# Patient Record
Sex: FEMALE | Race: WHITE | NOT HISPANIC OR LATINO | URBAN - METROPOLITAN AREA
[De-identification: names, ages, dates, MRNs, and addresses within clinical notes are randomized per-mention and may not be internally consistent; named-entity substitution may affect disease eponyms.]

---

## 2017-03-30 ENCOUNTER — OUTPATIENT (OUTPATIENT)
Dept: OUTPATIENT SERVICES | Facility: HOSPITAL | Age: 82
LOS: 1 days | Discharge: HOME | End: 2017-03-30

## 2017-06-27 DIAGNOSIS — R91.1 SOLITARY PULMONARY NODULE: ICD-10-CM

## 2017-07-10 ENCOUNTER — OUTPATIENT (OUTPATIENT)
Dept: OUTPATIENT SERVICES | Facility: HOSPITAL | Age: 82
LOS: 1 days | Discharge: HOME | End: 2017-07-10

## 2017-07-10 DIAGNOSIS — R91.8 OTHER NONSPECIFIC ABNORMAL FINDING OF LUNG FIELD: ICD-10-CM

## 2017-10-06 PROBLEM — Z00.00 ENCOUNTER FOR PREVENTIVE HEALTH EXAMINATION: Status: ACTIVE | Noted: 2017-10-06

## 2017-11-28 ENCOUNTER — OUTPATIENT (OUTPATIENT)
Dept: OUTPATIENT SERVICES | Facility: HOSPITAL | Age: 82
LOS: 1 days | Discharge: HOME | End: 2017-11-28

## 2017-11-28 DIAGNOSIS — R91.8 OTHER NONSPECIFIC ABNORMAL FINDING OF LUNG FIELD: ICD-10-CM

## 2017-11-29 ENCOUNTER — APPOINTMENT (OUTPATIENT)
Dept: CARDIOLOGY | Facility: CLINIC | Age: 82
End: 2017-11-29

## 2017-11-29 VITALS — WEIGHT: 116 LBS | HEIGHT: 59 IN | BODY MASS INDEX: 23.39 KG/M2

## 2017-11-29 VITALS — SYSTOLIC BLOOD PRESSURE: 150 MMHG | HEART RATE: 78 BPM | DIASTOLIC BLOOD PRESSURE: 80 MMHG

## 2017-11-29 RX ORDER — CHOLECALCIFEROL (VITAMIN D3) 50 MCG
2000 CAPSULE ORAL
Refills: 0 | Status: ACTIVE | COMMUNITY

## 2017-11-29 RX ORDER — MULTIVIT-MIN/FA/LYCOPEN/LUTEIN .4-300-25
TABLET ORAL
Refills: 0 | Status: ACTIVE | COMMUNITY

## 2017-11-29 RX ORDER — ZOLPIDEM TARTRATE 10 MG/1
10 TABLET, FILM COATED ORAL
Refills: 0 | Status: ACTIVE | COMMUNITY

## 2017-12-14 ENCOUNTER — APPOINTMENT (OUTPATIENT)
Dept: CARDIOLOGY | Facility: CLINIC | Age: 82
End: 2017-12-14

## 2018-02-15 ENCOUNTER — OUTPATIENT (OUTPATIENT)
Dept: OUTPATIENT SERVICES | Facility: HOSPITAL | Age: 83
LOS: 1 days | Discharge: HOME | End: 2018-02-15

## 2018-02-15 DIAGNOSIS — R06.02 SHORTNESS OF BREATH: ICD-10-CM

## 2018-02-15 DIAGNOSIS — J44.9 CHRONIC OBSTRUCTIVE PULMONARY DISEASE, UNSPECIFIED: ICD-10-CM

## 2018-02-15 RX ORDER — REGADENOSON 0.08 MG/ML
0.4 INJECTION, SOLUTION INTRAVENOUS ONCE
Qty: 0 | Refills: 0 | Status: DISCONTINUED | OUTPATIENT
Start: 2018-02-15 | End: 2018-03-02

## 2018-04-05 ENCOUNTER — APPOINTMENT (OUTPATIENT)
Dept: CARDIOLOGY | Facility: CLINIC | Age: 83
End: 2018-04-05

## 2018-04-05 VITALS
BODY MASS INDEX: 22.78 KG/M2 | WEIGHT: 113 LBS | SYSTOLIC BLOOD PRESSURE: 130 MMHG | HEART RATE: 71 BPM | DIASTOLIC BLOOD PRESSURE: 74 MMHG | HEIGHT: 59 IN

## 2018-04-05 RX ORDER — PANTOPRAZOLE 20 MG/1
20 TABLET, DELAYED RELEASE ORAL DAILY
Refills: 0 | Status: DISCONTINUED | COMMUNITY
End: 2018-04-05

## 2018-04-18 ENCOUNTER — OUTPATIENT (OUTPATIENT)
Dept: OUTPATIENT SERVICES | Facility: HOSPITAL | Age: 83
LOS: 1 days | Discharge: HOME | End: 2018-04-18

## 2018-04-18 DIAGNOSIS — Z12.31 ENCOUNTER FOR SCREENING MAMMOGRAM FOR MALIGNANT NEOPLASM OF BREAST: ICD-10-CM

## 2018-04-19 ENCOUNTER — OUTPATIENT (OUTPATIENT)
Dept: OUTPATIENT SERVICES | Facility: HOSPITAL | Age: 83
LOS: 1 days | Discharge: HOME | End: 2018-04-19

## 2018-04-19 DIAGNOSIS — R92.8 OTHER ABNORMAL AND INCONCLUSIVE FINDINGS ON DIAGNOSTIC IMAGING OF BREAST: ICD-10-CM

## 2018-05-01 ENCOUNTER — OUTPATIENT (OUTPATIENT)
Dept: OUTPATIENT SERVICES | Facility: HOSPITAL | Age: 83
LOS: 1 days | Discharge: HOME | End: 2018-05-01

## 2018-05-01 DIAGNOSIS — R91.8 OTHER NONSPECIFIC ABNORMAL FINDING OF LUNG FIELD: ICD-10-CM

## 2018-06-25 ENCOUNTER — OUTPATIENT (OUTPATIENT)
Dept: OUTPATIENT SERVICES | Facility: HOSPITAL | Age: 83
LOS: 1 days | Discharge: HOME | End: 2018-06-25

## 2018-06-25 ENCOUNTER — RESULT REVIEW (OUTPATIENT)
Age: 83
End: 2018-06-25

## 2018-06-25 VITALS
HEART RATE: 86 BPM | SYSTOLIC BLOOD PRESSURE: 135 MMHG | WEIGHT: 110.89 LBS | TEMPERATURE: 98 F | DIASTOLIC BLOOD PRESSURE: 76 MMHG | RESPIRATION RATE: 18 BRPM | HEIGHT: 58 IN

## 2018-06-25 VITALS
SYSTOLIC BLOOD PRESSURE: 152 MMHG | DIASTOLIC BLOOD PRESSURE: 63 MMHG | HEART RATE: 80 BPM | OXYGEN SATURATION: 98 % | RESPIRATION RATE: 18 BRPM

## 2018-06-25 DIAGNOSIS — Z98.890 OTHER SPECIFIED POSTPROCEDURAL STATES: Chronic | ICD-10-CM

## 2018-06-25 DIAGNOSIS — K63.5 POLYP OF COLON: ICD-10-CM

## 2018-06-25 DIAGNOSIS — Z90.89 ACQUIRED ABSENCE OF OTHER ORGANS: Chronic | ICD-10-CM

## 2018-06-25 NOTE — ASU PATIENT PROFILE, ADULT - PMH
Anxiety    COPD (chronic obstructive pulmonary disease)    GERD (gastroesophageal reflux disease)    Glaucoma

## 2018-06-25 NOTE — ASU DISCHARGE PLAN (ADULT/PEDIATRIC). - NOTIFY
Excessive Diarrhea/Inability to Tolerate Liquids or Foods/Persistent Nausea and Vomiting/Fever greater than 101/Pain not relieved by Medications/Bleeding that does not stop

## 2018-06-26 LAB — SURGICAL PATHOLOGY STUDY: SIGNIFICANT CHANGE UP

## 2018-06-27 DIAGNOSIS — K63.5 POLYP OF COLON: ICD-10-CM

## 2018-06-27 DIAGNOSIS — H40.9 UNSPECIFIED GLAUCOMA: ICD-10-CM

## 2018-06-27 DIAGNOSIS — D12.3 BENIGN NEOPLASM OF TRANSVERSE COLON: ICD-10-CM

## 2018-06-27 DIAGNOSIS — J44.9 CHRONIC OBSTRUCTIVE PULMONARY DISEASE, UNSPECIFIED: ICD-10-CM

## 2018-06-27 DIAGNOSIS — F41.9 ANXIETY DISORDER, UNSPECIFIED: ICD-10-CM

## 2018-06-27 DIAGNOSIS — K21.9 GASTRO-ESOPHAGEAL REFLUX DISEASE WITHOUT ESOPHAGITIS: ICD-10-CM

## 2018-06-27 DIAGNOSIS — Z09 ENCOUNTER FOR FOLLOW-UP EXAMINATION AFTER COMPLETED TREATMENT FOR CONDITIONS OTHER THAN MALIGNANT NEOPLASM: ICD-10-CM

## 2018-10-11 PROBLEM — J44.9 CHRONIC OBSTRUCTIVE PULMONARY DISEASE, UNSPECIFIED: Chronic | Status: ACTIVE | Noted: 2018-06-25

## 2018-10-11 PROBLEM — K21.9 GASTRO-ESOPHAGEAL REFLUX DISEASE WITHOUT ESOPHAGITIS: Chronic | Status: ACTIVE | Noted: 2018-06-25

## 2018-10-11 PROBLEM — F41.9 ANXIETY DISORDER, UNSPECIFIED: Chronic | Status: ACTIVE | Noted: 2018-06-25

## 2018-10-11 PROBLEM — H40.9 UNSPECIFIED GLAUCOMA: Chronic | Status: ACTIVE | Noted: 2018-06-25

## 2018-10-24 ENCOUNTER — OUTPATIENT (OUTPATIENT)
Dept: OUTPATIENT SERVICES | Facility: HOSPITAL | Age: 83
LOS: 1 days | Discharge: HOME | End: 2018-10-24

## 2018-10-24 DIAGNOSIS — Z98.890 OTHER SPECIFIED POSTPROCEDURAL STATES: Chronic | ICD-10-CM

## 2018-10-24 DIAGNOSIS — R92.8 OTHER ABNORMAL AND INCONCLUSIVE FINDINGS ON DIAGNOSTIC IMAGING OF BREAST: ICD-10-CM

## 2018-10-24 DIAGNOSIS — Z90.89 ACQUIRED ABSENCE OF OTHER ORGANS: Chronic | ICD-10-CM

## 2018-12-11 ENCOUNTER — APPOINTMENT (OUTPATIENT)
Dept: CARDIOLOGY | Facility: CLINIC | Age: 83
End: 2018-12-11

## 2018-12-12 ENCOUNTER — APPOINTMENT (OUTPATIENT)
Dept: CARDIOLOGY | Facility: CLINIC | Age: 83
End: 2018-12-12

## 2019-01-03 ENCOUNTER — APPOINTMENT (OUTPATIENT)
Dept: CARDIOLOGY | Facility: CLINIC | Age: 84
End: 2019-01-03

## 2019-01-03 VITALS
SYSTOLIC BLOOD PRESSURE: 150 MMHG | HEIGHT: 59 IN | BODY MASS INDEX: 22.98 KG/M2 | HEART RATE: 71 BPM | WEIGHT: 114 LBS | DIASTOLIC BLOOD PRESSURE: 80 MMHG

## 2019-01-03 RX ORDER — ESCITALOPRAM OXALATE 10 MG/1
10 TABLET ORAL
Qty: 90 | Refills: 0 | Status: DISCONTINUED | COMMUNITY
Start: 2017-12-09 | End: 2019-01-03

## 2019-01-03 NOTE — REVIEW OF SYSTEMS
[Shortness Of Breath] : shortness of breath [Joint Pain] : joint pain [Negative] : Psychiatric [Chest Pain] : no chest pain [Palpitations] : no palpitations

## 2019-01-03 NOTE — PHYSICAL EXAM
[General Appearance - Well Developed] : well developed [Normal Appearance] : normal appearance [Well Groomed] : well groomed [General Appearance - Well Nourished] : well nourished [No Deformities] : no deformities [General Appearance - In No Acute Distress] : no acute distress [Normal Conjunctiva] : the conjunctiva exhibited no abnormalities [Eyelids - No Xanthelasma] : the eyelids demonstrated no xanthelasmas [Normal Oral Mucosa] : normal oral mucosa [No Oral Pallor] : no oral pallor [No Oral Cyanosis] : no oral cyanosis [Respiration, Rhythm And Depth] : normal respiratory rhythm and effort [Exaggerated Use Of Accessory Muscles For Inspiration] : no accessory muscle use [Auscultation Breath Sounds / Voice Sounds] : lungs were clear to auscultation bilaterally [Abdomen Soft] : soft [Abdomen Tenderness] : non-tender [Abdomen Mass (___ Cm)] : no abdominal mass palpated [Abnormal Walk] : normal gait [Nail Clubbing] : no clubbing of the fingernails [Cyanosis, Localized] : no localized cyanosis [Petechial Hemorrhages (___cm)] : no petechial hemorrhages [Skin Color & Pigmentation] : normal skin color and pigmentation [] : no rash [No Venous Stasis] : no venous stasis [Skin Lesions] : no skin lesions [No Skin Ulcers] : no skin ulcer [No Xanthoma] : no  xanthoma was observed [Normal Rate] : normal [Rhythm Regular] : regular [No Murmur] : no murmurs heard [2+] : left 2+ [No Pitting Edema] : no pitting edema present [Rt] : varicose veins of the right leg noted [Lt] : varicose veins of the left leg noted [FreeTextEntry1] : No JVD

## 2019-01-03 NOTE — HISTORY OF PRESENT ILLNESS
[FreeTextEntry1] : The patient has COPD since 2000. She had stopped smoking more than 30 years ago. Overall she has no chest pain . She does have SOB with the slightest exertion.

## 2019-01-03 NOTE — ASSESSMENT
[FreeTextEntry1] : The patient has COPD . It is thought much of her SOB is from that .She has normal LV systolic function . She has mild MR . No chest pain and Lexiscan stress test was negative for ischemia less than 2 years ago.

## 2019-01-23 ENCOUNTER — OUTPATIENT (OUTPATIENT)
Dept: OUTPATIENT SERVICES | Facility: HOSPITAL | Age: 84
LOS: 1 days | Discharge: HOME | End: 2019-01-23

## 2019-01-23 DIAGNOSIS — Z90.89 ACQUIRED ABSENCE OF OTHER ORGANS: Chronic | ICD-10-CM

## 2019-01-23 DIAGNOSIS — Z98.890 OTHER SPECIFIED POSTPROCEDURAL STATES: Chronic | ICD-10-CM

## 2019-01-23 DIAGNOSIS — M25.511 PAIN IN RIGHT SHOULDER: ICD-10-CM

## 2019-05-01 ENCOUNTER — OUTPATIENT (OUTPATIENT)
Dept: OUTPATIENT SERVICES | Facility: HOSPITAL | Age: 84
LOS: 1 days | Discharge: HOME | End: 2019-05-01
Payer: MEDICARE

## 2019-05-01 DIAGNOSIS — Z98.890 OTHER SPECIFIED POSTPROCEDURAL STATES: Chronic | ICD-10-CM

## 2019-05-01 DIAGNOSIS — R92.8 OTHER ABNORMAL AND INCONCLUSIVE FINDINGS ON DIAGNOSTIC IMAGING OF BREAST: ICD-10-CM

## 2019-05-01 DIAGNOSIS — Z90.89 ACQUIRED ABSENCE OF OTHER ORGANS: Chronic | ICD-10-CM

## 2019-05-01 PROCEDURE — 77066 DX MAMMO INCL CAD BI: CPT | Mod: 26

## 2019-05-01 PROCEDURE — G0279: CPT | Mod: 26

## 2019-05-08 ENCOUNTER — OUTPATIENT (OUTPATIENT)
Dept: OUTPATIENT SERVICES | Facility: HOSPITAL | Age: 84
LOS: 1 days | Discharge: HOME | End: 2019-05-08
Payer: MEDICARE

## 2019-05-08 DIAGNOSIS — Z98.890 OTHER SPECIFIED POSTPROCEDURAL STATES: Chronic | ICD-10-CM

## 2019-05-08 DIAGNOSIS — R91.8 OTHER NONSPECIFIC ABNORMAL FINDING OF LUNG FIELD: ICD-10-CM

## 2019-05-08 DIAGNOSIS — J44.9 CHRONIC OBSTRUCTIVE PULMONARY DISEASE, UNSPECIFIED: ICD-10-CM

## 2019-05-08 DIAGNOSIS — Z90.89 ACQUIRED ABSENCE OF OTHER ORGANS: Chronic | ICD-10-CM

## 2019-05-08 PROCEDURE — 71250 CT THORAX DX C-: CPT | Mod: 26

## 2019-07-09 ENCOUNTER — APPOINTMENT (OUTPATIENT)
Dept: CARDIOLOGY | Facility: CLINIC | Age: 84
End: 2019-07-09
Payer: MEDICARE

## 2019-07-09 VITALS
SYSTOLIC BLOOD PRESSURE: 130 MMHG | BODY MASS INDEX: 22.18 KG/M2 | HEIGHT: 59 IN | WEIGHT: 110 LBS | DIASTOLIC BLOOD PRESSURE: 70 MMHG | HEART RATE: 76 BPM

## 2019-07-09 PROCEDURE — 93000 ELECTROCARDIOGRAM COMPLETE: CPT

## 2019-07-09 PROCEDURE — 99214 OFFICE O/P EST MOD 30 MIN: CPT

## 2019-07-09 NOTE — ASSESSMENT
[FreeTextEntry1] : The patient has COPD . It is thought much of her SOB is from that .She has normal LV systolic function . She has mild MR . No chest pain and Lexiscan stress test was negative for ischemia l 2 years ago.   The patient has severe COPD

## 2019-07-09 NOTE — PHYSICAL EXAM
[General Appearance - Well Developed] : well developed [Well Groomed] : well groomed [Normal Appearance] : normal appearance [No Deformities] : no deformities [General Appearance - Well Nourished] : well nourished [General Appearance - In No Acute Distress] : no acute distress [Normal Conjunctiva] : the conjunctiva exhibited no abnormalities [Normal Oral Mucosa] : normal oral mucosa [Eyelids - No Xanthelasma] : the eyelids demonstrated no xanthelasmas [No Oral Pallor] : no oral pallor [No Oral Cyanosis] : no oral cyanosis [Respiration, Rhythm And Depth] : normal respiratory rhythm and effort [Exaggerated Use Of Accessory Muscles For Inspiration] : no accessory muscle use [Auscultation Breath Sounds / Voice Sounds] : lungs were clear to auscultation bilaterally [Abdomen Soft] : soft [Abdomen Tenderness] : non-tender [Abdomen Mass (___ Cm)] : no abdominal mass palpated [Abnormal Walk] : normal gait [Nail Clubbing] : no clubbing of the fingernails [Petechial Hemorrhages (___cm)] : no petechial hemorrhages [Cyanosis, Localized] : no localized cyanosis [Skin Color & Pigmentation] : normal skin color and pigmentation [] : no rash [No Venous Stasis] : no venous stasis [No Skin Ulcers] : no skin ulcer [Skin Lesions] : no skin lesions [No Xanthoma] : no  xanthoma was observed [Normal Rate] : normal [Rhythm Regular] : regular [No Murmur] : no murmurs heard [2+] : left 2+ [Rt] : varicose veins of the right leg noted [No Pitting Edema] : no pitting edema present [Lt] : varicose veins of the left leg noted [FreeTextEntry1] : No JVD

## 2019-09-04 ENCOUNTER — OUTPATIENT (OUTPATIENT)
Dept: OUTPATIENT SERVICES | Facility: HOSPITAL | Age: 84
LOS: 1 days | Discharge: HOME | End: 2019-09-04
Payer: MEDICARE

## 2019-09-04 DIAGNOSIS — Z90.89 ACQUIRED ABSENCE OF OTHER ORGANS: Chronic | ICD-10-CM

## 2019-09-04 DIAGNOSIS — R91.8 OTHER NONSPECIFIC ABNORMAL FINDING OF LUNG FIELD: ICD-10-CM

## 2019-09-04 DIAGNOSIS — Z98.890 OTHER SPECIFIED POSTPROCEDURAL STATES: Chronic | ICD-10-CM

## 2019-09-04 PROCEDURE — 71250 CT THORAX DX C-: CPT | Mod: 26

## 2019-10-10 ENCOUNTER — OUTPATIENT (OUTPATIENT)
Dept: OUTPATIENT SERVICES | Facility: HOSPITAL | Age: 84
LOS: 1 days | Discharge: HOME | End: 2019-10-10

## 2019-10-10 DIAGNOSIS — R79.9 ABNORMAL FINDING OF BLOOD CHEMISTRY, UNSPECIFIED: ICD-10-CM

## 2019-10-10 DIAGNOSIS — Z90.89 ACQUIRED ABSENCE OF OTHER ORGANS: Chronic | ICD-10-CM

## 2019-10-10 DIAGNOSIS — Z98.890 OTHER SPECIFIED POSTPROCEDURAL STATES: Chronic | ICD-10-CM

## 2019-10-12 ENCOUNTER — OUTPATIENT (OUTPATIENT)
Dept: OUTPATIENT SERVICES | Facility: HOSPITAL | Age: 84
LOS: 1 days | Discharge: HOME | End: 2019-10-12

## 2019-10-12 DIAGNOSIS — R79.9 ABNORMAL FINDING OF BLOOD CHEMISTRY, UNSPECIFIED: ICD-10-CM

## 2019-10-12 DIAGNOSIS — Z90.89 ACQUIRED ABSENCE OF OTHER ORGANS: Chronic | ICD-10-CM

## 2019-10-12 DIAGNOSIS — Z98.890 OTHER SPECIFIED POSTPROCEDURAL STATES: Chronic | ICD-10-CM

## 2019-10-17 ENCOUNTER — OUTPATIENT (OUTPATIENT)
Dept: OUTPATIENT SERVICES | Facility: HOSPITAL | Age: 84
LOS: 1 days | Discharge: HOME | End: 2019-10-17

## 2019-10-17 DIAGNOSIS — Z98.890 OTHER SPECIFIED POSTPROCEDURAL STATES: Chronic | ICD-10-CM

## 2019-10-17 DIAGNOSIS — R79.9 ABNORMAL FINDING OF BLOOD CHEMISTRY, UNSPECIFIED: ICD-10-CM

## 2019-10-17 DIAGNOSIS — Z90.89 ACQUIRED ABSENCE OF OTHER ORGANS: Chronic | ICD-10-CM

## 2019-12-04 ENCOUNTER — OUTPATIENT (OUTPATIENT)
Dept: OUTPATIENT SERVICES | Facility: HOSPITAL | Age: 84
LOS: 1 days | Discharge: HOME | End: 2019-12-04
Payer: MEDICARE

## 2019-12-04 DIAGNOSIS — Z98.890 OTHER SPECIFIED POSTPROCEDURAL STATES: Chronic | ICD-10-CM

## 2019-12-04 DIAGNOSIS — Z90.89 ACQUIRED ABSENCE OF OTHER ORGANS: Chronic | ICD-10-CM

## 2019-12-04 DIAGNOSIS — R92.8 OTHER ABNORMAL AND INCONCLUSIVE FINDINGS ON DIAGNOSTIC IMAGING OF BREAST: ICD-10-CM

## 2019-12-04 PROCEDURE — G0279: CPT | Mod: 26,LT

## 2019-12-04 PROCEDURE — 77065 DX MAMMO INCL CAD UNI: CPT | Mod: 26,LT

## 2019-12-05 DIAGNOSIS — Z13.820 ENCOUNTER FOR SCREENING FOR OSTEOPOROSIS: ICD-10-CM

## 2019-12-05 DIAGNOSIS — Z78.0 ASYMPTOMATIC MENOPAUSAL STATE: ICD-10-CM

## 2019-12-05 DIAGNOSIS — M81.0 AGE-RELATED OSTEOPOROSIS WITHOUT CURRENT PATHOLOGICAL FRACTURE: ICD-10-CM

## 2019-12-13 ENCOUNTER — OUTPATIENT (OUTPATIENT)
Dept: OUTPATIENT SERVICES | Facility: HOSPITAL | Age: 84
LOS: 1 days | Discharge: HOME | End: 2019-12-13
Payer: MEDICARE

## 2019-12-13 DIAGNOSIS — Z90.89 ACQUIRED ABSENCE OF OTHER ORGANS: Chronic | ICD-10-CM

## 2019-12-13 DIAGNOSIS — R91.1 SOLITARY PULMONARY NODULE: ICD-10-CM

## 2019-12-13 DIAGNOSIS — Z98.890 OTHER SPECIFIED POSTPROCEDURAL STATES: Chronic | ICD-10-CM

## 2019-12-13 PROCEDURE — 71250 CT THORAX DX C-: CPT | Mod: 26

## 2020-01-09 ENCOUNTER — APPOINTMENT (OUTPATIENT)
Dept: CARDIOLOGY | Facility: CLINIC | Age: 85
End: 2020-01-09
Payer: MEDICARE

## 2020-01-09 VITALS
BODY MASS INDEX: 20.96 KG/M2 | HEART RATE: 88 BPM | WEIGHT: 104 LBS | HEIGHT: 59 IN | DIASTOLIC BLOOD PRESSURE: 80 MMHG | SYSTOLIC BLOOD PRESSURE: 118 MMHG

## 2020-01-09 PROCEDURE — 99214 OFFICE O/P EST MOD 30 MIN: CPT

## 2020-01-09 PROCEDURE — 93000 ELECTROCARDIOGRAM COMPLETE: CPT

## 2020-01-09 RX ORDER — ASCORBIC ACID 500 MG
TABLET ORAL
Refills: 0 | Status: ACTIVE | COMMUNITY

## 2020-01-09 NOTE — REVIEW OF SYSTEMS
[Shortness Of Breath] : shortness of breath [Chest Pain] : no chest pain [Palpitations] : no palpitations [Joint Pain] : joint pain [Negative] : Psychiatric

## 2020-01-09 NOTE — PHYSICAL EXAM
[Normal Appearance] : normal appearance [General Appearance - Well Developed] : well developed [General Appearance - Well Nourished] : well nourished [No Deformities] : no deformities [Well Groomed] : well groomed [Normal Conjunctiva] : the conjunctiva exhibited no abnormalities [Eyelids - No Xanthelasma] : the eyelids demonstrated no xanthelasmas [General Appearance - In No Acute Distress] : no acute distress [No Oral Pallor] : no oral pallor [Normal Oral Mucosa] : normal oral mucosa [No Oral Cyanosis] : no oral cyanosis [FreeTextEntry1] : No JVD  [Exaggerated Use Of Accessory Muscles For Inspiration] : no accessory muscle use [Respiration, Rhythm And Depth] : normal respiratory rhythm and effort [Abdomen Tenderness] : non-tender [Abdomen Soft] : soft [Auscultation Breath Sounds / Voice Sounds] : lungs were clear to auscultation bilaterally [Abnormal Walk] : normal gait [Abdomen Mass (___ Cm)] : no abdominal mass palpated [Petechial Hemorrhages (___cm)] : no petechial hemorrhages [Cyanosis, Localized] : no localized cyanosis [Nail Clubbing] : no clubbing of the fingernails [] : no rash [Skin Color & Pigmentation] : normal skin color and pigmentation [No Venous Stasis] : no venous stasis [Skin Lesions] : no skin lesions [No Skin Ulcers] : no skin ulcer [Normal Rate] : normal [No Xanthoma] : no  xanthoma was observed [No Murmur] : no murmurs heard [Rhythm Regular] : regular [2+] : left 2+ [Rt] : varicose veins of the right leg noted [No Pitting Edema] : no pitting edema present [Lt] : varicose veins of the left leg noted

## 2020-01-09 NOTE — HISTORY OF PRESENT ILLNESS
[FreeTextEntry1] : The patient was admitted to [facility] with PNA . Hosptial course was complicated by a rapid heart rhythm . She is now improved. Being followed by Dr. de la torre .

## 2020-01-09 NOTE — ASSESSMENT
[FreeTextEntry1] : The patient has been admitted to [facility] with PNA . Said to have had a "rapid rhythm . The patient has had no chest pain . Ucertain what work up she has had from [facility] . SHe has changes on her CT scan of her chest , possible postinflammatory . She is following with Dr. de la torre . Nulcear stress test was negative for ischemia less than 2 years ago .

## 2020-01-10 ENCOUNTER — OTHER (OUTPATIENT)
Age: 85
End: 2020-01-10

## 2020-01-29 ENCOUNTER — APPOINTMENT (OUTPATIENT)
Dept: CARDIOLOGY | Facility: CLINIC | Age: 85
End: 2020-01-29
Payer: MEDICARE

## 2020-01-29 PROCEDURE — 93306 TTE W/DOPPLER COMPLETE: CPT

## 2020-05-14 ENCOUNTER — APPOINTMENT (OUTPATIENT)
Dept: CARDIOLOGY | Facility: CLINIC | Age: 85
End: 2020-05-14
Payer: MEDICARE

## 2020-05-14 PROCEDURE — 99442: CPT | Mod: CR

## 2020-05-14 RX ORDER — ESCITALOPRAM OXALATE 10 MG/1
10 TABLET ORAL DAILY
Refills: 0 | Status: ACTIVE | COMMUNITY
Start: 2020-05-14

## 2020-05-14 NOTE — HISTORY OF PRESENT ILLNESS
[Verbal consent obtained from patient] : the patient, [unfilled] [FreeTextEntry1] : The patient has her baseline KONG . She has been active before the Covid crisis . She has not had another  admission and she had recovered from her PNA . No chest pain .  The patient had an echocardiogram in 1-2020 which showed Normal LV systolic function mod to severe TR RVSP was 51 mmhg . There is no edema according to the patient .  I have reviewed her medications .  Chol 170 HDL 60 LDL 96 [Time Spent: ___ minutes] : I have spent [unfilled] minutes with the patient on the telephone

## 2020-05-18 ENCOUNTER — OUTPATIENT (OUTPATIENT)
Dept: OUTPATIENT SERVICES | Facility: HOSPITAL | Age: 85
LOS: 1 days | Discharge: HOME | End: 2020-05-18
Payer: MEDICARE

## 2020-05-18 ENCOUNTER — RESULT REVIEW (OUTPATIENT)
Age: 85
End: 2020-05-18

## 2020-05-18 DIAGNOSIS — R91.8 OTHER NONSPECIFIC ABNORMAL FINDING OF LUNG FIELD: ICD-10-CM

## 2020-05-18 DIAGNOSIS — Z98.890 OTHER SPECIFIED POSTPROCEDURAL STATES: Chronic | ICD-10-CM

## 2020-05-18 DIAGNOSIS — Z90.89 ACQUIRED ABSENCE OF OTHER ORGANS: Chronic | ICD-10-CM

## 2020-05-18 PROCEDURE — 71250 CT THORAX DX C-: CPT | Mod: 26

## 2020-06-08 ENCOUNTER — OUTPATIENT (OUTPATIENT)
Dept: OUTPATIENT SERVICES | Facility: HOSPITAL | Age: 85
LOS: 1 days | Discharge: HOME | End: 2020-06-08
Payer: MEDICARE

## 2020-06-08 DIAGNOSIS — R92.8 OTHER ABNORMAL AND INCONCLUSIVE FINDINGS ON DIAGNOSTIC IMAGING OF BREAST: ICD-10-CM

## 2020-06-08 DIAGNOSIS — Z98.890 OTHER SPECIFIED POSTPROCEDURAL STATES: Chronic | ICD-10-CM

## 2020-06-08 DIAGNOSIS — Z90.89 ACQUIRED ABSENCE OF OTHER ORGANS: Chronic | ICD-10-CM

## 2020-06-08 PROCEDURE — G0279: CPT | Mod: 26

## 2020-06-08 PROCEDURE — 77066 DX MAMMO INCL CAD BI: CPT | Mod: 26

## 2020-09-03 NOTE — PACU DISCHARGE NOTE - MENTAL STATUS: PATIENT PARTICIPATION
"Anesthesia Transfer of Care Note    Patient: Fifi Mcnair    Procedure(s) Performed: Procedure(s) (LRB):  PHACOEMULSIFICATION, CATARACT (Left)    Patient location: PACU    Anesthesia Type: MAC    Transport from OR: Transported from OR on room air with adequate spontaneous ventilation    Post pain: adequate analgesia    Post assessment: no apparent anesthetic complications    Post vital signs: stable    Level of consciousness: awake    Nausea/Vomiting: no nausea/vomiting    Complications: none    Transfer of care protocol was followed      Last vitals:   Visit Vitals  BP (!) 123/59 (BP Location: Right arm, Patient Position: Lying)   Pulse 70   Temp 36.5 °C (97.7 °F) (Skin)   Resp 18   Ht 5' 2" (1.575 m)   Wt 117.9 kg (260 lb)   LMP  (LMP Unknown)   SpO2 99%   Breastfeeding No   BMI 47.55 kg/m²     " Awake

## 2020-09-16 ENCOUNTER — APPOINTMENT (OUTPATIENT)
Dept: CARDIOLOGY | Facility: CLINIC | Age: 85
End: 2020-09-16
Payer: MEDICARE

## 2020-09-16 VITALS
WEIGHT: 110 LBS | DIASTOLIC BLOOD PRESSURE: 80 MMHG | HEART RATE: 78 BPM | SYSTOLIC BLOOD PRESSURE: 130 MMHG | BODY MASS INDEX: 22.18 KG/M2 | TEMPERATURE: 96.7 F | HEIGHT: 59 IN

## 2020-09-16 PROCEDURE — 99214 OFFICE O/P EST MOD 30 MIN: CPT

## 2020-09-16 PROCEDURE — 93000 ELECTROCARDIOGRAM COMPLETE: CPT

## 2020-09-16 NOTE — PHYSICAL EXAM
[General Appearance - Well Developed] : well developed [Normal Appearance] : normal appearance [General Appearance - Well Nourished] : well nourished [Well Groomed] : well groomed [General Appearance - In No Acute Distress] : no acute distress [No Deformities] : no deformities [Normal Conjunctiva] : the conjunctiva exhibited no abnormalities [Eyelids - No Xanthelasma] : the eyelids demonstrated no xanthelasmas [Normal Oral Mucosa] : normal oral mucosa [No Oral Cyanosis] : no oral cyanosis [No Oral Pallor] : no oral pallor [Exaggerated Use Of Accessory Muscles For Inspiration] : no accessory muscle use [Respiration, Rhythm And Depth] : normal respiratory rhythm and effort [Auscultation Breath Sounds / Voice Sounds] : lungs were clear to auscultation bilaterally [Abdomen Soft] : soft [Abdomen Tenderness] : non-tender [Nail Clubbing] : no clubbing of the fingernails [Abnormal Walk] : normal gait [Abdomen Mass (___ Cm)] : no abdominal mass palpated [Cyanosis, Localized] : no localized cyanosis [Petechial Hemorrhages (___cm)] : no petechial hemorrhages [] : no rash [Skin Lesions] : no skin lesions [No Venous Stasis] : no venous stasis [Skin Color & Pigmentation] : normal skin color and pigmentation [Normal Rate] : normal [No Xanthoma] : no  xanthoma was observed [No Skin Ulcers] : no skin ulcer [Rhythm Regular] : regular [No Murmur] : no murmurs heard [Lt] : varicose veins of the left leg noted [No Pitting Edema] : no pitting edema present [Rt] : varicose veins of the right leg noted [2+] : left 2+ [FreeTextEntry1] : No JVD

## 2020-09-16 NOTE — PHYSICAL EXAM
[General Appearance - Well Developed] : well developed [Normal Appearance] : normal appearance [Well Groomed] : well groomed [General Appearance - Well Nourished] : well nourished [General Appearance - In No Acute Distress] : no acute distress [No Deformities] : no deformities [Normal Conjunctiva] : the conjunctiva exhibited no abnormalities [Normal Oral Mucosa] : normal oral mucosa [Eyelids - No Xanthelasma] : the eyelids demonstrated no xanthelasmas [No Oral Cyanosis] : no oral cyanosis [No Oral Pallor] : no oral pallor [Exaggerated Use Of Accessory Muscles For Inspiration] : no accessory muscle use [Respiration, Rhythm And Depth] : normal respiratory rhythm and effort [Auscultation Breath Sounds / Voice Sounds] : lungs were clear to auscultation bilaterally [Abdomen Soft] : soft [Abdomen Tenderness] : non-tender [Abnormal Walk] : normal gait [Abdomen Mass (___ Cm)] : no abdominal mass palpated [Nail Clubbing] : no clubbing of the fingernails [Cyanosis, Localized] : no localized cyanosis [Petechial Hemorrhages (___cm)] : no petechial hemorrhages [Skin Lesions] : no skin lesions [Skin Color & Pigmentation] : normal skin color and pigmentation [] : no rash [No Venous Stasis] : no venous stasis [Normal Rate] : normal [No Skin Ulcers] : no skin ulcer [No Xanthoma] : no  xanthoma was observed [No Murmur] : no murmurs heard [Rhythm Regular] : regular [Lt] : varicose veins of the left leg noted [Rt] : varicose veins of the right leg noted [No Pitting Edema] : no pitting edema present [2+] : left 2+ [FreeTextEntry1] : No JVD

## 2020-09-16 NOTE — ASSESSMENT
[FreeTextEntry1] : The patient has had her baseline KONG . She has not been sick during the pandemic . She does have some degree of pulmonary HTN . This may be from her lung issues . She does have moderate to severe TR . No clinical signs however of right sided HF . .

## 2020-09-16 NOTE — HISTORY OF PRESENT ILLNESS
[FreeTextEntry1] : The patient has been feeling well. Some KONG but is not changed . The patient overall feels well. . she has not been sick during pandemic

## 2020-11-17 ENCOUNTER — OUTPATIENT (OUTPATIENT)
Dept: OUTPATIENT SERVICES | Facility: HOSPITAL | Age: 85
LOS: 1 days | Discharge: HOME | End: 2020-11-17
Payer: MEDICARE

## 2020-11-17 ENCOUNTER — RESULT REVIEW (OUTPATIENT)
Age: 85
End: 2020-11-17

## 2020-11-17 DIAGNOSIS — Z98.890 OTHER SPECIFIED POSTPROCEDURAL STATES: Chronic | ICD-10-CM

## 2020-11-17 DIAGNOSIS — Z90.89 ACQUIRED ABSENCE OF OTHER ORGANS: Chronic | ICD-10-CM

## 2020-11-17 DIAGNOSIS — J44.9 CHRONIC OBSTRUCTIVE PULMONARY DISEASE, UNSPECIFIED: ICD-10-CM

## 2020-11-17 PROCEDURE — 71250 CT THORAX DX C-: CPT | Mod: 26

## 2020-11-28 ENCOUNTER — EMERGENCY (EMERGENCY)
Facility: HOSPITAL | Age: 85
LOS: 0 days | Discharge: HOME | End: 2020-11-28
Attending: STUDENT IN AN ORGANIZED HEALTH CARE EDUCATION/TRAINING PROGRAM | Admitting: STUDENT IN AN ORGANIZED HEALTH CARE EDUCATION/TRAINING PROGRAM
Payer: MEDICARE

## 2020-11-28 VITALS
OXYGEN SATURATION: 95 % | DIASTOLIC BLOOD PRESSURE: 85 MMHG | HEIGHT: 58 IN | TEMPERATURE: 98 F | HEART RATE: 100 BPM | SYSTOLIC BLOOD PRESSURE: 182 MMHG | RESPIRATION RATE: 19 BRPM

## 2020-11-28 VITALS — HEART RATE: 88 BPM

## 2020-11-28 DIAGNOSIS — Z90.89 ACQUIRED ABSENCE OF OTHER ORGANS: Chronic | ICD-10-CM

## 2020-11-28 DIAGNOSIS — R10.32 LEFT LOWER QUADRANT PAIN: ICD-10-CM

## 2020-11-28 DIAGNOSIS — Z98.890 OTHER SPECIFIED POSTPROCEDURAL STATES: Chronic | ICD-10-CM
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
## 2020-11-28 DIAGNOSIS — Z87.891 PERSONAL HISTORY OF NICOTINE DEPENDENCE: ICD-10-CM

## 2020-11-28 DIAGNOSIS — Z79.899 OTHER LONG TERM (CURRENT) DRUG THERAPY: ICD-10-CM
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
## 2020-11-28 DIAGNOSIS — J44.9 CHRONIC OBSTRUCTIVE PULMONARY DISEASE, UNSPECIFIED: ICD-10-CM
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
## 2020-11-28 DIAGNOSIS — M25.562 PAIN IN LEFT KNEE: ICD-10-CM

## 2020-11-28 DIAGNOSIS — R10.13 EPIGASTRIC PAIN: ICD-10-CM

## 2020-11-28 DIAGNOSIS — M25.561 PAIN IN RIGHT KNEE: ICD-10-CM

## 2020-11-28 LAB
ALBUMIN SERPL ELPH-MCNC: 4 G/DL — SIGNIFICANT CHANGE UP (ref 3.5–5.2)
ALP SERPL-CCNC: 61 U/L — SIGNIFICANT CHANGE UP (ref 30–115)
ALT FLD-CCNC: 14 U/L — SIGNIFICANT CHANGE UP (ref 0–41)
ANION GAP SERPL CALC-SCNC: 11 MMOL/L — SIGNIFICANT CHANGE UP (ref 7–14)
APPEARANCE UR: CLEAR — SIGNIFICANT CHANGE UP
AST SERPL-CCNC: 20 U/L — SIGNIFICANT CHANGE UP (ref 0–41)
BASOPHILS # BLD AUTO: 0.04 K/UL — SIGNIFICANT CHANGE UP (ref 0–0.2)
BASOPHILS NFR BLD AUTO: 0.2 % — SIGNIFICANT CHANGE UP (ref 0–1)
BILIRUB DIRECT SERPL-MCNC: <0.2 MG/DL — SIGNIFICANT CHANGE UP (ref 0–0.2)
BILIRUB INDIRECT FLD-MCNC: >0.4 MG/DL — SIGNIFICANT CHANGE UP (ref 0.2–1.2)
BILIRUB SERPL-MCNC: 0.6 MG/DL — SIGNIFICANT CHANGE UP (ref 0.2–1.2)
BILIRUB UR-MCNC: NEGATIVE — SIGNIFICANT CHANGE UP
BLD GP AB SCN SERPL QL: SIGNIFICANT CHANGE UP
BUN SERPL-MCNC: 22 MG/DL — HIGH (ref 10–20)
CALCIUM SERPL-MCNC: 9.6 MG/DL — SIGNIFICANT CHANGE UP (ref 8.5–10.1)
CHLORIDE SERPL-SCNC: 104 MMOL/L — SIGNIFICANT CHANGE UP (ref 98–110)
CO2 SERPL-SCNC: 23 MMOL/L — SIGNIFICANT CHANGE UP (ref 17–32)
COLOR SPEC: YELLOW — SIGNIFICANT CHANGE UP
CREAT SERPL-MCNC: 0.9 MG/DL — SIGNIFICANT CHANGE UP (ref 0.7–1.5)
DIFF PNL FLD: SIGNIFICANT CHANGE UP
EOSINOPHIL # BLD AUTO: 0.11 K/UL — SIGNIFICANT CHANGE UP (ref 0–0.7)
EOSINOPHIL NFR BLD AUTO: 0.7 % — SIGNIFICANT CHANGE UP (ref 0–8)
GLUCOSE SERPL-MCNC: 115 MG/DL — HIGH (ref 70–99)
GLUCOSE UR QL: NEGATIVE — SIGNIFICANT CHANGE UP
HCT VFR BLD CALC: 41.9 % — SIGNIFICANT CHANGE UP (ref 37–47)
HGB BLD-MCNC: 13.8 G/DL — SIGNIFICANT CHANGE UP (ref 12–16)
IMM GRANULOCYTES NFR BLD AUTO: 0.2 % — SIGNIFICANT CHANGE UP (ref 0.1–0.3)
KETONES UR-MCNC: SIGNIFICANT CHANGE UP
LACTATE SERPL-SCNC: 1 MMOL/L — SIGNIFICANT CHANGE UP (ref 0.7–2)
LEUKOCYTE ESTERASE UR-ACNC: NEGATIVE — SIGNIFICANT CHANGE UP
LIDOCAIN IGE QN: 23 U/L — SIGNIFICANT CHANGE UP (ref 7–60)
LYMPHOCYTES # BLD AUTO: 1.05 K/UL — LOW (ref 1.2–3.4)
LYMPHOCYTES # BLD AUTO: 6.5 % — LOW (ref 20.5–51.1)
MCHC RBC-ENTMCNC: 32.3 PG — HIGH (ref 27–31)
MCHC RBC-ENTMCNC: 32.9 G/DL — SIGNIFICANT CHANGE UP (ref 32–37)
MCV RBC AUTO: 98.1 FL — SIGNIFICANT CHANGE UP (ref 81–99)
MONOCYTES # BLD AUTO: 1.17 K/UL — HIGH (ref 0.1–0.6)
MONOCYTES NFR BLD AUTO: 7.2 % — SIGNIFICANT CHANGE UP (ref 1.7–9.3)
NEUTROPHILS # BLD AUTO: 13.74 K/UL — HIGH (ref 1.4–6.5)
NEUTROPHILS NFR BLD AUTO: 85.2 % — HIGH (ref 42.2–75.2)
NITRITE UR-MCNC: NEGATIVE — SIGNIFICANT CHANGE UP
NRBC # BLD: 0 /100 WBCS — SIGNIFICANT CHANGE UP (ref 0–0)
PH UR: 5.5 — SIGNIFICANT CHANGE UP (ref 5–8)
PLATELET # BLD AUTO: 295 K/UL — SIGNIFICANT CHANGE UP (ref 130–400)
POTASSIUM SERPL-MCNC: 4.1 MMOL/L — SIGNIFICANT CHANGE UP (ref 3.5–5)
POTASSIUM SERPL-SCNC: 4.1 MMOL/L — SIGNIFICANT CHANGE UP (ref 3.5–5)
PROT SERPL-MCNC: 6.6 G/DL — SIGNIFICANT CHANGE UP (ref 6–8)
PROT UR-MCNC: SIGNIFICANT CHANGE UP
RBC # BLD: 4.27 M/UL — SIGNIFICANT CHANGE UP (ref 4.2–5.4)
RBC # FLD: 13.1 % — SIGNIFICANT CHANGE UP (ref 11.5–14.5)
SODIUM SERPL-SCNC: 138 MMOL/L — SIGNIFICANT CHANGE UP (ref 135–146)
SP GR SPEC: 1.02 — SIGNIFICANT CHANGE UP (ref 1.01–1.03)
TROPONIN T SERPL-MCNC: <0.01 NG/ML — SIGNIFICANT CHANGE UP
UROBILINOGEN FLD QL: SIGNIFICANT CHANGE UP
WBC # BLD: 16.15 K/UL — HIGH (ref 4.8–10.8)
WBC # FLD AUTO: 16.15 K/UL — HIGH (ref 4.8–10.8)

## 2020-11-28 PROCEDURE — 99285 EMERGENCY DEPT VISIT HI MDM: CPT | Mod: GC

## 2020-11-28 PROCEDURE — 71260 CT THORAX DX C+: CPT | Mod: 26

## 2020-11-28 PROCEDURE — 71045 X-RAY EXAM CHEST 1 VIEW: CPT | Mod: 26

## 2020-11-28 PROCEDURE — 74177 CT ABD & PELVIS W/CONTRAST: CPT | Mod: 26

## 2020-11-28 PROCEDURE — 73562 X-RAY EXAM OF KNEE 3: CPT | Mod: 26,50

## 2020-11-28 PROCEDURE — 93010 ELECTROCARDIOGRAM REPORT: CPT

## 2020-11-28 RX ORDER — ACETAMINOPHEN 500 MG
650 TABLET ORAL ONCE
Refills: 0 | Status: COMPLETED | OUTPATIENT
Start: 2020-11-28 | End: 2020-11-28

## 2020-11-28 RX ORDER — FAMOTIDINE 10 MG/ML
1 INJECTION INTRAVENOUS
Qty: 14 | Refills: 0
Start: 2020-11-28 | End: 2020-12-11

## 2020-11-28 RX ORDER — SODIUM CHLORIDE 9 MG/ML
1000 INJECTION, SOLUTION INTRAVENOUS ONCE
Refills: 0 | Status: COMPLETED | OUTPATIENT
Start: 2020-11-28 | End: 2020-11-28

## 2020-11-28 RX ADMIN — Medication 650 MILLIGRAM(S): at 16:55

## 2020-11-28 RX ADMIN — Medication 650 MILLIGRAM(S): at 13:11

## 2020-11-28 RX ADMIN — SODIUM CHLORIDE 1000 MILLILITER(S): 9 INJECTION, SOLUTION INTRAVENOUS at 13:11

## 2020-11-28 NOTE — ED PROVIDER NOTE - NSFOLLOWUPINSTRUCTIONS_ED_ALL_ED_FT
Please take pepcid 20 mg once daily, and follow-up with the gastroenterologist (GI) doctor to get further evaluation for your abdominal pain.     Abdominal Pain    Many things can cause abdominal pain. Usually, abdominal pain is not caused by a disease and will improve without treatment. Your health care provider will do a physical exam and possibly order blood tests and imaging to help determine the seriousness of your pain. However, in many cases, no cause may be found and you may need further testing as an outpatient. Monitor your abdominal pain for any changes.     SEEK IMMEDIATE MEDICAL CARE IF YOU HAVE THE FOLLOWING SYMPTOMS: worsening abdominal pain, vomiting, diarrhea, inability to have bowel movements or pass gas, black or bloody stool, fever accompanying chest pain or back pain, or dizziness/lightheadedness.

## 2020-11-28 NOTE — ED PROVIDER NOTE - PATIENT PORTAL LINK FT
You can access the FollowMyHealth Patient Portal offered by [facility] by registering at the following website: [email or web address]. By joining [employer]’s FollowMyHealth portal, you will also be able to view your health information using other applications (apps) compatible with our system.

## 2020-11-28 NOTE — ED PROVIDER NOTE - PROGRESS NOTE DETAILS
Discussed lab/CT results with Dr. Ravi, noted that she was comfortable with discharge and GI followup. Patient expressed understanding of results and agreement with outpatient plan.

## 2020-11-28 NOTE — ED ADULT NURSE NOTE - PMH
Anxiety    COPD (chronic obstructive pulmonary disease)    GERD (gastroesophageal reflux disease)    Glaucoma    Pancreatitis

## 2020-11-28 NOTE — ED PROVIDER NOTE - CLINICAL SUMMARY MEDICAL DECISION MAKING FREE TEXT BOX
86F with PMH COPD, pancreatitis who presents to [facility] from Dr. Van's office for concern regarding abd pain. Pain is epigastric and wraps horizontally around her belly, difficult to characterize, constant. This morning she also had a mechanical fall just outside the office, in which she landed directly onto b/l knees, and was ambulatory afterwards. Denies head trauma of loc. Pt denies n/v/d/f/c. Received a call from Dr. Morales requesting CT scan to eval for possible rib fractures vs intra-abd pathology. EKG, CXR, CT chest/abd/pelvis and labs reviewed. Isolated WBC of 16 however no evidence of infection via workup in the ED. Dr. Van was updated and agrees pt may f/u with GI. Will give pepcid on d/c. Pt well appearing, on re-eval abd soft, nt. I have fully discussed the medical management and delivery of care with the patient. I have discussed any available labs, imaging and treatment options with the patient. All Questions answered at the bedside. Patient confirms understanding and has been given detailed return precautions. Patient instructed to return to the ED should symptoms persist or worsen. Patient has demonstrated capacity and has verbalized understanding. Patient is well appearing upon discharge, ambulatory with a steady gait.

## 2020-11-28 NOTE — ED PROVIDER NOTE - CARE PROVIDER_API CALL
Rodney Simmons)  Gastroenterology; Internal Medicine  [address] [place] [address]  Phone: [phone]  Fax: [phone]  Follow Up Time: 4-6 Days

## 2020-11-28 NOTE — ED ADULT NURSE NOTE - OBJECTIVE STATEMENT
pt presents to ED c/o s/p fall while going to doctors office. Pt states she tripped outside on curb, denies LOC, denies blood thinners. Pt also c/o epigastric abdominal pain wrapping around abdomen with 1 day of loose stools.

## 2020-11-28 NOTE — ED PROVIDER NOTE - OBJECTIVE STATEMENT
86F hx COPD/pancreatitis/glaucoma presents with abdominal pain/fall. Patient woke up today and noticed epigastric pain wrapping around her stomach area, endorses loose stools yesterday, denies fever/cough/vomiting/dysuria/back pain. Patient was on her way to the doctor's office when she fell twice d/t mechanical trip/fall from the car, landed on her hands and knees, now has mild knee pain bilaterally, denies head injury/loc.

## 2020-11-28 NOTE — ED PROVIDER NOTE - CONDITION AT DISCHARGE:
Please notify     Provider agrees with these recommendations. Please thank pharmacist for calling. Recommend 8 mg weekly and once patient tolerates can go to 16 mg every other week.    Thank you   Satisfactory

## 2020-11-28 NOTE — ED ADULT TRIAGE NOTE - CHIEF COMPLAINT QUOTE
pt with c/o abdominal pain, fell going to drs office, fell outside of the drs office. no loc. not blood thinners. c/o lower back and knee pain.

## 2020-11-29 LAB
CULTURE RESULTS: SIGNIFICANT CHANGE UP
SPECIMEN SOURCE: SIGNIFICANT CHANGE UP

## 2021-01-01 ENCOUNTER — OUTPATIENT (OUTPATIENT)
Dept: OUTPATIENT SERVICES | Facility: HOSPITAL | Age: 86
LOS: 1 days | Discharge: HOME | End: 2021-01-01
Payer: MEDICARE

## 2021-01-01 ENCOUNTER — APPOINTMENT (OUTPATIENT)
Dept: CARDIOLOGY | Facility: CLINIC | Age: 86
End: 2021-01-01
Payer: MEDICARE

## 2021-01-01 ENCOUNTER — RX RENEWAL (OUTPATIENT)
Age: 86
End: 2021-01-01

## 2021-01-01 ENCOUNTER — APPOINTMENT (OUTPATIENT)
Dept: CARE COORDINATION | Facility: HOME HEALTH | Age: 86
End: 2021-01-01
Payer: MEDICARE

## 2021-01-01 ENCOUNTER — INPATIENT (INPATIENT)
Facility: HOSPITAL | Age: 86
LOS: 8 days | Discharge: HOME | End: 2021-12-27
Attending: INTERNAL MEDICINE | Admitting: INTERNAL MEDICINE
Payer: MEDICARE

## 2021-01-01 ENCOUNTER — APPOINTMENT (OUTPATIENT)
Dept: CARDIOLOGY | Facility: CLINIC | Age: 86
End: 2021-01-01

## 2021-01-01 ENCOUNTER — TRANSCRIPTION ENCOUNTER (OUTPATIENT)
Age: 86
End: 2021-01-01

## 2021-01-01 ENCOUNTER — RESULT REVIEW (OUTPATIENT)
Age: 86
End: 2021-01-01

## 2021-01-01 ENCOUNTER — APPOINTMENT (OUTPATIENT)
Age: 86
End: 2021-01-01
Payer: MEDICARE

## 2021-01-01 VITALS
DIASTOLIC BLOOD PRESSURE: 70 MMHG | BODY MASS INDEX: 21.37 KG/M2 | OXYGEN SATURATION: 92 % | WEIGHT: 106 LBS | SYSTOLIC BLOOD PRESSURE: 122 MMHG | HEIGHT: 59 IN | HEART RATE: 74 BPM | RESPIRATION RATE: 14 BRPM

## 2021-01-01 VITALS
SYSTOLIC BLOOD PRESSURE: 149 MMHG | TEMPERATURE: 97 F | RESPIRATION RATE: 18 BRPM | HEART RATE: 106 BPM | DIASTOLIC BLOOD PRESSURE: 67 MMHG

## 2021-01-01 VITALS
SYSTOLIC BLOOD PRESSURE: 202 MMHG | RESPIRATION RATE: 18 BRPM | OXYGEN SATURATION: 99 % | HEIGHT: 58 IN | DIASTOLIC BLOOD PRESSURE: 98 MMHG | TEMPERATURE: 98 F | HEART RATE: 98 BPM

## 2021-01-01 VITALS — DIASTOLIC BLOOD PRESSURE: 78 MMHG | HEART RATE: 95 BPM | TEMPERATURE: 97 F | SYSTOLIC BLOOD PRESSURE: 132 MMHG

## 2021-01-01 DIAGNOSIS — Z90.89 ACQUIRED ABSENCE OF OTHER ORGANS: Chronic | ICD-10-CM

## 2021-01-01 DIAGNOSIS — Z98.890 OTHER SPECIFIED POSTPROCEDURAL STATES: Chronic | ICD-10-CM

## 2021-01-01 DIAGNOSIS — Z86.79 PERSONAL HISTORY OF OTHER DISEASES OF THE CIRCULATORY SYSTEM: ICD-10-CM

## 2021-01-01 DIAGNOSIS — Z12.31 ENCOUNTER FOR SCREENING MAMMOGRAM FOR MALIGNANT NEOPLASM OF BREAST: ICD-10-CM

## 2021-01-01 DIAGNOSIS — Z78.9 OTHER SPECIFIED HEALTH STATUS: ICD-10-CM

## 2021-01-01 DIAGNOSIS — R07.9 CHEST PAIN, UNSPECIFIED: ICD-10-CM

## 2021-01-01 DIAGNOSIS — K22.70 BARRETT'S ESOPHAGUS W/OUT DYSPLASIA: ICD-10-CM

## 2021-01-01 DIAGNOSIS — Z87.19 PERSONAL HISTORY OF OTHER DISEASES OF THE DIGESTIVE SYSTEM: ICD-10-CM

## 2021-01-01 DIAGNOSIS — Z87.891 PERSONAL HISTORY OF NICOTINE DEPENDENCE: ICD-10-CM

## 2021-01-01 DIAGNOSIS — Z87.09 PERSONAL HISTORY OF OTHER DISEASES OF THE RESPIRATORY SYSTEM: ICD-10-CM

## 2021-01-01 LAB
ALBUMIN SERPL ELPH-MCNC: 2.7 G/DL — LOW (ref 3.5–5.2)
ALBUMIN SERPL ELPH-MCNC: 2.8 G/DL — LOW (ref 3.5–5.2)
ALBUMIN SERPL ELPH-MCNC: 2.9 G/DL — LOW (ref 3.5–5.2)
ALBUMIN SERPL ELPH-MCNC: 3.3 G/DL — LOW (ref 3.5–5.2)
ALBUMIN SERPL ELPH-MCNC: 3.3 G/DL — LOW (ref 3.5–5.2)
ALBUMIN SERPL ELPH-MCNC: 3.6 G/DL — SIGNIFICANT CHANGE UP (ref 3.5–5.2)
ALBUMIN SERPL ELPH-MCNC: 3.8 G/DL — SIGNIFICANT CHANGE UP (ref 3.5–5.2)
ALBUMIN SERPL ELPH-MCNC: 3.8 G/DL — SIGNIFICANT CHANGE UP (ref 3.5–5.2)
ALBUMIN SERPL ELPH-MCNC: 3.9 G/DL — SIGNIFICANT CHANGE UP (ref 3.5–5.2)
ALP SERPL-CCNC: 42 U/L — SIGNIFICANT CHANGE UP (ref 30–115)
ALP SERPL-CCNC: 43 U/L — SIGNIFICANT CHANGE UP (ref 30–115)
ALP SERPL-CCNC: 44 U/L — SIGNIFICANT CHANGE UP (ref 30–115)
ALP SERPL-CCNC: 49 U/L — SIGNIFICANT CHANGE UP (ref 30–115)
ALP SERPL-CCNC: 51 U/L — SIGNIFICANT CHANGE UP (ref 30–115)
ALP SERPL-CCNC: 52 U/L — SIGNIFICANT CHANGE UP (ref 30–115)
ALP SERPL-CCNC: 54 U/L — SIGNIFICANT CHANGE UP (ref 30–115)
ALP SERPL-CCNC: 54 U/L — SIGNIFICANT CHANGE UP (ref 30–115)
ALP SERPL-CCNC: 55 U/L — SIGNIFICANT CHANGE UP (ref 30–115)
ALT FLD-CCNC: 16 U/L — SIGNIFICANT CHANGE UP (ref 0–41)
ALT FLD-CCNC: 17 U/L — SIGNIFICANT CHANGE UP (ref 0–41)
ALT FLD-CCNC: 18 U/L — SIGNIFICANT CHANGE UP (ref 0–41)
ALT FLD-CCNC: 18 U/L — SIGNIFICANT CHANGE UP (ref 0–41)
ALT FLD-CCNC: 19 U/L — SIGNIFICANT CHANGE UP (ref 0–41)
ALT FLD-CCNC: 19 U/L — SIGNIFICANT CHANGE UP (ref 0–41)
ALT FLD-CCNC: 20 U/L — SIGNIFICANT CHANGE UP (ref 0–41)
ALT FLD-CCNC: 22 U/L — SIGNIFICANT CHANGE UP (ref 0–41)
ALT FLD-CCNC: 23 U/L — SIGNIFICANT CHANGE UP (ref 0–41)
ANION GAP SERPL CALC-SCNC: 11 MMOL/L — SIGNIFICANT CHANGE UP (ref 7–14)
ANION GAP SERPL CALC-SCNC: 12 MMOL/L — SIGNIFICANT CHANGE UP (ref 7–14)
ANION GAP SERPL CALC-SCNC: 12 MMOL/L — SIGNIFICANT CHANGE UP (ref 7–14)
ANION GAP SERPL CALC-SCNC: 13 MMOL/L — SIGNIFICANT CHANGE UP (ref 7–14)
ANION GAP SERPL CALC-SCNC: 14 MMOL/L — SIGNIFICANT CHANGE UP (ref 7–14)
ANION GAP SERPL CALC-SCNC: 14 MMOL/L — SIGNIFICANT CHANGE UP (ref 7–14)
ANION GAP SERPL CALC-SCNC: 15 MMOL/L — HIGH (ref 7–14)
ANION GAP SERPL CALC-SCNC: 15 MMOL/L — HIGH (ref 7–14)
ANION GAP SERPL CALC-SCNC: 16 MMOL/L — HIGH (ref 7–14)
ANION GAP SERPL CALC-SCNC: 16 MMOL/L — HIGH (ref 7–14)
AST SERPL-CCNC: 16 U/L — SIGNIFICANT CHANGE UP (ref 0–41)
AST SERPL-CCNC: 18 U/L — SIGNIFICANT CHANGE UP (ref 0–41)
AST SERPL-CCNC: 18 U/L — SIGNIFICANT CHANGE UP (ref 0–41)
AST SERPL-CCNC: 20 U/L — SIGNIFICANT CHANGE UP (ref 0–41)
AST SERPL-CCNC: 21 U/L — SIGNIFICANT CHANGE UP (ref 0–41)
AST SERPL-CCNC: 24 U/L — SIGNIFICANT CHANGE UP (ref 0–41)
BASOPHILS # BLD AUTO: 0.01 K/UL — SIGNIFICANT CHANGE UP (ref 0–0.2)
BASOPHILS # BLD AUTO: 0.01 K/UL — SIGNIFICANT CHANGE UP (ref 0–0.2)
BASOPHILS # BLD AUTO: 0.02 K/UL — SIGNIFICANT CHANGE UP (ref 0–0.2)
BASOPHILS # BLD AUTO: 0.03 K/UL — SIGNIFICANT CHANGE UP (ref 0–0.2)
BASOPHILS NFR BLD AUTO: 0.1 % — SIGNIFICANT CHANGE UP (ref 0–1)
BASOPHILS NFR BLD AUTO: 0.2 % — SIGNIFICANT CHANGE UP (ref 0–1)
BILIRUB SERPL-MCNC: 0.2 MG/DL — SIGNIFICANT CHANGE UP (ref 0.2–1.2)
BILIRUB SERPL-MCNC: 0.2 MG/DL — SIGNIFICANT CHANGE UP (ref 0.2–1.2)
BILIRUB SERPL-MCNC: 0.3 MG/DL — SIGNIFICANT CHANGE UP (ref 0.2–1.2)
BILIRUB SERPL-MCNC: 0.4 MG/DL — SIGNIFICANT CHANGE UP (ref 0.2–1.2)
BILIRUB SERPL-MCNC: 0.4 MG/DL — SIGNIFICANT CHANGE UP (ref 0.2–1.2)
BILIRUB SERPL-MCNC: 0.5 MG/DL — SIGNIFICANT CHANGE UP (ref 0.2–1.2)
BUN SERPL-MCNC: 10 MG/DL — SIGNIFICANT CHANGE UP (ref 10–20)
BUN SERPL-MCNC: 14 MG/DL — SIGNIFICANT CHANGE UP (ref 10–20)
BUN SERPL-MCNC: 14 MG/DL — SIGNIFICANT CHANGE UP (ref 10–20)
BUN SERPL-MCNC: 15 MG/DL — SIGNIFICANT CHANGE UP (ref 10–20)
BUN SERPL-MCNC: 17 MG/DL — SIGNIFICANT CHANGE UP (ref 10–20)
BUN SERPL-MCNC: 18 MG/DL — SIGNIFICANT CHANGE UP (ref 10–20)
BUN SERPL-MCNC: 20 MG/DL — SIGNIFICANT CHANGE UP (ref 10–20)
BUN SERPL-MCNC: 21 MG/DL — HIGH (ref 10–20)
BUN SERPL-MCNC: 22 MG/DL — HIGH (ref 10–20)
BUN SERPL-MCNC: 29 MG/DL — HIGH (ref 10–20)
C DIFF BY PCR RESULT: NEGATIVE — SIGNIFICANT CHANGE UP
C DIFF BY PCR RESULT: NEGATIVE — SIGNIFICANT CHANGE UP
C DIFF TOX GENS STL QL NAA+PROBE: SIGNIFICANT CHANGE UP
C DIFF TOX GENS STL QL NAA+PROBE: SIGNIFICANT CHANGE UP
CALCIUM SERPL-MCNC: 6.5 MG/DL — LOW (ref 8.5–10.1)
CALCIUM SERPL-MCNC: 6.7 MG/DL — LOW (ref 8.5–10.1)
CALCIUM SERPL-MCNC: 7.1 MG/DL — LOW (ref 8.5–10.1)
CALCIUM SERPL-MCNC: 7.2 MG/DL — LOW (ref 8.5–10.1)
CALCIUM SERPL-MCNC: 7.4 MG/DL — LOW (ref 8.5–10.1)
CALCIUM SERPL-MCNC: 8.1 MG/DL — LOW (ref 8.5–10.1)
CALCIUM SERPL-MCNC: 8.7 MG/DL — SIGNIFICANT CHANGE UP (ref 8.5–10.1)
CALCIUM SERPL-MCNC: 9 MG/DL — SIGNIFICANT CHANGE UP (ref 8.5–10.1)
CALCIUM SERPL-MCNC: 9.2 MG/DL — SIGNIFICANT CHANGE UP (ref 8.5–10.1)
CALCIUM SERPL-MCNC: 9.2 MG/DL — SIGNIFICANT CHANGE UP (ref 8.5–10.1)
CHLORIDE SERPL-SCNC: 101 MMOL/L — SIGNIFICANT CHANGE UP (ref 98–110)
CHLORIDE SERPL-SCNC: 102 MMOL/L — SIGNIFICANT CHANGE UP (ref 98–110)
CHLORIDE SERPL-SCNC: 102 MMOL/L — SIGNIFICANT CHANGE UP (ref 98–110)
CHLORIDE SERPL-SCNC: 103 MMOL/L — SIGNIFICANT CHANGE UP (ref 98–110)
CHLORIDE SERPL-SCNC: 104 MMOL/L — SIGNIFICANT CHANGE UP (ref 98–110)
CHLORIDE SERPL-SCNC: 105 MMOL/L — SIGNIFICANT CHANGE UP (ref 98–110)
CHLORIDE SERPL-SCNC: 106 MMOL/L — SIGNIFICANT CHANGE UP (ref 98–110)
CK SERPL-CCNC: 86 U/L — SIGNIFICANT CHANGE UP (ref 0–225)
CO2 SERPL-SCNC: 16 MMOL/L — LOW (ref 17–32)
CO2 SERPL-SCNC: 18 MMOL/L — SIGNIFICANT CHANGE UP (ref 17–32)
CO2 SERPL-SCNC: 18 MMOL/L — SIGNIFICANT CHANGE UP (ref 17–32)
CO2 SERPL-SCNC: 19 MMOL/L — SIGNIFICANT CHANGE UP (ref 17–32)
CO2 SERPL-SCNC: 20 MMOL/L — SIGNIFICANT CHANGE UP (ref 17–32)
CO2 SERPL-SCNC: 22 MMOL/L — SIGNIFICANT CHANGE UP (ref 17–32)
CO2 SERPL-SCNC: 22 MMOL/L — SIGNIFICANT CHANGE UP (ref 17–32)
CO2 SERPL-SCNC: 23 MMOL/L — SIGNIFICANT CHANGE UP (ref 17–32)
CO2 SERPL-SCNC: 26 MMOL/L — SIGNIFICANT CHANGE UP (ref 17–32)
CO2 SERPL-SCNC: 27 MMOL/L — SIGNIFICANT CHANGE UP (ref 17–32)
CREAT SERPL-MCNC: 0.7 MG/DL — SIGNIFICANT CHANGE UP (ref 0.7–1.5)
CREAT SERPL-MCNC: 0.7 MG/DL — SIGNIFICANT CHANGE UP (ref 0.7–1.5)
CREAT SERPL-MCNC: 0.8 MG/DL — SIGNIFICANT CHANGE UP (ref 0.7–1.5)
CREAT SERPL-MCNC: 0.9 MG/DL — SIGNIFICANT CHANGE UP (ref 0.7–1.5)
CREAT SERPL-MCNC: 0.9 MG/DL — SIGNIFICANT CHANGE UP (ref 0.7–1.5)
CREAT SERPL-MCNC: 1 MG/DL — SIGNIFICANT CHANGE UP (ref 0.7–1.5)
CRP SERPL-MCNC: <3 MG/L — SIGNIFICANT CHANGE UP
CULTURE RESULTS: SIGNIFICANT CHANGE UP
CULTURE RESULTS: SIGNIFICANT CHANGE UP
D DIMER BLD IA.RAPID-MCNC: 116 NG/ML DDU — SIGNIFICANT CHANGE UP (ref 0–230)
EOSINOPHIL # BLD AUTO: 0 K/UL — SIGNIFICANT CHANGE UP (ref 0–0.7)
EOSINOPHIL # BLD AUTO: 0.01 K/UL — SIGNIFICANT CHANGE UP (ref 0–0.7)
EOSINOPHIL # BLD AUTO: 0.03 K/UL — SIGNIFICANT CHANGE UP (ref 0–0.7)
EOSINOPHIL # BLD AUTO: 0.08 K/UL — SIGNIFICANT CHANGE UP (ref 0–0.7)
EOSINOPHIL # BLD AUTO: 0.12 K/UL — SIGNIFICANT CHANGE UP (ref 0–0.7)
EOSINOPHIL # BLD AUTO: 0.14 K/UL — SIGNIFICANT CHANGE UP (ref 0–0.7)
EOSINOPHIL # BLD AUTO: 0.22 K/UL — SIGNIFICANT CHANGE UP (ref 0–0.7)
EOSINOPHIL NFR BLD AUTO: 0 % — SIGNIFICANT CHANGE UP (ref 0–8)
EOSINOPHIL NFR BLD AUTO: 0.1 % — SIGNIFICANT CHANGE UP (ref 0–8)
EOSINOPHIL NFR BLD AUTO: 0.2 % — SIGNIFICANT CHANGE UP (ref 0–8)
EOSINOPHIL NFR BLD AUTO: 0.8 % — SIGNIFICANT CHANGE UP (ref 0–8)
EOSINOPHIL NFR BLD AUTO: 0.9 % — SIGNIFICANT CHANGE UP (ref 0–8)
EOSINOPHIL NFR BLD AUTO: 1 % — SIGNIFICANT CHANGE UP (ref 0–8)
EOSINOPHIL NFR BLD AUTO: 2.4 % — SIGNIFICANT CHANGE UP (ref 0–8)
FERRITIN SERPL-MCNC: 129 NG/ML — SIGNIFICANT CHANGE UP (ref 15–150)
GLUCOSE BLDC GLUCOMTR-MCNC: 151 MG/DL — HIGH (ref 70–99)
GLUCOSE SERPL-MCNC: 105 MG/DL — HIGH (ref 70–99)
GLUCOSE SERPL-MCNC: 105 MG/DL — HIGH (ref 70–99)
GLUCOSE SERPL-MCNC: 108 MG/DL — HIGH (ref 70–99)
GLUCOSE SERPL-MCNC: 112 MG/DL — HIGH (ref 70–99)
GLUCOSE SERPL-MCNC: 114 MG/DL — HIGH (ref 70–99)
GLUCOSE SERPL-MCNC: 131 MG/DL — HIGH (ref 70–99)
GLUCOSE SERPL-MCNC: 140 MG/DL — HIGH (ref 70–99)
GLUCOSE SERPL-MCNC: 208 MG/DL — HIGH (ref 70–99)
GLUCOSE SERPL-MCNC: 84 MG/DL — SIGNIFICANT CHANGE UP (ref 70–99)
GLUCOSE SERPL-MCNC: 91 MG/DL — SIGNIFICANT CHANGE UP (ref 70–99)
HCT VFR BLD CALC: 37.4 % — SIGNIFICANT CHANGE UP (ref 37–47)
HCT VFR BLD CALC: 38.4 % — SIGNIFICANT CHANGE UP (ref 37–47)
HCT VFR BLD CALC: 40.5 % — SIGNIFICANT CHANGE UP (ref 37–47)
HCT VFR BLD CALC: 41.2 % — SIGNIFICANT CHANGE UP (ref 37–47)
HCT VFR BLD CALC: 42.1 % — SIGNIFICANT CHANGE UP (ref 37–47)
HCT VFR BLD CALC: 42.8 % — SIGNIFICANT CHANGE UP (ref 37–47)
HCT VFR BLD CALC: 43.2 % — SIGNIFICANT CHANGE UP (ref 37–47)
HCT VFR BLD CALC: 43.9 % — SIGNIFICANT CHANGE UP (ref 37–47)
HCT VFR BLD CALC: 46.2 % — SIGNIFICANT CHANGE UP (ref 37–47)
HCT VFR BLD CALC: 46.9 % — SIGNIFICANT CHANGE UP (ref 37–47)
HGB BLD-MCNC: 12.5 G/DL — SIGNIFICANT CHANGE UP (ref 12–16)
HGB BLD-MCNC: 12.8 G/DL — SIGNIFICANT CHANGE UP (ref 12–16)
HGB BLD-MCNC: 13.3 G/DL — SIGNIFICANT CHANGE UP (ref 12–16)
HGB BLD-MCNC: 13.4 G/DL — SIGNIFICANT CHANGE UP (ref 12–16)
HGB BLD-MCNC: 13.6 G/DL — SIGNIFICANT CHANGE UP (ref 12–16)
HGB BLD-MCNC: 14.2 G/DL — SIGNIFICANT CHANGE UP (ref 12–16)
HGB BLD-MCNC: 14.2 G/DL — SIGNIFICANT CHANGE UP (ref 12–16)
HGB BLD-MCNC: 14.5 G/DL — SIGNIFICANT CHANGE UP (ref 12–16)
HGB BLD-MCNC: 15.1 G/DL — SIGNIFICANT CHANGE UP (ref 12–16)
HGB BLD-MCNC: 15.3 G/DL — SIGNIFICANT CHANGE UP (ref 12–16)
IMM GRANULOCYTES NFR BLD AUTO: 0.3 % — SIGNIFICANT CHANGE UP (ref 0.1–0.3)
IMM GRANULOCYTES NFR BLD AUTO: 0.4 % — HIGH (ref 0.1–0.3)
IMM GRANULOCYTES NFR BLD AUTO: 0.7 % — HIGH (ref 0.1–0.3)
IMM GRANULOCYTES NFR BLD AUTO: 0.9 % — HIGH (ref 0.1–0.3)
IMM GRANULOCYTES NFR BLD AUTO: 1.1 % — HIGH (ref 0.1–0.3)
LYMPHOCYTES # BLD AUTO: 0.32 K/UL — LOW (ref 1.2–3.4)
LYMPHOCYTES # BLD AUTO: 0.44 K/UL — LOW (ref 1.2–3.4)
LYMPHOCYTES # BLD AUTO: 0.48 K/UL — LOW (ref 1.2–3.4)
LYMPHOCYTES # BLD AUTO: 0.62 K/UL — LOW (ref 1.2–3.4)
LYMPHOCYTES # BLD AUTO: 0.64 K/UL — LOW (ref 1.2–3.4)
LYMPHOCYTES # BLD AUTO: 0.73 K/UL — LOW (ref 1.2–3.4)
LYMPHOCYTES # BLD AUTO: 1.13 K/UL — LOW (ref 1.2–3.4)
LYMPHOCYTES # BLD AUTO: 1.77 K/UL — SIGNIFICANT CHANGE UP (ref 1.2–3.4)
LYMPHOCYTES # BLD AUTO: 11.2 % — LOW (ref 20.5–51.1)
LYMPHOCYTES # BLD AUTO: 12.4 % — LOW (ref 20.5–51.1)
LYMPHOCYTES # BLD AUTO: 16.7 % — LOW (ref 20.5–51.1)
LYMPHOCYTES # BLD AUTO: 2.05 K/UL — SIGNIFICANT CHANGE UP (ref 1.2–3.4)
LYMPHOCYTES # BLD AUTO: 2.3 % — LOW (ref 20.5–51.1)
LYMPHOCYTES # BLD AUTO: 3.4 % — LOW (ref 20.5–51.1)
LYMPHOCYTES # BLD AUTO: 3.5 % — LOW (ref 20.5–51.1)
LYMPHOCYTES # BLD AUTO: 5 % — LOW (ref 20.5–51.1)
LYMPHOCYTES # BLD AUTO: 5.5 % — LOW (ref 20.5–51.1)
LYMPHOCYTES # BLD AUTO: 7.5 % — LOW (ref 20.5–51.1)
MAGNESIUM SERPL-MCNC: 1.7 MG/DL — LOW (ref 1.8–2.4)
MAGNESIUM SERPL-MCNC: 1.8 MG/DL — SIGNIFICANT CHANGE UP (ref 1.8–2.4)
MAGNESIUM SERPL-MCNC: 1.8 MG/DL — SIGNIFICANT CHANGE UP (ref 1.8–2.4)
MAGNESIUM SERPL-MCNC: 1.9 MG/DL — SIGNIFICANT CHANGE UP (ref 1.8–2.4)
MAGNESIUM SERPL-MCNC: 1.9 MG/DL — SIGNIFICANT CHANGE UP (ref 1.8–2.4)
MAGNESIUM SERPL-MCNC: 2 MG/DL — SIGNIFICANT CHANGE UP (ref 1.8–2.4)
MAGNESIUM SERPL-MCNC: 2.2 MG/DL — SIGNIFICANT CHANGE UP (ref 1.8–2.4)
MCHC RBC-ENTMCNC: 31.6 PG — HIGH (ref 27–31)
MCHC RBC-ENTMCNC: 31.9 PG — HIGH (ref 27–31)
MCHC RBC-ENTMCNC: 32 PG — HIGH (ref 27–31)
MCHC RBC-ENTMCNC: 32.1 PG — HIGH (ref 27–31)
MCHC RBC-ENTMCNC: 32.3 G/DL — SIGNIFICANT CHANGE UP (ref 32–37)
MCHC RBC-ENTMCNC: 32.3 PG — HIGH (ref 27–31)
MCHC RBC-ENTMCNC: 32.5 G/DL — SIGNIFICANT CHANGE UP (ref 32–37)
MCHC RBC-ENTMCNC: 32.5 PG — HIGH (ref 27–31)
MCHC RBC-ENTMCNC: 32.6 G/DL — SIGNIFICANT CHANGE UP (ref 32–37)
MCHC RBC-ENTMCNC: 32.7 G/DL — SIGNIFICANT CHANGE UP (ref 32–37)
MCHC RBC-ENTMCNC: 32.7 PG — HIGH (ref 27–31)
MCHC RBC-ENTMCNC: 32.7 PG — HIGH (ref 27–31)
MCHC RBC-ENTMCNC: 32.8 G/DL — SIGNIFICANT CHANGE UP (ref 32–37)
MCHC RBC-ENTMCNC: 32.9 G/DL — SIGNIFICANT CHANGE UP (ref 32–37)
MCHC RBC-ENTMCNC: 33 G/DL — SIGNIFICANT CHANGE UP (ref 32–37)
MCHC RBC-ENTMCNC: 33.2 G/DL — SIGNIFICANT CHANGE UP (ref 32–37)
MCHC RBC-ENTMCNC: 33.3 G/DL — SIGNIFICANT CHANGE UP (ref 32–37)
MCHC RBC-ENTMCNC: 33.4 G/DL — SIGNIFICANT CHANGE UP (ref 32–37)
MCV RBC AUTO: 100 FL — HIGH (ref 81–99)
MCV RBC AUTO: 95.8 FL — SIGNIFICANT CHANGE UP (ref 81–99)
MCV RBC AUTO: 96.1 FL — SIGNIFICANT CHANGE UP (ref 81–99)
MCV RBC AUTO: 97.6 FL — SIGNIFICANT CHANGE UP (ref 81–99)
MCV RBC AUTO: 97.9 FL — SIGNIFICANT CHANGE UP (ref 81–99)
MCV RBC AUTO: 98.4 FL — SIGNIFICANT CHANGE UP (ref 81–99)
MCV RBC AUTO: 98.6 FL — SIGNIFICANT CHANGE UP (ref 81–99)
MCV RBC AUTO: 98.7 FL — SIGNIFICANT CHANGE UP (ref 81–99)
MCV RBC AUTO: 98.9 FL — SIGNIFICANT CHANGE UP (ref 81–99)
MCV RBC AUTO: 98.9 FL — SIGNIFICANT CHANGE UP (ref 81–99)
MONOCYTES # BLD AUTO: 0.1 K/UL — SIGNIFICANT CHANGE UP (ref 0.1–0.6)
MONOCYTES # BLD AUTO: 0.91 K/UL — HIGH (ref 0.1–0.6)
MONOCYTES # BLD AUTO: 1.01 K/UL — HIGH (ref 0.1–0.6)
MONOCYTES # BLD AUTO: 1.04 K/UL — HIGH (ref 0.1–0.6)
MONOCYTES # BLD AUTO: 1.08 K/UL — HIGH (ref 0.1–0.6)
MONOCYTES # BLD AUTO: 1.1 K/UL — HIGH (ref 0.1–0.6)
MONOCYTES # BLD AUTO: 1.28 K/UL — HIGH (ref 0.1–0.6)
MONOCYTES # BLD AUTO: 1.39 K/UL — HIGH (ref 0.1–0.6)
MONOCYTES # BLD AUTO: 1.43 K/UL — HIGH (ref 0.1–0.6)
MONOCYTES NFR BLD AUTO: 1.1 % — LOW (ref 1.7–9.3)
MONOCYTES NFR BLD AUTO: 11.4 % — HIGH (ref 1.7–9.3)
MONOCYTES NFR BLD AUTO: 13.2 % — HIGH (ref 1.7–9.3)
MONOCYTES NFR BLD AUTO: 15.7 % — HIGH (ref 1.7–9.3)
MONOCYTES NFR BLD AUTO: 6 % — SIGNIFICANT CHANGE UP (ref 1.7–9.3)
MONOCYTES NFR BLD AUTO: 6.4 % — SIGNIFICANT CHANGE UP (ref 1.7–9.3)
MONOCYTES NFR BLD AUTO: 6.6 % — SIGNIFICANT CHANGE UP (ref 1.7–9.3)
MONOCYTES NFR BLD AUTO: 7.4 % — SIGNIFICANT CHANGE UP (ref 1.7–9.3)
MONOCYTES NFR BLD AUTO: 9.7 % — HIGH (ref 1.7–9.3)
NEUTROPHILS # BLD AUTO: 12.37 K/UL — HIGH (ref 1.4–6.5)
NEUTROPHILS # BLD AUTO: 12.51 K/UL — HIGH (ref 1.4–6.5)
NEUTROPHILS # BLD AUTO: 12.78 K/UL — HIGH (ref 1.4–6.5)
NEUTROPHILS # BLD AUTO: 16.16 K/UL — HIGH (ref 1.4–6.5)
NEUTROPHILS # BLD AUTO: 6.18 K/UL — SIGNIFICANT CHANGE UP (ref 1.4–6.5)
NEUTROPHILS # BLD AUTO: 7.52 K/UL — HIGH (ref 1.4–6.5)
NEUTROPHILS # BLD AUTO: 8.25 K/UL — HIGH (ref 1.4–6.5)
NEUTROPHILS # BLD AUTO: 8.6 K/UL — HIGH (ref 1.4–6.5)
NEUTROPHILS # BLD AUTO: 9.5 K/UL — HIGH (ref 1.4–6.5)
NEUTROPHILS NFR BLD AUTO: 68.2 % — SIGNIFICANT CHANGE UP (ref 42.2–75.2)
NEUTROPHILS NFR BLD AUTO: 70.3 % — SIGNIFICANT CHANGE UP (ref 42.2–75.2)
NEUTROPHILS NFR BLD AUTO: 77.5 % — HIGH (ref 42.2–75.2)
NEUTROPHILS NFR BLD AUTO: 81 % — HIGH (ref 42.2–75.2)
NEUTROPHILS NFR BLD AUTO: 84.2 % — HIGH (ref 42.2–75.2)
NEUTROPHILS NFR BLD AUTO: 88.7 % — HIGH (ref 42.2–75.2)
NEUTROPHILS NFR BLD AUTO: 89.4 % — HIGH (ref 42.2–75.2)
NEUTROPHILS NFR BLD AUTO: 90.5 % — HIGH (ref 42.2–75.2)
NEUTROPHILS NFR BLD AUTO: 93.4 % — HIGH (ref 42.2–75.2)
NRBC # BLD: 0 /100 WBCS — SIGNIFICANT CHANGE UP (ref 0–0)
PLATELET # BLD AUTO: 239 K/UL — SIGNIFICANT CHANGE UP (ref 130–400)
PLATELET # BLD AUTO: 245 K/UL — SIGNIFICANT CHANGE UP (ref 130–400)
PLATELET # BLD AUTO: 261 K/UL — SIGNIFICANT CHANGE UP (ref 130–400)
PLATELET # BLD AUTO: 262 K/UL — SIGNIFICANT CHANGE UP (ref 130–400)
PLATELET # BLD AUTO: 264 K/UL — SIGNIFICANT CHANGE UP (ref 130–400)
PLATELET # BLD AUTO: 274 K/UL — SIGNIFICANT CHANGE UP (ref 130–400)
PLATELET # BLD AUTO: 307 K/UL — SIGNIFICANT CHANGE UP (ref 130–400)
PLATELET # BLD AUTO: 370 K/UL — SIGNIFICANT CHANGE UP (ref 130–400)
PLATELET # BLD AUTO: 389 K/UL — SIGNIFICANT CHANGE UP (ref 130–400)
PLATELET # BLD AUTO: 425 K/UL — HIGH (ref 130–400)
POTASSIUM SERPL-MCNC: 3.6 MMOL/L — SIGNIFICANT CHANGE UP (ref 3.5–5)
POTASSIUM SERPL-MCNC: 3.7 MMOL/L — SIGNIFICANT CHANGE UP (ref 3.5–5)
POTASSIUM SERPL-MCNC: 3.9 MMOL/L — SIGNIFICANT CHANGE UP (ref 3.5–5)
POTASSIUM SERPL-MCNC: 4.2 MMOL/L — SIGNIFICANT CHANGE UP (ref 3.5–5)
POTASSIUM SERPL-MCNC: 4.3 MMOL/L — SIGNIFICANT CHANGE UP (ref 3.5–5)
POTASSIUM SERPL-MCNC: 4.4 MMOL/L — SIGNIFICANT CHANGE UP (ref 3.5–5)
POTASSIUM SERPL-MCNC: 4.5 MMOL/L — SIGNIFICANT CHANGE UP (ref 3.5–5)
POTASSIUM SERPL-MCNC: 4.6 MMOL/L — SIGNIFICANT CHANGE UP (ref 3.5–5)
POTASSIUM SERPL-MCNC: 5 MMOL/L — SIGNIFICANT CHANGE UP (ref 3.5–5)
POTASSIUM SERPL-MCNC: 5.1 MMOL/L — HIGH (ref 3.5–5)
POTASSIUM SERPL-SCNC: 3.6 MMOL/L — SIGNIFICANT CHANGE UP (ref 3.5–5)
POTASSIUM SERPL-SCNC: 3.7 MMOL/L — SIGNIFICANT CHANGE UP (ref 3.5–5)
POTASSIUM SERPL-SCNC: 3.9 MMOL/L — SIGNIFICANT CHANGE UP (ref 3.5–5)
POTASSIUM SERPL-SCNC: 4.2 MMOL/L — SIGNIFICANT CHANGE UP (ref 3.5–5)
POTASSIUM SERPL-SCNC: 4.3 MMOL/L — SIGNIFICANT CHANGE UP (ref 3.5–5)
POTASSIUM SERPL-SCNC: 4.4 MMOL/L — SIGNIFICANT CHANGE UP (ref 3.5–5)
POTASSIUM SERPL-SCNC: 4.5 MMOL/L — SIGNIFICANT CHANGE UP (ref 3.5–5)
POTASSIUM SERPL-SCNC: 4.6 MMOL/L — SIGNIFICANT CHANGE UP (ref 3.5–5)
POTASSIUM SERPL-SCNC: 5 MMOL/L — SIGNIFICANT CHANGE UP (ref 3.5–5)
POTASSIUM SERPL-SCNC: 5.1 MMOL/L — HIGH (ref 3.5–5)
PROCALCITONIN SERPL-MCNC: 0.04 NG/ML — SIGNIFICANT CHANGE UP (ref 0.02–0.1)
PROT SERPL-MCNC: 4.6 G/DL — LOW (ref 6–8)
PROT SERPL-MCNC: 4.8 G/DL — LOW (ref 6–8)
PROT SERPL-MCNC: 4.8 G/DL — LOW (ref 6–8)
PROT SERPL-MCNC: 5.6 G/DL — LOW (ref 6–8)
PROT SERPL-MCNC: 5.7 G/DL — LOW (ref 6–8)
PROT SERPL-MCNC: 5.9 G/DL — LOW (ref 6–8)
PROT SERPL-MCNC: 6 G/DL — SIGNIFICANT CHANGE UP (ref 6–8)
PROT SERPL-MCNC: 6.1 G/DL — SIGNIFICANT CHANGE UP (ref 6–8)
PROT SERPL-MCNC: 6.8 G/DL — SIGNIFICANT CHANGE UP (ref 6–8)
RAPID RVP RESULT: SIGNIFICANT CHANGE UP
RBC # BLD: 3.89 M/UL — LOW (ref 4.2–5.4)
RBC # BLD: 4.01 M/UL — LOW (ref 4.2–5.4)
RBC # BLD: 4.12 M/UL — LOW (ref 4.2–5.4)
RBC # BLD: 4.15 M/UL — LOW (ref 4.2–5.4)
RBC # BLD: 4.3 M/UL — SIGNIFICANT CHANGE UP (ref 4.2–5.4)
RBC # BLD: 4.34 M/UL — SIGNIFICANT CHANGE UP (ref 4.2–5.4)
RBC # BLD: 4.39 M/UL — SIGNIFICANT CHANGE UP (ref 4.2–5.4)
RBC # BLD: 4.44 M/UL — SIGNIFICANT CHANGE UP (ref 4.2–5.4)
RBC # BLD: 4.68 M/UL — SIGNIFICANT CHANGE UP (ref 4.2–5.4)
RBC # BLD: 4.74 M/UL — SIGNIFICANT CHANGE UP (ref 4.2–5.4)
RBC # FLD: 13.2 % — SIGNIFICANT CHANGE UP (ref 11.5–14.5)
RBC # FLD: 13.2 % — SIGNIFICANT CHANGE UP (ref 11.5–14.5)
RBC # FLD: 13.3 % — SIGNIFICANT CHANGE UP (ref 11.5–14.5)
RBC # FLD: 13.4 % — SIGNIFICANT CHANGE UP (ref 11.5–14.5)
RBC # FLD: 13.4 % — SIGNIFICANT CHANGE UP (ref 11.5–14.5)
RBC # FLD: 13.7 % — SIGNIFICANT CHANGE UP (ref 11.5–14.5)
RBC # FLD: 13.8 % — SIGNIFICANT CHANGE UP (ref 11.5–14.5)
RBC # FLD: 13.8 % — SIGNIFICANT CHANGE UP (ref 11.5–14.5)
SARS-COV-2 RNA SPEC QL NAA+PROBE: SIGNIFICANT CHANGE UP
SARS-COV-2 RNA SPEC QL NAA+PROBE: SIGNIFICANT CHANGE UP
SODIUM SERPL-SCNC: 134 MMOL/L — LOW (ref 135–146)
SODIUM SERPL-SCNC: 135 MMOL/L — SIGNIFICANT CHANGE UP (ref 135–146)
SODIUM SERPL-SCNC: 135 MMOL/L — SIGNIFICANT CHANGE UP (ref 135–146)
SODIUM SERPL-SCNC: 136 MMOL/L — SIGNIFICANT CHANGE UP (ref 135–146)
SODIUM SERPL-SCNC: 137 MMOL/L — SIGNIFICANT CHANGE UP (ref 135–146)
SODIUM SERPL-SCNC: 137 MMOL/L — SIGNIFICANT CHANGE UP (ref 135–146)
SODIUM SERPL-SCNC: 139 MMOL/L — SIGNIFICANT CHANGE UP (ref 135–146)
SODIUM SERPL-SCNC: 140 MMOL/L — SIGNIFICANT CHANGE UP (ref 135–146)
SODIUM SERPL-SCNC: 140 MMOL/L — SIGNIFICANT CHANGE UP (ref 135–146)
SODIUM SERPL-SCNC: 142 MMOL/L — SIGNIFICANT CHANGE UP (ref 135–146)
SPECIMEN SOURCE: SIGNIFICANT CHANGE UP
SPECIMEN SOURCE: SIGNIFICANT CHANGE UP
TROPONIN T SERPL-MCNC: <0.01 NG/ML — SIGNIFICANT CHANGE UP
WBC # BLD: 11.29 K/UL — HIGH (ref 4.8–10.8)
WBC # BLD: 12.24 K/UL — HIGH (ref 4.8–10.8)
WBC # BLD: 13.8 K/UL — HIGH (ref 4.8–10.8)
WBC # BLD: 13.83 K/UL — HIGH (ref 4.8–10.8)
WBC # BLD: 13.96 K/UL — HIGH (ref 4.8–10.8)
WBC # BLD: 15.78 K/UL — HIGH (ref 4.8–10.8)
WBC # BLD: 18.06 K/UL — HIGH (ref 4.8–10.8)
WBC # BLD: 8.84 K/UL — SIGNIFICANT CHANGE UP (ref 4.8–10.8)
WBC # BLD: 9.08 K/UL — SIGNIFICANT CHANGE UP (ref 4.8–10.8)
WBC # BLD: 9.71 K/UL — SIGNIFICANT CHANGE UP (ref 4.8–10.8)
WBC # FLD AUTO: 11.29 K/UL — HIGH (ref 4.8–10.8)
WBC # FLD AUTO: 12.24 K/UL — HIGH (ref 4.8–10.8)
WBC # FLD AUTO: 13.8 K/UL — HIGH (ref 4.8–10.8)
WBC # FLD AUTO: 13.83 K/UL — HIGH (ref 4.8–10.8)
WBC # FLD AUTO: 13.96 K/UL — HIGH (ref 4.8–10.8)
WBC # FLD AUTO: 15.78 K/UL — HIGH (ref 4.8–10.8)
WBC # FLD AUTO: 18.06 K/UL — HIGH (ref 4.8–10.8)
WBC # FLD AUTO: 8.84 K/UL — SIGNIFICANT CHANGE UP (ref 4.8–10.8)
WBC # FLD AUTO: 9.08 K/UL — SIGNIFICANT CHANGE UP (ref 4.8–10.8)
WBC # FLD AUTO: 9.71 K/UL — SIGNIFICANT CHANGE UP (ref 4.8–10.8)

## 2021-01-01 PROCEDURE — 71045 X-RAY EXAM CHEST 1 VIEW: CPT | Mod: 26

## 2021-01-01 PROCEDURE — 99285 EMERGENCY DEPT VISIT HI MDM: CPT | Mod: CS

## 2021-01-01 PROCEDURE — 99214 OFFICE O/P EST MOD 30 MIN: CPT

## 2021-01-01 PROCEDURE — 99348 HOME/RES VST EST LOW MDM 30: CPT | Mod: 95

## 2021-01-01 PROCEDURE — 77067 SCR MAMMO BI INCL CAD: CPT | Mod: 26

## 2021-01-01 PROCEDURE — 93010 ELECTROCARDIOGRAM REPORT: CPT

## 2021-01-01 PROCEDURE — 71046 X-RAY EXAM CHEST 2 VIEWS: CPT | Mod: 26

## 2021-01-01 PROCEDURE — 99233 SBSQ HOSP IP/OBS HIGH 50: CPT

## 2021-01-01 PROCEDURE — 93000 ELECTROCARDIOGRAM COMPLETE: CPT

## 2021-01-01 PROCEDURE — 99223 1ST HOSP IP/OBS HIGH 75: CPT

## 2021-01-01 PROCEDURE — 99441: CPT | Mod: 95

## 2021-01-01 PROCEDURE — 99213 OFFICE O/P EST LOW 20 MIN: CPT

## 2021-01-01 PROCEDURE — 77063 BREAST TOMOSYNTHESIS BI: CPT | Mod: 26

## 2021-01-01 RX ORDER — FLUTICASONE PROPIONATE AND SALMETEROL 50; 250 UG/1; UG/1
1 POWDER ORAL; RESPIRATORY (INHALATION)
Qty: 0 | Refills: 0 | DISCHARGE

## 2021-01-01 RX ORDER — ESCITALOPRAM OXALATE 10 MG/1
10 TABLET, FILM COATED ORAL DAILY
Refills: 0 | Status: DISCONTINUED | OUTPATIENT
Start: 2021-01-01 | End: 2021-01-01

## 2021-01-01 RX ORDER — NYSTATIN 500MM UNIT
1 POWDER (EA) MISCELLANEOUS
Qty: 56 | Refills: 0
Start: 2021-01-01 | End: 2022-01-01

## 2021-01-01 RX ORDER — FLUTICASONE PROPIONATE AND SALMETEROL 250; 50 UG/1; UG/1
250-50 POWDER RESPIRATORY (INHALATION)
Qty: 1 | Refills: 3 | Status: DISCONTINUED | COMMUNITY
Start: 2021-06-04 | End: 2021-01-01

## 2021-01-01 RX ORDER — ALBUTEROL 90 UG/1
2 AEROSOL, METERED ORAL EVERY 6 HOURS
Refills: 0 | Status: DISCONTINUED | OUTPATIENT
Start: 2021-01-01 | End: 2021-01-01

## 2021-01-01 RX ORDER — LOPERAMIDE HCL 2 MG
2 TABLET ORAL THREE TIMES A DAY
Refills: 0 | Status: DISCONTINUED | OUTPATIENT
Start: 2021-01-01 | End: 2021-01-01

## 2021-01-01 RX ORDER — ACETAMINOPHEN 500 MG
650 TABLET ORAL ONCE
Refills: 0 | Status: COMPLETED | OUTPATIENT
Start: 2021-01-01 | End: 2021-01-01

## 2021-01-01 RX ORDER — FAMOTIDINE 10 MG/ML
20 INJECTION INTRAVENOUS DAILY
Refills: 0 | Status: DISCONTINUED | OUTPATIENT
Start: 2021-01-01 | End: 2021-01-01

## 2021-01-01 RX ORDER — ONDANSETRON 8 MG/1
4 TABLET, FILM COATED ORAL ONCE
Refills: 0 | Status: COMPLETED | OUTPATIENT
Start: 2021-01-01 | End: 2021-01-01

## 2021-01-01 RX ORDER — ZOLPIDEM TARTRATE 10 MG/1
1 TABLET ORAL
Qty: 0 | Refills: 0 | DISCHARGE

## 2021-01-01 RX ORDER — CALCIUM CARBONATE 500(1250)
1 TABLET ORAL
Refills: 0 | Status: DISCONTINUED | OUTPATIENT
Start: 2021-01-01 | End: 2021-01-01

## 2021-01-01 RX ORDER — CLOTRIMAZOLE 10 MG
1 TROCHE MUCOUS MEMBRANE
Qty: 30 | Refills: 0
Start: 2021-01-01 | End: 2022-01-01

## 2021-01-01 RX ORDER — ESCITALOPRAM OXALATE 10 MG/1
1 TABLET, FILM COATED ORAL
Qty: 0 | Refills: 0 | DISCHARGE
Start: 2021-01-01

## 2021-01-01 RX ORDER — SODIUM CHLORIDE 9 MG/ML
1000 INJECTION, SOLUTION INTRAVENOUS ONCE
Refills: 0 | Status: COMPLETED | OUTPATIENT
Start: 2021-01-01 | End: 2021-01-01

## 2021-01-01 RX ORDER — FLUTICASONE PROPIONATE 220 MCG
0 AEROSOL WITH ADAPTER (GRAM) INHALATION
Qty: 0 | Refills: 0 | DISCHARGE

## 2021-01-01 RX ORDER — SACCHAROMYCES BOULARDII 250 MG
250 POWDER IN PACKET (EA) ORAL
Refills: 0 | Status: DISCONTINUED | OUTPATIENT
Start: 2021-01-01 | End: 2021-01-01

## 2021-01-01 RX ORDER — BUDESONIDE AND FORMOTEROL FUMARATE DIHYDRATE 160; 4.5 UG/1; UG/1
2 AEROSOL RESPIRATORY (INHALATION)
Qty: 120 | Refills: 0
Start: 2021-01-01 | End: 2022-01-01

## 2021-01-01 RX ORDER — ZOLPIDEM TARTRATE 10 MG/1
5 TABLET ORAL AT BEDTIME
Refills: 0 | Status: DISCONTINUED | OUTPATIENT
Start: 2021-01-01 | End: 2021-01-01

## 2021-01-01 RX ORDER — FAMOTIDINE 10 MG/ML
1 INJECTION INTRAVENOUS
Qty: 0 | Refills: 0 | DISCHARGE
Start: 2021-01-01

## 2021-01-01 RX ORDER — IPRATROPIUM/ALBUTEROL SULFATE 18-103MCG
3 AEROSOL WITH ADAPTER (GRAM) INHALATION EVERY 6 HOURS
Refills: 0 | Status: DISCONTINUED | OUTPATIENT
Start: 2021-01-01 | End: 2021-01-01

## 2021-01-01 RX ORDER — LEVALBUTEROL 1.25 MG/.5ML
3 SOLUTION, CONCENTRATE RESPIRATORY (INHALATION)
Qty: 0 | Refills: 0 | DISCHARGE

## 2021-01-01 RX ORDER — ALBUTEROL 90 UG/1
2 AEROSOL, METERED ORAL
Qty: 0 | Refills: 0 | DISCHARGE
Start: 2021-01-01

## 2021-01-01 RX ORDER — FLUTICASONE PROPIONATE AND SALMETEROL 50; 250 UG/1; UG/1
250-50 POWDER RESPIRATORY (INHALATION) TWICE DAILY
Refills: 0 | Status: DISCONTINUED | COMMUNITY
End: 2021-01-01

## 2021-01-01 RX ORDER — LEVALBUTEROL 1.25 MG/.5ML
2 SOLUTION, CONCENTRATE RESPIRATORY (INHALATION)
Qty: 0 | Refills: 0 | DISCHARGE

## 2021-01-01 RX ORDER — BUDESONIDE AND FORMOTEROL FUMARATE DIHYDRATE 160; 4.5 UG/1; UG/1
2 AEROSOL RESPIRATORY (INHALATION)
Refills: 0 | Status: DISCONTINUED | OUTPATIENT
Start: 2021-01-01 | End: 2021-01-01

## 2021-01-01 RX ORDER — RANITIDINE HYDROCHLORIDE 150 MG/1
1 TABLET, FILM COATED ORAL
Qty: 0 | Refills: 0 | DISCHARGE

## 2021-01-01 RX ORDER — LOPERAMIDE HCL 2 MG
2 TABLET ORAL THREE TIMES A DAY
Refills: 0 | Status: COMPLETED | OUTPATIENT
Start: 2021-01-01 | End: 2021-01-01

## 2021-01-01 RX ORDER — AMLODIPINE BESYLATE 2.5 MG/1
1 TABLET ORAL
Qty: 90 | Refills: 0
Start: 2021-01-01 | End: 2022-01-01

## 2021-01-01 RX ORDER — ESCITALOPRAM OXALATE 10 MG/1
1 TABLET, FILM COATED ORAL
Qty: 0 | Refills: 0 | DISCHARGE

## 2021-01-01 RX ORDER — LOPERAMIDE HCL 2 MG
2 TABLET ORAL
Refills: 0 | Status: DISCONTINUED | OUTPATIENT
Start: 2021-01-01 | End: 2021-01-01

## 2021-01-01 RX ORDER — ENOXAPARIN SODIUM 100 MG/ML
40 INJECTION SUBCUTANEOUS DAILY
Refills: 0 | Status: DISCONTINUED | OUTPATIENT
Start: 2021-01-01 | End: 2021-01-01

## 2021-01-01 RX ORDER — SODIUM CHLORIDE 9 MG/ML
1000 INJECTION INTRAMUSCULAR; INTRAVENOUS; SUBCUTANEOUS
Refills: 0 | Status: DISCONTINUED | OUTPATIENT
Start: 2021-01-01 | End: 2021-01-01

## 2021-01-01 RX ORDER — CALCIUM CARBONATE 500(1250)
1 TABLET ORAL
Qty: 60 | Refills: 0
Start: 2021-01-01 | End: 2022-01-01

## 2021-01-01 RX ORDER — BIMATOPROST 0.1 MG/ML
0.01 SOLUTION/ DROPS OPHTHALMIC
Refills: 0 | Status: DISCONTINUED | COMMUNITY
End: 2021-01-01

## 2021-01-01 RX ORDER — SACCHAROMYCES BOULARDII 250 MG
1 POWDER IN PACKET (EA) ORAL
Qty: 30 | Refills: 0
Start: 2021-01-01 | End: 2022-01-01

## 2021-01-01 RX ORDER — TOCOPHERSOLAN (VITAMIN E TPGS) 400/15ML
LIQUID (ML) ORAL
Refills: 0 | Status: DISCONTINUED | COMMUNITY
End: 2021-01-01

## 2021-01-01 RX ORDER — PANTOPRAZOLE SODIUM 20 MG/1
40 TABLET, DELAYED RELEASE ORAL
Refills: 0 | Status: DISCONTINUED | OUTPATIENT
Start: 2021-01-01 | End: 2021-01-01

## 2021-01-01 RX ORDER — FLUTICASONE PROPIONATE AND SALMETEROL 250; 50 UG/1; UG/1
250-50 POWDER RESPIRATORY (INHALATION)
Qty: 1 | Refills: 3 | Status: ACTIVE | COMMUNITY
Start: 2021-01-01 | End: 1900-01-01

## 2021-01-01 RX ORDER — LEVALBUTEROL TARTRATE 45 UG/1
45 AEROSOL, METERED ORAL EVERY 4 HOURS
Qty: 3 | Refills: 3 | Status: ACTIVE | COMMUNITY
Start: 2021-01-01 | End: 1900-01-01

## 2021-01-01 RX ORDER — ACETAMINOPHEN 500 MG
650 TABLET ORAL EVERY 6 HOURS
Refills: 0 | Status: DISCONTINUED | OUTPATIENT
Start: 2021-01-01 | End: 2021-01-01

## 2021-01-01 RX ORDER — PANTOPRAZOLE SODIUM 20 MG/1
1 TABLET, DELAYED RELEASE ORAL
Qty: 0 | Refills: 0 | DISCHARGE

## 2021-01-01 RX ORDER — LATANOPROST 0.05 MG/ML
1 SOLUTION/ DROPS OPHTHALMIC; TOPICAL
Qty: 0 | Refills: 0 | DISCHARGE

## 2021-01-01 RX ORDER — FAMOTIDINE 10 MG/ML
1 INJECTION INTRAVENOUS
Qty: 0 | Refills: 0 | DISCHARGE

## 2021-01-01 RX ORDER — ALBUTEROL 90 UG/1
3 AEROSOL, METERED ORAL
Qty: 0 | Refills: 0 | DISCHARGE

## 2021-01-01 RX ORDER — LEVALBUTEROL TARTRATE 45 UG/1
45 AEROSOL, METERED ORAL EVERY 4 HOURS
Qty: 3 | Refills: 3 | Status: DISCONTINUED | COMMUNITY
Start: 2021-06-25 | End: 2021-01-01

## 2021-01-01 RX ADMIN — Medication 600 MILLIGRAM(S): at 17:50

## 2021-01-01 RX ADMIN — ONDANSETRON 4 MILLIGRAM(S): 8 TABLET, FILM COATED ORAL at 22:46

## 2021-01-01 RX ADMIN — Medication 100 MILLIGRAM(S): at 05:41

## 2021-01-01 RX ADMIN — ESCITALOPRAM OXALATE 10 MILLIGRAM(S): 10 TABLET, FILM COATED ORAL at 11:10

## 2021-01-01 RX ADMIN — Medication 600 MILLIGRAM(S): at 18:58

## 2021-01-01 RX ADMIN — SODIUM CHLORIDE 50 MILLILITER(S): 9 INJECTION INTRAMUSCULAR; INTRAVENOUS; SUBCUTANEOUS at 14:05

## 2021-01-01 RX ADMIN — Medication 250 MILLIGRAM(S): at 06:00

## 2021-01-01 RX ADMIN — PANTOPRAZOLE SODIUM 40 MILLIGRAM(S): 20 TABLET, DELAYED RELEASE ORAL at 05:49

## 2021-01-01 RX ADMIN — Medication 600 MILLIGRAM(S): at 17:45

## 2021-01-01 RX ADMIN — BUDESONIDE AND FORMOTEROL FUMARATE DIHYDRATE 2 PUFF(S): 160; 4.5 AEROSOL RESPIRATORY (INHALATION) at 20:44

## 2021-01-01 RX ADMIN — Medication 3 MILLILITER(S): at 02:27

## 2021-01-01 RX ADMIN — Medication 100 MILLIGRAM(S): at 23:45

## 2021-01-01 RX ADMIN — Medication 600 MILLIGRAM(S): at 05:07

## 2021-01-01 RX ADMIN — Medication 3 MILLILITER(S): at 13:46

## 2021-01-01 RX ADMIN — Medication 100 MILLIGRAM(S): at 15:01

## 2021-01-01 RX ADMIN — Medication 100 MILLIGRAM(S): at 06:48

## 2021-01-01 RX ADMIN — Medication 100 MILLIGRAM(S): at 23:00

## 2021-01-01 RX ADMIN — ENOXAPARIN SODIUM 40 MILLIGRAM(S): 100 INJECTION SUBCUTANEOUS at 11:10

## 2021-01-01 RX ADMIN — ESCITALOPRAM OXALATE 10 MILLIGRAM(S): 10 TABLET, FILM COATED ORAL at 12:01

## 2021-01-01 RX ADMIN — BUDESONIDE AND FORMOTEROL FUMARATE DIHYDRATE 2 PUFF(S): 160; 4.5 AEROSOL RESPIRATORY (INHALATION) at 21:00

## 2021-01-01 RX ADMIN — Medication 2 MILLIGRAM(S): at 15:34

## 2021-01-01 RX ADMIN — Medication 40 MILLIGRAM(S): at 05:43

## 2021-01-01 RX ADMIN — FAMOTIDINE 20 MILLIGRAM(S): 10 INJECTION INTRAVENOUS at 11:56

## 2021-01-01 RX ADMIN — FAMOTIDINE 20 MILLIGRAM(S): 10 INJECTION INTRAVENOUS at 12:59

## 2021-01-01 RX ADMIN — Medication 600 MILLIGRAM(S): at 06:22

## 2021-01-01 RX ADMIN — BUDESONIDE AND FORMOTEROL FUMARATE DIHYDRATE 2 PUFF(S): 160; 4.5 AEROSOL RESPIRATORY (INHALATION) at 08:12

## 2021-01-01 RX ADMIN — ZOLPIDEM TARTRATE 5 MILLIGRAM(S): 10 TABLET ORAL at 22:39

## 2021-01-01 RX ADMIN — ENOXAPARIN SODIUM 40 MILLIGRAM(S): 100 INJECTION SUBCUTANEOUS at 12:00

## 2021-01-01 RX ADMIN — Medication 250 MILLIGRAM(S): at 19:30

## 2021-01-01 RX ADMIN — ESCITALOPRAM OXALATE 10 MILLIGRAM(S): 10 TABLET, FILM COATED ORAL at 12:09

## 2021-01-01 RX ADMIN — Medication 3 MILLILITER(S): at 08:38

## 2021-01-01 RX ADMIN — FAMOTIDINE 20 MILLIGRAM(S): 10 INJECTION INTRAVENOUS at 12:01

## 2021-01-01 RX ADMIN — Medication 3 MILLILITER(S): at 08:20

## 2021-01-01 RX ADMIN — BUDESONIDE AND FORMOTEROL FUMARATE DIHYDRATE 2 PUFF(S): 160; 4.5 AEROSOL RESPIRATORY (INHALATION) at 20:23

## 2021-01-01 RX ADMIN — Medication 650 MILLIGRAM(S): at 22:12

## 2021-01-01 RX ADMIN — Medication 100 MILLIGRAM(S): at 18:00

## 2021-01-01 RX ADMIN — Medication 250 MILLIGRAM(S): at 17:45

## 2021-01-01 RX ADMIN — Medication 100 MILLIGRAM(S): at 17:34

## 2021-01-01 RX ADMIN — Medication 1 TABLET(S): at 06:47

## 2021-01-01 RX ADMIN — ZOLPIDEM TARTRATE 5 MILLIGRAM(S): 10 TABLET ORAL at 02:33

## 2021-01-01 RX ADMIN — Medication 1 TABLET(S): at 22:59

## 2021-01-01 RX ADMIN — Medication 30 MILLILITER(S): at 02:33

## 2021-01-01 RX ADMIN — Medication 100 MILLIGRAM(S): at 11:56

## 2021-01-01 RX ADMIN — Medication 40 MILLIGRAM(S): at 06:27

## 2021-01-01 RX ADMIN — BUDESONIDE AND FORMOTEROL FUMARATE DIHYDRATE 2 PUFF(S): 160; 4.5 AEROSOL RESPIRATORY (INHALATION) at 08:07

## 2021-01-01 RX ADMIN — FAMOTIDINE 20 MILLIGRAM(S): 10 INJECTION INTRAVENOUS at 14:57

## 2021-01-01 RX ADMIN — Medication 2 MILLIGRAM(S): at 21:42

## 2021-01-01 RX ADMIN — Medication 100 MILLIGRAM(S): at 06:00

## 2021-01-01 RX ADMIN — FAMOTIDINE 20 MILLIGRAM(S): 10 INJECTION INTRAVENOUS at 12:09

## 2021-01-01 RX ADMIN — Medication 600 MILLIGRAM(S): at 05:44

## 2021-01-01 RX ADMIN — Medication 40 MILLIGRAM(S): at 06:07

## 2021-01-01 RX ADMIN — Medication 600 MILLIGRAM(S): at 17:34

## 2021-01-01 RX ADMIN — Medication 100 MILLIGRAM(S): at 21:54

## 2021-01-01 RX ADMIN — SODIUM CHLORIDE 50 MILLILITER(S): 9 INJECTION INTRAMUSCULAR; INTRAVENOUS; SUBCUTANEOUS at 02:33

## 2021-01-01 RX ADMIN — Medication 3 MILLILITER(S): at 08:26

## 2021-01-01 RX ADMIN — SODIUM CHLORIDE 1000 MILLILITER(S): 9 INJECTION, SOLUTION INTRAVENOUS at 21:42

## 2021-01-01 RX ADMIN — Medication 2 MILLIGRAM(S): at 05:43

## 2021-01-01 RX ADMIN — Medication 40 MILLIGRAM(S): at 02:25

## 2021-01-01 RX ADMIN — ZOLPIDEM TARTRATE 5 MILLIGRAM(S): 10 TABLET ORAL at 23:30

## 2021-01-01 RX ADMIN — Medication 600 MILLIGRAM(S): at 05:41

## 2021-01-01 RX ADMIN — Medication 100 MILLIGRAM(S): at 06:07

## 2021-01-01 RX ADMIN — BUDESONIDE AND FORMOTEROL FUMARATE DIHYDRATE 2 PUFF(S): 160; 4.5 AEROSOL RESPIRATORY (INHALATION) at 12:12

## 2021-01-01 RX ADMIN — ZOLPIDEM TARTRATE 5 MILLIGRAM(S): 10 TABLET ORAL at 02:26

## 2021-01-01 RX ADMIN — Medication 3 MILLILITER(S): at 20:08

## 2021-01-01 RX ADMIN — Medication 30 MILLILITER(S): at 15:15

## 2021-01-01 RX ADMIN — SODIUM CHLORIDE 50 MILLILITER(S): 9 INJECTION INTRAMUSCULAR; INTRAVENOUS; SUBCUTANEOUS at 14:57

## 2021-01-01 RX ADMIN — Medication 3 MILLILITER(S): at 08:11

## 2021-01-01 RX ADMIN — ENOXAPARIN SODIUM 40 MILLIGRAM(S): 100 INJECTION SUBCUTANEOUS at 12:13

## 2021-01-01 RX ADMIN — Medication 100 MILLIGRAM(S): at 05:49

## 2021-01-01 RX ADMIN — Medication 100 MILLIGRAM(S): at 14:41

## 2021-01-01 RX ADMIN — Medication 250 MILLIGRAM(S): at 05:07

## 2021-01-01 RX ADMIN — BUDESONIDE AND FORMOTEROL FUMARATE DIHYDRATE 2 PUFF(S): 160; 4.5 AEROSOL RESPIRATORY (INHALATION) at 07:59

## 2021-01-01 RX ADMIN — FAMOTIDINE 20 MILLIGRAM(S): 10 INJECTION INTRAVENOUS at 11:43

## 2021-01-01 RX ADMIN — Medication 100 MILLIGRAM(S): at 14:57

## 2021-01-01 RX ADMIN — ZOLPIDEM TARTRATE 5 MILLIGRAM(S): 10 TABLET ORAL at 01:40

## 2021-01-01 RX ADMIN — BUDESONIDE AND FORMOTEROL FUMARATE DIHYDRATE 2 PUFF(S): 160; 4.5 AEROSOL RESPIRATORY (INHALATION) at 20:48

## 2021-01-01 RX ADMIN — PANTOPRAZOLE SODIUM 40 MILLIGRAM(S): 20 TABLET, DELAYED RELEASE ORAL at 06:00

## 2021-01-01 RX ADMIN — FAMOTIDINE 20 MILLIGRAM(S): 10 INJECTION INTRAVENOUS at 11:10

## 2021-01-01 RX ADMIN — PANTOPRAZOLE SODIUM 40 MILLIGRAM(S): 20 TABLET, DELAYED RELEASE ORAL at 05:44

## 2021-01-01 RX ADMIN — Medication 40 MILLIGRAM(S): at 05:49

## 2021-01-01 RX ADMIN — BUDESONIDE AND FORMOTEROL FUMARATE DIHYDRATE 2 PUFF(S): 160; 4.5 AEROSOL RESPIRATORY (INHALATION) at 10:17

## 2021-01-01 RX ADMIN — BUDESONIDE AND FORMOTEROL FUMARATE DIHYDRATE 2 PUFF(S): 160; 4.5 AEROSOL RESPIRATORY (INHALATION) at 08:00

## 2021-01-01 RX ADMIN — Medication 100 MILLIGRAM(S): at 05:44

## 2021-01-01 RX ADMIN — ONDANSETRON 4 MILLIGRAM(S): 8 TABLET, FILM COATED ORAL at 17:56

## 2021-01-01 RX ADMIN — BUDESONIDE AND FORMOTEROL FUMARATE DIHYDRATE 2 PUFF(S): 160; 4.5 AEROSOL RESPIRATORY (INHALATION) at 19:57

## 2021-01-01 RX ADMIN — ESCITALOPRAM OXALATE 10 MILLIGRAM(S): 10 TABLET, FILM COATED ORAL at 11:42

## 2021-01-01 RX ADMIN — Medication 3 MILLILITER(S): at 08:28

## 2021-01-01 RX ADMIN — Medication 250 MILLIGRAM(S): at 05:41

## 2021-01-01 RX ADMIN — FAMOTIDINE 20 MILLIGRAM(S): 10 INJECTION INTRAVENOUS at 12:53

## 2021-01-01 RX ADMIN — ENOXAPARIN SODIUM 40 MILLIGRAM(S): 100 INJECTION SUBCUTANEOUS at 12:09

## 2021-01-01 RX ADMIN — Medication 600 MILLIGRAM(S): at 06:27

## 2021-01-01 RX ADMIN — BUDESONIDE AND FORMOTEROL FUMARATE DIHYDRATE 2 PUFF(S): 160; 4.5 AEROSOL RESPIRATORY (INHALATION) at 21:36

## 2021-01-01 RX ADMIN — Medication 3 MILLILITER(S): at 13:15

## 2021-01-01 RX ADMIN — Medication 2 MILLIGRAM(S): at 06:22

## 2021-01-01 RX ADMIN — ENOXAPARIN SODIUM 40 MILLIGRAM(S): 100 INJECTION SUBCUTANEOUS at 11:43

## 2021-01-01 RX ADMIN — Medication 100 MILLIGRAM(S): at 21:41

## 2021-01-01 RX ADMIN — ESCITALOPRAM OXALATE 10 MILLIGRAM(S): 10 TABLET, FILM COATED ORAL at 11:56

## 2021-01-01 RX ADMIN — Medication 40 MILLIGRAM(S): at 05:50

## 2021-01-01 RX ADMIN — Medication 100 MILLIGRAM(S): at 06:56

## 2021-01-01 RX ADMIN — Medication 250 MILLIGRAM(S): at 17:50

## 2021-01-01 RX ADMIN — Medication 650 MILLIGRAM(S): at 21:42

## 2021-01-01 RX ADMIN — Medication 3 MILLILITER(S): at 09:31

## 2021-01-01 RX ADMIN — Medication 100 MILLIGRAM(S): at 06:27

## 2021-01-01 RX ADMIN — Medication 100 MILLIGRAM(S): at 15:34

## 2021-01-01 RX ADMIN — ESCITALOPRAM OXALATE 10 MILLIGRAM(S): 10 TABLET, FILM COATED ORAL at 12:53

## 2021-01-01 RX ADMIN — ZOLPIDEM TARTRATE 5 MILLIGRAM(S): 10 TABLET ORAL at 21:56

## 2021-01-01 RX ADMIN — Medication 250 MILLIGRAM(S): at 05:44

## 2021-01-01 RX ADMIN — Medication 3 MILLILITER(S): at 21:17

## 2021-01-01 RX ADMIN — Medication 40 MILLIGRAM(S): at 15:06

## 2021-01-01 RX ADMIN — ESCITALOPRAM OXALATE 10 MILLIGRAM(S): 10 TABLET, FILM COATED ORAL at 12:12

## 2021-01-01 RX ADMIN — PANTOPRAZOLE SODIUM 40 MILLIGRAM(S): 20 TABLET, DELAYED RELEASE ORAL at 06:07

## 2021-01-01 RX ADMIN — FAMOTIDINE 20 MILLIGRAM(S): 10 INJECTION INTRAVENOUS at 12:12

## 2021-01-01 RX ADMIN — ZOLPIDEM TARTRATE 5 MILLIGRAM(S): 10 TABLET ORAL at 00:08

## 2021-01-01 RX ADMIN — ENOXAPARIN SODIUM 40 MILLIGRAM(S): 100 INJECTION SUBCUTANEOUS at 12:54

## 2021-01-01 RX ADMIN — Medication 600 MILLIGRAM(S): at 19:30

## 2021-01-01 RX ADMIN — Medication 600 MILLIGRAM(S): at 06:06

## 2021-01-01 RX ADMIN — BUDESONIDE AND FORMOTEROL FUMARATE DIHYDRATE 2 PUFF(S): 160; 4.5 AEROSOL RESPIRATORY (INHALATION) at 08:33

## 2021-01-01 RX ADMIN — ESCITALOPRAM OXALATE 10 MILLIGRAM(S): 10 TABLET, FILM COATED ORAL at 14:57

## 2021-01-01 RX ADMIN — Medication 100 MILLIGRAM(S): at 15:18

## 2021-01-01 RX ADMIN — ESCITALOPRAM OXALATE 10 MILLIGRAM(S): 10 TABLET, FILM COATED ORAL at 13:00

## 2021-01-01 RX ADMIN — Medication 3 MILLILITER(S): at 13:11

## 2021-01-01 RX ADMIN — Medication 1 TABLET(S): at 06:14

## 2021-01-01 RX ADMIN — ZOLPIDEM TARTRATE 5 MILLIGRAM(S): 10 TABLET ORAL at 23:57

## 2021-01-01 RX ADMIN — Medication 100 MILLIGRAM(S): at 22:21

## 2021-01-01 RX ADMIN — ENOXAPARIN SODIUM 40 MILLIGRAM(S): 100 INJECTION SUBCUTANEOUS at 13:00

## 2021-01-01 RX ADMIN — Medication 2 MILLIGRAM(S): at 23:01

## 2021-01-01 RX ADMIN — ENOXAPARIN SODIUM 40 MILLIGRAM(S): 100 INJECTION SUBCUTANEOUS at 11:56

## 2021-01-01 RX ADMIN — Medication 3 MILLILITER(S): at 08:39

## 2021-01-01 RX ADMIN — PANTOPRAZOLE SODIUM 40 MILLIGRAM(S): 20 TABLET, DELAYED RELEASE ORAL at 06:27

## 2021-01-01 RX ADMIN — Medication 20 MILLIGRAM(S): at 12:06

## 2021-01-01 RX ADMIN — Medication 600 MILLIGRAM(S): at 17:59

## 2021-01-01 RX ADMIN — Medication 3 MILLILITER(S): at 14:35

## 2021-01-01 RX ADMIN — Medication 600 MILLIGRAM(S): at 05:49

## 2021-01-21 ENCOUNTER — APPOINTMENT (OUTPATIENT)
Dept: CARDIOLOGY | Facility: CLINIC | Age: 86
End: 2021-01-21
Payer: MEDICARE

## 2021-01-21 VITALS
SYSTOLIC BLOOD PRESSURE: 130 MMHG | WEIGHT: 108 LBS | BODY MASS INDEX: 21.77 KG/M2 | DIASTOLIC BLOOD PRESSURE: 78 MMHG | TEMPERATURE: 97.2 F | HEART RATE: 93 BPM | HEIGHT: 59 IN

## 2021-01-21 PROBLEM — K85.90 ACUTE PANCREATITIS WITHOUT NECROSIS OR INFECTION, UNSPECIFIED: Chronic | Status: ACTIVE | Noted: 2020-11-28

## 2021-01-21 PROCEDURE — 93000 ELECTROCARDIOGRAM COMPLETE: CPT

## 2021-01-21 PROCEDURE — 99214 OFFICE O/P EST MOD 30 MIN: CPT

## 2021-01-21 RX ORDER — FAMOTIDINE 20 MG/1
20 TABLET, FILM COATED ORAL
Refills: 0 | Status: ACTIVE | COMMUNITY

## 2021-01-21 RX ORDER — LATANOPROST/PF 0.005 %
0.01 DROPS OPHTHALMIC (EYE) DAILY
Refills: 0 | Status: DISCONTINUED | COMMUNITY
End: 2021-01-21

## 2021-01-21 NOTE — HISTORY OF PRESENT ILLNESS
[FreeTextEntry1] : The patient has been feeling well. Some KONG but is not changed . The patient overall feels well. . she has not been sick during pandemic  The had abdominal pain and was seen in the ER . W/U was negative . The patient has had increased Amylase and Lipase . . CT scan of the abdomin and pelvis showed normal pancrease .

## 2021-03-15 ENCOUNTER — NON-APPOINTMENT (OUTPATIENT)
Age: 86
End: 2021-03-15

## 2021-03-15 DIAGNOSIS — M81.0 AGE-RELATED OSTEOPOROSIS W/OUT CURRENT PATHOLOGICAL FRACTURE: ICD-10-CM

## 2021-03-15 DIAGNOSIS — Z87.19 PERSONAL HISTORY OF OTHER DISEASES OF THE DIGESTIVE SYSTEM: ICD-10-CM

## 2021-03-15 DIAGNOSIS — G47.00 INSOMNIA, UNSPECIFIED: ICD-10-CM

## 2021-03-15 RX ORDER — PANTOPRAZOLE 40 MG/1
40 TABLET, DELAYED RELEASE ORAL
Refills: 0 | Status: ACTIVE | COMMUNITY

## 2021-04-05 ENCOUNTER — APPOINTMENT (OUTPATIENT)
Dept: PULMONOLOGY | Facility: CLINIC | Age: 86
End: 2021-04-05
Payer: MEDICARE

## 2021-04-05 VITALS
HEIGHT: 59 IN | WEIGHT: 106 LBS | OXYGEN SATURATION: 93 % | HEART RATE: 102 BPM | BODY MASS INDEX: 21.37 KG/M2 | RESPIRATION RATE: 14 BRPM | SYSTOLIC BLOOD PRESSURE: 110 MMHG | DIASTOLIC BLOOD PRESSURE: 70 MMHG

## 2021-04-05 PROCEDURE — 99213 OFFICE O/P EST LOW 20 MIN: CPT

## 2021-04-20 ENCOUNTER — APPOINTMENT (OUTPATIENT)
Dept: CARDIOLOGY | Facility: CLINIC | Age: 86
End: 2021-04-20
Payer: MEDICARE

## 2021-04-20 PROCEDURE — 93306 TTE W/DOPPLER COMPLETE: CPT

## 2021-05-27 NOTE — ED PROVIDER NOTE - ATTENDING CONTRIBUTION TO CARE
Ultrasound-guided mammotome biopsy x2 Wednesday morning. 86F with PMH COPD, pancreatitis who presents to [facility] from Dr. Van's office for concern regarding abd pain. Pain is epigastric and wraps horizontally around her belly, difficult to characterize, constant. This morning she also had a mechanical fall just outside the office, in which she landed directly onto b/l knees, and was ambulatory afterwards. Denies head trauma of loc. Pt denies n/v/d/f/c. Received a call from Dr. Morales requesting CT scan to eval for possible rib fractures vs intra-abd pathology.     CONSTITUTIONAL: Well-developed; well-nourished; in no acute distress. Sitting up and providing appropriate history and physical examination  SKIN: skin exam is warm and dry, no acute rash.  HEAD: Normocephalic; atraumatic.  EYES: PERRL, 3 mm bilateral, no nystagmus, EOM intact; conjunctiva and sclera clear.  ENT: No nasal discharge; airway clear.  NECK: Supple; non tender. + full passive ROM in all directions. No JVD  CARD: S1, S2 normal; no murmurs, gallops, or rubs. Regular rate and rhythm. + Symmetric Strong Pulses  RESP: + ttp lateral ribs 5-7 b/l. No wheezes, rales or rhonchi. Good air movement bilaterally  ABD: soft; non-distended; non-tender. No Rebound, No Guarding, No signs of peritonitis, No CVA tenderness. No pulsatile abdominal mass. + Strong and Symmetric Pulses  EXT: Normal ROM. No clubbing, cyanosis or edema. Dp and Pt Pulses intact. Cap refill less than 3 seconds  NEURO: CN 2-12 intact, normal finger to nose, normal romberg, stable gait, no sensory or motor deficits, Alert, oriented, grossly unremarkable. No Focal deficits. GCS 15. NIH 0  PSYCH: Cooperative, appropriate.

## 2021-06-04 ENCOUNTER — APPOINTMENT (OUTPATIENT)
Dept: CARDIOLOGY | Facility: CLINIC | Age: 86
End: 2021-06-04
Payer: MEDICARE

## 2021-06-04 VITALS
WEIGHT: 108 LBS | HEIGHT: 59 IN | TEMPERATURE: 97.4 F | DIASTOLIC BLOOD PRESSURE: 80 MMHG | BODY MASS INDEX: 21.77 KG/M2 | SYSTOLIC BLOOD PRESSURE: 118 MMHG

## 2021-06-04 PROCEDURE — 93000 ELECTROCARDIOGRAM COMPLETE: CPT

## 2021-06-04 PROCEDURE — 99214 OFFICE O/P EST MOD 30 MIN: CPT

## 2021-06-04 RX ORDER — SERTRALINE 25 MG/1
25 TABLET, FILM COATED ORAL
Refills: 0 | Status: DISCONTINUED | COMMUNITY
End: 2021-06-04

## 2021-06-04 RX ORDER — CALCIUM CARB/VIT D3/MINERALS 600 MG-400
600-400 TABLET ORAL
Refills: 0 | Status: DISCONTINUED | COMMUNITY
End: 2021-06-04

## 2021-06-04 NOTE — ASSESSMENT
[FreeTextEntry1] : The patient has remained unchanged . She has COPD and pulmonary HTN . Echo showed normal LV systolic function . She has had some increased SOB which she has attributed to her wearing a mask . It is not as prevalent when she is not  She does see Dr. Deng . previous ischemia work up was negative .

## 2021-06-04 NOTE — REVIEW OF SYSTEMS
[SOB] : shortness of breath [Dyspnea on exertion] : dyspnea during exertion [Joint Pain] : joint pain [Joint Swelling] : joint swelling [Negative] : Psychiatric [Chest Discomfort] : no chest discomfort

## 2021-06-04 NOTE — CARDIOLOGY SUMMARY
[___] : [unfilled] [de-identified] : 6-4-2021 NSR RSR' in V1 and V2 .  [de-identified] : 4- Normal LV systolic function mild MR Mod TR RVSP was 52 mmhg

## 2021-06-04 NOTE — HISTORY OF PRESENT ILLNESS
[FreeTextEntry1] : The patient has had no further abdominal pain . Has had some dyspnea which she has attributed to her mask

## 2021-06-04 NOTE — REASON FOR VISIT
[Symptom and Test Evaluation] : symptom and test evaluation [Structural Heart and Valve Disease] : structural heart and valve disease [Consultation] : a consultation regarding [FreeTextEntry3] : Dr. Van  [FreeTextEntry1] : COPD needs cardiac evaluation .

## 2021-11-09 NOTE — HISTORY OF PRESENT ILLNESS
[FreeTextEntry1] : The patient has had increased SOB    The patient has severe COPD and is seeing pulmonary . No chest pain

## 2021-11-09 NOTE — ASSESSMENT
[FreeTextEntry1] : The patient has remained unchanged . She has COPD and pulmonary HTN . Echo showed normal LV systolic function . She has had some increased SOB which she has attributed to her wearing a mask . It is not as prevalent when she is not  She does see Dr. Deng . previous ischemia work up was negative .The patient has remained unchanged . Her LDL is at goal. She has no edema .

## 2021-11-09 NOTE — REASON FOR VISIT
[Symptom and Test Evaluation] : symptom and test evaluation [Structural Heart and Valve Disease] : structural heart and valve disease [FreeTextEntry3] : Dr. Van  [Consultation] : a consultation regarding [FreeTextEntry1] : COPD needs cardiac evaluation .

## 2021-11-09 NOTE — PHYSICAL EXAM
[General Appearance - Well Developed] : well developed [Normal Appearance] : normal appearance [Well Groomed] : well groomed [General Appearance - Well Nourished] : well nourished [No Deformities] : no deformities [General Appearance - In No Acute Distress] : no acute distress [Normal Conjunctiva] : the conjunctiva exhibited no abnormalities [Eyelids - No Xanthelasma] : the eyelids demonstrated no xanthelasmas [Normal Oral Mucosa] : normal oral mucosa [No Oral Pallor] : no oral pallor [No Oral Cyanosis] : no oral cyanosis [FreeTextEntry1] : No JVD  [Respiration, Rhythm And Depth] : normal respiratory rhythm and effort [Exaggerated Use Of Accessory Muscles For Inspiration] : no accessory muscle use [Auscultation Breath Sounds / Voice Sounds] : lungs were clear to auscultation bilaterally [Abdomen Soft] : soft [Abdomen Tenderness] : non-tender [Abdomen Mass (___ Cm)] : no abdominal mass palpated [Abnormal Walk] : normal gait [Nail Clubbing] : no clubbing of the fingernails [Cyanosis, Localized] : no localized cyanosis [Petechial Hemorrhages (___cm)] : no petechial hemorrhages [Skin Color & Pigmentation] : normal skin color and pigmentation [] : no rash [No Venous Stasis] : no venous stasis [Skin Lesions] : no skin lesions [No Skin Ulcers] : no skin ulcer [No Xanthoma] : no  xanthoma was observed [Normal Rate] : normal [Rhythm Regular] : regular [No Murmur] : no murmurs heard [2+] : left 2+ [No Pitting Edema] : no pitting edema present [Rt] : varicose veins of the right leg noted [Lt] : varicose veins of the left leg noted

## 2021-11-09 NOTE — REVIEW OF SYSTEMS
[SOB] : shortness of breath [Dyspnea on exertion] : dyspnea during exertion [Chest Discomfort] : no chest discomfort [Joint Pain] : joint pain [Joint Swelling] : joint swelling [Negative] : Psychiatric

## 2021-12-18 NOTE — ED ADULT NURSE NOTE - ED STAT RN HANDOFF DETAILS
87 YEARS OLD FEMALE RECEIVED IN AREA ALERT,AWAKE, ORIENTED X4, 2 LITERS 02 VIA NC, CAME WITH C/O SOB/COUGH. 02 WAS APPLIED NEB TX GIVEN ALONG WITH FLUIDS.PT IS CURRENTLY CALM, NO S/S OF RESP DISTRESS.PT REPORTED FEELING BETTER. REPORT GIVEN

## 2021-12-18 NOTE — ED PROVIDER NOTE - CLINICAL SUMMARY MEDICAL DECISION MAKING FREE TEXT BOX
86 yo F presented to ED for SOB and weakness. COVID negative. CXR did not demonstrate pneumonia. When walked pt her oxygen was normal but she became weak. Pt lives alone and due to weakness is an unsafe discharge. PT admitted for further evaluation and management.
I have fibroids

## 2021-12-18 NOTE — ED PROVIDER NOTE - NSICDXPASTMEDICALHX_GEN_ALL_CORE_FT
PAST MEDICAL HISTORY:  Anxiety     COPD (chronic obstructive pulmonary disease)     GERD (gastroesophageal reflux disease)     Glaucoma     Pancreatitis

## 2021-12-18 NOTE — ED ADULT NURSE NOTE - NSIMPLEMENTINTERV_GEN_ALL_ED
Implemented All Fall with Harm Risk Interventions:  [place] to call system. Call bell, personal items and telephone within reach. Instruct patient to call for assistance. Room bathroom lighting operational. Non-slip footwear when patient is off stretcher. Physically safe environment: no spills, clutter or unnecessary equipment. Stretcher in lowest position, wheels locked, appropriate side rails in place. Provide visual cue, wrist band, yellow gown, etc. Monitor gait and stability. Monitor for mental status changes and reorient to person, place, and time. Review medications for side effects contributing to fall risk. Reinforce activity limits and safety measures with patient and family. Provide visual clues: red socks.

## 2021-12-18 NOTE — ED ADULT NURSE REASSESSMENT NOTE - NS ED NURSE REASSESS COMMENT FT1
bedside report given to Mike VALLES
vasyl admitted to medicine and moved to 15A.  Report given to REENA Keys. Patient remains on 2Lnc oxygen, stable conditon and nad.
Patient admitted with SOB. Patient's daughter is at bedside. Patient's GCS 15. Patient able to verbalized understanding on topics covered during teaching. Patient VSS. Will give report to receiving nurse.

## 2021-12-18 NOTE — ED PROVIDER NOTE - PHYSICAL EXAMINATION
Physical Exam    Vital Signs: I have reviewed the initial vital signs.  Constitutional: well-nourished, appears stated age, no acute distress  Eyes: Conjunctiva pink, Sclera clear  Cardiovascular: S1 and S2, regular rate, regular rhythm, well-perfused extremities, radial pulses equal and 2+, calves nonttp, equal in size  Respiratory: unlabored respiratory effort, speaking in full sentences, handling oral secretions, 98% on RA. 95 % on RA swith ambulation,  clear to auscultation bilaterally no wheezing, rales and rhonchi  Gastrointestinal: soft, non-tender abdomen, no pulsatile mass, normal bowl sounds  Musculoskeletal: supple neck, no lower extremity edema,   Neurologic: awake, alert x 3

## 2021-12-18 NOTE — ED PROVIDER NOTE - NS ED ROS FT
Constitutional: (-) fever (-) chills (-) (-) lightheadedness   Eyes/ENT: (-) blurry vision, (-) epistaxis (-) rhinorrhea (-) nasal congestion  Cardiovascular: (-) chest pain, (-) syncope (-) palpitations   Respiratory: (+) cough, (+) shortness of breath (-) pleurisy   Gastrointestinal: (-) vomiting, (-) diarrhea (-) abdominal pain (-) nausea (-) anorexia  Musculoskeletal: (-) neck pain, (-) back pain, (-) joint pain (-) joint swelling (-) painful ROM  Integumentary: (-) rash, (-) edema (-) lacerations (-) pruritis   Neurological: (-) headache, (-) altered mental status (-) LOC (-) dizziness (-) paresthesias (-) gait abnormalities

## 2021-12-18 NOTE — ED PROVIDER NOTE - CADM POA PRESS ULCER
PROCEDURE NOTE    DATE OF SERVICE: 1/15/2020    SURGEON: HERMAN Chavez MD     PRE-OP DIAGNOSIS:    History of ulcerative colitis  Rectal bleeding    POST-OP DIAGNOSIS:    Rectal bleeding  Active colitis    PROCEDURE:   Colonoscopy with random biopsies    ASSISTANT:  Circulator: Li Kasper RN  Relief Circulator: Emily Fernandez RN  Scrub Person: Pat Bernal  2nd Scrub: Carmencita Tyler  Pre-Op Nurse: Jacki Russell RN  Post-Op Nurse: Caitie Mercado RN    ANESTHESIA:  MAC                            Monitor Anesthesia CareCRNA Independent: Alex Holman APRN CRNA    INDICATION FOR THE PROCEDURE: The patient is a 47 year old male with active ulcerative colitis and rectal bleeding. The patient has no other complaints.  After explaining the risks to include bleeding, perforation, potential inability to reach the cecum the patient wishes to proceed.    PROCEDURE:After adequate sedation, the patient was in the left lateral decubitus position.  Rectal exam was performed.  There was normal tone and no palpable masses.  The colonoscope was introduced into the rectum and advanced to the cecum with Mild difficulty.  The patient's prep was good.  The terminal cecum was reached.  The cecum, ascending, transverse, descending and sigmoid colon were significant for continuous inflammation from splenic flexure to anorectal junction with areas of bleeding and diffusely friable tissue. random biopsies were taken from the entire colon.  The scope was retroflexed in the rectum.  The rectum was also remarkable for inflammation extending to the anorectal junction.  The scope was straightened and removed.  The patient tolerated the procedure well.     ESTIMATED BLOOD LOSS: none    COMPLICATIONS:  None    TISSUE REMOVED:  Yes    RECOMMEND:    Follow-up pending pathology      HERMAN Chavez MD            No

## 2021-12-18 NOTE — ED PROVIDER NOTE - ATTENDING CONTRIBUTION TO CARE
86 yo F with PMH of COPD (not on home 02) presents to ED for cough and SOB x2 weeks. Pt was given a zpack by her doctor and finished it earlier this week, as well as steroids. She is vaccinated against COVID x3. She came to the ED tonight because she felt SOB and weak with ambulation. She lives alone and felt she was unable to get around. No CP, nausea, vomiting, fevers or chills.     Const: Well nourished, well developed, appears stated age  Eyes: PERRL, no conjunctival injection  HENT:  Neck supple without meningismus   CV: RRR, Warm, well-perfused extremities  RESP: CTA B/L, no tachypnea   GI: soft, non-tender, non-distended  MSK: No gross deformities appreciated  Skin: Warm, dry. No rashes  Neuro: Alert, CNs II-XII grossly intact. Sensation and motor function of extremities grossly intact.  Psych: Appropriate mood and affect.    will ambulate pt to see O2 sat. Do labs and CXR to ru pneumonia

## 2021-12-18 NOTE — ED PROVIDER NOTE - OBJECTIVE STATEMENT
87 y.o. f, pmh of COPD not on home O2, covid vaccinated here fo 2 weeks of progressive cough but now with sob and generalized body aches. Is s/p completion of Z-pack started on Tuesday and almost completed a 5 day course of prednisone. Lives alone, feels weak with ADLS (dressing self) Called EMS, placed on 2L nasal cannula and breathing improved.

## 2021-12-18 NOTE — ED ADULT NURSE NOTE - OBJECTIVE STATEMENT
patient complaints of cough, SOB, generalized weakness, headache x 5 days.  Patient reports being vaccinated and denies exposure.

## 2021-12-18 NOTE — ED PROVIDER NOTE - CARE PLAN
1 Principal Discharge DX:	SOB (shortness of breath)  Secondary Diagnosis:	Weakness  Secondary Diagnosis:	Ambulatory dysfunction

## 2021-12-19 NOTE — H&P ADULT - NSHPPHYSICALEXAM_GEN_ALL_CORE
LOS: 1d    VITALS:   T(C): 36.6 (12-18-21 @ 23:34), Max: 36.9 (12-18-21 @ 20:05)  HR: 81 (12-18-21 @ 23:34) (81 - 98)  BP: 157/72 (12-18-21 @ 23:34) (157/72 - 202/98)  RR: 18 (12-18-21 @ 23:34) (18 - 18)  SpO2: 98% (12-18-21 @ 23:34) (98% - 99%)    GENERAL: NAD, lying in bed comfortably  HEAD:  Atraumatic, Normocephalic  EYES: EOMI, PERRLA, conjunctiva and sclera clear  ENT: Moist mucous membranes  NECK: Supple, No JVD  CHEST/LUNG: Clear to auscultation bilaterally; No rales, rhonchi, wheezing, or rubs. Unlabored respirations  HEART: Regular rate and rhythm; No murmurs, rubs, or gallops  ABDOMEN: BSx4; Soft, nontender, nondistended  EXTREMITIES:  2+ Peripheral Pulses, brisk capillary refill. No clubbing, cyanosis, or edema  NERVOUS SYSTEM:  A&Ox3, no focal deficits   SKIN: No rashes or lesions VITALS:   T(C): 36.6 (12-18-21 @ 23:34), Max: 36.9 (12-18-21 @ 20:05)  HR: 81 (12-18-21 @ 23:34) (81 - 98)  BP: 157/72 (12-18-21 @ 23:34) (157/72 - 202/98)  RR: 18 (12-18-21 @ 23:34) (18 - 18)  SpO2: 98% (12-18-21 @ 23:34) (98% - 99%)    GENERAL: NAD, lying in bed comfortably, frail elderly woman  HEAD:  Atraumatic, Normocephalic  EYES: EOMI, PERRLA, conjunctiva and sclera clear  ENT: Moist mucous membranes  NECK: Supple, No JVD  CHEST/LUNG: Decreased breath sounds, no wheezing   HEART: Regular rate and rhythm; No murmurs, rubs, or gallops  ABDOMEN: BSx4; Soft, nontender, nondistended  EXTREMITIES:  2+ Peripheral Pulses, brisk capillary refill. No clubbing, cyanosis, or edema  NERVOUS SYSTEM:  A&Ox3, no focal deficits

## 2021-12-19 NOTE — H&P ADULT - NSHPLABSRESULTS_GEN_ALL_CORE
14.2   12.24 )-----------( 425      ( 18 Dec 2021 21:17 )             42.8     12-18    140  |  102  |  15  ----------------------------<  105<H>  4.4   |  23  |  0.8    Ca    9.2      18 Dec 2021 21:17  Mg     1.9     12-18    TPro  6.8  /  Alb  3.9  /  TBili  0.4  /  DBili  x   /  AST  20  /  ALT  18  /  AlkPhos  52  12-18        CARDIAC MARKERS ( 18 Dec 2021 21:17 )  x     / <0.01 ng/mL / x     / x     / x            LIVER FUNCTIONS - ( 18 Dec 2021 21:17 )  Alb: 3.9 g/dL / Pro: 6.8 g/dL / ALK PHOS: 52 U/L / ALT: 18 U/L / AST: 20 U/L / GGT: x             D-Dimer Assay, Quantitative: 116 ng/mL DDU (12-18-21 @ 21:17)          RADIOLOGY & ADDITIONAL STUDIES: 14.2   12.24 )-----------( 425      ( 18 Dec 2021 21:17 )             42.8     12-18    140  |  102  |  15  ----------------------------<  105<H>  4.4   |  23  |  0.8    Ca    9.2      18 Dec 2021 21:17  Mg     1.9     12-18    TPro  6.8  /  Alb  3.9  /  TBili  0.4  /  DBili  x   /  AST  20  /  ALT  18  /  AlkPhos  52  12-18        CARDIAC MARKERS ( 18 Dec 2021 21:17 )  x     / <0.01 ng/mL / x     / x     / x            LIVER FUNCTIONS - ( 18 Dec 2021 21:17 )  Alb: 3.9 g/dL / Pro: 6.8 g/dL / ALK PHOS: 52 U/L / ALT: 18 U/L / AST: 20 U/L / GGT: x             D-Dimer Assay, Quantitative: 116 ng/mL DDU (12-18-21 @ 21:17)

## 2021-12-19 NOTE — H&P ADULT - ATTENDING COMMENTS
86 YO F with a PMH of COPD (not on home O2), HTN, GERD, Lucio's esophagus, and COVID vaccinated who was BIBEMS for eval of SOB for the past x 3-5 days. Associated with productive cough. + increase in sputum production. - change in sputum color (White). Denies any CP, fevers/chills, sore throat, runny nose, allergies, palpitations, headache, ABD pain, dysuria, or LE swelling.     Of note, pt received Azithro and PO prednisone from her PCP with little relief.     In the ED, Chest X-ray showed no changes from previous (pending official read).     FMHx: Reviewed, not relevant    Physical exam shows pt in NAD. VSS, not hypoxic on 1L NC. Speaking in full sentences. Neuro exam is WNLs. CTA B/L with no W/C/R, good air entry B/L. RRR, no M/G/R. ABD is soft and non-tender to palpation, normoactive BSs. No LE edema. Labs and radiology as above.     Dyspnea from acute COPD exacerbation. Send RSV. Send procal and CRP. IV steroids and transition to PO. Duonebs PRN/standing. Symbicort. Azithro. Supplemental O2 PRN.    Hx of HTN, GERD, Lucio's esophagus, and COVID vaccinated. Restart home meds, except as stated above. DVT PPX. Inform PCP of pt's admission to hospital. My note supersedes the residents note.     Date seen by Attendin21 86 YO F with a PMH of COPD (not on home O2), HTN, GERD, Lucio's esophagus, and COVID vaccinated who was BIBEMS for eval of SOB for the past x 3-5 days. Associated with productive cough. + increase in sputum production. - change in sputum color (White). Denies any CP, fevers/chills, sore throat, runny nose, allergies, palpitations, headache, ABD pain, dysuria, or LE swelling.     Of note, pt received Azithro and PO prednisone from her PCP with little relief.     In the ED, Chest X-ray showed no changes from previous (pending official read).     FMHx: Reviewed, not relevant    Physical exam shows pt in NAD. VSS, not hypoxic on 1L NC. Speaking in full sentences. Neuro exam is WNLs. CTA B/L with no W/C/R, good air entry B/L. RRR, no M/G/R. ABD is soft and non-tender to palpation, normoactive BSs. No LE edema. Labs and radiology as above.     Dyspnea from acute COPD exacerbation. Send RSV. Send procal and CRP. IV steroids and transition to PO. Duonebs PRN/standing. Symbicort. Azithro. Supplemental O2 PRN.    Hx of HTN, GERD, Lucio's esophagus, and COVID vaccinated. Restart home meds, except as stated above. DVT PPX. Inform PCP of pt's admission to hospital. My note supersedes the residents note.

## 2021-12-19 NOTE — H&P ADULT - ASSESSMENT
88 y/o female with PMHx of COPD not on home O2, pHTN, GERD, Lucio's esophagus, COVID vaccinated presented to the ED for 2 weeks of progressive productive cough, worsening SOB and generalized body aches. She reports that on Tuesday she had reached out to her PCP who prescribed her Zpack and 5 day course of Prednisone (completes tomorrow).    #Hypoxia likely secondary to COPD exacerbation   #Hx of COPD not on home O2  #Hx of pHTN  - 2 week hx of productive cough, SOB, generalized body aches  - s/p Z pack and Prednisone outpt  - no wheezing on exam, afebrile, leukocytosis (on prednisone)  - COVID and RVP negative  - CXR appear unremarkable f/u official read  - D-dimer 115, less likely to be PE   - currently on 2L NC saturating 98%, titrate O2 91-92%   - continue with Prednisone x1, to complete 5 days   - Duonebs, albuterol PRN  - PT consult     #Hypertensive Urgency - resolved  - /98 on admission   - consider starting amlodipine 5mg if continues to have elevated BP    #Hx of GERD  #Hx of Barett's esophagus   - continue with pepcid and PPI    #DVT PPx: Lovenox  #GI PPx: PPI  #Regular diet

## 2021-12-19 NOTE — H&P ADULT - HISTORY OF PRESENT ILLNESS
87 y.o. f, pmh of COPD not on home O2, covid vaccinated here fo 2 weeks of progressive cough but now with sob and generalized body aches. Is s/p completion of Z-pack started on Tuesday and almost completed a 5 day course of prednisone. Lives alone, feels weak with ADLS (dressing self) Called EMS, placed on 2L nasal cannula and breathing improved 88 y/o female with PMHx of COPD not on home O2, pHTN, GERD, Lucio's esophagus, COVID vaccinated presented to the ED for 2 weeks of progressive productive cough, worsening SOB and generalized body aches. She reports that on Tuesday she had reached out to her PCP who prescribed her Zpack and 5 day course of Prednisone (completes tomorrow). She woke up this morning and was completely unable to perform her ADLs, decided to call EMS. She denies any fevers, chills, chest pain, abdominal pain, nausea, vomiting, diarrhea, constipation.     In the ED, VS noted for T 98, HR 98, /98, SpO2 98% on 2L NC. Labs noted for WBC 12, D-dimer 115, Trop 0.01. RVP and COVID negative. CXR appears unremarkable

## 2021-12-19 NOTE — PATIENT PROFILE ADULT - FALL HARM RISK - HARM RISK INTERVENTIONS
Assistance with ambulation/Assistance OOB with selected safe patient handling equipment/Communicate Risk of Fall with Harm to all staff/Discuss with provider need for PT consult/Monitor gait and stability/Reinforce activity limits and safety measures with patient and family/Tailored Fall Risk Interventions/Visual Cue: Yellow wristband and red socks/Bed in lowest position, wheels locked, appropriate side rails in place/Call bell, personal items and telephone in reach/Instruct patient to call for assistance before getting out of bed or chair/Non-slip footwear when patient is out of bed/[place] to call system/Physically safe environment - no spills, clutter or unnecessary equipment/Purposeful Proactive Rounding/Room/bathroom lighting operational, light cord in reach

## 2021-12-20 NOTE — DISCHARGE NOTE NURSING/CASE MANAGEMENT/SOCIAL WORK - PATIENT PORTAL LINK FT
You can access the FollowMyHealth Patient Portal offered by [facility] by registering at the following website: [email or web address]. By joining "[facility]’s FollowMyHealth portal, you will also be able to view your health information using other applications (apps) compatible with our system.

## 2021-12-20 NOTE — PHYSICAL THERAPY INITIAL EVALUATION ADULT - LEVEL OF INDEPENDENCE: GAIT, REHAB EVAL
Amb on RA then on NC 2 L/m with 5 min seated rest break in between. Desat to 82% on RA, 93% on NC 2 L/m s/p amb./supervision

## 2021-12-20 NOTE — PHYSICAL THERAPY INITIAL EVALUATION ADULT - GENERAL OBSERVATIONS, REHAB EVAL
0971 - [id number] Pt rec in b/s chair with +chair alarm, in NAD with NC 2 L/m. Assessed SPO2 with and without NC, s/p amb. RA: 95% and 82% at rest and s/p amb respectively. NC 2 L/m: 97% and 93% at rest and s/p amb respectively. REENA Cherry notified and aware.

## 2021-12-20 NOTE — DISCHARGE NOTE NURSING/CASE MANAGEMENT/SOCIAL WORK - NSDCPEFALRISK_GEN_ALL_CORE
For information on Fall & Injury Prevention, visit: [email or web address] OR  [email or web address] OR  https://www.cdc.gov/steadi/patient.html

## 2021-12-20 NOTE — DISCHARGE NOTE PROVIDER - HOSPITAL COURSE
86 y/o female with PMHx of COPD not on home O2, pHTN, GERD, Lucio's esophagus, COVID vaccinated presented to the ED for 2 weeks of progressive productive cough, worsening SOB and generalized body aches. She reports that on Tuesday she had reached out to her PCP who prescribed her Zpack and 5 day course of Prednisone (completes tomorrow).    1) Hypoxia likely secondary to COPD exacerbation - Not on home O2  - 2 week hx of productive cough, SOB, generalized body aches  - s/p Z pack and Prednisone outpt with little improvement;   - no wheezing on exam, afebrile, leukocytosis (on prednisone)  - COVID and RVP negative  - CXR appear unremarkable f/u official read  - D-dimer 115, less likely to be PE   *** Desats to 86% on RA on Ambulation; Patient aware and agreeable to plan;   *** Plan: DC home on Home O2 & Prednisone (total 5 days; )     2) Hypertensive Urgency - Resolved       # Hypoxia likely secondary to COPD exacerbation - Not on home O2  # Hx of Pulmonary HTN  - 2 week hx of productive cough, SOB, generalized body aches  - s/p Z pack and Prednisone outpt  - COVID and RVP negative  - Completed 5 days of prednisone 40mg po for 5 days, will need to continue predisone 20mg po once daily for 5 more days ( start date: 12/27)  - F/u sputum gram stain/cx;  - pulm recs appreciated 12/22:  Neb q 6 h  prednisone 40 for 5 days 20 for 5 days  change doxy to levaquin  sputum gram stain/ cx ( ro stenotrophomonas)  repeat CXR  will add OP Daliresp ( after discussing risk and benefit)    #Watery Diarrhea Secondary to Rotavirus  - Rotavirus detected by GI PCR  - BUN/Creatinine normal.   - Encourage PO hydration upon discharge    # Hypertensive Urgency - Resolved  - /98 on admission   - consider starting amlodipine 5mg if continues to have elevated BP  - SBP >140mmHg on several times during the day  - Start Amlodipine 5mg po once daily    # Hx of GERD - Complicated w/ Lucio's Esophagus  - c/w Pepcid and increase to q12hrs, DC protonix.

## 2021-12-20 NOTE — DISCHARGE NOTE PROVIDER - NSDCCPCAREPLAN_GEN_ALL_CORE_FT
PRINCIPAL DISCHARGE DIAGNOSIS  Diagnosis: COPD exacerbation  Assessment and Plan of Treatment: Chronic obstructive pulmonary disease (COPD) is a disease that limits the flow of air in and out of your lungs. This makes it harder to breathe. With COPD, you are also more likely to get lung infections. COPD includes chronic bronchitis and emphysema. COPD is most often caused by heavy, long-term cigarette smoking. If you smoke, quit. It is the best thing you can do for your COPD and your overall health. To avoid infections, wash your hands often. Do your best to keep your hands away from your face. Most germs are spread from your hands to your mouth. Get a flu shot every year. Also ask your provider about pneumonia vaccines. To stay healthy, get enough sleep, exercise regularly, and eat a balanced diet.  Take your medicines exactly as directed. Don't skip doses.  Call your provider immediately if you have any of the following:  Shortness of breath, wheezing, or coughing  Increased mucus  Yellow, green, bloody, or smelly mucus  Fever or chills  Tightness in your chest that does not go away with rest or medicine  An irregular heartbeat or a feeling that your heart is beating very fast  Swollen ankles.       PRINCIPAL DISCHARGE DIAGNOSIS  Diagnosis: COPD exacerbation  Assessment and Plan of Treatment: Chronic obstructive pulmonary disease (COPD) is a disease that limits the flow of air in and out of your lungs. This makes it harder to breathe. With COPD, you are also more likely to get lung infections. COPD includes chronic bronchitis and emphysema. COPD is most often caused by heavy, long-term cigarette smoking. If you smoke, quit. It is the best thing you can do for your COPD and your overall health. To avoid infections, wash your hands often. Do your best to keep your hands away from your face. Most germs are spread from your hands to your mouth. Get a flu shot every year. Also ask your provider about pneumonia vaccines. To stay healthy, get enough sleep, exercise regularly, and eat a balanced diet.  Take your medicines exactly as directed. Don't skip doses.  Call your provider immediately if you have any of the following:  Shortness of breath, wheezing, or coughing  Increased mucus  Yellow, green, bloody, or smelly mucus  Fever or chills  Tightness in your chest that does not go away with rest or medicine  An irregular heartbeat or a feeling that your heart is beating very fast  Swollen ankles.      SECONDARY DISCHARGE DIAGNOSES  Diagnosis: Hypocalcemia  Assessment and Plan of Treatment: You have hypocalcemia which is low calcium levels in the blood. Part of the low number is because your albumin in the blood is low, but if you correct for that, your calcium will remain to be lower than normal. You will need to continue getting your calcium supplements for now. Follow up with your primary care physician as soon as possible.

## 2021-12-20 NOTE — DISCHARGE NOTE PROVIDER - NSDCFUADDINST_GEN_ALL_CORE_FT
A) For an optimal medical care, please take your medication as prescribed     B) For an optimal medical care, please make your suggested appointments     C) You will need to follow up with a Lung doctor, Pulmonologist Dr. Singh. You will also need to complete a course of prednisone for 5 more days    D) Continue to take your calcium supplements until you see your Primary care physician and recheck your calcium levels that are low.

## 2021-12-20 NOTE — DISCHARGE NOTE PROVIDER - PROVIDER TOKENS
PROVIDER:[TOKEN:[[id number]]] PROVIDER:[TOKEN:[[id number]]],PROVIDER:[TOKEN:[[id number]],FOLLOWUP:[1 week]]

## 2021-12-20 NOTE — PHYSICAL THERAPY INITIAL EVALUATION ADULT - PERTINENT HX OF CURRENT PROBLEM, REHAB EVAL
88 y/o female with PMHx of COPD not on home O2, pHTN, GERD, Lucio's esophagus, COVID vaccinated presented to the ED for 2 weeks of progressive productive cough, worsening SOB and generalized body aches. She reports that on Tuesday she had reached out to her PCP who prescribed her Zpack and 5 day course of Prednisone

## 2021-12-20 NOTE — DISCHARGE NOTE PROVIDER - CARE PROVIDER_API CALL
Srinivas Galdamez)  Critical Care Medicine; Internal Medicine; Pulmonary Disease; Sleep Medicine  [address] [place] [address]  Phone: [phone]  Fax: [phone]  Follow Up Time:    Srinivas Galdamez)  Critical Care Medicine; Internal Medicine; Pulmonary Disease; Sleep Medicine  [address] [place] [address]  Phone: [phone]  Fax: [phone]  Follow Up Time:     [place] [address]  Phone: [phone]  Fax: [phone]  Follow Up Time: 1 week

## 2021-12-20 NOTE — DISCHARGE NOTE PROVIDER - NSDCHHCONTACT_GEN_ALL_CORE_FT
As certified below, I, or a nurse practitioner or physician assistant working with me, had a face-to-face encounter that meets the physician face-to-face encounter requirements.
No

## 2021-12-20 NOTE — PHYSICAL THERAPY INITIAL EVALUATION ADULT - THERAPY FREQUENCY, PT EVAL
Pt can cont amb with NSG as needed, PT on stand by to monitor pt SPO2 with activity as needed as pt reports feeling more debilitated than baseline due to cough. Pt however encouraged to cont amb with NSG staff as tolerated and to continue independent seated ther ex./1-2x/week

## 2021-12-20 NOTE — PHYSICAL THERAPY INITIAL EVALUATION ADULT - GAIT DEVIATIONS NOTED, PT EVAL
Decreased speed, minimal arm swing, pt amb cautiously/slowly but no unsteadiness or loss of balance observed during this amb trial./decreased amilcar/decreased step length/decreased stride length

## 2021-12-20 NOTE — PHYSICAL THERAPY INITIAL EVALUATION ADULT - LEVEL OF INDEPENDENCE: STAND/SIT, REHAB EVAL
supervision Price (Do Not Change): 0.00 Detail Level: Simple Instructions: This plan will send the code FBSE to the PM system.  DO NOT or CHANGE the price.

## 2021-12-22 NOTE — CONSULT NOTE ADULT - SUBJECTIVE AND OBJECTIVE BOX
Patient is a 87y old  Female who presents with a chief complaint of weakness (21 Dec 2021 12:16)      88 y/o female with PMHx of COPD not on home O2, pHTN, GERD, Lucio's esophagus, COVID vaccinated presented to the ED for 2 weeks of progressive productive cough, worsening SOB and generalized body aches. She reports that on Tuesday she had reached out to her PCP who prescribed her Zpack and 5 day course of Prednisone (completes tomorrow). She woke up this morning and was completely unable to perform her ADLs, decided to call EMS. She denies any fevers, chills, chest pain, abdominal pain, nausea, vomiting, diarrhea, constipation.     In the ED, VS noted for T 98, HR 98, /98, SpO2 98% on 2L NC. Labs noted for WBC 12, D-dimer 115, Trop 0.01. RVP and COVID negative. Admitted to floor, co cough, productive of yellowish phlegm      PAST MEDICAL & SURGICAL HISTORY:  COPD (chronic obstructive pulmonary disease)    GERD (gastroesophageal reflux disease)    Glaucoma    Anxiety    Pancreatitis    History of tonsillectomy    H/O colonoscopy  5 years ago        SOCIAL HX:   Smoking   ex    FAMILY HISTORY:  No pertinent family history in first degree relatives        REVIEW OF SYSTEMS see hpi    Allergies    No Known Allergies    Intolerances        ALBUTerol    90 MICROgram(s) HFA Inhaler 2 Puff(s) Inhalation every 6 hours PRN  albuterol/ipratropium for Nebulization 3 milliLiter(s) Nebulizer every 6 hours  benzonatate 100 milliGRAM(s) Oral three times a day  budesonide  80 MICROgram(s)/formoterol 4.5 MICROgram(s) Inhaler 2 Puff(s) Inhalation two times a day  doxycycline hyclate Capsule 100 milliGRAM(s) Oral every 12 hours  enoxaparin Injectable 40 milliGRAM(s) SubCutaneous daily  escitalopram 10 milliGRAM(s) Oral daily  famotidine    Tablet 20 milliGRAM(s) Oral daily  guaiFENesin  milliGRAM(s) Oral every 12 hours  pantoprazole    Tablet 40 milliGRAM(s) Oral before breakfast  predniSONE   Tablet 40 milliGRAM(s) Oral daily  zolpidem 5 milliGRAM(s) Oral at bedtime PRN  : Home Meds:      PHYSICAL EXAM    ICU Vital Signs Last 24 Hrs  T(C): 36.3 (22 Dec 2021 05:53), Max: 36.3 (22 Dec 2021 05:53)  T(F): 97.3 (22 Dec 2021 05:53), Max: 97.3 (22 Dec 2021 05:53)  HR: 82 (22 Dec 2021 05:53) (82 - 97)  BP: 160/85 (22 Dec 2021 05:53) (158/70 - 160/85)  RR: 20 (22 Dec 2021 05:53) (17 - 20)  SpO2: 94%      General: ill looking  HEENT:  ZAIDA              Lymph Nodes: No cervical LN   Lungs: dec bs both bases  Cardiovascular: JULIO CESAR 2/6  Abdomen: Soft, Positive BS  Extremities: No clubbing  Skin: Warm  Neurological: Non focal       12-21-21 @ 07:01  -  12-22-21 @ 07:00  --------------------------------------------------------  IN:    Oral Fluid: 840 mL  Total IN: 840 mL    OUT:  Total OUT: 0 mL    Total NET: 840 mL          LABS:                          14.2   15.78 )-----------( 307      ( 21 Dec 2021 08:36 )             43.2                                               12-21    140  |  101  |  22<H>  ----------------------------<  114<H>  5.0   |  27  |  0.9    Ca    8.7      21 Dec 2021 08:36  Mg     2.2     12-21    TPro  5.9<L>  /  Alb  3.8  /  TBili  0.4  /  DBili  x   /  AST  16  /  ALT  16  /  AlkPhos  49  12-21                                                                                           LIVER FUNCTIONS - ( 21 Dec 2021 08:36 )  Alb: 3.8 g/dL / Pro: 5.9 g/dL / ALK PHOS: 49 U/L / ALT: 16 U/L / AST: 16 U/L / GGT: x                                                                                                                                       X-Rays                                                                                     ECHO    MEDICATIONS  (STANDING):  albuterol/ipratropium for Nebulization 3 milliLiter(s) Nebulizer every 6 hours  benzonatate 100 milliGRAM(s) Oral three times a day  budesonide  80 MICROgram(s)/formoterol 4.5 MICROgram(s) Inhaler 2 Puff(s) Inhalation two times a day  doxycycline hyclate Capsule 100 milliGRAM(s) Oral every 12 hours  enoxaparin Injectable 40 milliGRAM(s) SubCutaneous daily  escitalopram 10 milliGRAM(s) Oral daily  famotidine    Tablet 20 milliGRAM(s) Oral daily  guaiFENesin  milliGRAM(s) Oral every 12 hours  pantoprazole    Tablet 40 milliGRAM(s) Oral before breakfast  predniSONE   Tablet 40 milliGRAM(s) Oral daily    MEDICATIONS  (PRN):  ALBUTerol    90 MICROgram(s) HFA Inhaler 2 Puff(s) Inhalation every 6 hours PRN Shortness of Breath and/or Wheezing  zolpidem 5 milliGRAM(s) Oral at bedtime PRN Insomnia

## 2021-12-22 NOTE — CONSULT NOTE ADULT - ASSESSMENT
IMPRESSION:    COPD exacerbation/ RVP neg ( Felt better than worse)  severe COPD        PLAN:    - Neb q 6 h  - prednisone 40 for 5 days 20 for 5 days  - change doxy to levaquin  - sputum gram stain/ cx ( ro stenotrophomonas)  - repeat CXR  - will add OP Daliresp ( after discussing risk and benefit)  - DVT prophylaxis  - POX RA

## 2021-12-23 NOTE — CHART NOTE - NSCHARTNOTEFT_GEN_A_CORE
Patient's O2 sat is 92% on room air at rest. Patient's O2 sat is 86% on room air with ambulation.   Patient's O2 sat is 97% on 2 LPM via nasal cannula upon ambulation. Patient has been diagnosed with COPD and therefore needs home O2.   Patient is aware that she will be going home on oxygen. Patient was tested in a chronic stable state.
Per RN and Attending note, Patient has 4-5 episodes of watery diarrhea. MD requesting for patient to have rice, bananas, yogurt. Florstor also started per note. Levaquin planned to be held as improvement may be seen without antibiotics. If diarrhea continues, consider banatrol two times a day to aid in solidifying stool. RD to f/u.

## 2021-12-24 NOTE — CONSULT NOTE ADULT - ASSESSMENT
ASSESSMENT   Hypoxia likely secondary to COPD exacerbation - Not on home O2   Hx of Pulmonary HTN   Hypertensive Urgency - Resolved  -  Hx of GERD - Complicated w/ Lucio's Esophagus  PLAN     ASSESSMENT   Hypoxia likely secondary to COPD exacerbation - Not on home O2   Hx of Pulmonary HTN   Hypertensive Urgency - Resolved  -  Hx of GERD - Complicated w/ Lucio's Esophagus    SUGGEST:  - weight pt and document, please  - note that pt has received 2 antibiotics (doxycycline and levaquin) this admission, as well as oral PPI - all of which can cause loose BMs  - pt takes Pepcid hs and PPI in am - can she take the Pepcid BID and hold the PPI for several days?  - Desire Stor added today - would treat twice daily x 2 weeks  - add yogurt po every 1-2 days  - consider adding Banatrol po twice a day for 3-4 days  - BMs as described do not seem to be malabsorptive - please cont to describe them, as this is helpful in determining etiology.

## 2021-12-24 NOTE — CONSULT NOTE ADULT - SUBJECTIVE AND OBJECTIVE BOX
88 y/o female with PMHx of COPD not on home O2, pHTN, GERD, Lucio's esophagus, COVID vaccinated presented to the ED for 2 weeks of progressive productive cough, worsening SOB and generalized body aches. She reports that on Tuesday she had reached out to her PCP who prescribed her Zpack and 5 day course of Prednisone (completes tomorrow). She woke up this morning and was completely unable to perform her ADLs, decided to call EMS.   pt is c/o severe watery diarrhea, generalized weakness     PAST MEDICAL & SURGICAL HISTORY:  COPD (chronic obstructive pulmonary disease)  GERD (gastroesophageal reflux disease)  Glaucoma  Anxiety  Pancreatitis  History of tonsillectomy  H/O colonoscopy  5 years ago    ICU Vital Signs Last 24 Hrs  T(C): 35.9 (24 Dec 2021 12:07), Max: 36.9 (23 Dec 2021 22:00)  T(F): 96.6 (24 Dec 2021 12:07), Max: 98.5 (23 Dec 2021 22:00)  HR: 117 (24 Dec 2021 12:07) (99 - 117)  BP: 140/65 (24 Dec 2021 12:07) (135/64 - 140/80)  RR: 18 (24 Dec 2021 12:07) (18 - 20)  SpO2: 98% (24 Dec 2021 05:38) (98% - 98%)  23 Dec 2021 07:01  -  24 Dec 2021 07:00  Drug Dosing Weight  Height (cm): 147.3 (18 Dec 2021 20:05)  --------------------------------------------------------  IN:    IV PiggyBack: 150 mL    sodium chloride 0.9%: 350 mL  Total IN: 500 mL  OUT:    Voided (mL): 3 mL  Total OUT: 3 mL  Total NET: 497 mL    PHYSICAL EXAM:  GENERAL: resting comfortably  HEENT: Moist mucous membranes,  ABDOMEN: Soft, n/d n/t   EXTREMITIES: no edema, decrease in LBM  SKIN: No lesions  IV ACCESS:  FEEDING ACCESS: po diet    MEDICATIONS  (STANDING):  albuterol/ipratropium for Nebulization 3 milliLiter(s) Nebulizer every 6 hours  benzonatate 100 milliGRAM(s) Oral three times a day  budesonide  80 MICROgram(s)/formoterol 4.5 MICROgram(s) Inhaler 2 Puff(s) Inhalation two times a day  enoxaparin Injectable 40 milliGRAM(s) SubCutaneous daily  escitalopram 10 milliGRAM(s) Oral daily  famotidine    Tablet 20 milliGRAM(s) Oral daily  guaiFENesin  milliGRAM(s) Oral every 12 hours  pantoprazole    Tablet 40 milliGRAM(s) Oral before breakfast  saccharomyces boulardii 250 milliGRAM(s) Oral two times a day  sodium chloride 0.9%. 1000 milliLiter(s) (50 mL/Hr) IV Continuous <Continuous>    MEDICATIONS  (PRN):  ALBUTerol    90 MICROgram(s) HFA Inhaler 2 Puff(s) Inhalation every 6 hours PRN Shortness of Breath and/or Wheezing  aluminum hydroxide/magnesium hydroxide/simethicone Suspension 30 milliLiter(s) Oral every 6 hours PRN Dyspepsia  zolpidem 5 milliGRAM(s) Oral at bedtime PRN Insomnia    AllergiesNKDA    LABS:    12-24    134<L>  |  102  |  20  ----------------------------<  140<H>  4.2   |  18  |  0.8    Ca    7.2<L>      24 Dec 2021 08:44  Mg     2.0     12-24    TPro  5.6<L>  /  Alb  3.3<L>  /  TBili  0.3  /  DBili  x   /  AST  20  /  ALT  19  /  AlkPhos  54  12-24                        14.5   13.96 )-----------( 274      ( 24 Dec 2021 08:44 )             43.9   Clostridium difficile Toxin by PCR (12.23.21 @ 07:55)   Clostridium difficile Toxin by PCR: RESULT INTERPRETATION:   Not detected - No Clostridium difficile toxins detected by amplified DNA   CAPILLARY BLOOD GLUCOSE  POCT Blood Glucose.: 151 mg/dL (22 Dec 2021 16:21)  RADIOLOGY:  < from: Xray Chest 1 View- PORTABLE-Routine (Xray Chest 1 View- PORTABLE-Routine in AM.) (12.23.21 @ 06:24) >  Impression:  Stable emphysematous changes. No consolidation effusion or pneumothorax.   Multiple punctate calcified granulomata.  DIET  Diet, Regular (12-23-21 @ 11:10) 86 y/o female with PMHx of COPD not on home O2, pHTN, GERD, Lucio's esophagus, COVID vaccinated presented to the ED for 2 weeks of progressive productive cough, worsening SOB and generalized body aches. She reports that on Tuesday she had reached out to her PCP who prescribed her Zpack and 5 day course of Prednisone (completes tomorrow). She woke up this morning and was completely unable to perform her ADLs, decided to call EMS.   pt is c/o severe watery diarrhea (started 12/22 - described by nurses as watery and often yellow), generalized weakness     PAST MEDICAL & SURGICAL HISTORY:  COPD (chronic obstructive pulmonary disease)  GERD (gastroesophageal reflux disease)  Glaucoma  Anxiety  Pancreatitis  History of tonsillectomy  H/O colonoscopy  5 years ago    ICU Vital Signs Last 24 Hrs  T(C): 35.9 (24 Dec 2021 12:07), Max: 36.9 (23 Dec 2021 22:00)  T(F): 96.6 (24 Dec 2021 12:07), Max: 98.5 (23 Dec 2021 22:00)  HR: 117 (24 Dec 2021 12:07) (99 - 117)  BP: 140/65 (24 Dec 2021 12:07) (135/64 - 140/80)  RR: 18 (24 Dec 2021 12:07) (18 - 20)  SpO2: 98% (24 Dec 2021 05:38) (98% - 98%)  23 Dec 2021 07:01  -  24 Dec 2021 07:00  NO Weight recorded  Height (cm): 147.3 (18 Dec 2021 20:05)  --------------------------------------------------------  IN:    IV PiggyBack: 150 mL    sodium chloride 0.9%: 350 mL  Total IN: 500 mL  OUT:    Voided (mL): 3 mL  Total OUT: 3 mL  ---  ???  Total NET: 497 mL    PHYSICAL EXAM:  GENERAL: resting comfortably, awake  HEENT: Moist mucous membranes,  ABDOMEN: Soft, n/d n/t   EXTREMITIES: no edema, decrease in LBM  SKIN: No lesions  IV ACCESS: periph  FEEDING ACCESS: po diet    MEDICATIONS  (STANDING):  albuterol/ipratropium for Nebulization 3 milliLiter(s) Nebulizer every 6 hours  benzonatate 100 milliGRAM(s) Oral three times a day  budesonide  80 MICROgram(s)/formoterol 4.5 MICROgram(s) Inhaler 2 Puff(s) Inhalation two times a day  enoxaparin Injectable 40 milliGRAM(s) SubCutaneous daily  escitalopram 10 milliGRAM(s) Oral daily  famotidine    Tablet 20 milliGRAM(s) Oral daily  guaiFENesin  milliGRAM(s) Oral every 12 hours  pantoprazole    Tablet 40 milliGRAM(s) Oral before breakfast  saccharomyces boulardii 250 milliGRAM(s) Oral two times a day  sodium chloride 0.9%. 1000 milliLiter(s) (50 mL/Hr) IV Continuous <Continuous>    MEDICATIONS  (PRN):  ALBUTerol    90 MICROgram(s) HFA Inhaler 2 Puff(s) Inhalation every 6 hours PRN Shortness of Breath and/or Wheezing  aluminum hydroxide/magnesium hydroxide/simethicone Suspension 30 milliLiter(s) Oral every 6 hours PRN Dyspepsia  zolpidem 5 milliGRAM(s) Oral at bedtime PRN Insomnia    AllergiesNKDA    LABS:    12-24    134<L>  |  102  |  20  ----------------------------<  140<H>  4.2   |  18  |  0.8    Ca    7.2<L>      24 Dec 2021 08:44  Mg     2.0     12-24    TPro  5.6<L>  /  Alb  3.3<L>  /  TBili  0.3  /  DBili  x   /  AST  20  /  ALT  19  /  AlkPhos  54  12-24                        14.5   13.96 )-----------( 274      ( 24 Dec 2021 08:44 )             43.9   Clostridium difficile Toxin by PCR (12.23.21 @ 07:55)   Clostridium difficile Toxin by PCR: RESULT INTERPRETATION:   Not detected - No Clostridium difficile toxins detected by amplified DNA   CAPILLARY BLOOD GLUCOSE  POCT Blood Glucose.: 151 mg/dL (22 Dec 2021 16:21)  RADIOLOGY:  < from: Xray Chest 1 View- PORTABLE-Routine (Xray Chest 1 View- PORTABLE-Routine in AM.) (12.23.21 @ 06:24) >  Impression:  Stable emphysematous changes. No consolidation effusion or pneumothorax.   Multiple punctate calcified granulomata.  DIET  Diet, Regular (12-23-21 @ 11:10)

## 2021-12-26 NOTE — PROGRESS NOTE ADULT - ASSESSMENT
86 y/o female with PMHx of COPD not on home O2, pHTN, GERD, Lucio's esophagus, COVID vaccinated presented to the ED for 2 weeks of progressive productive cough, worsening SOB and generalized body aches. She reports that on Tuesday she had reached out to her PCP who prescribed her Zpack and 5 day course of Prednisone.     # Hypoxia likely secondary to COPD exacerbation - Not on home O2  # Hx of Pulmonary HTN  - 2 week hx of productive cough, SOB, generalized body aches  - s/p Z pack and Prednisone outpt  - COVID and RVP negative  - cont Neb q 6 h, completed prednisone 40 for 5 days (12/19-19/24), c/w 20 for 5 days  - F/u sputum gram stain/cx;  - pulm recs appreciated 12/22:  ·	Neb q 6 h  ·	prednisone 40 for 5 days 20 for 5 days  ·	change doxy to levaquin  ·	sputum gram stain/ cx ( ro stenotrophomonas)  ·	repeat CXR  ·	will add OP Daliresp ( after discussing risk and benefit)      # Watery Diarrhea- 4-5 watery diarrhea  - C Diff - 12/23, f/u repeat GI PCR, Cont to hold abx for now  - c/w IVF  - consider Imodium after repeat C diff -ve  - f/u Nutrition recs for Dietary modifications to alleviate diarrhea as per attending;       # Hypertensive Urgency - Resolved  - /98 on admission   - consider starting amlodipine 5mg if continues to have elevated BP  - bp stable 135/64    # Hx of GERD - Complicated w/ Lucio's Esophagus  - c/w Protonix/Pepcid        Diet: DASH--> will adjust after nutrition recs  Activity: Ambulate as Tolerated  DVT ppx: Lovenox 40mg SQ Daily  GI ppx: Protonix/Pepcid  FULL CODE    
86 y/o female with PMHx of COPD not on home O2, pHTN, GERD, Lucio's esophagus, COVID vaccinated presented to the ED for 2 weeks of progressive productive cough, worsening SOB and generalized body aches. She reports that on Tuesday she had reached out to her PCP who prescribed her Zpack and 5 day course of Prednisone. Admitted for work up of COPD exacerbation. Her course was complicated by multiple episodes of watery diarrhea s/ rectal tube. C Diff NG x2.    # Hypoxia likely secondary to COPD exacerbation - Not on home O2  # Hx of Pulmonary HTN  - 2 week hx of productive cough, SOB, generalized body aches  - s/p Z pack and Prednisone outpt  - COVID and RVP negative  - cont Neb q 6 h, completed prednisone 40 for 5 days (12/19-19/24), c/w 20 for 5 days  - F/u sputum gram stain/cx;  - pulm recs appreciated 12/22:  ·	Neb q 6 h  ·	prednisone 40 for 5 days 20 for 5 days  ·	change doxy to levaquin  ·	sputum gram stain/ cx ( ro stenotrophomonas)  ·	repeat CXR  ·	will add OP Daliresp ( after discussing risk and benefit)      # Watery Diarrhea- 4-5 watery diarrhea- improved  - C Diff - 12/23, repeat GI PCR NG 12/25, Cont to hold abx for now  - c/w IVF  - s/p Imodium   - Nutrition recs noted     # Hypertensive Urgency - Resolved  - /98 on admission   - consider starting amlodipine 5mg if continues to have elevated BP  - bp stable    # Hx of GERD - Complicated w/ Lucio's Esophagus  - c/w Protonix/Pepcid        Diet: regular with adjustment  Activity: Ambulate as Tolerated  DVT ppx: Lovenox 40mg SQ Daily  GI ppx: Protonix/Pepcid  FULL CODE

## 2021-12-27 NOTE — PROGRESS NOTE ADULT - SUBJECTIVE AND OBJECTIVE BOX
KIMBERLI LEIGH  87y  Female    stable vitals   very uncomfortable due to rectal tube- d/c and monitor stool output  still severe dyspnea  has not gotten out of bed  labs acceptable- except ca decreasing- needs suplemments  INTERVAL EVENTS:    T(C): 36 (12-25-21 @ 20:14), Max: 36.6 (12-24-21 @ 21:21)  HR: 97 (12-25-21 @ 20:14) (97 - 105)  BP: 161/65 (12-25-21 @ 20:14) (112/68 - 161/65)  RR: 18 (12-25-21 @ 20:14) (18 - 18)  SpO2: --  Wt(kg): --Vital Signs Last 24 Hrs  T(C): 36 (25 Dec 2021 20:14), Max: 36.6 (24 Dec 2021 21:21)  T(F): 96.8 (25 Dec 2021 20:14), Max: 97.9 (24 Dec 2021 21:21)  HR: 97 (25 Dec 2021 20:14) (97 - 105)  BP: 161/65 (25 Dec 2021 20:14) (112/68 - 161/65)  BP(mean): --  RR: 18 (25 Dec 2021 20:14) (18 - 18)  SpO2: --    PHYSICAL EXAM:  GENERAL: resting comfortably  NECK: Supple, No JVD, Normal thyroid  CHEST/LUNG: scattered creps  HEART: S1, S2, Regular rate and rhythm;   ABDOMEN: Soft, mildly tender, Nondistended; Bowel sounds present  EXTREMITIES: No clubbing, cyanosis, or edema  SKIN: No rashes or lesions    LABS:                          13.3   11.29 )-----------( 264      ( 25 Dec 2021 04:30 )             40.5     12-25    135  |  104  |  18  ----------------------------<  105<H>  3.9   |  18  |  0.8    Ca    6.7<L>      25 Dec 2021 04:30  Mg     2.0     12-25    TPro  4.8<L>  /  Alb  2.8<L>  /  TBili  0.2  /  DBili  x   /  AST  18  /  ALT  19  /  AlkPhos  43  12-25          Culture - Stool (collected 23 Dec 2021 07:55)  Source: .Stool Feces  Final Report (25 Dec 2021 16:15):    No enteric pathogens isolated.    (Stool culture examined for Salmonella,    Shigella, Campylobacter, Aeromonas, Plesiomonas,    Vibrio, E.coli O157 and Yersinia)          acetaminophen     Tablet .. 650 milliGRAM(s) Oral every 6 hours PRN  ALBUTerol    90 MICROgram(s) HFA Inhaler 2 Puff(s) Inhalation every 6 hours PRN  albuterol/ipratropium for Nebulization 3 milliLiter(s) Nebulizer every 6 hours  aluminum hydroxide/magnesium hydroxide/simethicone Suspension 30 milliLiter(s) Oral every 6 hours PRN  benzonatate 100 milliGRAM(s) Oral three times a day  budesonide  80 MICROgram(s)/formoterol 4.5 MICROgram(s) Inhaler 2 Puff(s) Inhalation two times a day  enoxaparin Injectable 40 milliGRAM(s) SubCutaneous daily  escitalopram 10 milliGRAM(s) Oral daily  famotidine    Tablet 20 milliGRAM(s) Oral daily  guaiFENesin  milliGRAM(s) Oral every 12 hours  loperamide 2 milliGRAM(s) Oral three times a day  pantoprazole    Tablet 40 milliGRAM(s) Oral before breakfast  saccharomyces boulardii 250 milliGRAM(s) Oral two times a day  sodium chloride 0.9%. 1000 milliLiter(s) IV Continuous <Continuous>  zolpidem 5 milliGRAM(s) Oral at bedtime PRN      RADIOLOGY & ADDITIONAL TESTS:    case discussed with the resident  Available consult notes reviewed    Assessment and plan:     nutritional eval appreciated  will follow recommendations  replace ca  remove rectal tube and monitor output  cont resp treatments          
  KIMBERLI LEIGH  87y  Female  well known to me pt w severe copd, admitted w exacerbation  feels a little better, but still severely dyspneic and cant even walk to the bathroom  needs home o2 and home care- refuses to go to rehab, lives home alone    INTERVAL EVENTS:    T(C): 35.4 (12-21-21 @ 04:50), Max: 36.6 (12-20-21 @ 20:02)  HR: 83 (12-21-21 @ 04:50) (83 - 104)  BP: 162/74 (12-21-21 @ 04:50) (106/59 - 185/82)  RR: 19 (12-21-21 @ 04:50) (19 - 22)  SpO2: --  Wt(kg): --Vital Signs Last 24 Hrs  T(C): 35.4 (21 Dec 2021 04:50), Max: 36.6 (20 Dec 2021 20:02)  T(F): 95.7 (21 Dec 2021 04:50), Max: 97.9 (20 Dec 2021 20:02)  HR: 83 (21 Dec 2021 04:50) (83 - 104)  BP: 162/74 (21 Dec 2021 04:50) (106/59 - 185/82)  BP(mean): --  RR: 19 (21 Dec 2021 04:50) (19 - 22)  SpO2: --    PHYSICAL EXAM:  GENERAL: resting comfortably  NECK: Supple, No JVD, Normal thyroid  CHEST/LUNG poor air exchange, diffuse creps and wheezes  HEART: S1, S2, Regular rate and rhythm;   ABDOMEN: Soft, Nontender, Nondistended; Bowel sounds present  EXTREMITIES: No clubbing, cyanosis, or edema  SKIN: No rashes or lesions    LABS:                          14.2   15.78 )-----------( 307      ( 21 Dec 2021 08:36 )             43.2     12-21    140  |  101  |  22<H>  ----------------------------<  114<H>  5.0   |  27  |  0.9    Ca    8.7      21 Dec 2021 08:36  Mg     2.2     12-21    TPro  5.9<L>  /  Alb  3.8  /  TBili  0.4  /  DBili  x   /  AST  16  /  ALT  16  /  AlkPhos  49  12-21              ALBUTerol    90 MICROgram(s) HFA Inhaler 2 Puff(s) Inhalation every 6 hours PRN  albuterol/ipratropium for Nebulization 3 milliLiter(s) Nebulizer every 6 hours  benzonatate 100 milliGRAM(s) Oral three times a day  budesonide  80 MICROgram(s)/formoterol 4.5 MICROgram(s) Inhaler 2 Puff(s) Inhalation two times a day  doxycycline hyclate Capsule 100 milliGRAM(s) Oral every 12 hours  enoxaparin Injectable 40 milliGRAM(s) SubCutaneous daily  escitalopram 10 milliGRAM(s) Oral daily  famotidine    Tablet 20 milliGRAM(s) Oral daily  guaiFENesin  milliGRAM(s) Oral every 12 hours  pantoprazole    Tablet 40 milliGRAM(s) Oral before breakfast  predniSONE   Tablet 40 milliGRAM(s) Oral daily  zolpidem 5 milliGRAM(s) Oral at bedtime PRN      RADIOLOGY & ADDITIONAL TESTS:    case discussed with the resident  Available consult notes reviewed    Assessment and plan:     dyspneic, shaky  looks dehydrated- afraid to drink since has difficulty ambulating to the bathroom  pulmonary f/y- ?? daliresp?  needs another day or to of hospital care  case management working on home O2 delivery          
Hospital Day:  8d    Subjective: Patient is a 87y old  Female who presents with a chief complaint of weakness (25 Dec 2021 20:38)      Pt seen and evaluated at bedside.   Complaints:  Over the night Events:    Past Medical Hx:   COPD (chronic obstructive pulmonary disease)    GERD (gastroesophageal reflux disease)    Glaucoma    Anxiety    Pancreatitis      Past Sx:  History of tonsillectomy    H/O colonoscopy      Allergies:  No Known Allergies    Current Meds:   Standng Meds:  albuterol/ipratropium for Nebulization 3 milliLiter(s) Nebulizer every 6 hours  benzonatate 100 milliGRAM(s) Oral three times a day  budesonide  80 MICROgram(s)/formoterol 4.5 MICROgram(s) Inhaler 2 Puff(s) Inhalation two times a day  calcium carbonate    500 mG (Tums) Chewable 1 Tablet(s) Chew two times a day  enoxaparin Injectable 40 milliGRAM(s) SubCutaneous daily  escitalopram 10 milliGRAM(s) Oral daily  famotidine    Tablet 20 milliGRAM(s) Oral daily  guaiFENesin  milliGRAM(s) Oral every 12 hours  saccharomyces boulardii 250 milliGRAM(s) Oral two times a day  sodium chloride 0.9%. 1000 milliLiter(s) (50 mL/Hr) IV Continuous <Continuous>    PRN Meds:  acetaminophen     Tablet .. 650 milliGRAM(s) Oral every 6 hours PRN Moderate Pain (4 - 6)  ALBUTerol    90 MICROgram(s) HFA Inhaler 2 Puff(s) Inhalation every 6 hours PRN Shortness of Breath and/or Wheezing  aluminum hydroxide/magnesium hydroxide/simethicone Suspension 30 milliLiter(s) Oral every 6 hours PRN Dyspepsia      Vital Signs:   T(F): 96.8 (12-25-21 @ 20:14), Max: 96.9 (12-25-21 @ 11:42)  HR: 97 (12-25-21 @ 20:14) (97 - 99)  BP: 161/65 (12-25-21 @ 20:14) (112/68 - 161/65)  RR: 18 (12-25-21 @ 20:14) (18 - 18)  SpO2: --    Physical Exam:   GENERAL: NAD, Resting in bed, frail  HEENT: NCAT  CHEST/LUNG: Clear to auscultation bilaterally; No wheezing or rubs.   HEART: Regular rate and rhythm; No murmurs, rubs, or gallops  ABDOMEN: Bowel sounds present; Soft, Nontender, Nondistended.   EXTREMITIES:  No clubbing, cyanosis, or edema  NERVOUS SYSTEM:  Alert & Oriented X3    FLUID BALANCE    12-23-21 @ 07:01  -  12-24-21 @ 07:00  --------------------------------------------------------  IN: 500 mL / OUT: 3 mL / NET: 497 mL    12-24-21 @ 07:01  -  12-25-21 @ 07:00  --------------------------------------------------------  IN: 720 mL / OUT: 200 mL / NET: 520 mL    12-25-21 @ 07:01  -  12-26-21 @ 04:58  --------------------------------------------------------  IN: 220 mL / OUT: 0 mL / NET: 220 mL        Labs:                         13.3   11.29 )-----------( 264      ( 25 Dec 2021 04:30 )             40.5       25 Dec 2021 04:30    135    |  104    |  18     ----------------------------<  105    3.9     |  18     |  0.8      Ca    6.7        25 Dec 2021 04:30  Mg     2.0       25 Dec 2021 04:30    TPro  4.8    /  Alb  2.8    /  TBili  0.2    /  DBili  x      /  AST  18     /  ALT  19     /  AlkPhos  43     25 Dec 2021 04:30                        
KIMBERLI LEIGH 87y Female  MRN#: [id number]   CODE STATUS: full       SUBJECTIVE  Patient is a 87y old Female who presents with a chief complaint of weakness (22 Dec 2021 09:36)  Currently admitted to medicine with the primary diagnosis of COPD exacerbation    Today is hospital day 4d, and this morning she is resting in bed and reports having some sob and cough.  No overnight events.         OBJECTIVE  PAST MEDICAL & SURGICAL HISTORY  COPD (chronic obstructive pulmonary disease)    GERD (gastroesophageal reflux disease)    Glaucoma    Anxiety    Pancreatitis    History of tonsillectomy    H/O colonoscopy  5 years ago      ALLERGIES:  No Known Allergies    MEDICATIONS:  STANDING MEDICATIONS  albuterol/ipratropium for Nebulization 3 milliLiter(s) Nebulizer every 6 hours  benzonatate 100 milliGRAM(s) Oral three times a day  budesonide  80 MICROgram(s)/formoterol 4.5 MICROgram(s) Inhaler 2 Puff(s) Inhalation two times a day  enoxaparin Injectable 40 milliGRAM(s) SubCutaneous daily  escitalopram 10 milliGRAM(s) Oral daily  famotidine    Tablet 20 milliGRAM(s) Oral daily  guaiFENesin  milliGRAM(s) Oral every 12 hours  levoFLOXacin IVPB 750 milliGRAM(s) IV Intermittent every 24 hours  pantoprazole    Tablet 40 milliGRAM(s) Oral before breakfast  predniSONE   Tablet 40 milliGRAM(s) Oral daily    PRN MEDICATIONS  ALBUTerol    90 MICROgram(s) HFA Inhaler 2 Puff(s) Inhalation every 6 hours PRN  zolpidem 5 milliGRAM(s) Oral at bedtime PRN      VITAL SIGNS: Last 24 Hours  T(C): 36.3 (22 Dec 2021 05:53), Max: 36.3 (22 Dec 2021 05:53)  T(F): 97.3 (22 Dec 2021 05:53), Max: 97.3 (22 Dec 2021 05:53)  HR: 82 (22 Dec 2021 05:53) (82 - 97)  BP: 160/85 (22 Dec 2021 05:53) (158/70 - 160/85)  BP(mean): --  RR: 20 (22 Dec 2021 05:53) (17 - 20)  SpO2: --    LABS:                        14.2   15.78 )-----------( 307      ( 21 Dec 2021 08:36 )             43.2     12-21    140  |  101  |  22<H>  ----------------------------<  114<H>  5.0   |  27  |  0.9    Ca    8.7      21 Dec 2021 08:36  Mg     2.2     12-21    TPro  5.9<L>  /  Alb  3.8  /  TBili  0.4  /  DBili  x   /  AST  16  /  ALT  16  /  AlkPhos  49  12-21          RADIOLOGY:  < from: Xray Chest 1 View- PORTABLE-Urgent (12.18.21 @ 21:02) >  Lung parenchyma/Pleura: Hyperinflated lungs. Scattered punctate bilateral   calcified granulomas. No focal parenchymal opacities, pleural effusions   or pneumothorax. Biapical scarring, right greater than left.    < end of copied text >      PHYSICAL EXAM:  GENERAL: NAD, well-developed, AAOx3  HEENT:  Atraumatic, Normocephalic. EOMI, conjunctiva and sclera clear, No JVD  PULMONARY: decreased breath sounds bilaterally; No wheeze  CARDIOVASCULAR: Regular rate and rhythm; No murmurs, rubs, or gallops  GASTROINTESTINAL: Soft, Nontender, Nondistended; Bowel sounds present  MUSCULOSKELETAL: No clubbing, cyanosis, or edema  NEUROLOGY: non-focal  SKIN: No rashes or lesions      ASSESSMENT & PLAN  86 y/o female with PMHx of COPD not on home O2, pHTN, GERD, Lucio's esophagus, COVID vaccinated presented to the ED for 2 weeks of progressive productive cough, worsening SOB and generalized body aches. She reports that on Tuesday she had reached out to her PCP who prescribed her Zpack and 5 day course of Prednisone (completes tomorrow).    # Hypoxia likely secondary to COPD exacerbation - Not on home O2  # Hx of Pulmonary HTN  - 2 week hx of productive cough, SOB, generalized body aches  - s/p Z pack and Prednisone outpt  - COVID and RVP negative  - cont Neb q 6 h, prednisone 40 for 5 days 20 for 5 days  - doxycycline changed to levaquin   - F/u sputum gram stain/cx  - pulm recs appreciated     # Hypertensive Urgency - Resolved  - /98 on admission   - consider starting amlodipine 5mg if continues to have elevated BP    # Hx of GERD - Complicated w/ Lucio's Esophagus; c/w Protonix/Pepcid        Diet: DASH  Activity: Ambulate as Tolerated  DVT ppx: Lovenox 40mg SQ Daily  GI ppx: Protonix/Pepcid  FULL CODE    
  KIMBERLI LEIGH  87y  Female  profuse watery diarrhea w mucus few times an hour since yesterday!  very exhausted, dehydrated, nauseous, unable to tolerate po intake  gets iv fluids- need to be careful at her age  c diff-x1  would consider rectal shield for skin protection  nutritional eval    INTERVAL EVENTS:    T(C): 36.8 (12-24-21 @ 05:38), Max: 36.9 (12-23-21 @ 22:00)  HR: 99 (12-24-21 @ 05:38) (99 - 107)  BP: 135/64 (12-24-21 @ 05:38) (135/64 - 154/67)  RR: 20 (12-24-21 @ 05:38) (18 - 20)  SpO2: 98% (12-24-21 @ 05:38) (97% - 98%)  Wt(kg): --Vital Signs Last 24 Hrs  T(C): 36.8 (24 Dec 2021 05:38), Max: 36.9 (23 Dec 2021 22:00)  T(F): 98.2 (24 Dec 2021 05:38), Max: 98.5 (23 Dec 2021 22:00)  HR: 99 (24 Dec 2021 05:38) (99 - 107)  BP: 135/64 (24 Dec 2021 05:38) (135/64 - 154/67)  BP(mean): --  RR: 20 (24 Dec 2021 05:38) (18 - 20)  SpO2: 98% (24 Dec 2021 05:38) (97% - 98%)    PHYSICAL EXAM:  GENERAL: uncomfortable, looks very tired  NECK: Supple, No JVD, Normal thyroid  CHEST/LUNG: scattered creps and wheezes, still has productive cough  HEART: S1, S2, Regular rate and rhythm;   ABDOMEN: Soft,  mildly tender, Nondistended; Bowel sounds present  EXTREMITIES: No clubbing, cyanosis, or edema  SKIN: No rashes or lesions    LABS:                          14.5   13.96 )-----------( 274      ( 24 Dec 2021 08:44 )             43.9     12-23    135  |  103  |  21<H>  ----------------------------<  131<H>  4.6   |  16<L>  |  0.8    Ca    7.4<L>      23 Dec 2021 22:18  Mg     1.9     12-23    TPro  5.7<L>  /  Alb  3.3<L>  /  TBili  0.3  /  DBili  x   /  AST  24  /  ALT  20  /  AlkPhos  54  12-23              ALBUTerol    90 MICROgram(s) HFA Inhaler 2 Puff(s) Inhalation every 6 hours PRN  albuterol/ipratropium for Nebulization 3 milliLiter(s) Nebulizer every 6 hours  aluminum hydroxide/magnesium hydroxide/simethicone Suspension 30 milliLiter(s) Oral every 6 hours PRN  benzonatate 100 milliGRAM(s) Oral three times a day  budesonide  80 MICROgram(s)/formoterol 4.5 MICROgram(s) Inhaler 2 Puff(s) Inhalation two times a day  enoxaparin Injectable 40 milliGRAM(s) SubCutaneous daily  escitalopram 10 milliGRAM(s) Oral daily  famotidine    Tablet 20 milliGRAM(s) Oral daily  guaiFENesin  milliGRAM(s) Oral every 12 hours  loperamide 2 milliGRAM(s) Oral two times a day  pantoprazole    Tablet 40 milliGRAM(s) Oral before breakfast  saccharomyces boulardii 250 milliGRAM(s) Oral two times a day  sodium chloride 0.9%. 1000 milliLiter(s) IV Continuous <Continuous>  zolpidem 5 milliGRAM(s) Oral at bedtime PRN      RADIOLOGY & ADDITIONAL TESTS:    case discussed with the resident  Available consult notes reviewed    Assessment and plan:     repeat c diff  nutritional eval  pulmonary f/u- cant give antibiotics, cxr ok, wbc better  monitor electrolytes  may give imodium if c diff negative again  very guarded- elderly, frail  continue respiratory treatments  please call me pamela updates [phone]        
LENGTH OF HOSPITAL STAY: 2d      CHIEF COMPLAINT: Patient is a 87y old  Female who presents with a chief complaint of weakness (20 Dec 2021 16:20)      OVER Past 24hrs:  No acute overnight events.     HISTORY OF PRESENTING ILLNESS:   86 y/o female with PMHx of COPD not on home O2, pHTN, GERD, Lucio's esophagus, COVID vaccinated presented to the ED for 2 weeks of progressive productive cough, worsening SOB and generalized body aches. She reports that on Tuesday she had reached out to her PCP who prescribed her Zpack and 5 day course of Prednisone (completes tomorrow). She woke up this morning and was completely unable to perform her ADLs, decided to call EMS. She denies any fevers, chills, chest pain, abdominal pain, nausea, vomiting, diarrhea, constipation.     In the ED, VS noted for T 98, HR 98, /98, SpO2 98% on 2L NC. Labs noted for WBC 12, D-dimer 115, Trop 0.01. RVP and COVID negative. CXR appears unremarkable       (19 Dec 2021 00:48)    PAST MEDICAL & SURGICAL HISTORY  PAST MEDICAL & SURGICAL HISTORY:  COPD (chronic obstructive pulmonary disease)    GERD (gastroesophageal reflux disease)    Glaucoma    Anxiety    Pancreatitis    History of tonsillectomy    H/O colonoscopy  5 years ago          REVIEW OF SYSTEMS  Negative, except as in Physical exam    ALLERGIES:  No Known Allergies    MEDICATIONS:  STANDING MEDICATIONS  albuterol/ipratropium for Nebulization 3 milliLiter(s) Nebulizer every 6 hours  budesonide  80 MICROgram(s)/formoterol 4.5 MICROgram(s) Inhaler 2 Puff(s) Inhalation two times a day  enoxaparin Injectable 40 milliGRAM(s) SubCutaneous daily  escitalopram 10 milliGRAM(s) Oral daily  famotidine    Tablet 20 milliGRAM(s) Oral daily  guaiFENesin  milliGRAM(s) Oral every 12 hours  pantoprazole    Tablet 40 milliGRAM(s) Oral before breakfast  predniSONE   Tablet 40 milliGRAM(s) Oral daily      PRN MEDICATIONS  ALBUTerol    90 MICROgram(s) HFA Inhaler 2 Puff(s) Inhalation every 6 hours PRN  zolpidem 5 milliGRAM(s) Oral at bedtime PRN    VITALS:   T(F): 97.3  HR: 101  BP: 106/59  RR: 24  SpO2: 86%    PHYSICAL EXAM:  General: No acute distress.  AOx  HEENT: Sclear in clear;  PULM: Clear to auscultation bilaterally.  CVS: RRR; No murmurs  Abdomen: Soft, nondistended, nontender.  Pelvis:  Extremities: No edema, nontender; Peripheral pulse  SKIN: Warm and well perfused, no rashes noted.  PSYCH:   NEURO:    LABS:                        13.6   13.80 )-----------( 370      ( 20 Dec 2021 04:30 )             42.1     12-20    142  |  101  |  17  ----------------------------<  108<H>  5.1<H>   |  26  |  0.8    Ca    9.2      20 Dec 2021 04:30  Mg     1.7     12-19    TPro  6.1  /  Alb  3.8  /  TBili  0.3  /  DBili  x   /  AST  18  /  ALT  17  /  AlkPhos  51  12-19              CARDIAC MARKERS ( 19 Dec 2021 08:51 )  x     / x     / 86 U/L / x     / x      CARDIAC MARKERS ( 18 Dec 2021 21:17 )  x     / <0.01 ng/mL / x     / x     / x          RADIOLOGY:    Assessment/Plan:  86 y/o female with PMHx of COPD not on home O2, pHTN, GERD, Lucio's esophagus, COVID vaccinated presented to the ED for 2 weeks of progressive productive cough, worsening SOB and generalized body aches. She reports that on Tuesday she had reached out to her PCP who prescribed her Zpack and 5 day course of Prednisone (completes tomorrow).    # Hypoxia likely secondary to COPD exacerbation - Not on home O2  # Hx of Pulmonary HTN  - 2 week hx of productive cough, SOB, generalized body aches  - s/p Z pack and Prednisone outpt  - no wheezing on exam, afebrile, leukocytosis (on prednisone)  - COVID and RVP negative  - CXR appear unremarkable f/u official read  - D-dimer 115, less likely to be PE   - currently on 2L NC saturating 98%, titrate O2 91-92%   - continue with Prednisone x1, to complete 5 days   - Duonebs, albuterol PRN;  *** Desats to 86% on RA on Ambulation; Patient aware and agreeable to plan;   *** Plan: DC in AM on Home O2;     # Hypertensive Urgency - Resolved  - /98 on admission   - consider starting amlodipine 5mg if continues to have elevated BP    # Hx of GERD - Complicated w/ Lucio's Esophagus; c/w Protonix/Pepcid        Diet: DASH  Activity: Ambulate as Tolerated  DVT ppx: Lovenox 40mg SQ Daily  GI ppx: Protonix/Pepcid  FULL CODE    Dispo: Home;           
  KIMBERLI LEIGH  87y  Female    still very dyspneic, cant ambulate  home o2 being arranged  pulmonary noted- will change antibiotics, get cxr, sputum culture  cont prednisone  INTERVAL EVENTS:    T(C): 36.3 (12-22-21 @ 05:53), Max: 36.3 (12-22-21 @ 05:53)  HR: 82 (12-22-21 @ 05:53) (82 - 97)  BP: 160/85 (12-22-21 @ 05:53) (158/70 - 160/85)  RR: 20 (12-22-21 @ 05:53) (17 - 20)  SpO2: --  Wt(kg): --Vital Signs Last 24 Hrs  T(C): 36.3 (22 Dec 2021 05:53), Max: 36.3 (22 Dec 2021 05:53)  T(F): 97.3 (22 Dec 2021 05:53), Max: 97.3 (22 Dec 2021 05:53)  HR: 82 (22 Dec 2021 05:53) (82 - 97)  BP: 160/85 (22 Dec 2021 05:53) (158/70 - 160/85)  BP(mean): --  RR: 20 (22 Dec 2021 05:53) (17 - 20)  SpO2: --    PHYSICAL EXAM:  GENERAL: resting comfortably, dyspneic w minimal exertion  NECK: Supple, No JVD, Normal thyroid  CHEST/LUNG: poor air exchange  HEART: S1, S2, Regular rate and rhythm;   ABDOMEN: Soft, Nontender, Nondistended; Bowel sounds present  EXTREMITIES: No clubbing, cyanosis, or edema  SKIN: No rashes or lesions    LABS:                          14.2   15.78 )-----------( 307      ( 21 Dec 2021 08:36 )             43.2     12-21    140  |  101  |  22<H>  ----------------------------<  114<H>  5.0   |  27  |  0.9    Ca    8.7      21 Dec 2021 08:36  Mg     2.2     12-21    TPro  5.9<L>  /  Alb  3.8  /  TBili  0.4  /  DBili  x   /  AST  16  /  ALT  16  /  AlkPhos  49  12-21              ALBUTerol    90 MICROgram(s) HFA Inhaler 2 Puff(s) Inhalation every 6 hours PRN  albuterol/ipratropium for Nebulization 3 milliLiter(s) Nebulizer every 6 hours  benzonatate 100 milliGRAM(s) Oral three times a day  budesonide  80 MICROgram(s)/formoterol 4.5 MICROgram(s) Inhaler 2 Puff(s) Inhalation two times a day  doxycycline hyclate Capsule 100 milliGRAM(s) Oral every 12 hours  enoxaparin Injectable 40 milliGRAM(s) SubCutaneous daily  escitalopram 10 milliGRAM(s) Oral daily  famotidine    Tablet 20 milliGRAM(s) Oral daily  guaiFENesin  milliGRAM(s) Oral every 12 hours  pantoprazole    Tablet 40 milliGRAM(s) Oral before breakfast  predniSONE   Tablet 40 milliGRAM(s) Oral daily  zolpidem 5 milliGRAM(s) Oral at bedtime PRN      RADIOLOGY & ADDITIONAL TESTS:    case discussed with the resident  Available consult notes reviewed    Assessment and plan:   will change antibiotics, get sputum culture, cxr  cont steroids and treatments            
  KIMBERLI LEIGH  87y  Female  had 4-5 episodes of watery diarrhea  looks exhausted, dehydrated  less dyspnea  c diff-  vitals stable    INTERVAL EVENTS:    T(C): 36.9 (12-23-21 @ 06:15), Max: 36.9 (12-23-21 @ 06:15)  HR: 89 (12-23-21 @ 06:15) (89 - 114)  BP: 125/80 (12-23-21 @ 06:15) (125/80 - 145/65)  RR: 18 (12-23-21 @ 06:15) (18 - 18)  SpO2: --  Wt(kg): --Vital Signs Last 24 Hrs  T(C): 36.9 (23 Dec 2021 06:15), Max: 36.9 (23 Dec 2021 06:15)  T(F): 98.4 (23 Dec 2021 06:15), Max: 98.4 (23 Dec 2021 06:15)  HR: 89 (23 Dec 2021 06:15) (89 - 114)  BP: 125/80 (23 Dec 2021 06:15) (125/80 - 145/65)  BP(mean): --  RR: 18 (23 Dec 2021 06:15) (18 - 18)  SpO2: --    PHYSICAL EXAM:  GENERAL: resting comfortably, looks very tired  NECK: Supple, No JVD, Normal thyroid  CHEST/LUNG: Clear; No crackles or wheezing, better air exchange  HEART: S1, S2, Regular rate and rhythm;   ABDOMEN: Soft, Nontender, Nondistended; Bowel sounds present  EXTREMITIES: No clubbing, cyanosis, or edema  SKIN: No rashes or lesions    LABS:                          15.3   18.06 )-----------( 239      ( 23 Dec 2021 04:30 )             46.9     12-23    137  |  101  |  29<H>  ----------------------------<  112<H>  4.5   |  22  |  1.0    Ca    8.1<L>      23 Dec 2021 04:30  Mg     1.8     12-23    TPro  6.0  /  Alb  3.6  /  TBili  0.5  /  DBili  x   /  AST  20  /  ALT  18  /  AlkPhos  55  12-23              ALBUTerol    90 MICROgram(s) HFA Inhaler 2 Puff(s) Inhalation every 6 hours PRN  albuterol/ipratropium for Nebulization 3 milliLiter(s) Nebulizer every 6 hours  aluminum hydroxide/magnesium hydroxide/simethicone Suspension 30 milliLiter(s) Oral every 6 hours PRN  benzonatate 100 milliGRAM(s) Oral three times a day  budesonide  80 MICROgram(s)/formoterol 4.5 MICROgram(s) Inhaler 2 Puff(s) Inhalation two times a day  enoxaparin Injectable 40 milliGRAM(s) SubCutaneous daily  escitalopram 10 milliGRAM(s) Oral daily  famotidine    Tablet 20 milliGRAM(s) Oral daily  guaiFENesin  milliGRAM(s) Oral every 12 hours  levoFLOXacin IVPB 750 milliGRAM(s) IV Intermittent every 24 hours  pantoprazole    Tablet 40 milliGRAM(s) Oral before breakfast  predniSONE   Tablet 40 milliGRAM(s) Oral daily  sodium chloride 0.9%. 1000 milliLiter(s) IV Continuous <Continuous>  zolpidem 5 milliGRAM(s) Oral at bedtime PRN      RADIOLOGY & ADDITIONAL TESTS:    case discussed with the resident  Available consult notes reviewed    Assessment and plan:     increased wbc- could be from steroids  het labs today and tomorrow  probiotics- florastor, activia yogurt  rice, bananas, apple sauce  gentle iv hydration  monitor ca, k  hold levaquin- cxr-, may be able to improve without antibiotics  no sputum collected for culture- pulmonary f/u- if better tomorrow, may d/c home w op f/u??          
  KIMBERLI LEIGH  87y  Female  no diarrhea overnight  feels a little better  no abd cramps  less dyspnea, but still has productive cough- unable to expectorate  vitals stable    INTERVAL EVENTS:    T(C): 36.2 (12-26-21 @ 05:10), Max: 36.2 (12-26-21 @ 05:10)  HR: 93 (12-26-21 @ 05:10) (93 - 99)  BP: 149/64 (12-26-21 @ 05:10) (112/68 - 161/65)  RR: 18 (12-26-21 @ 05:10) (18 - 18)  SpO2: 97% (12-26-21 @ 05:10) (97% - 97%)  Wt(kg): --Vital Signs Last 24 Hrs  T(C): 36.2 (26 Dec 2021 05:10), Max: 36.2 (26 Dec 2021 05:10)  T(F): 97.1 (26 Dec 2021 05:10), Max: 97.1 (26 Dec 2021 05:10)  HR: 93 (26 Dec 2021 05:10) (93 - 99)  BP: 149/64 (26 Dec 2021 05:10) (112/68 - 161/65)  BP(mean): --  RR: 18 (26 Dec 2021 05:10) (18 - 18)  SpO2: 97% (26 Dec 2021 05:10) (97% - 97%)    PHYSICAL EXAM:  GENERAL: resting comfortably  NECK: Supple, No JVD, Normal thyroid  CHEST/LUNG: poor air exchange, less creps  HEART: S1, S2, Regular rate and rhythm;   ABDOMEN: Soft, Nontender, Nondistended; Bowel sounds present  EXTREMITIES: No clubbing, cyanosis, or edema  SKIN: No rashes or lesions    LABS:                          13.3   11.29 )-----------( 264      ( 25 Dec 2021 04:30 )             40.5     12-25    135  |  104  |  18  ----------------------------<  105<H>  3.9   |  18  |  0.8    Ca    6.7<L>      25 Dec 2021 04:30  Mg     2.0     12-25    TPro  4.8<L>  /  Alb  2.8<L>  /  TBili  0.2  /  DBili  x   /  AST  18  /  ALT  19  /  AlkPhos  43  12-25          Culture - Stool (collected 23 Dec 2021 07:55)  Source: .Stool Feces  Final Report (25 Dec 2021 16:15):    No enteric pathogens isolated.    (Stool culture examined for Salmonella,    Shigella, Campylobacter, Aeromonas, Plesiomonas,    Vibrio, E.coli O157 and Yersinia)          acetaminophen     Tablet .. 650 milliGRAM(s) Oral every 6 hours PRN  ALBUTerol    90 MICROgram(s) HFA Inhaler 2 Puff(s) Inhalation every 6 hours PRN  albuterol/ipratropium for Nebulization 3 milliLiter(s) Nebulizer every 6 hours  aluminum hydroxide/magnesium hydroxide/simethicone Suspension 30 milliLiter(s) Oral every 6 hours PRN  benzonatate 100 milliGRAM(s) Oral three times a day  budesonide  80 MICROgram(s)/formoterol 4.5 MICROgram(s) Inhaler 2 Puff(s) Inhalation two times a day  calcium carbonate    500 mG (Tums) Chewable 1 Tablet(s) Chew two times a day  enoxaparin Injectable 40 milliGRAM(s) SubCutaneous daily  escitalopram 10 milliGRAM(s) Oral daily  famotidine    Tablet 20 milliGRAM(s) Oral daily  guaiFENesin  milliGRAM(s) Oral every 12 hours  saccharomyces boulardii 250 milliGRAM(s) Oral two times a day  sodium chloride 0.9%. 1000 milliLiter(s) IV Continuous <Continuous>      RADIOLOGY & ADDITIONAL TESTS:    case discussed with the resident  Available consult notes reviewed    Assessment and plan:     hold protonix, increase pepcid bid  ca replaced, monitor  oob, try ambulation tolerability  if stable, consider d/c home tomorrow w home O2 and home care          
  KIMBERLI LEIGH  87y  Female  oob, eating  has thrush in mouth  less nausea  stool - rotavirus  still has lbm, but tolerable  still dyspneic, but wants to go home  O2 sat acceptable  needs home care    INTERVAL EVENTS:    T(C): 36.2 (12-27-21 @ 05:30), Max: 36.2 (12-27-21 @ 05:30)  HR: 98 (12-27-21 @ 05:30) (95 - 103)  BP: 159/70 (12-27-21 @ 05:30) (140/65 - 159/70)  RR: 18 (12-27-21 @ 05:30) (18 - 18)  SpO2: 97% (12-27-21 @ 05:30) (97% - 97%)  Wt(kg): --Vital Signs Last 24 Hrs  T(C): 36.2 (27 Dec 2021 05:30), Max: 36.2 (27 Dec 2021 05:30)  T(F): 97.2 (27 Dec 2021 05:30), Max: 97.2 (27 Dec 2021 05:30)  HR: 98 (27 Dec 2021 05:30) (95 - 103)  BP: 159/70 (27 Dec 2021 05:30) (140/65 - 159/70)  BP(mean): --  RR: 18 (27 Dec 2021 05:30) (18 - 18)  SpO2: 97% (27 Dec 2021 05:30) (97% - 97%)    PHYSICAL EXAM: tongue w thrush  GENERAL: resting comfortably  NECK: Supple, No JVD, Normal thyroid  CHEST/LUNG: less creps and wheezes  HEART: S1, S2, Regular rate and rhythm;   ABDOMEN: Soft, Nontender, Nondistended; Bowel sounds present  EXTREMITIES: No clubbing, cyanosis, or edema  SKIN: No rashes or lesions    LABS:                          12.5   9.08  )-----------( 245      ( 27 Dec 2021 06:56 )             37.4     12-27    137  |  105  |  10  ----------------------------<  84  3.7   |  20  |  0.7    Ca    7.1<L>      27 Dec 2021 06:56  Mg     1.8     12-27    TPro  4.6<L>  /  Alb  2.7<L>  /  TBili  0.3  /  DBili  x   /  AST  21  /  ALT  22  /  AlkPhos  42  12-27          GI PCR Panel, Stool (collected 25 Dec 2021 22:15)  Source: .Stool Feces  Final Report (26 Dec 2021 14:06):    Rotavirus A    DETECTED by PCR    *******Please Note:*******    GI panel PCR evaluates for:    Campylobacter, Plesiomonas shigelloides, Salmonella,    Vibrio, Yersinia enterocolitica, Enteroaggregative    Escherichia coli (EAEC), Enteropathogenic E.coli (EPEC),    Enterotoxigenic E. coli (ETEC) lt/st, Shiga-like    toxin-producing E. coli (STEC) stx1/stx2,    Shigella/ Enteroinvasive E. coli (EIEC), Cryptosporidium,    Cyclospora cayetanensis, Entamoeba histolytica,    Giardia lamblia, Adenovirus F 40/41, Astrovirus,    Norovirus GI/GII, Rotavirus A, Sapovirus          acetaminophen     Tablet .. 650 milliGRAM(s) Oral every 6 hours PRN  ALBUTerol    90 MICROgram(s) HFA Inhaler 2 Puff(s) Inhalation every 6 hours PRN  albuterol/ipratropium for Nebulization 3 milliLiter(s) Nebulizer every 6 hours  aluminum hydroxide/magnesium hydroxide/simethicone Suspension 30 milliLiter(s) Oral every 6 hours PRN  benzonatate 100 milliGRAM(s) Oral three times a day  budesonide  80 MICROgram(s)/formoterol 4.5 MICROgram(s) Inhaler 2 Puff(s) Inhalation two times a day  calcium carbonate    500 mG (Tums) Chewable 1 Tablet(s) Chew two times a day  enoxaparin Injectable 40 milliGRAM(s) SubCutaneous daily  escitalopram 10 milliGRAM(s) Oral daily  famotidine    Tablet 20 milliGRAM(s) Oral daily  guaiFENesin  milliGRAM(s) Oral every 12 hours  saccharomyces boulardii 250 milliGRAM(s) Oral two times a day  sodium chloride 0.9%. 1000 milliLiter(s) IV Continuous <Continuous>      RADIOLOGY & ADDITIONAL TESTS:    case discussed with the resident  Available consult notes reviewed    Assessment and plan:       will d/c home  nystatin, mycelex  prednisone 20 mg for 5 days, then will decrease  pepcid, resp treatments  will monitor as op        
LENGTH OF HOSPITAL STAY: 5d      CHIEF COMPLAINT: Patient is a 87y old  Female who presents with a chief complaint of weakness (23 Dec 2021 09:45)      OVER Past 24hrs:  overnight events.     HISTORY OF PRESENTING ILLNESS:    HPI:  86 y/o female with PMHx of COPD not on home O2, pHTN, GERD, Lucio's esophagus, COVID vaccinated presented to the ED for 2 weeks of progressive productive cough, worsening SOB and generalized body aches. She reports that on Tuesday she had reached out to her PCP who prescribed her Zpack and 5 day course of Prednisone (completes tomorrow). She woke up this morning and was completely unable to perform her ADLs, decided to call EMS. She denies any fevers, chills, chest pain, abdominal pain, nausea, vomiting, diarrhea, constipation.     In the ED, VS noted for T 98, HR 98, /98, SpO2 98% on 2L NC. Labs noted for WBC 12, D-dimer 115, Trop 0.01. RVP and COVID negative. CXR appears unremarkable       (19 Dec 2021 00:48)    PAST MEDICAL & SURGICAL HISTORY  PAST MEDICAL & SURGICAL HISTORY:  COPD (chronic obstructive pulmonary disease)    GERD (gastroesophageal reflux disease)    Glaucoma    Anxiety    Pancreatitis    History of tonsillectomy    H/O colonoscopy  5 years ago          REVIEW OF SYSTEMS  Negative, except as in Physical exam    ALLERGIES:  No Known Allergies    MEDICATIONS:  STANDING MEDICATIONS  albuterol/ipratropium for Nebulization 3 milliLiter(s) Nebulizer every 6 hours  benzonatate 100 milliGRAM(s) Oral three times a day  budesonide  80 MICROgram(s)/formoterol 4.5 MICROgram(s) Inhaler 2 Puff(s) Inhalation two times a day  enoxaparin Injectable 40 milliGRAM(s) SubCutaneous daily  escitalopram 10 milliGRAM(s) Oral daily  famotidine    Tablet 20 milliGRAM(s) Oral daily  guaiFENesin  milliGRAM(s) Oral every 12 hours  pantoprazole    Tablet 40 milliGRAM(s) Oral before breakfast  predniSONE   Tablet 40 milliGRAM(s) Oral daily  saccharomyces boulardii 250 milliGRAM(s) Oral two times a day  sodium chloride 0.9%. 1000 milliLiter(s) IV Continuous <Continuous>      PRN MEDICATIONS  ALBUTerol    90 MICROgram(s) HFA Inhaler 2 Puff(s) Inhalation every 6 hours PRN  aluminum hydroxide/magnesium hydroxide/simethicone Suspension 30 milliLiter(s) Oral every 6 hours PRN  zolpidem 5 milliGRAM(s) Oral at bedtime PRN    VITALS:   T(F): 96.6  HR: 107  BP: 154/67  RR: 18  SpO2: 97%    PHYSICAL EXAM:  General: No acute distress.  AOx  HEENT: Sclear in clear;  PULM: Clear to auscultation bilaterally.  CVS: RRR; No murmurs  Abdomen: Soft, nondistended, nontender.  Pelvis:  Extremities: No edema, nontender; Peripheral pulse  SKIN: Warm and well perfused, no rashes noted.  PSYCH:   NEURO:    LABS:                        15.3   18.06 )-----------( 239      ( 23 Dec 2021 04:30 )             46.9     12-23    137  |  101  |  29<H>  ----------------------------<  112<H>  4.5   |  22  |  1.0    Ca    8.1<L>      23 Dec 2021 04:30  Mg     1.8     12-23    TPro  6.0  /  Alb  3.6  /  TBili  0.5  /  DBili  x   /  AST  20  /  ALT  18  /  AlkPhos  55  12-23                  RADIOLOGY:    Assessment/Plan:  86 y/o female with PMHx of COPD not on home O2, pHTN, GERD, Lucio's esophagus, COVID vaccinated presented to the ED for 2 weeks of progressive productive cough, worsening SOB and generalized body aches. She reports that on Tuesday she had reached out to her PCP who prescribed her Zpack and 5 day course of Prednisone (completes tomorrow).    # Hypoxia likely secondary to COPD exacerbation - Not on home O2  # Hx of Pulmonary HTN  - 2 week hx of productive cough, SOB, generalized body aches  - s/p Z pack and Prednisone outpt  - COVID and RVP negative  - cont Neb q 6 h, prednisone 40 for 5 days 20 for 5 days  - F/u sputum gram stain/cx;  - pulm recs appreciated;   - Dietary modifications to alleviate diarrhea as per attending;   - c.diff negative; Holding abx;  > fu GI PCR    # Hypertensive Urgency - Resolved  - /98 on admission   - consider starting amlodipine 5mg if continues to have elevated BP    # Hx of GERD - Complicated w/ Lucio's Esophagus; c/w Protonix/Pepcid        Diet: DASH  Activity: Ambulate as Tolerated  DVT ppx: Lovenox 40mg SQ Daily  GI ppx: Protonix/Pepcid  FULL CODE    
Progress Note:  Provider Speciality                            Hospitalist      KIMBERLI LEIGH [id number] 87y Female     CHIEF PRESENTING COMPLAINT:  Patient is a 87y old  Female who presents with a chief complaint of weakness (19 Dec 2021 00:48)        SUBJECTIVE:  Patient was seen and examined at bedside. Reports improvement in  presenting complaint. No significant overnight events reported.     HISTORY OF PRESENTING ILLNESS:  HPI:  86 y/o female with PMHx of COPD not on home O2, pHTN, GERD, Lucio's esophagus, COVID vaccinated presented to the ED for 2 weeks of progressive productive cough, worsening SOB and generalized body aches. She reports that on Tuesday she had reached out to her PCP who prescribed her Zpack and 5 day course of Prednisone (completes tomorrow). She woke up this morning and was completely unable to perform her ADLs, decided to call EMS. She denies any fevers, chills, chest pain, abdominal pain, nausea, vomiting, diarrhea, constipation.     In the ED, VS noted for T 98, HR 98, /98, SpO2 98% on 2L NC. Labs noted for WBC 12, D-dimer 115, Trop 0.01. RVP and COVID negative. CXR appears unremarkable       (19 Dec 2021 00:48)        REVIEW OF SYSTEMS:  Patient denies any headache, any vision complaints, runny nose, fever, chills, sore throat. Denies chest pain, palpitation. Denies nausea, vomiting, abdominal pain, diarrhoea, Denies urinary burning, urgency, frequency, dysuria.At least 10 systems were reviewed in ROS. All systems reviewed  are within normal limits except for the complaints as described in Subjective.    PAST MEDICAL & SURGICAL HISTORY:  PAST MEDICAL & SURGICAL HISTORY:  COPD (chronic obstructive pulmonary disease)    GERD (gastroesophageal reflux disease)    Glaucoma    Anxiety    Pancreatitis    History of tonsillectomy    H/O colonoscopy  5 years ago            VITAL SIGNS:  Vital Signs Last 24 Hrs  T(C): 36.3 (20 Dec 2021 13:29), Max: 36.3 (20 Dec 2021 13:29)  T(F): 97.3 (20 Dec 2021 13:29), Max: 97.3 (20 Dec 2021 13:29)  HR: 101 (20 Dec 2021 13:29) (93 - 101)  BP: 106/59 (20 Dec 2021 13:29) (106/59 - 186/84)  BP(mean): --  RR: 18 (20 Dec 2021 13:29) (18 - 18)  SpO2: 86% (20 Dec 2021 13:29) (86% - 86%)          PHYSICAL EXAMINATION:  Not in acute distress  General: No pallor, no icterus  HEENT:   EOMI, no JVD.  Heart: S1+S2 audible  Lungs: bilateral  moderate air entry, no wheezing, no crepitations.  Abdomen: Soft, non-tender, non-distended , no  rigidity or guarding.  CNS: Awake alert, CN  grossly intact.  Extremities:  No edema            CONSULTS:  Consultant(s) Notes Reviewed by me.   Care Discussed with Consultants/Other Providers where required.        MEDICATIONS:  MEDICATIONS  (STANDING):  albuterol/ipratropium for Nebulization 3 milliLiter(s) Nebulizer every 6 hours  budesonide  80 MICROgram(s)/formoterol 4.5 MICROgram(s) Inhaler 2 Puff(s) Inhalation two times a day  enoxaparin Injectable 40 milliGRAM(s) SubCutaneous daily  escitalopram 10 milliGRAM(s) Oral daily  famotidine    Tablet 20 milliGRAM(s) Oral daily  pantoprazole    Tablet 40 milliGRAM(s) Oral before breakfast  predniSONE   Tablet 40 milliGRAM(s) Oral daily    MEDICATIONS  (PRN):  ALBUTerol    90 MICROgram(s) HFA Inhaler 2 Puff(s) Inhalation every 6 hours PRN Shortness of Breath and/or Wheezing  zolpidem 5 milliGRAM(s) Oral at bedtime PRN Insomnia            ASSESSMENT:              86 YO F with a PMH of COPD (not on home O2), HTN, GERD, Lucio's esophagus, and COVID vaccinated who was BIBEMS for eval of SOB for the past x 3-5 days. Associated with productive cough. + increase in sputum production. - change in sputum color (White). Denies any CP, fevers/chills, sore throat, runny nose, allergies, palpitations, headache, ABD pain, dysuria, or LE swelling.     Acute hypoxic resp failure  from acute COPD exacerbation  HTN urgency-resolved    Pt received Azithro and PO prednisone from her PCP with little relief.   Chest X-ray -No radiographic evidence of acute cardiopulmonary disease.  Acute hypoxic resp failure  from acute COPD exacerbation. Improved  Covid & RSV.  negative  Duonebs PRN/standing. Symbicort. Supplemental O2 PRN.  predniosne 40 mg x 5 adys without taper  requires supplemental oxygen on discharge  Handoff: Stable for discharge with HHA with home oxygen             
Hospital Day:  6d    Subjective: Patient is a 87y old  Female who presents with a chief complaint of weakness (23 Dec 2021 16:57)      Pt seen and evaluated at bedside.   Complaints: sever watery diarrhea, had 4-5 episodes of watery diarrhea  looks exhausted, dehydrated  Over the night Events: none    Past Medical Hx:   COPD (chronic obstructive pulmonary disease)    GERD (gastroesophageal reflux disease)    Glaucoma    Anxiety    Pancreatitis      Past Sx:  History of tonsillectomy    H/O colonoscopy      Allergies:  No Known Allergies    Current Meds:   Standng Meds:  albuterol/ipratropium for Nebulization 3 milliLiter(s) Nebulizer every 6 hours  benzonatate 100 milliGRAM(s) Oral three times a day  budesonide  80 MICROgram(s)/formoterol 4.5 MICROgram(s) Inhaler 2 Puff(s) Inhalation two times a day  enoxaparin Injectable 40 milliGRAM(s) SubCutaneous daily  escitalopram 10 milliGRAM(s) Oral daily  famotidine    Tablet 20 milliGRAM(s) Oral daily  guaiFENesin  milliGRAM(s) Oral every 12 hours  loperamide 2 milliGRAM(s) Oral two times a day  pantoprazole    Tablet 40 milliGRAM(s) Oral before breakfast  saccharomyces boulardii 250 milliGRAM(s) Oral two times a day  sodium chloride 0.9%. 1000 milliLiter(s) (50 mL/Hr) IV Continuous <Continuous>    PRN Meds:  ALBUTerol    90 MICROgram(s) HFA Inhaler 2 Puff(s) Inhalation every 6 hours PRN Shortness of Breath and/or Wheezing  aluminum hydroxide/magnesium hydroxide/simethicone Suspension 30 milliLiter(s) Oral every 6 hours PRN Dyspepsia  zolpidem 5 milliGRAM(s) Oral at bedtime PRN Insomnia      Vital Signs:   T(F): 98.2 (12-24-21 @ 05:38), Max: 98.5 (12-23-21 @ 22:00)  HR: 99 (12-24-21 @ 05:38) (99 - 107)  BP: 135/64 (12-24-21 @ 05:38) (135/64 - 154/67)  RR: 20 (12-24-21 @ 05:38) (18 - 20)  SpO2: 98% (12-24-21 @ 05:38) (97% - 98%)    Physical Exam:   GENERAL: NAD, appears tired, dehydrated   HEENT: NCAT  CHEST/LUNG: Clear to auscultation bilaterally; No wheezing or rubs.   HEART: Regular rate and rhythm; No murmurs, rubs, or gallops  ABDOMEN: Bowel sounds present; Soft, Nontender, Nondistended.   EXTREMITIES:  No clubbing, cyanosis, or edema  NERVOUS SYSTEM:  Alert & Oriented X3    FLUID BALANCE    12-22-21 @ 07:01  -  12-23-21 @ 07:00  --------------------------------------------------------  IN: 120 mL / OUT: 0 mL / NET: 120 mL    12-23-21 @ 07:01  -  12-24-21 @ 07:00  --------------------------------------------------------  IN: 500 mL / OUT: 3 mL / NET: 497 mL        Labs:                         14.5   13.96 )-----------( 274      ( 24 Dec 2021 08:44 )             43.9     Neutophil% 88.7, Lymphocyte% 3.4, Monocyte% 7.4, Bands% 0.4 12-24-21 @ 08:44    23 Dec 2021 22:18    135    |  103    |  21     ----------------------------<  131    4.6     |  16     |  0.8      Ca    7.4        23 Dec 2021 22:18  Mg     1.9       23 Dec 2021 22:18    TPro  5.7    /  Alb  3.3    /  TBili  0.3    /  DBili  x      /  AST  24     /  ALT  20     /  AlkPhos  54     23 Dec 2021 22:18                          D-Dimer Assay, Quantitative: 116 ng/mL DDU (12-18-21 @ 21:17)    Procalcitonin, Serum: 0.04 ng/mL (12-18-21 @ 21:17)    Ferritin, Serum: 129 ng/mL (12-18-21 @ 21:17)    C-Reactive Protein, Serum: <3 mg/L (12-18-21 @ 21:17)

## 2021-12-27 NOTE — PROGRESS NOTE ADULT - PROVIDER SPECIALTY LIST ADULT
Internal Medicine
Internal Medicine
Hospitalist
Internal Medicine

## 2021-12-30 PROBLEM — Z86.79 HISTORY OF HYPERTENSION: Status: RESOLVED | Noted: 2021-01-01 | Resolved: 2021-01-01

## 2021-12-30 PROBLEM — Z78.9 DENIES ALCOHOL CONSUMPTION: Status: ACTIVE | Noted: 2021-01-01

## 2021-12-30 PROBLEM — Z86.79 HISTORY OF PULMONARY HYPERTENSION: Status: RESOLVED | Noted: 2021-01-01 | Resolved: 2021-01-01

## 2021-12-30 PROBLEM — K22.70 BARRETT'S ESOPHAGUS: Status: ACTIVE | Noted: 2021-03-15

## 2021-12-30 PROBLEM — Z87.09 HISTORY OF CHRONIC OBSTRUCTIVE LUNG DISEASE: Status: RESOLVED | Noted: 2021-01-01 | Resolved: 2021-01-01

## 2021-12-30 PROBLEM — Z87.19 HISTORY OF GASTROESOPHAGEAL REFLUX (GERD): Status: RESOLVED | Noted: 2021-01-01 | Resolved: 2021-01-01

## 2021-12-30 PROBLEM — Z87.891 FORMER SMOKER: Status: ACTIVE | Noted: 2021-01-01

## 2021-12-30 PROBLEM — Z78.9 DOES NOT USE ILLICIT DRUGS: Status: ACTIVE | Noted: 2021-01-01

## 2021-12-30 NOTE — PLAN
[FreeTextEntry1] : CV and pulmonary status stable\par Adhere to all medications including demonstrating proper use of inhalers and nebulizers.\par Encourage the use of proper breathing techniques such as pursed lip breathing or leaning forward during exhalation.\par Understands proper use of oxygen therapy.\par Increase activity as tolerated and maintain optimal activity levels. \par Continue coughing and deep breathing exercises \par Receive routine pneumococcal and influenza vaccinations.\par Identify early signs and sx of disease and notify NP/MD.\par Maintain proper nutrition and hydration.\par Follow up with Pulmonologist and Medical Providers\par Instructed the patient to call TCM Team or CCC with any questions or concerns.\par

## 2021-12-30 NOTE — PHYSICAL EXAM
[No Acute Distress] : no acute distress [Well Nourished] : well nourished [Well Developed] : well developed [Normal Sclera/Conjunctiva] : normal sclera/conjunctiva [Normal Outer Ear/Nose] : the outer ears and nose were normal in appearance [No JVD] : no jugular venous distention [No Respiratory Distress] : no respiratory distress  [No Accessory Muscle Use] : no accessory muscle use [No Edema] : there was no peripheral edema [Non Tender] : non-tender [de-identified] : Limited Visual Physical Exam during TeleHealth Visit [de-identified] : Home Oxygen [de-identified] : Awake and alert [de-identified] : Calm

## 2021-12-30 NOTE — HISTORY OF PRESENT ILLNESS
amLODIPine (NORVASC) 5 MG tablet      Last Written Prescription Date: 5/2/17  Last Fill Quantity: 30, # refills: 1  Last Office Visit with G, P or [facility] prescribing provider: 6/20/17       Potassium   Date Value Ref Range Status   05/02/2017 3.8 3.4 - 5.3 mmol/L Final     Creatinine   Date Value Ref Range Status   05/02/2017 0.69 0.52 - 1.04 mg/dL Final     BP Readings from Last 3 Encounters:   06/20/17 118/80   05/22/17 147/79   05/17/17 132/70        [Home] : at home, [unfilled] , at the time of the visit. [Other Location: e.g. Home (Enter Location, City,State)___] : at [unfilled] [Verbal consent obtained from patient] : the patient, [unfilled] [de-identified] : This patient is Enrolled in the [employer] Program through [employer]\par Copied As per [facility] Discharge Summary\par  \par \par "87 year old female with PMHx of COPD not on home O2, pHTN, GERD, Lucio's esophagus, COVID vaccinated presented to the ED for 2 weeks of progressive productive cough, worsening SOB and generalized body aches. She reports that on Tuesday she had reached out to her PCP who prescribed her Zpack and 5 day course of Prednisone. Admitted for work up of COPD exacerbation. Her course was complicated by multiple episodes of watery diarrhea s/ rectal tube. C Diff NG x2." The patient was discharged in stable condition with follow up care.\par \par During the TeleHealth the patient was in no acute distress.  Able to speak with complete sentences and no audible wheeze noted.\par The patient denies chest pain, shortness of breath, cough, hemoptysis, fever, palpitations, syncope.  Pt has home oxygen in use.

## 2021-12-30 NOTE — COUNSELING
[Fall prevention counseling provided] : Fall prevention counseling provided [Adequate lighting] : Adequate lighting [Use proper foot wear] : Use proper foot wear [Use recommended devices] : Use recommended devices [Behavioral health counseling provided] : Behavioral health counseling provided [Sleep ___ hours/day] : Sleep [unfilled] hours/day [Engage in a relaxing activity] : Engage in a relaxing activity [Plan in advance] : Plan in advance [Good understanding] : Patient has a good understanding of lifestyle changes and steps needed to achieve self management goal

## 2022-01-01 ENCOUNTER — APPOINTMENT (OUTPATIENT)
Dept: CARDIOLOGY | Facility: CLINIC | Age: 87
End: 2022-01-01
Payer: MEDICARE

## 2022-01-01 ENCOUNTER — INPATIENT (INPATIENT)
Facility: HOSPITAL | Age: 87
LOS: 17 days | End: 2022-06-27
Attending: HOSPITALIST | Admitting: HOSPITALIST
Payer: MEDICARE

## 2022-01-01 ENCOUNTER — APPOINTMENT (OUTPATIENT)
Age: 87
End: 2022-01-01
Payer: MEDICARE

## 2022-01-01 ENCOUNTER — RX RENEWAL (OUTPATIENT)
Age: 87
End: 2022-01-01

## 2022-01-01 VITALS — RESPIRATION RATE: 14 BRPM | HEART RATE: 110 BPM | DIASTOLIC BLOOD PRESSURE: 68 MMHG | SYSTOLIC BLOOD PRESSURE: 112 MMHG

## 2022-01-01 VITALS
WEIGHT: 95 LBS | SYSTOLIC BLOOD PRESSURE: 108 MMHG | DIASTOLIC BLOOD PRESSURE: 70 MMHG | HEART RATE: 79 BPM | BODY MASS INDEX: 19.15 KG/M2 | RESPIRATION RATE: 14 BRPM | HEIGHT: 59 IN

## 2022-01-01 VITALS
OXYGEN SATURATION: 97 % | RESPIRATION RATE: 31 BRPM | DIASTOLIC BLOOD PRESSURE: 94 MMHG | HEART RATE: 132 BPM | HEIGHT: 58 IN | SYSTOLIC BLOOD PRESSURE: 202 MMHG

## 2022-01-01 VITALS
HEART RATE: 93 BPM | HEIGHT: 59 IN | WEIGHT: 95 LBS | DIASTOLIC BLOOD PRESSURE: 58 MMHG | SYSTOLIC BLOOD PRESSURE: 116 MMHG | BODY MASS INDEX: 19.15 KG/M2

## 2022-01-01 VITALS
DIASTOLIC BLOOD PRESSURE: 59 MMHG | TEMPERATURE: 97 F | OXYGEN SATURATION: 100 % | HEART RATE: 115 BPM | RESPIRATION RATE: 20 BRPM | SYSTOLIC BLOOD PRESSURE: 122 MMHG

## 2022-01-01 DIAGNOSIS — Z99.81 DEPENDENCE ON SUPPLEMENTAL OXYGEN: ICD-10-CM

## 2022-01-01 DIAGNOSIS — Z98.890 OTHER SPECIFIED POSTPROCEDURAL STATES: Chronic | ICD-10-CM

## 2022-01-01 DIAGNOSIS — R45.1 RESTLESSNESS AND AGITATION: ICD-10-CM

## 2022-01-01 DIAGNOSIS — I10 ESSENTIAL (PRIMARY) HYPERTENSION: ICD-10-CM

## 2022-01-01 DIAGNOSIS — K21.9 GASTRO-ESOPHAGEAL REFLUX DISEASE WITHOUT ESOPHAGITIS: ICD-10-CM
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
## 2022-01-01 DIAGNOSIS — R06.02 SHORTNESS OF BREATH: ICD-10-CM

## 2022-01-01 DIAGNOSIS — T85.628A DISPLACEMENT OF OTHER SPECIFIED INTERNAL PROSTHETIC DEVICES, IMPLANTS AND GRAFTS, INITIAL ENCOUNTER: ICD-10-CM
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
## 2022-01-01 DIAGNOSIS — Z66 DO NOT RESUSCITATE: ICD-10-CM

## 2022-01-01 DIAGNOSIS — Z71.89 OTHER SPECIFIED COUNSELING: ICD-10-CM

## 2022-01-01 DIAGNOSIS — Z51.5 ENCOUNTER FOR PALLIATIVE CARE: ICD-10-CM

## 2022-01-01 DIAGNOSIS — J44.1 CHRONIC OBSTRUCTIVE PULMONARY DISEASE WITH (ACUTE) EXACERBATION: ICD-10-CM

## 2022-01-01 DIAGNOSIS — R79.89 OTHER SPECIFIED ABNORMAL FINDINGS OF BLOOD CHEMISTRY: ICD-10-CM
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
## 2022-01-01 DIAGNOSIS — K22.70 BARRETT'S ESOPHAGUS WITHOUT DYSPLASIA: ICD-10-CM

## 2022-01-01 DIAGNOSIS — I34.0 NONRHEUMATIC MITRAL (VALVE) INSUFFICIENCY: ICD-10-CM

## 2022-01-01 DIAGNOSIS — I11.0 HYPERTENSIVE HEART DISEASE WITH HEART FAILURE: ICD-10-CM

## 2022-01-01 DIAGNOSIS — R06.00 DYSPNEA, UNSPECIFIED: ICD-10-CM

## 2022-01-01 DIAGNOSIS — J96.01 ACUTE RESPIRATORY FAILURE WITH HYPOXIA: ICD-10-CM

## 2022-01-01 DIAGNOSIS — E83.51 HYPOCALCEMIA: ICD-10-CM

## 2022-01-01 DIAGNOSIS — S41.111A LACERATION WITHOUT FOREIGN BODY OF RIGHT UPPER ARM, INITIAL ENCOUNTER: ICD-10-CM

## 2022-01-01 DIAGNOSIS — J96.02 ACUTE RESPIRATORY FAILURE WITH HYPERCAPNIA: ICD-10-CM

## 2022-01-01 DIAGNOSIS — E86.0 DEHYDRATION: ICD-10-CM

## 2022-01-01 DIAGNOSIS — S81.812A LACERATION WITHOUT FOREIGN BODY, LEFT LOWER LEG, INITIAL ENCOUNTER: ICD-10-CM

## 2022-01-01 DIAGNOSIS — I16.0 HYPERTENSIVE URGENCY: ICD-10-CM

## 2022-01-01 DIAGNOSIS — H40.9 UNSPECIFIED GLAUCOMA: ICD-10-CM

## 2022-01-01 DIAGNOSIS — R57.9 SHOCK, UNSPECIFIED: ICD-10-CM

## 2022-01-01 DIAGNOSIS — T81.82XA EMPHYSEMA (SUBCUTANEOUS) RESULTING FROM A PROCEDURE, INITIAL ENCOUNTER: ICD-10-CM

## 2022-01-01 DIAGNOSIS — K21.9 GASTRO-ESOPHAGEAL REFLUX DISEASE W/OUT ESOPHAGITIS: ICD-10-CM

## 2022-01-01 DIAGNOSIS — B37.0 CANDIDAL STOMATITIS: ICD-10-CM

## 2022-01-01 DIAGNOSIS — F41.9 ANXIETY DISORDER, UNSPECIFIED: ICD-10-CM

## 2022-01-01 DIAGNOSIS — Z99.11 DEPENDENCE ON RESPIRATOR [VENTILATOR] STATUS: ICD-10-CM

## 2022-01-01 DIAGNOSIS — A08.0 ROTAVIRAL ENTERITIS: ICD-10-CM

## 2022-01-01 DIAGNOSIS — J18.9 PNEUMONIA, UNSPECIFIED ORGANISM: ICD-10-CM

## 2022-01-01 DIAGNOSIS — L89.153 PRESSURE ULCER OF SACRAL REGION, STAGE 3: ICD-10-CM

## 2022-01-01 DIAGNOSIS — I07.1 RHEUMATIC TRICUSPID INSUFFICIENCY: ICD-10-CM

## 2022-01-01 DIAGNOSIS — I27.23 PULMONARY HYPERTENSION DUE TO LUNG DISEASES AND HYPOXIA: ICD-10-CM
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
## 2022-01-01 DIAGNOSIS — Z90.89 ACQUIRED ABSENCE OF OTHER ORGANS: Chronic | ICD-10-CM

## 2022-01-01 DIAGNOSIS — R91.1 SOLITARY PULMONARY NODULE: ICD-10-CM

## 2022-01-01 DIAGNOSIS — I50.22 CHRONIC SYSTOLIC (CONGESTIVE) HEART FAILURE: ICD-10-CM

## 2022-01-01 DIAGNOSIS — E87.5 HYPERKALEMIA: ICD-10-CM

## 2022-01-01 DIAGNOSIS — L89.156 PRESSURE-INDUCED DEEP TISSUE DAMAGE OF SACRAL REGION: ICD-10-CM

## 2022-01-01 DIAGNOSIS — S41.112A LACERATION WITHOUT FOREIGN BODY OF LEFT UPPER ARM, INITIAL ENCOUNTER: ICD-10-CM

## 2022-01-01 DIAGNOSIS — J93.12 SECONDARY SPONTANEOUS PNEUMOTHORAX: ICD-10-CM

## 2022-01-01 DIAGNOSIS — A41.9 SEPSIS, UNSPECIFIED ORGANISM: ICD-10-CM

## 2022-01-01 DIAGNOSIS — I27.20 PULMONARY HYPERTENSION, UNSPECIFIED: ICD-10-CM

## 2022-01-01 DIAGNOSIS — R54 AGE-RELATED PHYSICAL DEBILITY: ICD-10-CM
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
## 2022-01-01 DIAGNOSIS — R64 CACHEXIA: ICD-10-CM
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
## 2022-01-01 DIAGNOSIS — J45.909 UNSPECIFIED ASTHMA, UNCOMPLICATED: ICD-10-CM

## 2022-01-01 DIAGNOSIS — J96.22 ACUTE AND CHRONIC RESPIRATORY FAILURE WITH HYPERCAPNIA: ICD-10-CM

## 2022-01-01 DIAGNOSIS — S81.811A LACERATION WITHOUT FOREIGN BODY, RIGHT LOWER LEG, INITIAL ENCOUNTER: ICD-10-CM

## 2022-01-01 DIAGNOSIS — J44.9 CHRONIC OBSTRUCTIVE PULMONARY DISEASE, UNSPECIFIED: ICD-10-CM

## 2022-01-01 LAB
A1C WITH ESTIMATED AVERAGE GLUCOSE RESULT: 6.2 % — HIGH (ref 4–5.6)
ALBUMIN SERPL ELPH-MCNC: 2 G/DL — LOW (ref 3.5–5.2)
ALBUMIN SERPL ELPH-MCNC: 2.1 G/DL — LOW (ref 3.5–5.2)
ALBUMIN SERPL ELPH-MCNC: 2.2 G/DL — LOW (ref 3.5–5.2)
ALBUMIN SERPL ELPH-MCNC: 2.2 G/DL — LOW (ref 3.5–5.2)
ALBUMIN SERPL ELPH-MCNC: 2.3 G/DL — LOW (ref 3.5–5.2)
ALBUMIN SERPL ELPH-MCNC: 2.4 G/DL — LOW (ref 3.5–5.2)
ALBUMIN SERPL ELPH-MCNC: 2.6 G/DL — LOW (ref 3.5–5.2)
ALBUMIN SERPL ELPH-MCNC: 2.7 G/DL — LOW (ref 3.5–5.2)
ALBUMIN SERPL ELPH-MCNC: 2.7 G/DL — LOW (ref 3.5–5.2)
ALBUMIN SERPL ELPH-MCNC: 2.8 G/DL — LOW (ref 3.5–5.2)
ALBUMIN SERPL ELPH-MCNC: 3 G/DL — LOW (ref 3.5–5.2)
ALBUMIN SERPL ELPH-MCNC: 3.1 G/DL — LOW (ref 3.5–5.2)
ALBUMIN SERPL ELPH-MCNC: 3.1 G/DL — LOW (ref 3.5–5.2)
ALBUMIN SERPL ELPH-MCNC: 3.2 G/DL — LOW (ref 3.5–5.2)
ALBUMIN SERPL ELPH-MCNC: 3.6 G/DL — SIGNIFICANT CHANGE UP (ref 3.5–5.2)
ALBUMIN SERPL ELPH-MCNC: 4.1 G/DL — SIGNIFICANT CHANGE UP (ref 3.5–5.2)
ALBUMIN SERPL ELPH-MCNC: 4.5 G/DL — SIGNIFICANT CHANGE UP (ref 3.5–5.2)
ALP SERPL-CCNC: 41 U/L — SIGNIFICANT CHANGE UP (ref 30–115)
ALP SERPL-CCNC: 42 U/L — SIGNIFICANT CHANGE UP (ref 30–115)
ALP SERPL-CCNC: 42 U/L — SIGNIFICANT CHANGE UP (ref 30–115)
ALP SERPL-CCNC: 44 U/L — SIGNIFICANT CHANGE UP (ref 30–115)
ALP SERPL-CCNC: 46 U/L — SIGNIFICANT CHANGE UP (ref 30–115)
ALP SERPL-CCNC: 47 U/L — SIGNIFICANT CHANGE UP (ref 30–115)
ALP SERPL-CCNC: 48 U/L — SIGNIFICANT CHANGE UP (ref 30–115)
ALP SERPL-CCNC: 48 U/L — SIGNIFICANT CHANGE UP (ref 30–115)
ALP SERPL-CCNC: 51 U/L — SIGNIFICANT CHANGE UP (ref 30–115)
ALP SERPL-CCNC: 51 U/L — SIGNIFICANT CHANGE UP (ref 30–115)
ALP SERPL-CCNC: 54 U/L — SIGNIFICANT CHANGE UP (ref 30–115)
ALP SERPL-CCNC: 57 U/L — SIGNIFICANT CHANGE UP (ref 30–115)
ALP SERPL-CCNC: 57 U/L — SIGNIFICANT CHANGE UP (ref 30–115)
ALP SERPL-CCNC: 59 U/L — SIGNIFICANT CHANGE UP (ref 30–115)
ALP SERPL-CCNC: 59 U/L — SIGNIFICANT CHANGE UP (ref 30–115)
ALP SERPL-CCNC: 60 U/L — SIGNIFICANT CHANGE UP (ref 30–115)
ALP SERPL-CCNC: 61 U/L — SIGNIFICANT CHANGE UP (ref 30–115)
ALT FLD-CCNC: 21 U/L — SIGNIFICANT CHANGE UP (ref 0–41)
ALT FLD-CCNC: 21 U/L — SIGNIFICANT CHANGE UP (ref 0–41)
ALT FLD-CCNC: 22 U/L — SIGNIFICANT CHANGE UP (ref 0–41)
ALT FLD-CCNC: 23 U/L — SIGNIFICANT CHANGE UP (ref 0–41)
ALT FLD-CCNC: 23 U/L — SIGNIFICANT CHANGE UP (ref 0–41)
ALT FLD-CCNC: 25 U/L — SIGNIFICANT CHANGE UP (ref 0–41)
ALT FLD-CCNC: 27 U/L — SIGNIFICANT CHANGE UP (ref 0–41)
ALT FLD-CCNC: 27 U/L — SIGNIFICANT CHANGE UP (ref 0–41)
ALT FLD-CCNC: 34 U/L — SIGNIFICANT CHANGE UP (ref 0–41)
ALT FLD-CCNC: 38 U/L — SIGNIFICANT CHANGE UP (ref 0–41)
ALT FLD-CCNC: 44 U/L — HIGH (ref 0–41)
ALT FLD-CCNC: 44 U/L — HIGH (ref 0–41)
ALT FLD-CCNC: 47 U/L — HIGH (ref 0–41)
ALT FLD-CCNC: 50 U/L — HIGH (ref 0–41)
ALT FLD-CCNC: 53 U/L — HIGH (ref 0–41)
ALT FLD-CCNC: 78 U/L — HIGH (ref 0–41)
ALT FLD-CCNC: 90 U/L — HIGH (ref 0–41)
ANION GAP SERPL CALC-SCNC: 10 MMOL/L — SIGNIFICANT CHANGE UP (ref 7–14)
ANION GAP SERPL CALC-SCNC: 11 MMOL/L — SIGNIFICANT CHANGE UP (ref 7–14)
ANION GAP SERPL CALC-SCNC: 12 MMOL/L — SIGNIFICANT CHANGE UP (ref 7–14)
ANION GAP SERPL CALC-SCNC: 13 MMOL/L — SIGNIFICANT CHANGE UP (ref 7–14)
ANION GAP SERPL CALC-SCNC: 14 MMOL/L — SIGNIFICANT CHANGE UP (ref 7–14)
ANION GAP SERPL CALC-SCNC: 3 MMOL/L — LOW (ref 7–14)
ANION GAP SERPL CALC-SCNC: 7 MMOL/L — SIGNIFICANT CHANGE UP (ref 7–14)
ANION GAP SERPL CALC-SCNC: 8 MMOL/L — SIGNIFICANT CHANGE UP (ref 7–14)
ANION GAP SERPL CALC-SCNC: 8 MMOL/L — SIGNIFICANT CHANGE UP (ref 7–14)
ANION GAP SERPL CALC-SCNC: 9 MMOL/L — SIGNIFICANT CHANGE UP (ref 7–14)
APPEARANCE UR: ABNORMAL
APPEARANCE UR: CLEAR — SIGNIFICANT CHANGE UP
APTT BLD: 26.6 SEC — LOW (ref 27–39.2)
APTT BLD: 26.6 SEC — LOW (ref 27–39.2)
AST SERPL-CCNC: 11 U/L — SIGNIFICANT CHANGE UP (ref 0–41)
AST SERPL-CCNC: 12 U/L — SIGNIFICANT CHANGE UP (ref 0–41)
AST SERPL-CCNC: 13 U/L — SIGNIFICANT CHANGE UP (ref 0–41)
AST SERPL-CCNC: 13 U/L — SIGNIFICANT CHANGE UP (ref 0–41)
AST SERPL-CCNC: 15 U/L — SIGNIFICANT CHANGE UP (ref 0–41)
AST SERPL-CCNC: 16 U/L — SIGNIFICANT CHANGE UP (ref 0–41)
AST SERPL-CCNC: 17 U/L — SIGNIFICANT CHANGE UP (ref 0–41)
AST SERPL-CCNC: 17 U/L — SIGNIFICANT CHANGE UP (ref 0–41)
AST SERPL-CCNC: 18 U/L — SIGNIFICANT CHANGE UP (ref 0–41)
AST SERPL-CCNC: 20 U/L — SIGNIFICANT CHANGE UP (ref 0–41)
AST SERPL-CCNC: 25 U/L — SIGNIFICANT CHANGE UP (ref 0–41)
AST SERPL-CCNC: 26 U/L — SIGNIFICANT CHANGE UP (ref 0–41)
AST SERPL-CCNC: 27 U/L — SIGNIFICANT CHANGE UP (ref 0–41)
AST SERPL-CCNC: 29 U/L — SIGNIFICANT CHANGE UP (ref 0–41)
AST SERPL-CCNC: 30 U/L — SIGNIFICANT CHANGE UP (ref 0–41)
AST SERPL-CCNC: 32 U/L — SIGNIFICANT CHANGE UP (ref 0–41)
AST SERPL-CCNC: 33 U/L — SIGNIFICANT CHANGE UP (ref 0–41)
BACTERIA # UR AUTO: NEGATIVE — SIGNIFICANT CHANGE UP
BASE EXCESS BLDA CALC-SCNC: -1.3 MMOL/L — SIGNIFICANT CHANGE UP (ref -2–3)
BASE EXCESS BLDA CALC-SCNC: 12.1 MMOL/L — HIGH (ref -2–3)
BASE EXCESS BLDA CALC-SCNC: 2.3 MMOL/L — SIGNIFICANT CHANGE UP (ref -2–3)
BASE EXCESS BLDA CALC-SCNC: 9.7 MMOL/L — HIGH (ref -2–3)
BASE EXCESS BLDV CALC-SCNC: 1.2 MMOL/L — SIGNIFICANT CHANGE UP (ref -2–3)
BASE EXCESS BLDV CALC-SCNC: 1.4 MMOL/L — SIGNIFICANT CHANGE UP (ref -2–3)
BASOPHILS # BLD AUTO: 0 K/UL — SIGNIFICANT CHANGE UP (ref 0–0.2)
BASOPHILS # BLD AUTO: 0 K/UL — SIGNIFICANT CHANGE UP (ref 0–0.2)
BASOPHILS # BLD AUTO: 0.01 K/UL — SIGNIFICANT CHANGE UP (ref 0–0.2)
BASOPHILS # BLD AUTO: 0.02 K/UL — SIGNIFICANT CHANGE UP (ref 0–0.2)
BASOPHILS # BLD AUTO: 0.03 K/UL — SIGNIFICANT CHANGE UP (ref 0–0.2)
BASOPHILS # BLD AUTO: 0.03 K/UL — SIGNIFICANT CHANGE UP (ref 0–0.2)
BASOPHILS # BLD AUTO: 0.04 K/UL — SIGNIFICANT CHANGE UP (ref 0–0.2)
BASOPHILS # BLD AUTO: 0.05 K/UL — SIGNIFICANT CHANGE UP (ref 0–0.2)
BASOPHILS # BLD AUTO: 0.07 K/UL — SIGNIFICANT CHANGE UP (ref 0–0.2)
BASOPHILS # BLD AUTO: 0.08 K/UL — SIGNIFICANT CHANGE UP (ref 0–0.2)
BASOPHILS # BLD AUTO: 0.08 K/UL — SIGNIFICANT CHANGE UP (ref 0–0.2)
BASOPHILS # BLD AUTO: 0.1 K/UL — SIGNIFICANT CHANGE UP (ref 0–0.2)
BASOPHILS # BLD AUTO: 0.36 K/UL — HIGH (ref 0–0.2)
BASOPHILS NFR BLD AUTO: 0 % — SIGNIFICANT CHANGE UP (ref 0–1)
BASOPHILS NFR BLD AUTO: 0 % — SIGNIFICANT CHANGE UP (ref 0–1)
BASOPHILS NFR BLD AUTO: 0.1 % — SIGNIFICANT CHANGE UP (ref 0–1)
BASOPHILS NFR BLD AUTO: 0.2 % — SIGNIFICANT CHANGE UP (ref 0–1)
BASOPHILS NFR BLD AUTO: 1.8 % — HIGH (ref 0–1)
BILIRUB SERPL-MCNC: 0.2 MG/DL — SIGNIFICANT CHANGE UP (ref 0.2–1.2)
BILIRUB SERPL-MCNC: 0.2 MG/DL — SIGNIFICANT CHANGE UP (ref 0.2–1.2)
BILIRUB SERPL-MCNC: 0.3 MG/DL — SIGNIFICANT CHANGE UP (ref 0.2–1.2)
BILIRUB SERPL-MCNC: 0.4 MG/DL — SIGNIFICANT CHANGE UP (ref 0.2–1.2)
BILIRUB SERPL-MCNC: 0.4 MG/DL — SIGNIFICANT CHANGE UP (ref 0.2–1.2)
BILIRUB SERPL-MCNC: 0.5 MG/DL — SIGNIFICANT CHANGE UP (ref 0.2–1.2)
BILIRUB SERPL-MCNC: 0.6 MG/DL — SIGNIFICANT CHANGE UP (ref 0.2–1.2)
BILIRUB UR-MCNC: NEGATIVE — SIGNIFICANT CHANGE UP
BILIRUB UR-MCNC: NEGATIVE — SIGNIFICANT CHANGE UP
BUN SERPL-MCNC: 31 MG/DL — HIGH (ref 10–20)
BUN SERPL-MCNC: 37 MG/DL — HIGH (ref 10–20)
BUN SERPL-MCNC: 42 MG/DL — HIGH (ref 10–20)
BUN SERPL-MCNC: 43 MG/DL — HIGH (ref 10–20)
BUN SERPL-MCNC: 45 MG/DL — HIGH (ref 10–20)
BUN SERPL-MCNC: 47 MG/DL — HIGH (ref 10–20)
BUN SERPL-MCNC: 53 MG/DL — HIGH (ref 10–20)
BUN SERPL-MCNC: 53 MG/DL — HIGH (ref 10–20)
BUN SERPL-MCNC: 54 MG/DL — HIGH (ref 10–20)
BUN SERPL-MCNC: 54 MG/DL — HIGH (ref 10–20)
BUN SERPL-MCNC: 55 MG/DL — HIGH (ref 10–20)
BUN SERPL-MCNC: 55 MG/DL — HIGH (ref 10–20)
BUN SERPL-MCNC: 58 MG/DL — HIGH (ref 10–20)
BUN SERPL-MCNC: 64 MG/DL — CRITICAL HIGH (ref 10–20)
BUN SERPL-MCNC: 65 MG/DL — CRITICAL HIGH (ref 10–20)
BUN SERPL-MCNC: 66 MG/DL — CRITICAL HIGH (ref 10–20)
BUN SERPL-MCNC: 66 MG/DL — CRITICAL HIGH (ref 10–20)
BUN SERPL-MCNC: 67 MG/DL — CRITICAL HIGH (ref 10–20)
BUN SERPL-MCNC: 69 MG/DL — CRITICAL HIGH (ref 10–20)
CA-I SERPL-SCNC: 1.33 MMOL/L — SIGNIFICANT CHANGE UP (ref 1.15–1.33)
CA-I SERPL-SCNC: 1.37 MMOL/L — HIGH (ref 1.15–1.33)
CALCIUM SERPL-MCNC: 10.1 MG/DL — SIGNIFICANT CHANGE UP (ref 8.5–10.1)
CALCIUM SERPL-MCNC: 8.2 MG/DL — LOW (ref 8.5–10.1)
CALCIUM SERPL-MCNC: 8.3 MG/DL — LOW (ref 8.5–10.1)
CALCIUM SERPL-MCNC: 8.5 MG/DL — SIGNIFICANT CHANGE UP (ref 8.5–10.1)
CALCIUM SERPL-MCNC: 8.5 MG/DL — SIGNIFICANT CHANGE UP (ref 8.5–10.1)
CALCIUM SERPL-MCNC: 8.6 MG/DL — SIGNIFICANT CHANGE UP (ref 8.5–10.1)
CALCIUM SERPL-MCNC: 8.7 MG/DL — SIGNIFICANT CHANGE UP (ref 8.5–10.1)
CALCIUM SERPL-MCNC: 8.8 MG/DL — SIGNIFICANT CHANGE UP (ref 8.5–10.1)
CALCIUM SERPL-MCNC: 8.9 MG/DL — SIGNIFICANT CHANGE UP (ref 8.5–10.1)
CALCIUM SERPL-MCNC: 9 MG/DL — SIGNIFICANT CHANGE UP (ref 8.5–10.1)
CALCIUM SERPL-MCNC: 9 MG/DL — SIGNIFICANT CHANGE UP (ref 8.5–10.1)
CALCIUM SERPL-MCNC: 9.5 MG/DL — SIGNIFICANT CHANGE UP (ref 8.5–10.1)
CHLORIDE SERPL-SCNC: 100 MMOL/L — SIGNIFICANT CHANGE UP (ref 98–110)
CHLORIDE SERPL-SCNC: 100 MMOL/L — SIGNIFICANT CHANGE UP (ref 98–110)
CHLORIDE SERPL-SCNC: 101 MMOL/L — SIGNIFICANT CHANGE UP (ref 98–110)
CHLORIDE SERPL-SCNC: 102 MMOL/L — SIGNIFICANT CHANGE UP (ref 98–110)
CHLORIDE SERPL-SCNC: 104 MMOL/L — SIGNIFICANT CHANGE UP (ref 98–110)
CHLORIDE SERPL-SCNC: 105 MMOL/L — SIGNIFICANT CHANGE UP (ref 98–110)
CHLORIDE SERPL-SCNC: 97 MMOL/L — LOW (ref 98–110)
CHLORIDE SERPL-SCNC: 97 MMOL/L — LOW (ref 98–110)
CHLORIDE SERPL-SCNC: 98 MMOL/L — SIGNIFICANT CHANGE UP (ref 98–110)
CHLORIDE SERPL-SCNC: 99 MMOL/L — SIGNIFICANT CHANGE UP (ref 98–110)
CO2 SERPL-SCNC: 22 MMOL/L — SIGNIFICANT CHANGE UP (ref 17–32)
CO2 SERPL-SCNC: 23 MMOL/L — SIGNIFICANT CHANGE UP (ref 17–32)
CO2 SERPL-SCNC: 24 MMOL/L — SIGNIFICANT CHANGE UP (ref 17–32)
CO2 SERPL-SCNC: 25 MMOL/L — SIGNIFICANT CHANGE UP (ref 17–32)
CO2 SERPL-SCNC: 25 MMOL/L — SIGNIFICANT CHANGE UP (ref 17–32)
CO2 SERPL-SCNC: 27 MMOL/L — SIGNIFICANT CHANGE UP (ref 17–32)
CO2 SERPL-SCNC: 28 MMOL/L — SIGNIFICANT CHANGE UP (ref 17–32)
CO2 SERPL-SCNC: 29 MMOL/L — SIGNIFICANT CHANGE UP (ref 17–32)
CO2 SERPL-SCNC: 30 MMOL/L — SIGNIFICANT CHANGE UP (ref 17–32)
CO2 SERPL-SCNC: 32 MMOL/L — SIGNIFICANT CHANGE UP (ref 17–32)
CO2 SERPL-SCNC: 33 MMOL/L — HIGH (ref 17–32)
CO2 SERPL-SCNC: 34 MMOL/L — HIGH (ref 17–32)
CO2 SERPL-SCNC: 35 MMOL/L — HIGH (ref 17–32)
CO2 SERPL-SCNC: 36 MMOL/L — HIGH (ref 17–32)
CO2 SERPL-SCNC: 36 MMOL/L — HIGH (ref 17–32)
COLOR SPEC: YELLOW — SIGNIFICANT CHANGE UP
COLOR SPEC: YELLOW — SIGNIFICANT CHANGE UP
CREAT SERPL-MCNC: 0.6 MG/DL — LOW (ref 0.7–1.5)
CREAT SERPL-MCNC: 0.7 MG/DL — SIGNIFICANT CHANGE UP (ref 0.7–1.5)
CREAT SERPL-MCNC: 0.8 MG/DL — SIGNIFICANT CHANGE UP (ref 0.7–1.5)
CREAT SERPL-MCNC: 0.9 MG/DL — SIGNIFICANT CHANGE UP (ref 0.7–1.5)
CREAT SERPL-MCNC: 1 MG/DL — SIGNIFICANT CHANGE UP (ref 0.7–1.5)
CREAT SERPL-MCNC: 1 MG/DL — SIGNIFICANT CHANGE UP (ref 0.7–1.5)
CREAT SERPL-MCNC: 1.1 MG/DL — SIGNIFICANT CHANGE UP (ref 0.7–1.5)
CULTURE RESULTS: NO GROWTH — SIGNIFICANT CHANGE UP
CULTURE RESULTS: SIGNIFICANT CHANGE UP
D DIMER BLD IA.RAPID-MCNC: 579 NG/ML DDU — HIGH (ref 0–230)
DIFF PNL FLD: ABNORMAL
DIFF PNL FLD: NEGATIVE — SIGNIFICANT CHANGE UP
EGFR: 48 ML/MIN/1.73M2 — LOW
EGFR: 54 ML/MIN/1.73M2 — LOW
EGFR: 54 ML/MIN/1.73M2 — LOW
EGFR: 61 ML/MIN/1.73M2 — SIGNIFICANT CHANGE UP
EGFR: 71 ML/MIN/1.73M2 — SIGNIFICANT CHANGE UP
EGFR: 83 ML/MIN/1.73M2 — SIGNIFICANT CHANGE UP
EGFR: 86 ML/MIN/1.73M2 — SIGNIFICANT CHANGE UP
EOSINOPHIL # BLD AUTO: 0 K/UL — SIGNIFICANT CHANGE UP (ref 0–0.7)
EOSINOPHIL # BLD AUTO: 0.02 K/UL — SIGNIFICANT CHANGE UP (ref 0–0.7)
EOSINOPHIL # BLD AUTO: 0.11 K/UL — SIGNIFICANT CHANGE UP (ref 0–0.7)
EOSINOPHIL # BLD AUTO: 0.12 K/UL — SIGNIFICANT CHANGE UP (ref 0–0.7)
EOSINOPHIL # BLD AUTO: 0.16 K/UL — SIGNIFICANT CHANGE UP (ref 0–0.7)
EOSINOPHIL # BLD AUTO: 0.16 K/UL — SIGNIFICANT CHANGE UP (ref 0–0.7)
EOSINOPHIL # BLD AUTO: 0.17 K/UL — SIGNIFICANT CHANGE UP (ref 0–0.7)
EOSINOPHIL # BLD AUTO: 0.24 K/UL — SIGNIFICANT CHANGE UP (ref 0–0.7)
EOSINOPHIL # BLD AUTO: 0.44 K/UL — SIGNIFICANT CHANGE UP (ref 0–0.7)
EOSINOPHIL # BLD AUTO: 1.05 K/UL — HIGH (ref 0–0.7)
EOSINOPHIL NFR BLD AUTO: 0 % — SIGNIFICANT CHANGE UP (ref 0–8)
EOSINOPHIL NFR BLD AUTO: 0.1 % — SIGNIFICANT CHANGE UP (ref 0–8)
EOSINOPHIL NFR BLD AUTO: 0.3 % — SIGNIFICANT CHANGE UP (ref 0–8)
EOSINOPHIL NFR BLD AUTO: 0.4 % — SIGNIFICANT CHANGE UP (ref 0–8)
EOSINOPHIL NFR BLD AUTO: 0.4 % — SIGNIFICANT CHANGE UP (ref 0–8)
EOSINOPHIL NFR BLD AUTO: 0.6 % — SIGNIFICANT CHANGE UP (ref 0–8)
EOSINOPHIL NFR BLD AUTO: 0.6 % — SIGNIFICANT CHANGE UP (ref 0–8)
EOSINOPHIL NFR BLD AUTO: 0.7 % — SIGNIFICANT CHANGE UP (ref 0–8)
EOSINOPHIL NFR BLD AUTO: 0.9 % — SIGNIFICANT CHANGE UP (ref 0–8)
EOSINOPHIL NFR BLD AUTO: 5.3 % — SIGNIFICANT CHANGE UP (ref 0–8)
EPI CELLS # UR: 1 /HPF — SIGNIFICANT CHANGE UP (ref 0–5)
ESTIMATED AVERAGE GLUCOSE: 131 MG/DL — HIGH (ref 68–114)
FLUAV AG NPH QL: SIGNIFICANT CHANGE UP
FLUBV AG NPH QL: SIGNIFICANT CHANGE UP
FUNGITELL: <31 PG/ML — SIGNIFICANT CHANGE UP
GAS PNL BLDA: SIGNIFICANT CHANGE UP
GAS PNL BLDV: 136 MMOL/L — SIGNIFICANT CHANGE UP (ref 136–145)
GAS PNL BLDV: 136 MMOL/L — SIGNIFICANT CHANGE UP (ref 136–145)
GAS PNL BLDV: SIGNIFICANT CHANGE UP
GAS PNL BLDV: SIGNIFICANT CHANGE UP
GLUCOSE BLDC GLUCOMTR-MCNC: 108 MG/DL — HIGH (ref 70–99)
GLUCOSE BLDC GLUCOMTR-MCNC: 109 MG/DL — HIGH (ref 70–99)
GLUCOSE BLDC GLUCOMTR-MCNC: 113 MG/DL — HIGH (ref 70–99)
GLUCOSE BLDC GLUCOMTR-MCNC: 123 MG/DL — HIGH (ref 70–99)
GLUCOSE BLDC GLUCOMTR-MCNC: 124 MG/DL — HIGH (ref 70–99)
GLUCOSE BLDC GLUCOMTR-MCNC: 125 MG/DL — HIGH (ref 70–99)
GLUCOSE BLDC GLUCOMTR-MCNC: 131 MG/DL — HIGH (ref 70–99)
GLUCOSE BLDC GLUCOMTR-MCNC: 133 MG/DL — HIGH (ref 70–99)
GLUCOSE BLDC GLUCOMTR-MCNC: 133 MG/DL — HIGH (ref 70–99)
GLUCOSE BLDC GLUCOMTR-MCNC: 134 MG/DL — HIGH (ref 70–99)
GLUCOSE BLDC GLUCOMTR-MCNC: 135 MG/DL — HIGH (ref 70–99)
GLUCOSE BLDC GLUCOMTR-MCNC: 142 MG/DL — HIGH (ref 70–99)
GLUCOSE BLDC GLUCOMTR-MCNC: 143 MG/DL — HIGH (ref 70–99)
GLUCOSE BLDC GLUCOMTR-MCNC: 144 MG/DL — HIGH (ref 70–99)
GLUCOSE BLDC GLUCOMTR-MCNC: 145 MG/DL — HIGH (ref 70–99)
GLUCOSE BLDC GLUCOMTR-MCNC: 150 MG/DL — HIGH (ref 70–99)
GLUCOSE BLDC GLUCOMTR-MCNC: 156 MG/DL — HIGH (ref 70–99)
GLUCOSE BLDC GLUCOMTR-MCNC: 158 MG/DL — HIGH (ref 70–99)
GLUCOSE BLDC GLUCOMTR-MCNC: 160 MG/DL — HIGH (ref 70–99)
GLUCOSE BLDC GLUCOMTR-MCNC: 166 MG/DL — HIGH (ref 70–99)
GLUCOSE BLDC GLUCOMTR-MCNC: 171 MG/DL — HIGH (ref 70–99)
GLUCOSE BLDC GLUCOMTR-MCNC: 174 MG/DL — HIGH (ref 70–99)
GLUCOSE BLDC GLUCOMTR-MCNC: 174 MG/DL — HIGH (ref 70–99)
GLUCOSE BLDC GLUCOMTR-MCNC: 176 MG/DL — HIGH (ref 70–99)
GLUCOSE BLDC GLUCOMTR-MCNC: 180 MG/DL — HIGH (ref 70–99)
GLUCOSE BLDC GLUCOMTR-MCNC: 180 MG/DL — HIGH (ref 70–99)
GLUCOSE BLDC GLUCOMTR-MCNC: 181 MG/DL — HIGH (ref 70–99)
GLUCOSE BLDC GLUCOMTR-MCNC: 182 MG/DL — HIGH (ref 70–99)
GLUCOSE BLDC GLUCOMTR-MCNC: 183 MG/DL — HIGH (ref 70–99)
GLUCOSE BLDC GLUCOMTR-MCNC: 191 MG/DL — HIGH (ref 70–99)
GLUCOSE BLDC GLUCOMTR-MCNC: 193 MG/DL — HIGH (ref 70–99)
GLUCOSE BLDC GLUCOMTR-MCNC: 195 MG/DL — HIGH (ref 70–99)
GLUCOSE BLDC GLUCOMTR-MCNC: 197 MG/DL — HIGH (ref 70–99)
GLUCOSE BLDC GLUCOMTR-MCNC: 203 MG/DL — HIGH (ref 70–99)
GLUCOSE BLDC GLUCOMTR-MCNC: 204 MG/DL — HIGH (ref 70–99)
GLUCOSE BLDC GLUCOMTR-MCNC: 208 MG/DL — HIGH (ref 70–99)
GLUCOSE BLDC GLUCOMTR-MCNC: 212 MG/DL — HIGH (ref 70–99)
GLUCOSE BLDC GLUCOMTR-MCNC: 212 MG/DL — HIGH (ref 70–99)
GLUCOSE BLDC GLUCOMTR-MCNC: 216 MG/DL — HIGH (ref 70–99)
GLUCOSE BLDC GLUCOMTR-MCNC: 219 MG/DL — HIGH (ref 70–99)
GLUCOSE BLDC GLUCOMTR-MCNC: 220 MG/DL — HIGH (ref 70–99)
GLUCOSE BLDC GLUCOMTR-MCNC: 220 MG/DL — HIGH (ref 70–99)
GLUCOSE BLDC GLUCOMTR-MCNC: 224 MG/DL — HIGH (ref 70–99)
GLUCOSE BLDC GLUCOMTR-MCNC: 226 MG/DL — HIGH (ref 70–99)
GLUCOSE BLDC GLUCOMTR-MCNC: 230 MG/DL — HIGH (ref 70–99)
GLUCOSE BLDC GLUCOMTR-MCNC: 231 MG/DL — HIGH (ref 70–99)
GLUCOSE BLDC GLUCOMTR-MCNC: 231 MG/DL — HIGH (ref 70–99)
GLUCOSE BLDC GLUCOMTR-MCNC: 234 MG/DL — HIGH (ref 70–99)
GLUCOSE BLDC GLUCOMTR-MCNC: 235 MG/DL — HIGH (ref 70–99)
GLUCOSE BLDC GLUCOMTR-MCNC: 238 MG/DL — HIGH (ref 70–99)
GLUCOSE BLDC GLUCOMTR-MCNC: 241 MG/DL — HIGH (ref 70–99)
GLUCOSE BLDC GLUCOMTR-MCNC: 242 MG/DL — HIGH (ref 70–99)
GLUCOSE BLDC GLUCOMTR-MCNC: 245 MG/DL — HIGH (ref 70–99)
GLUCOSE BLDC GLUCOMTR-MCNC: 248 MG/DL — HIGH (ref 70–99)
GLUCOSE BLDC GLUCOMTR-MCNC: 249 MG/DL — HIGH (ref 70–99)
GLUCOSE BLDC GLUCOMTR-MCNC: 260 MG/DL — HIGH (ref 70–99)
GLUCOSE BLDC GLUCOMTR-MCNC: 265 MG/DL — HIGH (ref 70–99)
GLUCOSE BLDC GLUCOMTR-MCNC: 272 MG/DL — HIGH (ref 70–99)
GLUCOSE BLDC GLUCOMTR-MCNC: 286 MG/DL — HIGH (ref 70–99)
GLUCOSE BLDC GLUCOMTR-MCNC: 320 MG/DL — HIGH (ref 70–99)
GLUCOSE BLDC GLUCOMTR-MCNC: 514 MG/DL — CRITICAL HIGH (ref 70–99)
GLUCOSE SERPL-MCNC: 143 MG/DL — HIGH (ref 70–99)
GLUCOSE SERPL-MCNC: 144 MG/DL — HIGH (ref 70–99)
GLUCOSE SERPL-MCNC: 156 MG/DL — HIGH (ref 70–99)
GLUCOSE SERPL-MCNC: 157 MG/DL — HIGH (ref 70–99)
GLUCOSE SERPL-MCNC: 157 MG/DL — HIGH (ref 70–99)
GLUCOSE SERPL-MCNC: 160 MG/DL — HIGH (ref 70–99)
GLUCOSE SERPL-MCNC: 164 MG/DL — HIGH (ref 70–99)
GLUCOSE SERPL-MCNC: 165 MG/DL — HIGH (ref 70–99)
GLUCOSE SERPL-MCNC: 175 MG/DL — HIGH (ref 70–99)
GLUCOSE SERPL-MCNC: 184 MG/DL — HIGH (ref 70–99)
GLUCOSE SERPL-MCNC: 184 MG/DL — HIGH (ref 70–99)
GLUCOSE SERPL-MCNC: 196 MG/DL — HIGH (ref 70–99)
GLUCOSE SERPL-MCNC: 197 MG/DL — HIGH (ref 70–99)
GLUCOSE SERPL-MCNC: 200 MG/DL — HIGH (ref 70–99)
GLUCOSE SERPL-MCNC: 205 MG/DL — HIGH (ref 70–99)
GLUCOSE SERPL-MCNC: 220 MG/DL — HIGH (ref 70–99)
GLUCOSE SERPL-MCNC: 228 MG/DL — HIGH (ref 70–99)
GLUCOSE SERPL-MCNC: 238 MG/DL — HIGH (ref 70–99)
GLUCOSE SERPL-MCNC: 247 MG/DL — HIGH (ref 70–99)
GLUCOSE UR QL: ABNORMAL
GLUCOSE UR QL: NEGATIVE — SIGNIFICANT CHANGE UP
GRAM STN FLD: SIGNIFICANT CHANGE UP
GRAN CASTS # UR COMP ASSIST: 3 /LPF — HIGH
HCO3 BLDA-SCNC: 28 MMOL/L — SIGNIFICANT CHANGE UP (ref 21–28)
HCO3 BLDA-SCNC: 30 MMOL/L — HIGH (ref 21–28)
HCO3 BLDA-SCNC: 35 MMOL/L — HIGH (ref 21–28)
HCO3 BLDA-SCNC: 38 MMOL/L — HIGH (ref 21–28)
HCO3 BLDV-SCNC: 34 MMOL/L — HIGH (ref 22–29)
HCO3 BLDV-SCNC: 35 MMOL/L — HIGH (ref 22–29)
HCT VFR BLD CALC: 27.3 % — LOW (ref 37–47)
HCT VFR BLD CALC: 27.4 % — LOW (ref 37–47)
HCT VFR BLD CALC: 28.5 % — LOW (ref 37–47)
HCT VFR BLD CALC: 28.7 % — LOW (ref 37–47)
HCT VFR BLD CALC: 29.5 % — LOW (ref 37–47)
HCT VFR BLD CALC: 30.2 % — LOW (ref 37–47)
HCT VFR BLD CALC: 33.3 % — LOW (ref 37–47)
HCT VFR BLD CALC: 33.5 % — LOW (ref 37–47)
HCT VFR BLD CALC: 34.7 % — LOW (ref 37–47)
HCT VFR BLD CALC: 34.8 % — LOW (ref 37–47)
HCT VFR BLD CALC: 34.9 % — LOW (ref 37–47)
HCT VFR BLD CALC: 35.4 % — LOW (ref 37–47)
HCT VFR BLD CALC: 36 % — LOW (ref 37–47)
HCT VFR BLD CALC: 36.2 % — LOW (ref 37–47)
HCT VFR BLD CALC: 36.2 % — LOW (ref 37–47)
HCT VFR BLD CALC: 37.7 % — SIGNIFICANT CHANGE UP (ref 37–47)
HCT VFR BLD CALC: 37.7 % — SIGNIFICANT CHANGE UP (ref 37–47)
HCT VFR BLD CALC: 38.6 % — SIGNIFICANT CHANGE UP (ref 37–47)
HCT VFR BLD CALC: 40.5 % — SIGNIFICANT CHANGE UP (ref 37–47)
HCT VFR BLD CALC: 40.6 % — SIGNIFICANT CHANGE UP (ref 37–47)
HCT VFR BLD CALC: 44.9 % — SIGNIFICANT CHANGE UP (ref 37–47)
HCT VFR BLDA CALC: 42 % — SIGNIFICANT CHANGE UP (ref 39–51)
HCT VFR BLDA CALC: 44 % — SIGNIFICANT CHANGE UP (ref 39–51)
HEPARIN-PF4 AB RESULT: 0.9 U/ML — SIGNIFICANT CHANGE UP (ref 0–0.9)
HEPARIN-PF4 AB RESULT: <0.6 U/ML — SIGNIFICANT CHANGE UP (ref 0–0.9)
HGB BLD CALC-MCNC: 14.1 G/DL — SIGNIFICANT CHANGE UP (ref 12.6–17.4)
HGB BLD CALC-MCNC: 14.5 G/DL — SIGNIFICANT CHANGE UP (ref 12.6–17.4)
HGB BLD-MCNC: 10.2 G/DL — LOW (ref 12–16)
HGB BLD-MCNC: 11.1 G/DL — LOW (ref 12–16)
HGB BLD-MCNC: 11.3 G/DL — LOW (ref 12–16)
HGB BLD-MCNC: 11.6 G/DL — LOW (ref 12–16)
HGB BLD-MCNC: 11.7 G/DL — LOW (ref 12–16)
HGB BLD-MCNC: 11.8 G/DL — LOW (ref 12–16)
HGB BLD-MCNC: 11.9 G/DL — LOW (ref 12–16)
HGB BLD-MCNC: 12 G/DL — SIGNIFICANT CHANGE UP (ref 12–16)
HGB BLD-MCNC: 12.6 G/DL — SIGNIFICANT CHANGE UP (ref 12–16)
HGB BLD-MCNC: 12.7 G/DL — SIGNIFICANT CHANGE UP (ref 12–16)
HGB BLD-MCNC: 12.8 G/DL — SIGNIFICANT CHANGE UP (ref 12–16)
HGB BLD-MCNC: 13.2 G/DL — SIGNIFICANT CHANGE UP (ref 12–16)
HGB BLD-MCNC: 13.6 G/DL — SIGNIFICANT CHANGE UP (ref 12–16)
HGB BLD-MCNC: 14.5 G/DL — SIGNIFICANT CHANGE UP (ref 12–16)
HGB BLD-MCNC: 8.9 G/DL — LOW (ref 12–16)
HGB BLD-MCNC: 9 G/DL — LOW (ref 12–16)
HGB BLD-MCNC: 9.2 G/DL — LOW (ref 12–16)
HGB BLD-MCNC: 9.6 G/DL — LOW (ref 12–16)
HGB BLD-MCNC: 9.6 G/DL — LOW (ref 12–16)
HOROWITZ INDEX BLDA+IHG-RTO: 30 — SIGNIFICANT CHANGE UP
HYALINE CASTS # UR AUTO: 6 /LPF — SIGNIFICANT CHANGE UP (ref 0–7)
IMM GRANULOCYTES NFR BLD AUTO: 0.5 % — HIGH (ref 0.1–0.3)
IMM GRANULOCYTES NFR BLD AUTO: 1.1 % — HIGH (ref 0.1–0.3)
IMM GRANULOCYTES NFR BLD AUTO: 1.4 % — HIGH (ref 0.1–0.3)
IMM GRANULOCYTES NFR BLD AUTO: 1.4 % — HIGH (ref 0.1–0.3)
IMM GRANULOCYTES NFR BLD AUTO: 1.5 % — HIGH (ref 0.1–0.3)
IMM GRANULOCYTES NFR BLD AUTO: 1.5 % — HIGH (ref 0.1–0.3)
IMM GRANULOCYTES NFR BLD AUTO: 1.7 % — HIGH (ref 0.1–0.3)
IMM GRANULOCYTES NFR BLD AUTO: 1.7 % — HIGH (ref 0.1–0.3)
IMM GRANULOCYTES NFR BLD AUTO: 1.8 % — HIGH (ref 0.1–0.3)
INR BLD: 0.85 RATIO — SIGNIFICANT CHANGE UP (ref 0.65–1.3)
INR BLD: 0.94 RATIO — SIGNIFICANT CHANGE UP (ref 0.65–1.3)
KETONES UR-MCNC: SIGNIFICANT CHANGE UP
KETONES UR-MCNC: SIGNIFICANT CHANGE UP
LACTATE BLDV-MCNC: 1 MMOL/L — SIGNIFICANT CHANGE UP (ref 0.5–2)
LACTATE BLDV-MCNC: 1.4 MMOL/L — SIGNIFICANT CHANGE UP (ref 0.5–2)
LEGIONELLA AG UR QL: NEGATIVE — SIGNIFICANT CHANGE UP
LEUKOCYTE ESTERASE UR-ACNC: ABNORMAL
LEUKOCYTE ESTERASE UR-ACNC: NEGATIVE — SIGNIFICANT CHANGE UP
LIDOCAIN IGE QN: 26 U/L — SIGNIFICANT CHANGE UP (ref 7–60)
LYMPHOCYTES # BLD AUTO: 0.09 K/UL — LOW (ref 1.2–3.4)
LYMPHOCYTES # BLD AUTO: 0.18 K/UL — LOW (ref 1.2–3.4)
LYMPHOCYTES # BLD AUTO: 0.4 % — LOW (ref 20.5–51.1)
LYMPHOCYTES # BLD AUTO: 0.44 K/UL — LOW (ref 1.2–3.4)
LYMPHOCYTES # BLD AUTO: 0.48 K/UL — LOW (ref 1.2–3.4)
LYMPHOCYTES # BLD AUTO: 0.51 K/UL — LOW (ref 1.2–3.4)
LYMPHOCYTES # BLD AUTO: 0.62 K/UL — LOW (ref 1.2–3.4)
LYMPHOCYTES # BLD AUTO: 0.69 K/UL — LOW (ref 1.2–3.4)
LYMPHOCYTES # BLD AUTO: 0.75 K/UL — LOW (ref 1.2–3.4)
LYMPHOCYTES # BLD AUTO: 0.76 K/UL — LOW (ref 1.2–3.4)
LYMPHOCYTES # BLD AUTO: 0.78 K/UL — LOW (ref 1.2–3.4)
LYMPHOCYTES # BLD AUTO: 0.83 K/UL — LOW (ref 1.2–3.4)
LYMPHOCYTES # BLD AUTO: 0.9 % — LOW (ref 20.5–51.1)
LYMPHOCYTES # BLD AUTO: 0.9 % — LOW (ref 20.5–51.1)
LYMPHOCYTES # BLD AUTO: 0.96 K/UL — LOW (ref 1.2–3.4)
LYMPHOCYTES # BLD AUTO: 1.19 K/UL — LOW (ref 1.2–3.4)
LYMPHOCYTES # BLD AUTO: 1.23 K/UL — SIGNIFICANT CHANGE UP (ref 1.2–3.4)
LYMPHOCYTES # BLD AUTO: 1.6 % — LOW (ref 20.5–51.1)
LYMPHOCYTES # BLD AUTO: 1.7 % — LOW (ref 20.5–51.1)
LYMPHOCYTES # BLD AUTO: 1.9 % — LOW (ref 20.5–51.1)
LYMPHOCYTES # BLD AUTO: 1.9 % — LOW (ref 20.5–51.1)
LYMPHOCYTES # BLD AUTO: 2.2 % — LOW (ref 20.5–51.1)
LYMPHOCYTES # BLD AUTO: 2.2 % — LOW (ref 20.5–51.1)
LYMPHOCYTES # BLD AUTO: 2.3 % — LOW (ref 20.5–51.1)
LYMPHOCYTES # BLD AUTO: 27.4 % — SIGNIFICANT CHANGE UP (ref 20.5–51.1)
LYMPHOCYTES # BLD AUTO: 3.2 % — LOW (ref 20.5–51.1)
LYMPHOCYTES # BLD AUTO: 4.3 % — LOW (ref 20.5–51.1)
LYMPHOCYTES # BLD AUTO: 4.7 % — LOW (ref 20.5–51.1)
LYMPHOCYTES # BLD AUTO: 5 % — LOW (ref 20.5–51.1)
LYMPHOCYTES # BLD AUTO: 5.45 K/UL — HIGH (ref 1.2–3.4)
MAGNESIUM SERPL-MCNC: 2.1 MG/DL — SIGNIFICANT CHANGE UP (ref 1.8–2.4)
MAGNESIUM SERPL-MCNC: 2.2 MG/DL — SIGNIFICANT CHANGE UP (ref 1.8–2.4)
MAGNESIUM SERPL-MCNC: 2.3 MG/DL — SIGNIFICANT CHANGE UP (ref 1.8–2.4)
MAGNESIUM SERPL-MCNC: 2.5 MG/DL — HIGH (ref 1.8–2.4)
MAGNESIUM SERPL-MCNC: 2.6 MG/DL — HIGH (ref 1.8–2.4)
MANUAL SMEAR VERIFICATION: SIGNIFICANT CHANGE UP
MANUAL SMEAR VERIFICATION: SIGNIFICANT CHANGE UP
MCHC RBC-ENTMCNC: 32.3 G/DL — SIGNIFICANT CHANGE UP (ref 32–37)
MCHC RBC-ENTMCNC: 32.3 G/DL — SIGNIFICANT CHANGE UP (ref 32–37)
MCHC RBC-ENTMCNC: 32.5 G/DL — SIGNIFICANT CHANGE UP (ref 32–37)
MCHC RBC-ENTMCNC: 32.5 PG — HIGH (ref 27–31)
MCHC RBC-ENTMCNC: 32.7 PG — HIGH (ref 27–31)
MCHC RBC-ENTMCNC: 32.8 G/DL — SIGNIFICANT CHANGE UP (ref 32–37)
MCHC RBC-ENTMCNC: 32.8 PG — HIGH (ref 27–31)
MCHC RBC-ENTMCNC: 32.9 G/DL — SIGNIFICANT CHANGE UP (ref 32–37)
MCHC RBC-ENTMCNC: 32.9 PG — HIGH (ref 27–31)
MCHC RBC-ENTMCNC: 32.9 PG — HIGH (ref 27–31)
MCHC RBC-ENTMCNC: 33 G/DL — SIGNIFICANT CHANGE UP (ref 32–37)
MCHC RBC-ENTMCNC: 33 PG — HIGH (ref 27–31)
MCHC RBC-ENTMCNC: 33.1 G/DL — SIGNIFICANT CHANGE UP (ref 32–37)
MCHC RBC-ENTMCNC: 33.1 PG — HIGH (ref 27–31)
MCHC RBC-ENTMCNC: 33.1 PG — HIGH (ref 27–31)
MCHC RBC-ENTMCNC: 33.2 G/DL — SIGNIFICANT CHANGE UP (ref 32–37)
MCHC RBC-ENTMCNC: 33.2 PG — HIGH (ref 27–31)
MCHC RBC-ENTMCNC: 33.3 G/DL — SIGNIFICANT CHANGE UP (ref 32–37)
MCHC RBC-ENTMCNC: 33.4 G/DL — SIGNIFICANT CHANGE UP (ref 32–37)
MCHC RBC-ENTMCNC: 33.4 PG — HIGH (ref 27–31)
MCHC RBC-ENTMCNC: 33.6 G/DL — SIGNIFICANT CHANGE UP (ref 32–37)
MCHC RBC-ENTMCNC: 33.7 G/DL — SIGNIFICANT CHANGE UP (ref 32–37)
MCHC RBC-ENTMCNC: 33.7 G/DL — SIGNIFICANT CHANGE UP (ref 32–37)
MCHC RBC-ENTMCNC: 33.8 G/DL — SIGNIFICANT CHANGE UP (ref 32–37)
MCHC RBC-ENTMCNC: 34.1 G/DL — SIGNIFICANT CHANGE UP (ref 32–37)
MCV RBC AUTO: 100 FL — HIGH (ref 81–99)
MCV RBC AUTO: 100.7 FL — HIGH (ref 81–99)
MCV RBC AUTO: 100.7 FL — HIGH (ref 81–99)
MCV RBC AUTO: 100.8 FL — HIGH (ref 81–99)
MCV RBC AUTO: 101.8 FL — HIGH (ref 81–99)
MCV RBC AUTO: 101.8 FL — HIGH (ref 81–99)
MCV RBC AUTO: 102 FL — HIGH (ref 81–99)
MCV RBC AUTO: 97.1 FL — SIGNIFICANT CHANGE UP (ref 81–99)
MCV RBC AUTO: 97.4 FL — SIGNIFICANT CHANGE UP (ref 81–99)
MCV RBC AUTO: 97.5 FL — SIGNIFICANT CHANGE UP (ref 81–99)
MCV RBC AUTO: 97.8 FL — SIGNIFICANT CHANGE UP (ref 81–99)
MCV RBC AUTO: 97.9 FL — SIGNIFICANT CHANGE UP (ref 81–99)
MCV RBC AUTO: 98.5 FL — SIGNIFICANT CHANGE UP (ref 81–99)
MCV RBC AUTO: 98.6 FL — SIGNIFICANT CHANGE UP (ref 81–99)
MCV RBC AUTO: 98.6 FL — SIGNIFICANT CHANGE UP (ref 81–99)
MCV RBC AUTO: 98.7 FL — SIGNIFICANT CHANGE UP (ref 81–99)
MCV RBC AUTO: 98.8 FL — SIGNIFICANT CHANGE UP (ref 81–99)
MCV RBC AUTO: 99.7 FL — HIGH (ref 81–99)
MONOCYTES # BLD AUTO: 0.18 K/UL — SIGNIFICANT CHANGE UP (ref 0.1–0.6)
MONOCYTES # BLD AUTO: 0.82 K/UL — HIGH (ref 0.1–0.6)
MONOCYTES # BLD AUTO: 0.86 K/UL — HIGH (ref 0.1–0.6)
MONOCYTES # BLD AUTO: 1.14 K/UL — HIGH (ref 0.1–0.6)
MONOCYTES # BLD AUTO: 1.15 K/UL — HIGH (ref 0.1–0.6)
MONOCYTES # BLD AUTO: 1.23 K/UL — HIGH (ref 0.1–0.6)
MONOCYTES # BLD AUTO: 1.31 K/UL — HIGH (ref 0.1–0.6)
MONOCYTES # BLD AUTO: 1.38 K/UL — HIGH (ref 0.1–0.6)
MONOCYTES # BLD AUTO: 1.55 K/UL — HIGH (ref 0.1–0.6)
MONOCYTES # BLD AUTO: 1.68 K/UL — HIGH (ref 0.1–0.6)
MONOCYTES # BLD AUTO: 1.84 K/UL — HIGH (ref 0.1–0.6)
MONOCYTES # BLD AUTO: 1.92 K/UL — HIGH (ref 0.1–0.6)
MONOCYTES # BLD AUTO: 2.2 K/UL — HIGH (ref 0.1–0.6)
MONOCYTES # BLD AUTO: 2.47 K/UL — HIGH (ref 0.1–0.6)
MONOCYTES # BLD AUTO: 3.45 K/UL — HIGH (ref 0.1–0.6)
MONOCYTES NFR BLD AUTO: 0.9 % — LOW (ref 1.7–9.3)
MONOCYTES NFR BLD AUTO: 10.1 % — HIGH (ref 1.7–9.3)
MONOCYTES NFR BLD AUTO: 2.6 % — SIGNIFICANT CHANGE UP (ref 1.7–9.3)
MONOCYTES NFR BLD AUTO: 2.8 % — SIGNIFICANT CHANGE UP (ref 1.7–9.3)
MONOCYTES NFR BLD AUTO: 4.1 % — SIGNIFICANT CHANGE UP (ref 1.7–9.3)
MONOCYTES NFR BLD AUTO: 4.3 % — SIGNIFICANT CHANGE UP (ref 1.7–9.3)
MONOCYTES NFR BLD AUTO: 4.4 % — SIGNIFICANT CHANGE UP (ref 1.7–9.3)
MONOCYTES NFR BLD AUTO: 4.4 % — SIGNIFICANT CHANGE UP (ref 1.7–9.3)
MONOCYTES NFR BLD AUTO: 4.9 % — SIGNIFICANT CHANGE UP (ref 1.7–9.3)
MONOCYTES NFR BLD AUTO: 5.1 % — SIGNIFICANT CHANGE UP (ref 1.7–9.3)
MONOCYTES NFR BLD AUTO: 5.6 % — SIGNIFICANT CHANGE UP (ref 1.7–9.3)
MONOCYTES NFR BLD AUTO: 6.2 % — SIGNIFICANT CHANGE UP (ref 1.7–9.3)
MONOCYTES NFR BLD AUTO: 7.1 % — SIGNIFICANT CHANGE UP (ref 1.7–9.3)
MONOCYTES NFR BLD AUTO: 8.1 % — SIGNIFICANT CHANGE UP (ref 1.7–9.3)
MONOCYTES NFR BLD AUTO: 8.6 % — SIGNIFICANT CHANGE UP (ref 1.7–9.3)
MRSA PCR RESULT.: NEGATIVE — SIGNIFICANT CHANGE UP
MRSA PCR RESULT.: NEGATIVE — SIGNIFICANT CHANGE UP
NEUTROPHILS # BLD AUTO: 11.1 K/UL — HIGH (ref 1.4–6.5)
NEUTROPHILS # BLD AUTO: 16.62 K/UL — HIGH (ref 1.4–6.5)
NEUTROPHILS # BLD AUTO: 19.96 K/UL — HIGH (ref 1.4–6.5)
NEUTROPHILS # BLD AUTO: 20.66 K/UL — HIGH (ref 1.4–6.5)
NEUTROPHILS # BLD AUTO: 22.03 K/UL — HIGH (ref 1.4–6.5)
NEUTROPHILS # BLD AUTO: 22.09 K/UL — HIGH (ref 1.4–6.5)
NEUTROPHILS # BLD AUTO: 24.2 K/UL — HIGH (ref 1.4–6.5)
NEUTROPHILS # BLD AUTO: 26.73 K/UL — HIGH (ref 1.4–6.5)
NEUTROPHILS # BLD AUTO: 28.39 K/UL — HIGH (ref 1.4–6.5)
NEUTROPHILS # BLD AUTO: 28.59 K/UL — HIGH (ref 1.4–6.5)
NEUTROPHILS # BLD AUTO: 29.91 K/UL — HIGH (ref 1.4–6.5)
NEUTROPHILS # BLD AUTO: 30.48 K/UL — HIGH (ref 1.4–6.5)
NEUTROPHILS # BLD AUTO: 32.51 K/UL — HIGH (ref 1.4–6.5)
NEUTROPHILS # BLD AUTO: 37.6 K/UL — HIGH (ref 1.4–6.5)
NEUTROPHILS # BLD AUTO: 44.22 K/UL — HIGH (ref 1.4–6.5)
NEUTROPHILS NFR BLD AUTO: 54.9 % — SIGNIFICANT CHANGE UP (ref 42.2–75.2)
NEUTROPHILS NFR BLD AUTO: 84.2 % — HIGH (ref 42.2–75.2)
NEUTROPHILS NFR BLD AUTO: 86.1 % — HIGH (ref 42.2–75.2)
NEUTROPHILS NFR BLD AUTO: 86.7 % — HIGH (ref 42.2–75.2)
NEUTROPHILS NFR BLD AUTO: 86.9 % — HIGH (ref 42.2–75.2)
NEUTROPHILS NFR BLD AUTO: 89.6 % — HIGH (ref 42.2–75.2)
NEUTROPHILS NFR BLD AUTO: 90.2 % — HIGH (ref 42.2–75.2)
NEUTROPHILS NFR BLD AUTO: 90.9 % — HIGH (ref 42.2–75.2)
NEUTROPHILS NFR BLD AUTO: 91.5 % — HIGH (ref 42.2–75.2)
NEUTROPHILS NFR BLD AUTO: 91.7 % — HIGH (ref 42.2–75.2)
NEUTROPHILS NFR BLD AUTO: 91.8 % — HIGH (ref 42.2–75.2)
NEUTROPHILS NFR BLD AUTO: 93.3 % — HIGH (ref 42.2–75.2)
NEUTROPHILS NFR BLD AUTO: 94 % — HIGH (ref 42.2–75.2)
NEUTROPHILS NFR BLD AUTO: 94.1 % — HIGH (ref 42.2–75.2)
NEUTROPHILS NFR BLD AUTO: 98.2 % — HIGH (ref 42.2–75.2)
NEUTS BAND # BLD: 0.9 % — SIGNIFICANT CHANGE UP (ref 0–6)
NITRITE UR-MCNC: NEGATIVE — SIGNIFICANT CHANGE UP
NITRITE UR-MCNC: NEGATIVE — SIGNIFICANT CHANGE UP
NRBC # BLD: 0 /100 WBCS — SIGNIFICANT CHANGE UP (ref 0–0)
NT-PROBNP SERPL-SCNC: 254 PG/ML — SIGNIFICANT CHANGE UP (ref 0–300)
OVALOCYTES BLD QL SMEAR: SLIGHT — SIGNIFICANT CHANGE UP
PCO2 BLDA: 48 MMHG — SIGNIFICANT CHANGE UP (ref 25–48)
PCO2 BLDA: 52 MMHG — HIGH (ref 25–48)
PCO2 BLDA: 56 MMHG — HIGH (ref 25–48)
PCO2 BLDA: 71 MMHG — CRITICAL HIGH (ref 25–48)
PCO2 BLDV: 100 MMHG — HIGH (ref 39–42)
PCO2 BLDV: 115 MMHG — HIGH (ref 39–42)
PF4 HEPARIN CMPLX AB SER-ACNC: NEGATIVE — SIGNIFICANT CHANGE UP
PF4 HEPARIN CMPLX AB SER-ACNC: NEGATIVE — SIGNIFICANT CHANGE UP
PH BLDA: 7.21 — LOW (ref 7.35–7.45)
PH BLDA: 7.33 — LOW (ref 7.35–7.45)
PH BLDA: 7.47 — HIGH (ref 7.35–7.45)
PH BLDA: 7.47 — HIGH (ref 7.35–7.45)
PH BLDV: 7.09 — LOW (ref 7.32–7.43)
PH BLDV: 7.14 — LOW (ref 7.32–7.43)
PH UR: 6 — SIGNIFICANT CHANGE UP (ref 5–8)
PH UR: 6 — SIGNIFICANT CHANGE UP (ref 5–8)
PHOSPHATE SERPL-MCNC: 3.6 MG/DL — SIGNIFICANT CHANGE UP (ref 2.1–4.9)
PLAT MORPH BLD: NORMAL — SIGNIFICANT CHANGE UP
PLAT MORPH BLD: NORMAL — SIGNIFICANT CHANGE UP
PLATELET # BLD AUTO: 224 K/UL — SIGNIFICANT CHANGE UP (ref 130–400)
PLATELET # BLD AUTO: 277 K/UL — SIGNIFICANT CHANGE UP (ref 130–400)
PLATELET # BLD AUTO: 300 K/UL — SIGNIFICANT CHANGE UP (ref 130–400)
PLATELET # BLD AUTO: 314 K/UL — SIGNIFICANT CHANGE UP (ref 130–400)
PLATELET # BLD AUTO: 336 K/UL — SIGNIFICANT CHANGE UP (ref 130–400)
PLATELET # BLD AUTO: 340 K/UL — SIGNIFICANT CHANGE UP (ref 130–400)
PLATELET # BLD AUTO: 343 K/UL — SIGNIFICANT CHANGE UP (ref 130–400)
PLATELET # BLD AUTO: 344 K/UL — SIGNIFICANT CHANGE UP (ref 130–400)
PLATELET # BLD AUTO: 346 K/UL — SIGNIFICANT CHANGE UP (ref 130–400)
PLATELET # BLD AUTO: 346 K/UL — SIGNIFICANT CHANGE UP (ref 130–400)
PLATELET # BLD AUTO: 348 K/UL — SIGNIFICANT CHANGE UP (ref 130–400)
PLATELET # BLD AUTO: 352 K/UL — SIGNIFICANT CHANGE UP (ref 130–400)
PLATELET # BLD AUTO: 358 K/UL — SIGNIFICANT CHANGE UP (ref 130–400)
PLATELET # BLD AUTO: 378 K/UL — SIGNIFICANT CHANGE UP (ref 130–400)
PLATELET # BLD AUTO: 379 K/UL — SIGNIFICANT CHANGE UP (ref 130–400)
PLATELET # BLD AUTO: 382 K/UL — SIGNIFICANT CHANGE UP (ref 130–400)
PLATELET # BLD AUTO: 382 K/UL — SIGNIFICANT CHANGE UP (ref 130–400)
PLATELET # BLD AUTO: 383 K/UL — SIGNIFICANT CHANGE UP (ref 130–400)
PLATELET # BLD AUTO: 385 K/UL — SIGNIFICANT CHANGE UP (ref 130–400)
PLATELET # BLD AUTO: 408 K/UL — HIGH (ref 130–400)
PLATELET # BLD AUTO: 446 K/UL — HIGH (ref 130–400)
PO2 BLDA: 66 MMHG — LOW (ref 83–108)
PO2 BLDA: 70 MMHG — LOW (ref 83–108)
PO2 BLDA: 77 MMHG — LOW (ref 83–108)
PO2 BLDA: 83 MMHG — SIGNIFICANT CHANGE UP (ref 83–108)
PO2 BLDV: 165 MMHG — SIGNIFICANT CHANGE UP
PO2 BLDV: 86 MMHG — SIGNIFICANT CHANGE UP
POIKILOCYTOSIS BLD QL AUTO: SLIGHT — SIGNIFICANT CHANGE UP
POIKILOCYTOSIS BLD QL AUTO: SLIGHT — SIGNIFICANT CHANGE UP
POTASSIUM BLDV-SCNC: 4.2 MMOL/L — SIGNIFICANT CHANGE UP (ref 3.5–5.1)
POTASSIUM BLDV-SCNC: 4.3 MMOL/L — SIGNIFICANT CHANGE UP (ref 3.5–5.1)
POTASSIUM SERPL-MCNC: 4.6 MMOL/L — SIGNIFICANT CHANGE UP (ref 3.5–5)
POTASSIUM SERPL-MCNC: 4.8 MMOL/L — SIGNIFICANT CHANGE UP (ref 3.5–5)
POTASSIUM SERPL-MCNC: 4.9 MMOL/L — SIGNIFICANT CHANGE UP (ref 3.5–5)
POTASSIUM SERPL-MCNC: 4.9 MMOL/L — SIGNIFICANT CHANGE UP (ref 3.5–5)
POTASSIUM SERPL-MCNC: 5 MMOL/L — SIGNIFICANT CHANGE UP (ref 3.5–5)
POTASSIUM SERPL-MCNC: 5.3 MMOL/L — HIGH (ref 3.5–5)
POTASSIUM SERPL-MCNC: 5.5 MMOL/L — HIGH (ref 3.5–5)
POTASSIUM SERPL-MCNC: 5.6 MMOL/L — HIGH (ref 3.5–5)
POTASSIUM SERPL-MCNC: 5.7 MMOL/L — HIGH (ref 3.5–5)
POTASSIUM SERPL-SCNC: 4.6 MMOL/L — SIGNIFICANT CHANGE UP (ref 3.5–5)
POTASSIUM SERPL-SCNC: 4.8 MMOL/L — SIGNIFICANT CHANGE UP (ref 3.5–5)
POTASSIUM SERPL-SCNC: 4.9 MMOL/L — SIGNIFICANT CHANGE UP (ref 3.5–5)
POTASSIUM SERPL-SCNC: 4.9 MMOL/L — SIGNIFICANT CHANGE UP (ref 3.5–5)
POTASSIUM SERPL-SCNC: 5 MMOL/L — SIGNIFICANT CHANGE UP (ref 3.5–5)
POTASSIUM SERPL-SCNC: 5.3 MMOL/L — HIGH (ref 3.5–5)
POTASSIUM SERPL-SCNC: 5.5 MMOL/L — HIGH (ref 3.5–5)
POTASSIUM SERPL-SCNC: 5.6 MMOL/L — HIGH (ref 3.5–5)
POTASSIUM SERPL-SCNC: 5.7 MMOL/L — HIGH (ref 3.5–5)
PROCALCITONIN SERPL-MCNC: 0.13 NG/ML — HIGH (ref 0.02–0.1)
PROCALCITONIN SERPL-MCNC: 0.23 NG/ML — HIGH (ref 0.02–0.1)
PROCALCITONIN SERPL-MCNC: 0.52 NG/ML — HIGH (ref 0.02–0.1)
PROT SERPL-MCNC: 4.1 G/DL — LOW (ref 6–8)
PROT SERPL-MCNC: 4.2 G/DL — LOW (ref 6–8)
PROT SERPL-MCNC: 4.3 G/DL — LOW (ref 6–8)
PROT SERPL-MCNC: 4.4 G/DL — LOW (ref 6–8)
PROT SERPL-MCNC: 4.5 G/DL — LOW (ref 6–8)
PROT SERPL-MCNC: 4.5 G/DL — LOW (ref 6–8)
PROT SERPL-MCNC: 4.6 G/DL — LOW (ref 6–8)
PROT SERPL-MCNC: 4.6 G/DL — LOW (ref 6–8)
PROT SERPL-MCNC: 4.7 G/DL — LOW (ref 6–8)
PROT SERPL-MCNC: 4.8 G/DL — LOW (ref 6–8)
PROT SERPL-MCNC: 4.8 G/DL — LOW (ref 6–8)
PROT SERPL-MCNC: 4.9 G/DL — LOW (ref 6–8)
PROT SERPL-MCNC: 5.1 G/DL — LOW (ref 6–8)
PROT SERPL-MCNC: 5.2 G/DL — LOW (ref 6–8)
PROT SERPL-MCNC: 5.3 G/DL — LOW (ref 6–8)
PROT SERPL-MCNC: 5.6 G/DL — LOW (ref 6–8)
PROT SERPL-MCNC: 5.7 G/DL — LOW (ref 6–8)
PROT SERPL-MCNC: 6.1 G/DL — SIGNIFICANT CHANGE UP (ref 6–8)
PROT SERPL-MCNC: 7.1 G/DL — SIGNIFICANT CHANGE UP (ref 6–8)
PROT UR-MCNC: ABNORMAL
PROT UR-MCNC: ABNORMAL
PROTHROM AB SERPL-ACNC: 10.8 SEC — SIGNIFICANT CHANGE UP (ref 9.95–12.87)
PROTHROM AB SERPL-ACNC: 9.8 SEC — LOW (ref 9.95–12.87)
RAPID RVP RESULT: SIGNIFICANT CHANGE UP
RBC # BLD: 2.71 M/UL — LOW (ref 4.2–5.4)
RBC # BLD: 2.74 M/UL — LOW (ref 4.2–5.4)
RBC # BLD: 2.83 M/UL — LOW (ref 4.2–5.4)
RBC # BLD: 2.87 M/UL — LOW (ref 4.2–5.4)
RBC # BLD: 2.95 M/UL — LOW (ref 4.2–5.4)
RBC # BLD: 3.11 M/UL — LOW (ref 4.2–5.4)
RBC # BLD: 3.38 M/UL — LOW (ref 4.2–5.4)
RBC # BLD: 3.44 M/UL — LOW (ref 4.2–5.4)
RBC # BLD: 3.52 M/UL — LOW (ref 4.2–5.4)
RBC # BLD: 3.53 M/UL — LOW (ref 4.2–5.4)
RBC # BLD: 3.56 M/UL — LOW (ref 4.2–5.4)
RBC # BLD: 3.58 M/UL — LOW (ref 4.2–5.4)
RBC # BLD: 3.59 M/UL — LOW (ref 4.2–5.4)
RBC # BLD: 3.59 M/UL — LOW (ref 4.2–5.4)
RBC # BLD: 3.63 M/UL — LOW (ref 4.2–5.4)
RBC # BLD: 3.82 M/UL — LOW (ref 4.2–5.4)
RBC # BLD: 3.85 M/UL — LOW (ref 4.2–5.4)
RBC # BLD: 3.86 M/UL — LOW (ref 4.2–5.4)
RBC # BLD: 3.99 M/UL — LOW (ref 4.2–5.4)
RBC # BLD: 4.1 M/UL — LOW (ref 4.2–5.4)
RBC # BLD: 4.41 M/UL — SIGNIFICANT CHANGE UP (ref 4.2–5.4)
RBC # FLD: 14.2 % — SIGNIFICANT CHANGE UP (ref 11.5–14.5)
RBC # FLD: 14.2 % — SIGNIFICANT CHANGE UP (ref 11.5–14.5)
RBC # FLD: 14.3 % — SIGNIFICANT CHANGE UP (ref 11.5–14.5)
RBC # FLD: 14.4 % — SIGNIFICANT CHANGE UP (ref 11.5–14.5)
RBC # FLD: 14.5 % — SIGNIFICANT CHANGE UP (ref 11.5–14.5)
RBC # FLD: 14.5 % — SIGNIFICANT CHANGE UP (ref 11.5–14.5)
RBC # FLD: 14.6 % — HIGH (ref 11.5–14.5)
RBC # FLD: 14.7 % — HIGH (ref 11.5–14.5)
RBC # FLD: 14.8 % — HIGH (ref 11.5–14.5)
RBC # FLD: 14.9 % — HIGH (ref 11.5–14.5)
RBC # FLD: 15 % — HIGH (ref 11.5–14.5)
RBC # FLD: 15.1 % — HIGH (ref 11.5–14.5)
RBC # FLD: 15.1 % — HIGH (ref 11.5–14.5)
RBC # FLD: 15.2 % — HIGH (ref 11.5–14.5)
RBC BLD AUTO: ABNORMAL
RBC BLD AUTO: ABNORMAL
RBC CASTS # UR COMP ASSIST: 3 /HPF — SIGNIFICANT CHANGE UP (ref 0–4)
RSV RNA NPH QL NAA+NON-PROBE: SIGNIFICANT CHANGE UP
S PNEUM AG UR QL: NEGATIVE — SIGNIFICANT CHANGE UP
SAO2 % BLDA: 88 % — LOW (ref 94–98)
SAO2 % BLDA: 96 % — SIGNIFICANT CHANGE UP (ref 94–98)
SAO2 % BLDA: 97.8 % — SIGNIFICANT CHANGE UP (ref 94–98)
SAO2 % BLDA: 98.3 % — HIGH (ref 94–98)
SAO2 % BLDV: 95.6 % — SIGNIFICANT CHANGE UP
SAO2 % BLDV: 99.8 % — SIGNIFICANT CHANGE UP
SARS-COV-2 RNA SPEC QL NAA+PROBE: SIGNIFICANT CHANGE UP
SARS-COV-2 RNA SPEC QL NAA+PROBE: SIGNIFICANT CHANGE UP
SODIUM SERPL-SCNC: 136 MMOL/L — SIGNIFICANT CHANGE UP (ref 135–146)
SODIUM SERPL-SCNC: 137 MMOL/L — SIGNIFICANT CHANGE UP (ref 135–146)
SODIUM SERPL-SCNC: 138 MMOL/L — SIGNIFICANT CHANGE UP (ref 135–146)
SODIUM SERPL-SCNC: 138 MMOL/L — SIGNIFICANT CHANGE UP (ref 135–146)
SODIUM SERPL-SCNC: 139 MMOL/L — SIGNIFICANT CHANGE UP (ref 135–146)
SODIUM SERPL-SCNC: 139 MMOL/L — SIGNIFICANT CHANGE UP (ref 135–146)
SODIUM SERPL-SCNC: 140 MMOL/L — SIGNIFICANT CHANGE UP (ref 135–146)
SODIUM SERPL-SCNC: 141 MMOL/L — SIGNIFICANT CHANGE UP (ref 135–146)
SODIUM SERPL-SCNC: 143 MMOL/L — SIGNIFICANT CHANGE UP (ref 135–146)
SODIUM SERPL-SCNC: 143 MMOL/L — SIGNIFICANT CHANGE UP (ref 135–146)
SODIUM SERPL-SCNC: 144 MMOL/L — SIGNIFICANT CHANGE UP (ref 135–146)
SP GR SPEC: 1.02 — SIGNIFICANT CHANGE UP (ref 1.01–1.03)
SP GR SPEC: 1.03 — HIGH (ref 1.01–1.03)
SPECIMEN SOURCE: SIGNIFICANT CHANGE UP
TRIGL SERPL-MCNC: 116 MG/DL — SIGNIFICANT CHANGE UP
TROPONIN T SERPL-MCNC: 0.02 NG/ML — HIGH
TROPONIN T SERPL-MCNC: 0.04 NG/ML — CRITICAL HIGH
TROPONIN T SERPL-MCNC: 0.09 NG/ML — CRITICAL HIGH
TROPONIN T SERPL-MCNC: 0.25 NG/ML — CRITICAL HIGH
TROPONIN T SERPL-MCNC: 0.31 NG/ML — CRITICAL HIGH
UROBILINOGEN FLD QL: SIGNIFICANT CHANGE UP
UROBILINOGEN FLD QL: SIGNIFICANT CHANGE UP
VARIANT LYMPHS # BLD: 3.5 % — SIGNIFICANT CHANGE UP (ref 0–5)
WBC # BLD: 19.12 K/UL — HIGH (ref 4.8–10.8)
WBC # BLD: 19.89 K/UL — HIGH (ref 4.8–10.8)
WBC # BLD: 20.33 K/UL — HIGH (ref 4.8–10.8)
WBC # BLD: 22.19 K/UL — HIGH (ref 4.8–10.8)
WBC # BLD: 22.98 K/UL — HIGH (ref 4.8–10.8)
WBC # BLD: 23.47 K/UL — HIGH (ref 4.8–10.8)
WBC # BLD: 23.85 K/UL — HIGH (ref 4.8–10.8)
WBC # BLD: 24.53 K/UL — HIGH (ref 4.8–10.8)
WBC # BLD: 25.58 K/UL — HIGH (ref 4.8–10.8)
WBC # BLD: 25.63 K/UL — HIGH (ref 4.8–10.8)
WBC # BLD: 26.47 K/UL — HIGH (ref 4.8–10.8)
WBC # BLD: 29.64 K/UL — HIGH (ref 4.8–10.8)
WBC # BLD: 30.2 K/UL — HIGH (ref 4.8–10.8)
WBC # BLD: 30.25 K/UL — HIGH (ref 4.8–10.8)
WBC # BLD: 30.77 K/UL — HIGH (ref 4.8–10.8)
WBC # BLD: 31.16 K/UL — HIGH (ref 4.8–10.8)
WBC # BLD: 32.05 K/UL — HIGH (ref 4.8–10.8)
WBC # BLD: 34.06 K/UL — HIGH (ref 4.8–10.8)
WBC # BLD: 35.81 K/UL — HIGH (ref 4.8–10.8)
WBC # BLD: 41.02 K/UL — CRITICAL HIGH (ref 4.8–10.8)
WBC # BLD: 48.54 K/UL — CRITICAL HIGH (ref 4.8–10.8)
WBC # FLD AUTO: 19.12 K/UL — HIGH (ref 4.8–10.8)
WBC # FLD AUTO: 19.89 K/UL — HIGH (ref 4.8–10.8)
WBC # FLD AUTO: 20.33 K/UL — HIGH (ref 4.8–10.8)
WBC # FLD AUTO: 22.19 K/UL — HIGH (ref 4.8–10.8)
WBC # FLD AUTO: 22.98 K/UL — HIGH (ref 4.8–10.8)
WBC # FLD AUTO: 23.47 K/UL — HIGH (ref 4.8–10.8)
WBC # FLD AUTO: 23.85 K/UL — HIGH (ref 4.8–10.8)
WBC # FLD AUTO: 24.53 K/UL — HIGH (ref 4.8–10.8)
WBC # FLD AUTO: 25.58 K/UL — HIGH (ref 4.8–10.8)
WBC # FLD AUTO: 25.63 K/UL — HIGH (ref 4.8–10.8)
WBC # FLD AUTO: 26.47 K/UL — HIGH (ref 4.8–10.8)
WBC # FLD AUTO: 29.64 K/UL — HIGH (ref 4.8–10.8)
WBC # FLD AUTO: 30.2 K/UL — HIGH (ref 4.8–10.8)
WBC # FLD AUTO: 30.25 K/UL — HIGH (ref 4.8–10.8)
WBC # FLD AUTO: 30.77 K/UL — HIGH (ref 4.8–10.8)
WBC # FLD AUTO: 31.16 K/UL — HIGH (ref 4.8–10.8)
WBC # FLD AUTO: 32.05 K/UL — HIGH (ref 4.8–10.8)
WBC # FLD AUTO: 34.06 K/UL — HIGH (ref 4.8–10.8)
WBC # FLD AUTO: 35.81 K/UL — HIGH (ref 4.8–10.8)
WBC # FLD AUTO: 41.02 K/UL — CRITICAL HIGH (ref 4.8–10.8)
WBC # FLD AUTO: 48.54 K/UL — CRITICAL HIGH (ref 4.8–10.8)
WBC UR QL: 1 /HPF — SIGNIFICANT CHANGE UP (ref 0–5)

## 2022-01-01 PROCEDURE — 99442: CPT | Mod: 95

## 2022-01-01 PROCEDURE — 93000 ELECTROCARDIOGRAM COMPLETE: CPT

## 2022-01-01 PROCEDURE — 99291 CRITICAL CARE FIRST HOUR: CPT

## 2022-01-01 PROCEDURE — 71045 X-RAY EXAM CHEST 1 VIEW: CPT | Mod: 26

## 2022-01-01 PROCEDURE — 99221 1ST HOSP IP/OBS SF/LOW 40: CPT

## 2022-01-01 PROCEDURE — 99441: CPT | Mod: 95

## 2022-01-01 PROCEDURE — 93010 ELECTROCARDIOGRAM REPORT: CPT | Mod: 77

## 2022-01-01 PROCEDURE — 99214 OFFICE O/P EST MOD 30 MIN: CPT

## 2022-01-01 PROCEDURE — 99233 SBSQ HOSP IP/OBS HIGH 50: CPT

## 2022-01-01 PROCEDURE — 99239 HOSP IP/OBS DSCHRG MGMT >30: CPT

## 2022-01-01 PROCEDURE — 99231 SBSQ HOSP IP/OBS SF/LOW 25: CPT

## 2022-01-01 PROCEDURE — 71045 X-RAY EXAM CHEST 1 VIEW: CPT | Mod: 26,76

## 2022-01-01 PROCEDURE — 71045 X-RAY EXAM CHEST 1 VIEW: CPT | Mod: 26,77

## 2022-01-01 PROCEDURE — 93306 TTE W/DOPPLER COMPLETE: CPT

## 2022-01-01 PROCEDURE — 76770 US EXAM ABDO BACK WALL COMP: CPT | Mod: 26

## 2022-01-01 PROCEDURE — 32551 INSERTION OF CHEST TUBE: CPT

## 2022-01-01 PROCEDURE — 93010 ELECTROCARDIOGRAM REPORT: CPT

## 2022-01-01 PROCEDURE — 99213 OFFICE O/P EST LOW 20 MIN: CPT

## 2022-01-01 PROCEDURE — 93970 EXTREMITY STUDY: CPT | Mod: 26

## 2022-01-01 PROCEDURE — 71046 X-RAY EXAM CHEST 2 VIEWS: CPT

## 2022-01-01 PROCEDURE — 99232 SBSQ HOSP IP/OBS MODERATE 35: CPT

## 2022-01-01 PROCEDURE — 31500 INSERT EMERGENCY AIRWAY: CPT

## 2022-01-01 PROCEDURE — 99497 ADVNCD CARE PLAN 30 MIN: CPT

## 2022-01-01 PROCEDURE — 99291 CRITICAL CARE FIRST HOUR: CPT | Mod: FT,25

## 2022-01-01 PROCEDURE — 99223 1ST HOSP IP/OBS HIGH 75: CPT

## 2022-01-01 PROCEDURE — 93306 TTE W/DOPPLER COMPLETE: CPT | Mod: 26

## 2022-01-01 PROCEDURE — 99213 OFFICE O/P EST LOW 20 MIN: CPT | Mod: 25

## 2022-01-01 RX ORDER — MIDAZOLAM HYDROCHLORIDE 1 MG/ML
2 INJECTION, SOLUTION INTRAMUSCULAR; INTRAVENOUS ONCE
Refills: 0 | Status: DISCONTINUED | OUTPATIENT
Start: 2022-01-01 | End: 2022-01-01

## 2022-01-01 RX ORDER — ETOMIDATE 2 MG/ML
20 INJECTION INTRAVENOUS ONCE
Refills: 0 | Status: COMPLETED | OUTPATIENT
Start: 2022-01-01 | End: 2022-01-01

## 2022-01-01 RX ORDER — AMLODIPINE BESYLATE 2.5 MG/1
2.5 TABLET ORAL DAILY
Qty: 90 | Refills: 3 | Status: ACTIVE | COMMUNITY
Start: 1900-01-01 | End: 1900-01-01

## 2022-01-01 RX ORDER — PANTOPRAZOLE SODIUM 20 MG/1
40 TABLET, DELAYED RELEASE ORAL
Refills: 0 | Status: DISCONTINUED | OUTPATIENT
Start: 2022-01-01 | End: 2022-01-01

## 2022-01-01 RX ORDER — INSULIN HUMAN 100 [IU]/ML
10 INJECTION, SOLUTION SUBCUTANEOUS ONCE
Refills: 0 | Status: COMPLETED | OUTPATIENT
Start: 2022-01-01 | End: 2022-01-01

## 2022-01-01 RX ORDER — CEFEPIME 1 G/1
1000 INJECTION, POWDER, FOR SOLUTION INTRAMUSCULAR; INTRAVENOUS EVERY 12 HOURS
Refills: 0 | Status: DISCONTINUED | OUTPATIENT
Start: 2022-01-01 | End: 2022-01-01

## 2022-01-01 RX ORDER — MAGNESIUM SULFATE 500 MG/ML
2 VIAL (ML) INJECTION ONCE
Refills: 0 | Status: COMPLETED | OUTPATIENT
Start: 2022-01-01 | End: 2022-01-01

## 2022-01-01 RX ORDER — CLONAZEPAM 1 MG
1 TABLET ORAL
Refills: 0 | Status: DISCONTINUED | OUTPATIENT
Start: 2022-01-01 | End: 2022-01-01

## 2022-01-01 RX ORDER — QUETIAPINE FUMARATE 200 MG/1
50 TABLET, FILM COATED ORAL
Refills: 0 | Status: DISCONTINUED | OUTPATIENT
Start: 2022-01-01 | End: 2022-01-01

## 2022-01-01 RX ORDER — NOREPINEPHRINE BITARTRATE/D5W 8 MG/250ML
0.05 PLASTIC BAG, INJECTION (ML) INTRAVENOUS
Qty: 8 | Refills: 0 | Status: DISCONTINUED | OUTPATIENT
Start: 2022-01-01 | End: 2022-01-01

## 2022-01-01 RX ORDER — SODIUM CHLORIDE 9 MG/ML
500 INJECTION, SOLUTION INTRAVENOUS ONCE
Refills: 0 | Status: COMPLETED | OUTPATIENT
Start: 2022-01-01 | End: 2022-01-01

## 2022-01-01 RX ORDER — ROCURONIUM BROMIDE 10 MG/ML
100 VIAL (ML) INTRAVENOUS ONCE
Refills: 0 | Status: DISCONTINUED | OUTPATIENT
Start: 2022-01-01 | End: 2022-01-01

## 2022-01-01 RX ORDER — SODIUM CHLORIDE 9 MG/ML
1000 INJECTION, SOLUTION INTRAVENOUS
Refills: 0 | Status: DISCONTINUED | OUTPATIENT
Start: 2022-01-01 | End: 2022-01-01

## 2022-01-01 RX ORDER — MIDAZOLAM HYDROCHLORIDE 1 MG/ML
0.02 INJECTION, SOLUTION INTRAMUSCULAR; INTRAVENOUS
Qty: 100 | Refills: 0 | Status: DISCONTINUED | OUTPATIENT
Start: 2022-01-01 | End: 2022-01-01

## 2022-01-01 RX ORDER — SODIUM ZIRCONIUM CYCLOSILICATE 10 G/10G
10 POWDER, FOR SUSPENSION ORAL EVERY 8 HOURS
Refills: 0 | Status: COMPLETED | OUTPATIENT
Start: 2022-01-01 | End: 2022-01-01

## 2022-01-01 RX ORDER — MORPHINE SULFATE 50 MG/1
2 CAPSULE, EXTENDED RELEASE ORAL EVERY 6 HOURS
Refills: 0 | Status: DISCONTINUED | OUTPATIENT
Start: 2022-01-01 | End: 2022-01-01

## 2022-01-01 RX ORDER — CHLORHEXIDINE GLUCONATE 213 G/1000ML
15 SOLUTION TOPICAL
Refills: 0 | Status: DISCONTINUED | OUTPATIENT
Start: 2022-01-01 | End: 2022-01-01

## 2022-01-01 RX ORDER — DEXMEDETOMIDINE HYDROCHLORIDE IN 0.9% SODIUM CHLORIDE 4 UG/ML
0.05 INJECTION INTRAVENOUS
Qty: 400 | Refills: 0 | Status: DISCONTINUED | OUTPATIENT
Start: 2022-01-01 | End: 2022-01-01

## 2022-01-01 RX ORDER — FENTANYL CITRATE 50 UG/ML
50 INJECTION INTRAVENOUS ONCE
Refills: 0 | Status: DISCONTINUED | OUTPATIENT
Start: 2022-01-01 | End: 2022-01-01

## 2022-01-01 RX ORDER — CHLORHEXIDINE GLUCONATE 213 G/1000ML
1 SOLUTION TOPICAL
Refills: 0 | Status: DISCONTINUED | OUTPATIENT
Start: 2022-01-01 | End: 2022-01-01

## 2022-01-01 RX ORDER — CLONAZEPAM 1 MG
1 TABLET ORAL DAILY
Refills: 0 | Status: DISCONTINUED | OUTPATIENT
Start: 2022-01-01 | End: 2022-01-01

## 2022-01-01 RX ORDER — PROPOFOL 10 MG/ML
5 INJECTION, EMULSION INTRAVENOUS
Qty: 1000 | Refills: 0 | Status: DISCONTINUED | OUTPATIENT
Start: 2022-01-01 | End: 2022-01-01

## 2022-01-01 RX ORDER — ROBINUL 0.2 MG/ML
0.2 INJECTION INTRAMUSCULAR; INTRAVENOUS EVERY 6 HOURS
Refills: 0 | Status: DISCONTINUED | OUTPATIENT
Start: 2022-01-01 | End: 2022-01-01

## 2022-01-01 RX ORDER — FENTANYL CITRATE 50 UG/ML
0.5 INJECTION INTRAVENOUS
Qty: 5000 | Refills: 0 | Status: DISCONTINUED | OUTPATIENT
Start: 2022-01-01 | End: 2022-01-01

## 2022-01-01 RX ORDER — MIDAZOLAM HYDROCHLORIDE 1 MG/ML
2 INJECTION, SOLUTION INTRAMUSCULAR; INTRAVENOUS DAILY
Refills: 0 | Status: DISCONTINUED | OUTPATIENT
Start: 2022-01-01 | End: 2022-01-01

## 2022-01-01 RX ORDER — INSULIN LISPRO 100/ML
VIAL (ML) SUBCUTANEOUS EVERY 6 HOURS
Refills: 0 | Status: DISCONTINUED | OUTPATIENT
Start: 2022-01-01 | End: 2022-01-01

## 2022-01-01 RX ORDER — LEVALBUTEROL TARTRATE 45 UG/1
45 AEROSOL, METERED ORAL
Qty: 3 | Refills: 3 | Status: ACTIVE | COMMUNITY
Start: 2017-07-28 | End: 1900-01-01

## 2022-01-01 RX ORDER — IPRATROPIUM BROMIDE 0.2 MG/ML
8 SOLUTION, NON-ORAL INHALATION EVERY 6 HOURS
Refills: 0 | Status: DISCONTINUED | OUTPATIENT
Start: 2022-01-01 | End: 2022-01-01

## 2022-01-01 RX ORDER — ZOLPIDEM TARTRATE 10 MG/1
10 TABLET ORAL
Refills: 0 | Status: ACTIVE | COMMUNITY

## 2022-01-01 RX ORDER — MIDODRINE HYDROCHLORIDE 2.5 MG/1
10 TABLET ORAL EVERY 8 HOURS
Refills: 0 | Status: DISCONTINUED | OUTPATIENT
Start: 2022-01-01 | End: 2022-01-01

## 2022-01-01 RX ORDER — MIDAZOLAM HYDROCHLORIDE 1 MG/ML
2 INJECTION, SOLUTION INTRAMUSCULAR; INTRAVENOUS
Refills: 0 | Status: DISCONTINUED | OUTPATIENT
Start: 2022-01-01 | End: 2022-01-01

## 2022-01-01 RX ORDER — SODIUM ZIRCONIUM CYCLOSILICATE 10 G/10G
10 POWDER, FOR SUSPENSION ORAL ONCE
Refills: 0 | Status: COMPLETED | OUTPATIENT
Start: 2022-01-01 | End: 2022-01-01

## 2022-01-01 RX ORDER — QUETIAPINE FUMARATE 200 MG/1
25 TABLET, FILM COATED ORAL ONCE
Refills: 0 | Status: COMPLETED | OUTPATIENT
Start: 2022-01-01 | End: 2022-01-01

## 2022-01-01 RX ORDER — DEXTROSE 50 % IN WATER 50 %
25 SYRINGE (ML) INTRAVENOUS ONCE
Refills: 0 | Status: DISCONTINUED | OUTPATIENT
Start: 2022-01-01 | End: 2022-01-01

## 2022-01-01 RX ORDER — ACETAMINOPHEN 500 MG
650 TABLET ORAL EVERY 6 HOURS
Refills: 0 | Status: DISCONTINUED | OUTPATIENT
Start: 2022-01-01 | End: 2022-01-01

## 2022-01-01 RX ORDER — ETOMIDATE 2 MG/ML
20 INJECTION INTRAVENOUS ONCE
Refills: 0 | Status: DISCONTINUED | OUTPATIENT
Start: 2022-01-01 | End: 2022-01-01

## 2022-01-01 RX ORDER — FENTANYL CITRATE 50 UG/ML
0.5 INJECTION INTRAVENOUS
Qty: 2500 | Refills: 0 | Status: DISCONTINUED | OUTPATIENT
Start: 2022-01-01 | End: 2022-01-01

## 2022-01-01 RX ORDER — IPRATROPIUM/ALBUTEROL SULFATE 18-103MCG
3 AEROSOL WITH ADAPTER (GRAM) INHALATION
Refills: 0 | Status: COMPLETED | OUTPATIENT
Start: 2022-01-01 | End: 2022-01-01

## 2022-01-01 RX ORDER — MIDODRINE HYDROCHLORIDE 2.5 MG/1
5 TABLET ORAL EVERY 8 HOURS
Refills: 0 | Status: DISCONTINUED | OUTPATIENT
Start: 2022-01-01 | End: 2022-01-01

## 2022-01-01 RX ORDER — SENNA PLUS 8.6 MG/1
2 TABLET ORAL AT BEDTIME
Refills: 0 | Status: DISCONTINUED | OUTPATIENT
Start: 2022-01-01 | End: 2022-01-01

## 2022-01-01 RX ORDER — DEXTROSE 50 % IN WATER 50 %
12.5 SYRINGE (ML) INTRAVENOUS ONCE
Refills: 0 | Status: DISCONTINUED | OUTPATIENT
Start: 2022-01-01 | End: 2022-01-01

## 2022-01-01 RX ORDER — CASPOFUNGIN ACETATE 7 MG/ML
70 INJECTION, POWDER, LYOPHILIZED, FOR SOLUTION INTRAVENOUS ONCE
Refills: 0 | Status: COMPLETED | OUTPATIENT
Start: 2022-01-01 | End: 2022-01-01

## 2022-01-01 RX ORDER — QUETIAPINE FUMARATE 200 MG/1
100 TABLET, FILM COATED ORAL EVERY 12 HOURS
Refills: 0 | Status: DISCONTINUED | OUTPATIENT
Start: 2022-01-01 | End: 2022-01-01

## 2022-01-01 RX ORDER — POLYETHYLENE GLYCOL 3350 17 G/17G
17 POWDER, FOR SOLUTION ORAL DAILY
Refills: 0 | Status: DISCONTINUED | OUTPATIENT
Start: 2022-01-01 | End: 2022-01-01

## 2022-01-01 RX ORDER — ESCITALOPRAM OXALATE 10 MG/1
10 TABLET, FILM COATED ORAL DAILY
Refills: 0 | Status: DISCONTINUED | OUTPATIENT
Start: 2022-01-01 | End: 2022-01-01

## 2022-01-01 RX ORDER — SODIUM CHLORIDE 9 MG/ML
1000 INJECTION, SOLUTION INTRAVENOUS ONCE
Refills: 0 | Status: COMPLETED | OUTPATIENT
Start: 2022-01-01 | End: 2022-01-01

## 2022-01-01 RX ORDER — PREDNISONE 10 MG/1
10 TABLET ORAL
Qty: 90 | Refills: 1 | Status: ACTIVE | COMMUNITY
Start: 2022-01-01 | End: 1900-01-01

## 2022-01-01 RX ORDER — INSULIN GLARGINE 100 [IU]/ML
8 INJECTION, SOLUTION SUBCUTANEOUS AT BEDTIME
Refills: 0 | Status: DISCONTINUED | OUTPATIENT
Start: 2022-01-01 | End: 2022-01-01

## 2022-01-01 RX ORDER — ENOXAPARIN SODIUM 100 MG/ML
40 INJECTION SUBCUTANEOUS EVERY 24 HOURS
Refills: 0 | Status: DISCONTINUED | OUTPATIENT
Start: 2022-01-01 | End: 2022-01-01

## 2022-01-01 RX ORDER — BUDESONIDE AND FORMOTEROL FUMARATE DIHYDRATE 160; 4.5 UG/1; UG/1
160-4.5 AEROSOL RESPIRATORY (INHALATION) TWICE DAILY
Qty: 1 | Refills: 3 | Status: ACTIVE | COMMUNITY
Start: 2022-01-01 | End: 1900-01-01

## 2022-01-01 RX ORDER — DEXTROSE 50 % IN WATER 50 %
50 SYRINGE (ML) INTRAVENOUS ONCE
Refills: 0 | Status: COMPLETED | OUTPATIENT
Start: 2022-01-01 | End: 2022-01-01

## 2022-01-01 RX ORDER — FLUTICASONE PROPIONATE 50 UG/1
50 SPRAY, METERED NASAL
Qty: 16 | Refills: 3 | Status: ACTIVE | COMMUNITY
Start: 2021-04-06 | End: 1900-01-01

## 2022-01-01 RX ORDER — ALBUTEROL 90 UG/1
8 AEROSOL, METERED ORAL EVERY 6 HOURS
Refills: 0 | Status: DISCONTINUED | OUTPATIENT
Start: 2022-01-01 | End: 2022-01-01

## 2022-01-01 RX ORDER — QUETIAPINE FUMARATE 200 MG/1
75 TABLET, FILM COATED ORAL EVERY 12 HOURS
Refills: 0 | Status: DISCONTINUED | OUTPATIENT
Start: 2022-01-01 | End: 2022-01-01

## 2022-01-01 RX ORDER — ROCURONIUM BROMIDE 10 MG/ML
70 VIAL (ML) INTRAVENOUS ONCE
Refills: 0 | Status: COMPLETED | OUTPATIENT
Start: 2022-01-01 | End: 2022-01-01

## 2022-01-01 RX ORDER — INSULIN GLARGINE 100 [IU]/ML
6 INJECTION, SOLUTION SUBCUTANEOUS AT BEDTIME
Refills: 0 | Status: DISCONTINUED | OUTPATIENT
Start: 2022-01-01 | End: 2022-01-01

## 2022-01-01 RX ORDER — MORPHINE SULFATE 50 MG/1
4 CAPSULE, EXTENDED RELEASE ORAL
Refills: 0 | Status: DISCONTINUED | OUTPATIENT
Start: 2022-01-01 | End: 2022-01-01

## 2022-01-01 RX ORDER — SALICYLIC ACID 0.5 %
1 CLEANSER (GRAM) TOPICAL DAILY
Refills: 0 | Status: DISCONTINUED | OUTPATIENT
Start: 2022-01-01 | End: 2022-01-01

## 2022-01-01 RX ORDER — CASPOFUNGIN ACETATE 7 MG/ML
INJECTION, POWDER, LYOPHILIZED, FOR SOLUTION INTRAVENOUS
Refills: 0 | Status: DISCONTINUED | OUTPATIENT
Start: 2022-01-01 | End: 2022-01-01

## 2022-01-01 RX ORDER — FENTANYL CITRATE 50 UG/ML
0.87 INJECTION INTRAVENOUS
Qty: 2500 | Refills: 0 | Status: DISCONTINUED | OUTPATIENT
Start: 2022-01-01 | End: 2022-01-01

## 2022-01-01 RX ORDER — ALBUTEROL SULFATE 2.5 MG/3ML
(2.5 MG/3ML) SOLUTION RESPIRATORY (INHALATION)
Qty: 3 | Refills: 3 | Status: ACTIVE | COMMUNITY
Start: 2017-12-26 | End: 1900-01-01

## 2022-01-01 RX ORDER — CLONAZEPAM 1 MG
0.5 TABLET ORAL EVERY 12 HOURS
Refills: 0 | Status: DISCONTINUED | OUTPATIENT
Start: 2022-01-01 | End: 2022-01-01

## 2022-01-01 RX ORDER — MIDODRINE HYDROCHLORIDE 2.5 MG/1
5 TABLET ORAL ONCE
Refills: 0 | Status: COMPLETED | OUTPATIENT
Start: 2022-01-01 | End: 2022-01-01

## 2022-01-01 RX ORDER — GLUCAGON INJECTION, SOLUTION 0.5 MG/.1ML
1 INJECTION, SOLUTION SUBCUTANEOUS ONCE
Refills: 0 | Status: DISCONTINUED | OUTPATIENT
Start: 2022-01-01 | End: 2022-01-01

## 2022-01-01 RX ORDER — AZITHROMYCIN 500 MG/1
500 TABLET, FILM COATED ORAL EVERY 24 HOURS
Refills: 0 | Status: COMPLETED | OUTPATIENT
Start: 2022-01-01 | End: 2022-01-01

## 2022-01-01 RX ORDER — MIDAZOLAM HYDROCHLORIDE 1 MG/ML
2 INJECTION, SOLUTION INTRAMUSCULAR; INTRAVENOUS EVERY 4 HOURS
Refills: 0 | Status: DISCONTINUED | OUTPATIENT
Start: 2022-01-01 | End: 2022-01-01

## 2022-01-01 RX ORDER — CASPOFUNGIN ACETATE 7 MG/ML
50 INJECTION, POWDER, LYOPHILIZED, FOR SOLUTION INTRAVENOUS EVERY 24 HOURS
Refills: 0 | Status: DISCONTINUED | OUTPATIENT
Start: 2022-01-01 | End: 2022-01-01

## 2022-01-01 RX ORDER — DEXTROSE 50 % IN WATER 50 %
15 SYRINGE (ML) INTRAVENOUS ONCE
Refills: 0 | Status: DISCONTINUED | OUTPATIENT
Start: 2022-01-01 | End: 2022-01-01

## 2022-01-01 RX ORDER — IPRATROPIUM/ALBUTEROL SULFATE 18-103MCG
3 AEROSOL WITH ADAPTER (GRAM) INHALATION EVERY 6 HOURS
Refills: 0 | Status: DISCONTINUED | OUTPATIENT
Start: 2022-01-01 | End: 2022-01-01

## 2022-01-01 RX ORDER — MIDAZOLAM HYDROCHLORIDE 1 MG/ML
4 INJECTION, SOLUTION INTRAMUSCULAR; INTRAVENOUS ONCE
Refills: 0 | Status: DISCONTINUED | OUTPATIENT
Start: 2022-01-01 | End: 2022-01-01

## 2022-01-01 RX ORDER — PREDNISONE 20 MG/1
20 TABLET ORAL
Qty: 15 | Refills: 0 | Status: ACTIVE | COMMUNITY
Start: 2022-01-01 | End: 1900-01-01

## 2022-01-01 RX ORDER — QUETIAPINE FUMARATE 200 MG/1
25 TABLET, FILM COATED ORAL
Refills: 0 | Status: DISCONTINUED | OUTPATIENT
Start: 2022-01-01 | End: 2022-01-01

## 2022-01-01 RX ORDER — MIDODRINE HYDROCHLORIDE 2.5 MG/1
10 TABLET ORAL THREE TIMES A DAY
Refills: 0 | Status: DISCONTINUED | OUTPATIENT
Start: 2022-01-01 | End: 2022-01-01

## 2022-01-01 RX ORDER — MORPHINE SULFATE 50 MG/1
2 CAPSULE, EXTENDED RELEASE ORAL ONCE
Refills: 0 | Status: DISCONTINUED | OUTPATIENT
Start: 2022-01-01 | End: 2022-01-01

## 2022-01-01 RX ADMIN — MIDAZOLAM HYDROCHLORIDE 2 MILLIGRAM(S): 1 INJECTION, SOLUTION INTRAMUSCULAR; INTRAVENOUS at 15:01

## 2022-01-01 RX ADMIN — Medication 650 MILLIGRAM(S): at 01:21

## 2022-01-01 RX ADMIN — QUETIAPINE FUMARATE 75 MILLIGRAM(S): 200 TABLET, FILM COATED ORAL at 05:35

## 2022-01-01 RX ADMIN — Medication 4: at 23:36

## 2022-01-01 RX ADMIN — CHLORHEXIDINE GLUCONATE 15 MILLILITER(S): 213 SOLUTION TOPICAL at 17:50

## 2022-01-01 RX ADMIN — MORPHINE SULFATE 4 MILLIGRAM(S): 50 CAPSULE, EXTENDED RELEASE ORAL at 11:18

## 2022-01-01 RX ADMIN — CEFEPIME 100 MILLIGRAM(S): 1 INJECTION, POWDER, FOR SOLUTION INTRAMUSCULAR; INTRAVENOUS at 18:40

## 2022-01-01 RX ADMIN — MIDODRINE HYDROCHLORIDE 5 MILLIGRAM(S): 2.5 TABLET ORAL at 21:33

## 2022-01-01 RX ADMIN — MIDAZOLAM HYDROCHLORIDE 2 MILLIGRAM(S): 1 INJECTION, SOLUTION INTRAMUSCULAR; INTRAVENOUS at 22:34

## 2022-01-01 RX ADMIN — CEFEPIME 100 MILLIGRAM(S): 1 INJECTION, POWDER, FOR SOLUTION INTRAMUSCULAR; INTRAVENOUS at 05:24

## 2022-01-01 RX ADMIN — POLYETHYLENE GLYCOL 3350 17 GRAM(S): 17 POWDER, FOR SOLUTION ORAL at 11:46

## 2022-01-01 RX ADMIN — ALBUTEROL 8 PUFF(S): 90 AEROSOL, METERED ORAL at 14:56

## 2022-01-01 RX ADMIN — QUETIAPINE FUMARATE 75 MILLIGRAM(S): 200 TABLET, FILM COATED ORAL at 05:05

## 2022-01-01 RX ADMIN — Medication 2: at 17:37

## 2022-01-01 RX ADMIN — ESCITALOPRAM OXALATE 10 MILLIGRAM(S): 10 TABLET, FILM COATED ORAL at 12:40

## 2022-01-01 RX ADMIN — PANTOPRAZOLE SODIUM 40 MILLIGRAM(S): 20 TABLET, DELAYED RELEASE ORAL at 07:30

## 2022-01-01 RX ADMIN — CASPOFUNGIN ACETATE 260 MILLIGRAM(S): 7 INJECTION, POWDER, LYOPHILIZED, FOR SOLUTION INTRAVENOUS at 09:40

## 2022-01-01 RX ADMIN — MIDAZOLAM HYDROCHLORIDE 2 MILLIGRAM(S): 1 INJECTION, SOLUTION INTRAMUSCULAR; INTRAVENOUS at 21:12

## 2022-01-01 RX ADMIN — Medication 2: at 05:27

## 2022-01-01 RX ADMIN — CHLORHEXIDINE GLUCONATE 1 APPLICATION(S): 213 SOLUTION TOPICAL at 05:20

## 2022-01-01 RX ADMIN — DEXMEDETOMIDINE HYDROCHLORIDE IN 0.9% SODIUM CHLORIDE 0.63 MICROGRAM(S)/KG/HR: 4 INJECTION INTRAVENOUS at 00:05

## 2022-01-01 RX ADMIN — Medication 1 MILLIGRAM(S): at 18:01

## 2022-01-01 RX ADMIN — INSULIN HUMAN 10 UNIT(S): 100 INJECTION, SOLUTION SUBCUTANEOUS at 14:12

## 2022-01-01 RX ADMIN — CEFEPIME 100 MILLIGRAM(S): 1 INJECTION, POWDER, FOR SOLUTION INTRAMUSCULAR; INTRAVENOUS at 17:19

## 2022-01-01 RX ADMIN — CHLORHEXIDINE GLUCONATE 15 MILLILITER(S): 213 SOLUTION TOPICAL at 05:15

## 2022-01-01 RX ADMIN — INSULIN GLARGINE 6 UNIT(S): 100 INJECTION, SOLUTION SUBCUTANEOUS at 21:34

## 2022-01-01 RX ADMIN — CHLORHEXIDINE GLUCONATE 15 MILLILITER(S): 213 SOLUTION TOPICAL at 05:24

## 2022-01-01 RX ADMIN — Medication 60 MILLIGRAM(S): at 05:59

## 2022-01-01 RX ADMIN — CHLORHEXIDINE GLUCONATE 1 APPLICATION(S): 213 SOLUTION TOPICAL at 06:35

## 2022-01-01 RX ADMIN — MIDAZOLAM HYDROCHLORIDE 2 MILLIGRAM(S): 1 INJECTION, SOLUTION INTRAMUSCULAR; INTRAVENOUS at 10:56

## 2022-01-01 RX ADMIN — ENOXAPARIN SODIUM 40 MILLIGRAM(S): 100 INJECTION SUBCUTANEOUS at 10:35

## 2022-01-01 RX ADMIN — MIDODRINE HYDROCHLORIDE 5 MILLIGRAM(S): 2.5 TABLET ORAL at 06:56

## 2022-01-01 RX ADMIN — MIDAZOLAM HYDROCHLORIDE 2 MILLIGRAM(S): 1 INJECTION, SOLUTION INTRAMUSCULAR; INTRAVENOUS at 03:39

## 2022-01-01 RX ADMIN — SENNA PLUS 2 TABLET(S): 8.6 TABLET ORAL at 21:34

## 2022-01-01 RX ADMIN — ENOXAPARIN SODIUM 40 MILLIGRAM(S): 100 INJECTION SUBCUTANEOUS at 10:02

## 2022-01-01 RX ADMIN — CHLORHEXIDINE GLUCONATE 15 MILLILITER(S): 213 SOLUTION TOPICAL at 17:59

## 2022-01-01 RX ADMIN — CHLORHEXIDINE GLUCONATE 15 MILLILITER(S): 213 SOLUTION TOPICAL at 17:35

## 2022-01-01 RX ADMIN — ENOXAPARIN SODIUM 40 MILLIGRAM(S): 100 INJECTION SUBCUTANEOUS at 10:07

## 2022-01-01 RX ADMIN — Medication 4: at 17:12

## 2022-01-01 RX ADMIN — SODIUM ZIRCONIUM CYCLOSILICATE 10 GRAM(S): 10 POWDER, FOR SUSPENSION ORAL at 14:12

## 2022-01-01 RX ADMIN — CHLORHEXIDINE GLUCONATE 1 APPLICATION(S): 213 SOLUTION TOPICAL at 05:58

## 2022-01-01 RX ADMIN — POLYETHYLENE GLYCOL 3350 17 GRAM(S): 17 POWDER, FOR SOLUTION ORAL at 12:58

## 2022-01-01 RX ADMIN — MIDAZOLAM HYDROCHLORIDE 2 MILLIGRAM(S): 1 INJECTION, SOLUTION INTRAMUSCULAR; INTRAVENOUS at 07:25

## 2022-01-01 RX ADMIN — SENNA PLUS 2 TABLET(S): 8.6 TABLET ORAL at 21:35

## 2022-01-01 RX ADMIN — CASPOFUNGIN ACETATE 260 MILLIGRAM(S): 7 INJECTION, POWDER, LYOPHILIZED, FOR SOLUTION INTRAVENOUS at 07:57

## 2022-01-01 RX ADMIN — AZITHROMYCIN 255 MILLIGRAM(S): 500 TABLET, FILM COATED ORAL at 12:57

## 2022-01-01 RX ADMIN — QUETIAPINE FUMARATE 25 MILLIGRAM(S): 200 TABLET, FILM COATED ORAL at 05:25

## 2022-01-01 RX ADMIN — SODIUM ZIRCONIUM CYCLOSILICATE 10 GRAM(S): 10 POWDER, FOR SUSPENSION ORAL at 21:31

## 2022-01-01 RX ADMIN — ENOXAPARIN SODIUM 40 MILLIGRAM(S): 100 INJECTION SUBCUTANEOUS at 10:34

## 2022-01-01 RX ADMIN — CEFEPIME 100 MILLIGRAM(S): 1 INJECTION, POWDER, FOR SOLUTION INTRAMUSCULAR; INTRAVENOUS at 05:31

## 2022-01-01 RX ADMIN — PROPOFOL 1.21 MICROGRAM(S)/KG/MIN: 10 INJECTION, EMULSION INTRAVENOUS at 21:34

## 2022-01-01 RX ADMIN — Medication 4: at 06:30

## 2022-01-01 RX ADMIN — ENOXAPARIN SODIUM 40 MILLIGRAM(S): 100 INJECTION SUBCUTANEOUS at 12:54

## 2022-01-01 RX ADMIN — PANTOPRAZOLE SODIUM 40 MILLIGRAM(S): 20 TABLET, DELAYED RELEASE ORAL at 05:07

## 2022-01-01 RX ADMIN — CHLORHEXIDINE GLUCONATE 15 MILLILITER(S): 213 SOLUTION TOPICAL at 17:30

## 2022-01-01 RX ADMIN — DEXMEDETOMIDINE HYDROCHLORIDE IN 0.9% SODIUM CHLORIDE 0.63 MICROGRAM(S)/KG/HR: 4 INJECTION INTRAVENOUS at 20:53

## 2022-01-01 RX ADMIN — POLYETHYLENE GLYCOL 3350 17 GRAM(S): 17 POWDER, FOR SOLUTION ORAL at 12:16

## 2022-01-01 RX ADMIN — INSULIN GLARGINE 6 UNIT(S): 100 INJECTION, SOLUTION SUBCUTANEOUS at 21:38

## 2022-01-01 RX ADMIN — Medication 4.69 MICROGRAM(S)/KG/MIN: at 05:15

## 2022-01-01 RX ADMIN — ESCITALOPRAM OXALATE 10 MILLIGRAM(S): 10 TABLET, FILM COATED ORAL at 13:47

## 2022-01-01 RX ADMIN — POLYETHYLENE GLYCOL 3350 17 GRAM(S): 17 POWDER, FOR SOLUTION ORAL at 12:50

## 2022-01-01 RX ADMIN — ALBUTEROL 8 PUFF(S): 90 AEROSOL, METERED ORAL at 14:14

## 2022-01-01 RX ADMIN — Medication 2: at 13:02

## 2022-01-01 RX ADMIN — INSULIN GLARGINE 8 UNIT(S): 100 INJECTION, SOLUTION SUBCUTANEOUS at 23:04

## 2022-01-01 RX ADMIN — CHLORHEXIDINE GLUCONATE 1 APPLICATION(S): 213 SOLUTION TOPICAL at 05:32

## 2022-01-01 RX ADMIN — CEFEPIME 100 MILLIGRAM(S): 1 INJECTION, POWDER, FOR SOLUTION INTRAMUSCULAR; INTRAVENOUS at 17:59

## 2022-01-01 RX ADMIN — Medication 8 PUFF(S): at 20:48

## 2022-01-01 RX ADMIN — Medication 4: at 17:45

## 2022-01-01 RX ADMIN — QUETIAPINE FUMARATE 50 MILLIGRAM(S): 200 TABLET, FILM COATED ORAL at 06:03

## 2022-01-01 RX ADMIN — Medication 4.69 MICROGRAM(S)/KG/MIN: at 13:11

## 2022-01-01 RX ADMIN — CHLORHEXIDINE GLUCONATE 1 APPLICATION(S): 213 SOLUTION TOPICAL at 05:29

## 2022-01-01 RX ADMIN — CHLORHEXIDINE GLUCONATE 1 APPLICATION(S): 213 SOLUTION TOPICAL at 05:24

## 2022-01-01 RX ADMIN — Medication 60 MILLIGRAM(S): at 17:09

## 2022-01-01 RX ADMIN — CHLORHEXIDINE GLUCONATE 15 MILLILITER(S): 213 SOLUTION TOPICAL at 17:06

## 2022-01-01 RX ADMIN — SODIUM ZIRCONIUM CYCLOSILICATE 10 GRAM(S): 10 POWDER, FOR SUSPENSION ORAL at 08:38

## 2022-01-01 RX ADMIN — CEFEPIME 100 MILLIGRAM(S): 1 INJECTION, POWDER, FOR SOLUTION INTRAMUSCULAR; INTRAVENOUS at 17:17

## 2022-01-01 RX ADMIN — Medication 20 MILLIGRAM(S): at 05:05

## 2022-01-01 RX ADMIN — SENNA PLUS 2 TABLET(S): 8.6 TABLET ORAL at 21:33

## 2022-01-01 RX ADMIN — Medication 8 PUFF(S): at 13:33

## 2022-01-01 RX ADMIN — Medication 0.5 MILLIGRAM(S): at 05:31

## 2022-01-01 RX ADMIN — Medication 8 PUFF(S): at 15:34

## 2022-01-01 RX ADMIN — Medication 4: at 00:15

## 2022-01-01 RX ADMIN — CASPOFUNGIN ACETATE 260 MILLIGRAM(S): 7 INJECTION, POWDER, LYOPHILIZED, FOR SOLUTION INTRAVENOUS at 08:08

## 2022-01-01 RX ADMIN — Medication 20 MILLIGRAM(S): at 05:30

## 2022-01-01 RX ADMIN — Medication 1 MILLIGRAM(S): at 06:05

## 2022-01-01 RX ADMIN — SODIUM CHLORIDE 1000 MILLILITER(S): 9 INJECTION, SOLUTION INTRAVENOUS at 09:44

## 2022-01-01 RX ADMIN — CEFEPIME 100 MILLIGRAM(S): 1 INJECTION, POWDER, FOR SOLUTION INTRAMUSCULAR; INTRAVENOUS at 06:02

## 2022-01-01 RX ADMIN — CHLORHEXIDINE GLUCONATE 15 MILLILITER(S): 213 SOLUTION TOPICAL at 05:58

## 2022-01-01 RX ADMIN — ALBUTEROL 8 PUFF(S): 90 AEROSOL, METERED ORAL at 21:35

## 2022-01-01 RX ADMIN — INSULIN GLARGINE 8 UNIT(S): 100 INJECTION, SOLUTION SUBCUTANEOUS at 22:39

## 2022-01-01 RX ADMIN — Medication 8 PUFF(S): at 07:51

## 2022-01-01 RX ADMIN — FENTANYL CITRATE 3.5 MICROGRAM(S)/KG/HR: 50 INJECTION INTRAVENOUS at 13:11

## 2022-01-01 RX ADMIN — ALBUTEROL 8 PUFF(S): 90 AEROSOL, METERED ORAL at 20:40

## 2022-01-01 RX ADMIN — Medication 6: at 11:50

## 2022-01-01 RX ADMIN — Medication 8 PUFF(S): at 20:06

## 2022-01-01 RX ADMIN — POLYETHYLENE GLYCOL 3350 17 GRAM(S): 17 POWDER, FOR SOLUTION ORAL at 12:18

## 2022-01-01 RX ADMIN — POLYETHYLENE GLYCOL 3350 17 GRAM(S): 17 POWDER, FOR SOLUTION ORAL at 12:05

## 2022-01-01 RX ADMIN — QUETIAPINE FUMARATE 50 MILLIGRAM(S): 200 TABLET, FILM COATED ORAL at 17:19

## 2022-01-01 RX ADMIN — MIDAZOLAM HYDROCHLORIDE 0.81 MG/KG/HR: 1 INJECTION, SOLUTION INTRAMUSCULAR; INTRAVENOUS at 09:39

## 2022-01-01 RX ADMIN — PANTOPRAZOLE SODIUM 40 MILLIGRAM(S): 20 TABLET, DELAYED RELEASE ORAL at 05:55

## 2022-01-01 RX ADMIN — AZITHROMYCIN 255 MILLIGRAM(S): 500 TABLET, FILM COATED ORAL at 09:00

## 2022-01-01 RX ADMIN — Medication 4: at 22:50

## 2022-01-01 RX ADMIN — Medication 60 MILLIGRAM(S): at 17:42

## 2022-01-01 RX ADMIN — Medication 1 MILLIGRAM(S): at 17:16

## 2022-01-01 RX ADMIN — CHLORHEXIDINE GLUCONATE 15 MILLILITER(S): 213 SOLUTION TOPICAL at 17:19

## 2022-01-01 RX ADMIN — CHLORHEXIDINE GLUCONATE 1 APPLICATION(S): 213 SOLUTION TOPICAL at 05:09

## 2022-01-01 RX ADMIN — ALBUTEROL 8 PUFF(S): 90 AEROSOL, METERED ORAL at 07:43

## 2022-01-01 RX ADMIN — ALBUTEROL 8 PUFF(S): 90 AEROSOL, METERED ORAL at 13:13

## 2022-01-01 RX ADMIN — Medication 0.5 MILLIGRAM(S): at 17:10

## 2022-01-01 RX ADMIN — QUETIAPINE FUMARATE 25 MILLIGRAM(S): 200 TABLET, FILM COATED ORAL at 02:13

## 2022-01-01 RX ADMIN — Medication 4: at 11:00

## 2022-01-01 RX ADMIN — FENTANYL CITRATE 2.02 MICROGRAM(S)/KG/HR: 50 INJECTION INTRAVENOUS at 23:01

## 2022-01-01 RX ADMIN — AZITHROMYCIN 255 MILLIGRAM(S): 500 TABLET, FILM COATED ORAL at 09:20

## 2022-01-01 RX ADMIN — Medication 20 MILLIGRAM(S): at 05:25

## 2022-01-01 RX ADMIN — MIDAZOLAM HYDROCHLORIDE 2 MILLIGRAM(S): 1 INJECTION, SOLUTION INTRAMUSCULAR; INTRAVENOUS at 07:48

## 2022-01-01 RX ADMIN — Medication 650 MILLIGRAM(S): at 21:10

## 2022-01-01 RX ADMIN — Medication 8 PUFF(S): at 14:56

## 2022-01-01 RX ADMIN — ALBUTEROL 8 PUFF(S): 90 AEROSOL, METERED ORAL at 08:15

## 2022-01-01 RX ADMIN — SODIUM CHLORIDE 75 MILLILITER(S): 9 INJECTION, SOLUTION INTRAVENOUS at 03:34

## 2022-01-01 RX ADMIN — ALBUTEROL 8 PUFF(S): 90 AEROSOL, METERED ORAL at 20:06

## 2022-01-01 RX ADMIN — ALBUTEROL 8 PUFF(S): 90 AEROSOL, METERED ORAL at 13:32

## 2022-01-01 RX ADMIN — SENNA PLUS 2 TABLET(S): 8.6 TABLET ORAL at 21:37

## 2022-01-01 RX ADMIN — Medication 4: at 13:08

## 2022-01-01 RX ADMIN — Medication 8 PUFF(S): at 21:35

## 2022-01-01 RX ADMIN — POLYETHYLENE GLYCOL 3350 17 GRAM(S): 17 POWDER, FOR SOLUTION ORAL at 11:45

## 2022-01-01 RX ADMIN — MIDODRINE HYDROCHLORIDE 10 MILLIGRAM(S): 2.5 TABLET ORAL at 15:05

## 2022-01-01 RX ADMIN — SENNA PLUS 2 TABLET(S): 8.6 TABLET ORAL at 21:31

## 2022-01-01 RX ADMIN — AZITHROMYCIN 255 MILLIGRAM(S): 500 TABLET, FILM COATED ORAL at 10:34

## 2022-01-01 RX ADMIN — SENNA PLUS 2 TABLET(S): 8.6 TABLET ORAL at 21:30

## 2022-01-01 RX ADMIN — Medication 60 MILLIGRAM(S): at 05:25

## 2022-01-01 RX ADMIN — MIDAZOLAM HYDROCHLORIDE 4 MILLIGRAM(S): 1 INJECTION, SOLUTION INTRAMUSCULAR; INTRAVENOUS at 06:55

## 2022-01-01 RX ADMIN — CHLORHEXIDINE GLUCONATE 1 APPLICATION(S): 213 SOLUTION TOPICAL at 06:27

## 2022-01-01 RX ADMIN — POLYETHYLENE GLYCOL 3350 17 GRAM(S): 17 POWDER, FOR SOLUTION ORAL at 11:52

## 2022-01-01 RX ADMIN — ENOXAPARIN SODIUM 40 MILLIGRAM(S): 100 INJECTION SUBCUTANEOUS at 12:40

## 2022-01-01 RX ADMIN — Medication 2: at 23:46

## 2022-01-01 RX ADMIN — MIDAZOLAM HYDROCHLORIDE 2 MILLIGRAM(S): 1 INJECTION, SOLUTION INTRAMUSCULAR; INTRAVENOUS at 20:16

## 2022-01-01 RX ADMIN — Medication 2: at 05:58

## 2022-01-01 RX ADMIN — POLYETHYLENE GLYCOL 3350 17 GRAM(S): 17 POWDER, FOR SOLUTION ORAL at 11:17

## 2022-01-01 RX ADMIN — PANTOPRAZOLE SODIUM 40 MILLIGRAM(S): 20 TABLET, DELAYED RELEASE ORAL at 05:25

## 2022-01-01 RX ADMIN — ENOXAPARIN SODIUM 40 MILLIGRAM(S): 100 INJECTION SUBCUTANEOUS at 12:13

## 2022-01-01 RX ADMIN — ROBINUL 0.2 MILLIGRAM(S): 0.2 INJECTION INTRAMUSCULAR; INTRAVENOUS at 11:20

## 2022-01-01 RX ADMIN — ALBUTEROL 8 PUFF(S): 90 AEROSOL, METERED ORAL at 09:48

## 2022-01-01 RX ADMIN — CHLORHEXIDINE GLUCONATE 15 MILLILITER(S): 213 SOLUTION TOPICAL at 05:09

## 2022-01-01 RX ADMIN — Medication 60 MILLIGRAM(S): at 17:03

## 2022-01-01 RX ADMIN — Medication 2: at 11:45

## 2022-01-01 RX ADMIN — Medication 60 MILLIGRAM(S): at 17:30

## 2022-01-01 RX ADMIN — MIDAZOLAM HYDROCHLORIDE 2 MILLIGRAM(S): 1 INJECTION, SOLUTION INTRAMUSCULAR; INTRAVENOUS at 00:48

## 2022-01-01 RX ADMIN — MIDAZOLAM HYDROCHLORIDE 2 MILLIGRAM(S): 1 INJECTION, SOLUTION INTRAMUSCULAR; INTRAVENOUS at 21:34

## 2022-01-01 RX ADMIN — CHLORHEXIDINE GLUCONATE 15 MILLILITER(S): 213 SOLUTION TOPICAL at 05:51

## 2022-01-01 RX ADMIN — Medication 8 PUFF(S): at 20:12

## 2022-01-01 RX ADMIN — PANTOPRAZOLE SODIUM 40 MILLIGRAM(S): 20 TABLET, DELAYED RELEASE ORAL at 22:11

## 2022-01-01 RX ADMIN — INSULIN GLARGINE 8 UNIT(S): 100 INJECTION, SOLUTION SUBCUTANEOUS at 22:03

## 2022-01-01 RX ADMIN — ENOXAPARIN SODIUM 40 MILLIGRAM(S): 100 INJECTION SUBCUTANEOUS at 10:55

## 2022-01-01 RX ADMIN — MORPHINE SULFATE 2 MILLIGRAM(S): 50 CAPSULE, EXTENDED RELEASE ORAL at 12:44

## 2022-01-01 RX ADMIN — Medication 8 PUFF(S): at 20:39

## 2022-01-01 RX ADMIN — MIDAZOLAM HYDROCHLORIDE 2 MILLIGRAM(S): 1 INJECTION, SOLUTION INTRAMUSCULAR; INTRAVENOUS at 21:28

## 2022-01-01 RX ADMIN — PANTOPRAZOLE SODIUM 40 MILLIGRAM(S): 20 TABLET, DELAYED RELEASE ORAL at 06:18

## 2022-01-01 RX ADMIN — FENTANYL CITRATE 3.5 MICROGRAM(S)/KG/HR: 50 INJECTION INTRAVENOUS at 00:05

## 2022-01-01 RX ADMIN — FENTANYL CITRATE 3.5 MICROGRAM(S)/KG/HR: 50 INJECTION INTRAVENOUS at 14:27

## 2022-01-01 RX ADMIN — CEFEPIME 100 MILLIGRAM(S): 1 INJECTION, POWDER, FOR SOLUTION INTRAMUSCULAR; INTRAVENOUS at 05:45

## 2022-01-01 RX ADMIN — MIDAZOLAM HYDROCHLORIDE 2 MILLIGRAM(S): 1 INJECTION, SOLUTION INTRAMUSCULAR; INTRAVENOUS at 15:32

## 2022-01-01 RX ADMIN — Medication 8 PUFF(S): at 13:13

## 2022-01-01 RX ADMIN — PANTOPRAZOLE SODIUM 40 MILLIGRAM(S): 20 TABLET, DELAYED RELEASE ORAL at 09:25

## 2022-01-01 RX ADMIN — CEFEPIME 100 MILLIGRAM(S): 1 INJECTION, POWDER, FOR SOLUTION INTRAMUSCULAR; INTRAVENOUS at 05:57

## 2022-01-01 RX ADMIN — CHLORHEXIDINE GLUCONATE 15 MILLILITER(S): 213 SOLUTION TOPICAL at 06:18

## 2022-01-01 RX ADMIN — CHLORHEXIDINE GLUCONATE 15 MILLILITER(S): 213 SOLUTION TOPICAL at 05:35

## 2022-01-01 RX ADMIN — ALBUTEROL 8 PUFF(S): 90 AEROSOL, METERED ORAL at 08:33

## 2022-01-01 RX ADMIN — Medication 1 MILLIGRAM(S): at 17:19

## 2022-01-01 RX ADMIN — INSULIN GLARGINE 8 UNIT(S): 100 INJECTION, SOLUTION SUBCUTANEOUS at 00:54

## 2022-01-01 RX ADMIN — ENOXAPARIN SODIUM 40 MILLIGRAM(S): 100 INJECTION SUBCUTANEOUS at 09:40

## 2022-01-01 RX ADMIN — MIDODRINE HYDROCHLORIDE 10 MILLIGRAM(S): 2.5 TABLET ORAL at 05:30

## 2022-01-01 RX ADMIN — Medication 8 PUFF(S): at 20:02

## 2022-01-01 RX ADMIN — CHLORHEXIDINE GLUCONATE 15 MILLILITER(S): 213 SOLUTION TOPICAL at 05:04

## 2022-01-01 RX ADMIN — Medication 4: at 17:58

## 2022-01-01 RX ADMIN — CHLORHEXIDINE GLUCONATE 1 APPLICATION(S): 213 SOLUTION TOPICAL at 05:27

## 2022-01-01 RX ADMIN — MIDAZOLAM HYDROCHLORIDE 2 MILLIGRAM(S): 1 INJECTION, SOLUTION INTRAMUSCULAR; INTRAVENOUS at 16:30

## 2022-01-01 RX ADMIN — Medication 8 PUFF(S): at 07:43

## 2022-01-01 RX ADMIN — QUETIAPINE FUMARATE 50 MILLIGRAM(S): 200 TABLET, FILM COATED ORAL at 17:16

## 2022-01-01 RX ADMIN — CHLORHEXIDINE GLUCONATE 15 MILLILITER(S): 213 SOLUTION TOPICAL at 06:00

## 2022-01-01 RX ADMIN — MIDAZOLAM HYDROCHLORIDE 2 MILLIGRAM(S): 1 INJECTION, SOLUTION INTRAMUSCULAR; INTRAVENOUS at 09:36

## 2022-01-01 RX ADMIN — ESCITALOPRAM OXALATE 10 MILLIGRAM(S): 10 TABLET, FILM COATED ORAL at 17:41

## 2022-01-01 RX ADMIN — QUETIAPINE FUMARATE 75 MILLIGRAM(S): 200 TABLET, FILM COATED ORAL at 17:19

## 2022-01-01 RX ADMIN — Medication 4: at 12:06

## 2022-01-01 RX ADMIN — CHLORHEXIDINE GLUCONATE 15 MILLILITER(S): 213 SOLUTION TOPICAL at 17:09

## 2022-01-01 RX ADMIN — PROPOFOL 1.21 MICROGRAM(S)/KG/MIN: 10 INJECTION, EMULSION INTRAVENOUS at 05:57

## 2022-01-01 RX ADMIN — CEFEPIME 100 MILLIGRAM(S): 1 INJECTION, POWDER, FOR SOLUTION INTRAMUSCULAR; INTRAVENOUS at 17:02

## 2022-01-01 RX ADMIN — INSULIN GLARGINE 8 UNIT(S): 100 INJECTION, SOLUTION SUBCUTANEOUS at 23:35

## 2022-01-01 RX ADMIN — MIDAZOLAM HYDROCHLORIDE 2 MILLIGRAM(S): 1 INJECTION, SOLUTION INTRAMUSCULAR; INTRAVENOUS at 11:45

## 2022-01-01 RX ADMIN — INSULIN GLARGINE 8 UNIT(S): 100 INJECTION, SOLUTION SUBCUTANEOUS at 21:31

## 2022-01-01 RX ADMIN — Medication 20 MILLIGRAM(S): at 06:56

## 2022-01-01 RX ADMIN — CHLORHEXIDINE GLUCONATE 15 MILLILITER(S): 213 SOLUTION TOPICAL at 06:56

## 2022-01-01 RX ADMIN — Medication 0.5 MILLIGRAM(S): at 17:46

## 2022-01-01 RX ADMIN — CHLORHEXIDINE GLUCONATE 15 MILLILITER(S): 213 SOLUTION TOPICAL at 05:25

## 2022-01-01 RX ADMIN — Medication 8 PUFF(S): at 08:15

## 2022-01-01 RX ADMIN — ENOXAPARIN SODIUM 40 MILLIGRAM(S): 100 INJECTION SUBCUTANEOUS at 11:49

## 2022-01-01 RX ADMIN — ALBUTEROL 8 PUFF(S): 90 AEROSOL, METERED ORAL at 01:39

## 2022-01-01 RX ADMIN — MIDODRINE HYDROCHLORIDE 5 MILLIGRAM(S): 2.5 TABLET ORAL at 15:33

## 2022-01-01 RX ADMIN — PANTOPRAZOLE SODIUM 40 MILLIGRAM(S): 20 TABLET, DELAYED RELEASE ORAL at 06:07

## 2022-01-01 RX ADMIN — Medication 4: at 11:55

## 2022-01-01 RX ADMIN — MIDAZOLAM HYDROCHLORIDE 2 MILLIGRAM(S): 1 INJECTION, SOLUTION INTRAMUSCULAR; INTRAVENOUS at 01:47

## 2022-01-01 RX ADMIN — Medication 6: at 23:05

## 2022-01-01 RX ADMIN — ENOXAPARIN SODIUM 40 MILLIGRAM(S): 100 INJECTION SUBCUTANEOUS at 11:51

## 2022-01-01 RX ADMIN — CASPOFUNGIN ACETATE 260 MILLIGRAM(S): 7 INJECTION, POWDER, LYOPHILIZED, FOR SOLUTION INTRAVENOUS at 08:30

## 2022-01-01 RX ADMIN — ALBUTEROL 8 PUFF(S): 90 AEROSOL, METERED ORAL at 05:10

## 2022-01-01 RX ADMIN — CHLORHEXIDINE GLUCONATE 15 MILLILITER(S): 213 SOLUTION TOPICAL at 05:19

## 2022-01-01 RX ADMIN — MIDAZOLAM HYDROCHLORIDE 2 MILLIGRAM(S): 1 INJECTION, SOLUTION INTRAMUSCULAR; INTRAVENOUS at 12:17

## 2022-01-01 RX ADMIN — SODIUM ZIRCONIUM CYCLOSILICATE 10 GRAM(S): 10 POWDER, FOR SUSPENSION ORAL at 12:01

## 2022-01-01 RX ADMIN — POLYETHYLENE GLYCOL 3350 17 GRAM(S): 17 POWDER, FOR SOLUTION ORAL at 12:19

## 2022-01-01 RX ADMIN — Medication 0.5 MILLIGRAM(S): at 17:12

## 2022-01-01 RX ADMIN — CHLORHEXIDINE GLUCONATE 15 MILLILITER(S): 213 SOLUTION TOPICAL at 06:02

## 2022-01-01 RX ADMIN — CEFEPIME 100 MILLIGRAM(S): 1 INJECTION, POWDER, FOR SOLUTION INTRAMUSCULAR; INTRAVENOUS at 17:45

## 2022-01-01 RX ADMIN — PANTOPRAZOLE SODIUM 40 MILLIGRAM(S): 20 TABLET, DELAYED RELEASE ORAL at 06:03

## 2022-01-01 RX ADMIN — CEFEPIME 100 MILLIGRAM(S): 1 INJECTION, POWDER, FOR SOLUTION INTRAMUSCULAR; INTRAVENOUS at 18:06

## 2022-01-01 RX ADMIN — QUETIAPINE FUMARATE 75 MILLIGRAM(S): 200 TABLET, FILM COATED ORAL at 17:09

## 2022-01-01 RX ADMIN — MIDODRINE HYDROCHLORIDE 5 MILLIGRAM(S): 2.5 TABLET ORAL at 05:35

## 2022-01-01 RX ADMIN — ALBUTEROL 8 PUFF(S): 90 AEROSOL, METERED ORAL at 20:12

## 2022-01-01 RX ADMIN — MORPHINE SULFATE 2 MILLIGRAM(S): 50 CAPSULE, EXTENDED RELEASE ORAL at 05:50

## 2022-01-01 RX ADMIN — MIDAZOLAM HYDROCHLORIDE 2 MILLIGRAM(S): 1 INJECTION, SOLUTION INTRAMUSCULAR; INTRAVENOUS at 00:15

## 2022-01-01 RX ADMIN — Medication 4: at 23:10

## 2022-01-01 RX ADMIN — Medication 2: at 07:39

## 2022-01-01 RX ADMIN — CHLORHEXIDINE GLUCONATE 15 MILLILITER(S): 213 SOLUTION TOPICAL at 18:04

## 2022-01-01 RX ADMIN — PANTOPRAZOLE SODIUM 40 MILLIGRAM(S): 20 TABLET, DELAYED RELEASE ORAL at 06:56

## 2022-01-01 RX ADMIN — QUETIAPINE FUMARATE 75 MILLIGRAM(S): 200 TABLET, FILM COATED ORAL at 05:26

## 2022-01-01 RX ADMIN — Medication 60 MILLIGRAM(S): at 17:14

## 2022-01-01 RX ADMIN — Medication 8 PUFF(S): at 02:03

## 2022-01-01 RX ADMIN — ENOXAPARIN SODIUM 40 MILLIGRAM(S): 100 INJECTION SUBCUTANEOUS at 11:02

## 2022-01-01 RX ADMIN — MIDODRINE HYDROCHLORIDE 5 MILLIGRAM(S): 2.5 TABLET ORAL at 00:31

## 2022-01-01 RX ADMIN — MIDAZOLAM HYDROCHLORIDE 2 MILLIGRAM(S): 1 INJECTION, SOLUTION INTRAMUSCULAR; INTRAVENOUS at 10:02

## 2022-01-01 RX ADMIN — Medication 4: at 00:36

## 2022-01-01 RX ADMIN — PANTOPRAZOLE SODIUM 40 MILLIGRAM(S): 20 TABLET, DELAYED RELEASE ORAL at 05:28

## 2022-01-01 RX ADMIN — Medication 8 PUFF(S): at 08:33

## 2022-01-01 RX ADMIN — ALBUTEROL 8 PUFF(S): 90 AEROSOL, METERED ORAL at 07:51

## 2022-01-01 RX ADMIN — INSULIN GLARGINE 8 UNIT(S): 100 INJECTION, SOLUTION SUBCUTANEOUS at 21:22

## 2022-01-01 RX ADMIN — QUETIAPINE FUMARATE 50 MILLIGRAM(S): 200 TABLET, FILM COATED ORAL at 17:03

## 2022-01-01 RX ADMIN — SENNA PLUS 2 TABLET(S): 8.6 TABLET ORAL at 22:38

## 2022-01-01 RX ADMIN — SODIUM ZIRCONIUM CYCLOSILICATE 10 GRAM(S): 10 POWDER, FOR SUSPENSION ORAL at 21:53

## 2022-01-01 RX ADMIN — MIDODRINE HYDROCHLORIDE 10 MILLIGRAM(S): 2.5 TABLET ORAL at 22:43

## 2022-01-01 RX ADMIN — MIDAZOLAM HYDROCHLORIDE 2 MILLIGRAM(S): 1 INJECTION, SOLUTION INTRAMUSCULAR; INTRAVENOUS at 18:57

## 2022-01-01 RX ADMIN — FENTANYL CITRATE 2.02 MICROGRAM(S)/KG/HR: 50 INJECTION INTRAVENOUS at 08:59

## 2022-01-01 RX ADMIN — Medication 8 PUFF(S): at 21:05

## 2022-01-01 RX ADMIN — CASPOFUNGIN ACETATE 260 MILLIGRAM(S): 7 INJECTION, POWDER, LYOPHILIZED, FOR SOLUTION INTRAVENOUS at 08:42

## 2022-01-01 RX ADMIN — ENOXAPARIN SODIUM 40 MILLIGRAM(S): 100 INJECTION SUBCUTANEOUS at 11:10

## 2022-01-01 RX ADMIN — Medication 8 PUFF(S): at 14:14

## 2022-01-01 RX ADMIN — ALBUTEROL 8 PUFF(S): 90 AEROSOL, METERED ORAL at 20:02

## 2022-01-01 RX ADMIN — ALBUTEROL 8 PUFF(S): 90 AEROSOL, METERED ORAL at 15:34

## 2022-01-01 RX ADMIN — PANTOPRAZOLE SODIUM 40 MILLIGRAM(S): 20 TABLET, DELAYED RELEASE ORAL at 10:34

## 2022-01-01 RX ADMIN — Medication 4: at 12:05

## 2022-01-01 RX ADMIN — CEFEPIME 100 MILLIGRAM(S): 1 INJECTION, POWDER, FOR SOLUTION INTRAMUSCULAR; INTRAVENOUS at 05:11

## 2022-01-01 RX ADMIN — MIDODRINE HYDROCHLORIDE 10 MILLIGRAM(S): 2.5 TABLET ORAL at 22:38

## 2022-01-01 RX ADMIN — Medication 4.69 MICROGRAM(S)/KG/MIN: at 23:43

## 2022-01-01 RX ADMIN — DEXMEDETOMIDINE HYDROCHLORIDE IN 0.9% SODIUM CHLORIDE 0.63 MICROGRAM(S)/KG/HR: 4 INJECTION INTRAVENOUS at 08:39

## 2022-01-01 RX ADMIN — Medication 2: at 17:13

## 2022-01-01 RX ADMIN — Medication 8 PUFF(S): at 04:00

## 2022-01-01 RX ADMIN — QUETIAPINE FUMARATE 50 MILLIGRAM(S): 200 TABLET, FILM COATED ORAL at 05:50

## 2022-01-01 RX ADMIN — QUETIAPINE FUMARATE 75 MILLIGRAM(S): 200 TABLET, FILM COATED ORAL at 18:04

## 2022-01-01 RX ADMIN — SENNA PLUS 2 TABLET(S): 8.6 TABLET ORAL at 22:43

## 2022-01-01 RX ADMIN — Medication 2: at 05:07

## 2022-01-01 RX ADMIN — Medication 4: at 00:17

## 2022-01-01 RX ADMIN — ALBUTEROL 8 PUFF(S): 90 AEROSOL, METERED ORAL at 20:48

## 2022-01-01 RX ADMIN — CEFEPIME 100 MILLIGRAM(S): 1 INJECTION, POWDER, FOR SOLUTION INTRAMUSCULAR; INTRAVENOUS at 05:09

## 2022-01-01 RX ADMIN — FENTANYL CITRATE 50 MICROGRAM(S): 50 INJECTION INTRAVENOUS at 00:51

## 2022-01-01 RX ADMIN — Medication 1 MILLIGRAM(S): at 17:08

## 2022-01-01 RX ADMIN — MIDAZOLAM HYDROCHLORIDE 2 MILLIGRAM(S): 1 INJECTION, SOLUTION INTRAMUSCULAR; INTRAVENOUS at 15:51

## 2022-01-01 RX ADMIN — Medication 60 MILLIGRAM(S): at 17:35

## 2022-01-01 RX ADMIN — PANTOPRAZOLE SODIUM 40 MILLIGRAM(S): 20 TABLET, DELAYED RELEASE ORAL at 05:30

## 2022-01-01 RX ADMIN — CEFEPIME 100 MILLIGRAM(S): 1 INJECTION, POWDER, FOR SOLUTION INTRAMUSCULAR; INTRAVENOUS at 05:33

## 2022-01-01 RX ADMIN — Medication 1 APPLICATION(S): at 11:52

## 2022-01-01 RX ADMIN — CHLORHEXIDINE GLUCONATE 15 MILLILITER(S): 213 SOLUTION TOPICAL at 05:26

## 2022-01-01 RX ADMIN — Medication 2: at 08:01

## 2022-01-01 RX ADMIN — Medication 0.5 MILLIGRAM(S): at 17:14

## 2022-01-01 RX ADMIN — CHLORHEXIDINE GLUCONATE 15 MILLILITER(S): 213 SOLUTION TOPICAL at 05:29

## 2022-01-01 RX ADMIN — POLYETHYLENE GLYCOL 3350 17 GRAM(S): 17 POWDER, FOR SOLUTION ORAL at 12:43

## 2022-01-01 RX ADMIN — CHLORHEXIDINE GLUCONATE 15 MILLILITER(S): 213 SOLUTION TOPICAL at 17:18

## 2022-01-01 RX ADMIN — CHLORHEXIDINE GLUCONATE 1 APPLICATION(S): 213 SOLUTION TOPICAL at 05:51

## 2022-01-01 RX ADMIN — MIDODRINE HYDROCHLORIDE 10 MILLIGRAM(S): 2.5 TABLET ORAL at 05:28

## 2022-01-01 RX ADMIN — CEFEPIME 100 MILLIGRAM(S): 1 INJECTION, POWDER, FOR SOLUTION INTRAMUSCULAR; INTRAVENOUS at 05:58

## 2022-01-01 RX ADMIN — CHLORHEXIDINE GLUCONATE 15 MILLILITER(S): 213 SOLUTION TOPICAL at 05:31

## 2022-01-01 RX ADMIN — Medication 0.5 MILLIGRAM(S): at 05:30

## 2022-01-01 RX ADMIN — Medication 60 MILLIGRAM(S): at 06:36

## 2022-01-01 RX ADMIN — Medication 4: at 12:19

## 2022-01-01 RX ADMIN — CHLORHEXIDINE GLUCONATE 1 APPLICATION(S): 213 SOLUTION TOPICAL at 06:18

## 2022-01-01 RX ADMIN — QUETIAPINE FUMARATE 25 MILLIGRAM(S): 200 TABLET, FILM COATED ORAL at 17:06

## 2022-01-01 RX ADMIN — MORPHINE SULFATE 2 MILLIGRAM(S): 50 CAPSULE, EXTENDED RELEASE ORAL at 12:36

## 2022-01-01 RX ADMIN — CEFEPIME 100 MILLIGRAM(S): 1 INJECTION, POWDER, FOR SOLUTION INTRAMUSCULAR; INTRAVENOUS at 17:08

## 2022-01-01 RX ADMIN — Medication 1 MILLIGRAM(S): at 05:26

## 2022-01-01 RX ADMIN — POLYETHYLENE GLYCOL 3350 17 GRAM(S): 17 POWDER, FOR SOLUTION ORAL at 11:01

## 2022-01-01 RX ADMIN — QUETIAPINE FUMARATE 75 MILLIGRAM(S): 200 TABLET, FILM COATED ORAL at 06:55

## 2022-01-01 RX ADMIN — Medication 4: at 01:54

## 2022-01-01 RX ADMIN — CHLORHEXIDINE GLUCONATE 15 MILLILITER(S): 213 SOLUTION TOPICAL at 17:12

## 2022-01-01 RX ADMIN — SENNA PLUS 2 TABLET(S): 8.6 TABLET ORAL at 21:24

## 2022-01-01 RX ADMIN — Medication 1 MILLIGRAM(S): at 05:28

## 2022-01-01 RX ADMIN — QUETIAPINE FUMARATE 50 MILLIGRAM(S): 200 TABLET, FILM COATED ORAL at 05:28

## 2022-01-01 RX ADMIN — ALBUTEROL 8 PUFF(S): 90 AEROSOL, METERED ORAL at 04:30

## 2022-01-01 RX ADMIN — INSULIN GLARGINE 8 UNIT(S): 100 INJECTION, SOLUTION SUBCUTANEOUS at 22:43

## 2022-01-01 RX ADMIN — CHLORHEXIDINE GLUCONATE 15 MILLILITER(S): 213 SOLUTION TOPICAL at 17:37

## 2022-01-01 RX ADMIN — POLYETHYLENE GLYCOL 3350 17 GRAM(S): 17 POWDER, FOR SOLUTION ORAL at 13:48

## 2022-01-01 RX ADMIN — CHLORHEXIDINE GLUCONATE 1 APPLICATION(S): 213 SOLUTION TOPICAL at 05:04

## 2022-01-01 RX ADMIN — ALBUTEROL 8 PUFF(S): 90 AEROSOL, METERED ORAL at 02:03

## 2022-01-01 RX ADMIN — CEFEPIME 100 MILLIGRAM(S): 1 INJECTION, POWDER, FOR SOLUTION INTRAMUSCULAR; INTRAVENOUS at 17:09

## 2022-01-01 RX ADMIN — CEFEPIME 100 MILLIGRAM(S): 1 INJECTION, POWDER, FOR SOLUTION INTRAMUSCULAR; INTRAVENOUS at 06:40

## 2022-01-01 RX ADMIN — FENTANYL CITRATE 50 MICROGRAM(S): 50 INJECTION INTRAVENOUS at 01:17

## 2022-01-01 RX ADMIN — Medication 1 APPLICATION(S): at 12:23

## 2022-01-01 RX ADMIN — POLYETHYLENE GLYCOL 3350 17 GRAM(S): 17 POWDER, FOR SOLUTION ORAL at 12:33

## 2022-01-01 RX ADMIN — MIDAZOLAM HYDROCHLORIDE 1 MG/KG/HR: 1 INJECTION, SOLUTION INTRAMUSCULAR; INTRAVENOUS at 04:49

## 2022-01-01 RX ADMIN — CHLORHEXIDINE GLUCONATE 1 APPLICATION(S): 213 SOLUTION TOPICAL at 05:34

## 2022-01-01 RX ADMIN — Medication 8 PUFF(S): at 05:09

## 2022-01-01 RX ADMIN — Medication 650 MILLIGRAM(S): at 15:37

## 2022-01-01 RX ADMIN — Medication 60 MILLIGRAM(S): at 05:09

## 2022-01-01 RX ADMIN — Medication 3 MILLILITER(S): at 23:16

## 2022-01-01 RX ADMIN — SODIUM ZIRCONIUM CYCLOSILICATE 10 GRAM(S): 10 POWDER, FOR SUSPENSION ORAL at 21:17

## 2022-01-01 RX ADMIN — CHLORHEXIDINE GLUCONATE 1 APPLICATION(S): 213 SOLUTION TOPICAL at 05:15

## 2022-01-01 RX ADMIN — CHLORHEXIDINE GLUCONATE 15 MILLILITER(S): 213 SOLUTION TOPICAL at 17:13

## 2022-01-01 RX ADMIN — MORPHINE SULFATE 4 MILLIGRAM(S): 50 CAPSULE, EXTENDED RELEASE ORAL at 11:37

## 2022-01-01 RX ADMIN — PANTOPRAZOLE SODIUM 40 MILLIGRAM(S): 20 TABLET, DELAYED RELEASE ORAL at 06:45

## 2022-01-01 RX ADMIN — Medication 2: at 00:54

## 2022-01-01 RX ADMIN — CHLORHEXIDINE GLUCONATE 15 MILLILITER(S): 213 SOLUTION TOPICAL at 06:44

## 2022-01-01 RX ADMIN — SODIUM ZIRCONIUM CYCLOSILICATE 10 GRAM(S): 10 POWDER, FOR SUSPENSION ORAL at 11:44

## 2022-01-01 RX ADMIN — Medication 8 PUFF(S): at 13:32

## 2022-01-01 RX ADMIN — ESCITALOPRAM OXALATE 10 MILLIGRAM(S): 10 TABLET, FILM COATED ORAL at 12:19

## 2022-01-01 RX ADMIN — ALBUTEROL 8 PUFF(S): 90 AEROSOL, METERED ORAL at 04:55

## 2022-01-01 RX ADMIN — QUETIAPINE FUMARATE 75 MILLIGRAM(S): 200 TABLET, FILM COATED ORAL at 05:30

## 2022-01-01 RX ADMIN — PROPOFOL 1.21 MICROGRAM(S)/KG/MIN: 10 INJECTION, EMULSION INTRAVENOUS at 13:11

## 2022-01-01 RX ADMIN — CHLORHEXIDINE GLUCONATE 1 APPLICATION(S): 213 SOLUTION TOPICAL at 05:25

## 2022-01-01 RX ADMIN — ENOXAPARIN SODIUM 40 MILLIGRAM(S): 100 INJECTION SUBCUTANEOUS at 11:44

## 2022-01-01 RX ADMIN — MIDAZOLAM HYDROCHLORIDE 2 MILLIGRAM(S): 1 INJECTION, SOLUTION INTRAMUSCULAR; INTRAVENOUS at 04:20

## 2022-01-01 RX ADMIN — CEFEPIME 100 MILLIGRAM(S): 1 INJECTION, POWDER, FOR SOLUTION INTRAMUSCULAR; INTRAVENOUS at 17:10

## 2022-01-01 RX ADMIN — PANTOPRAZOLE SODIUM 40 MILLIGRAM(S): 20 TABLET, DELAYED RELEASE ORAL at 05:10

## 2022-01-01 RX ADMIN — MIDODRINE HYDROCHLORIDE 5 MILLIGRAM(S): 2.5 TABLET ORAL at 15:24

## 2022-01-01 RX ADMIN — Medication 60 MILLIGRAM(S): at 17:16

## 2022-01-01 RX ADMIN — SENNA PLUS 2 TABLET(S): 8.6 TABLET ORAL at 21:02

## 2022-01-01 RX ADMIN — SENNA PLUS 2 TABLET(S): 8.6 TABLET ORAL at 00:31

## 2022-01-01 RX ADMIN — Medication 60 MILLIGRAM(S): at 06:18

## 2022-01-01 RX ADMIN — MORPHINE SULFATE 2 MILLIGRAM(S): 50 CAPSULE, EXTENDED RELEASE ORAL at 06:00

## 2022-01-01 RX ADMIN — Medication 60 MILLIGRAM(S): at 06:03

## 2022-01-01 RX ADMIN — AZITHROMYCIN 255 MILLIGRAM(S): 500 TABLET, FILM COATED ORAL at 09:46

## 2022-01-01 RX ADMIN — ENOXAPARIN SODIUM 40 MILLIGRAM(S): 100 INJECTION SUBCUTANEOUS at 11:32

## 2022-01-01 RX ADMIN — Medication 650 MILLIGRAM(S): at 09:11

## 2022-01-01 RX ADMIN — Medication 8 PUFF(S): at 04:30

## 2022-01-01 RX ADMIN — Medication 70 MILLIGRAM(S): at 00:35

## 2022-01-01 RX ADMIN — MIDAZOLAM HYDROCHLORIDE 2 MILLIGRAM(S): 1 INJECTION, SOLUTION INTRAMUSCULAR; INTRAVENOUS at 22:59

## 2022-01-01 RX ADMIN — Medication 20 MILLIGRAM(S): at 05:34

## 2022-01-01 RX ADMIN — MIDAZOLAM HYDROCHLORIDE 2 MILLIGRAM(S): 1 INJECTION, SOLUTION INTRAMUSCULAR; INTRAVENOUS at 16:51

## 2022-01-01 RX ADMIN — CHLORHEXIDINE GLUCONATE 1 APPLICATION(S): 213 SOLUTION TOPICAL at 06:55

## 2022-01-01 RX ADMIN — CEFEPIME 100 MILLIGRAM(S): 1 INJECTION, POWDER, FOR SOLUTION INTRAMUSCULAR; INTRAVENOUS at 17:18

## 2022-01-01 RX ADMIN — ALBUTEROL 8 PUFF(S): 90 AEROSOL, METERED ORAL at 21:02

## 2022-01-01 RX ADMIN — Medication 8 PUFF(S): at 10:34

## 2022-01-01 RX ADMIN — Medication 0.5 MILLIGRAM(S): at 05:09

## 2022-01-01 RX ADMIN — FENTANYL CITRATE 3.5 MICROGRAM(S)/KG/HR: 50 INJECTION INTRAVENOUS at 04:41

## 2022-01-01 RX ADMIN — MIDODRINE HYDROCHLORIDE 5 MILLIGRAM(S): 2.5 TABLET ORAL at 13:33

## 2022-01-01 RX ADMIN — CEFEPIME 100 MILLIGRAM(S): 1 INJECTION, POWDER, FOR SOLUTION INTRAMUSCULAR; INTRAVENOUS at 17:31

## 2022-01-01 RX ADMIN — CHLORHEXIDINE GLUCONATE 15 MILLILITER(S): 213 SOLUTION TOPICAL at 17:44

## 2022-01-01 RX ADMIN — Medication 0.5 MILLIGRAM(S): at 11:20

## 2022-01-01 RX ADMIN — CEFEPIME 100 MILLIGRAM(S): 1 INJECTION, POWDER, FOR SOLUTION INTRAMUSCULAR; INTRAVENOUS at 06:54

## 2022-01-01 RX ADMIN — Medication 40 MILLIGRAM(S): at 05:50

## 2022-01-01 RX ADMIN — INSULIN GLARGINE 6 UNIT(S): 100 INJECTION, SOLUTION SUBCUTANEOUS at 21:17

## 2022-01-01 RX ADMIN — SODIUM CHLORIDE 1000 MILLILITER(S): 9 INJECTION, SOLUTION INTRAVENOUS at 05:00

## 2022-01-01 RX ADMIN — ALBUTEROL 8 PUFF(S): 90 AEROSOL, METERED ORAL at 13:34

## 2022-01-01 RX ADMIN — MIDAZOLAM HYDROCHLORIDE 2 MILLIGRAM(S): 1 INJECTION, SOLUTION INTRAMUSCULAR; INTRAVENOUS at 01:05

## 2022-01-01 RX ADMIN — MIDAZOLAM HYDROCHLORIDE 0.81 MG/KG/HR: 1 INJECTION, SOLUTION INTRAMUSCULAR; INTRAVENOUS at 23:01

## 2022-01-01 RX ADMIN — MIDAZOLAM HYDROCHLORIDE 2 MILLIGRAM(S): 1 INJECTION, SOLUTION INTRAMUSCULAR; INTRAVENOUS at 13:37

## 2022-01-01 RX ADMIN — Medication 2: at 17:17

## 2022-01-01 RX ADMIN — Medication 650 MILLIGRAM(S): at 15:07

## 2022-01-01 RX ADMIN — Medication 8 PUFF(S): at 01:39

## 2022-01-01 RX ADMIN — ALBUTEROL 8 PUFF(S): 90 AEROSOL, METERED ORAL at 10:33

## 2022-01-01 RX ADMIN — Medication 60 MILLIGRAM(S): at 05:19

## 2022-01-01 RX ADMIN — MIDODRINE HYDROCHLORIDE 10 MILLIGRAM(S): 2.5 TABLET ORAL at 21:02

## 2022-01-01 RX ADMIN — MIDODRINE HYDROCHLORIDE 5 MILLIGRAM(S): 2.5 TABLET ORAL at 13:49

## 2022-01-01 RX ADMIN — ETOMIDATE 20 MILLIGRAM(S): 2 INJECTION INTRAVENOUS at 00:34

## 2022-01-01 RX ADMIN — Medication 0.5 MILLIGRAM(S): at 17:20

## 2022-01-01 RX ADMIN — QUETIAPINE FUMARATE 75 MILLIGRAM(S): 200 TABLET, FILM COATED ORAL at 17:10

## 2022-01-01 RX ADMIN — QUETIAPINE FUMARATE 50 MILLIGRAM(S): 200 TABLET, FILM COATED ORAL at 05:59

## 2022-01-01 RX ADMIN — CHLORHEXIDINE GLUCONATE 1 APPLICATION(S): 213 SOLUTION TOPICAL at 06:00

## 2022-01-01 RX ADMIN — Medication 8 PUFF(S): at 04:55

## 2022-01-01 RX ADMIN — MIDODRINE HYDROCHLORIDE 10 MILLIGRAM(S): 2.5 TABLET ORAL at 05:05

## 2022-01-01 RX ADMIN — Medication 1 MILLIGRAM(S): at 17:02

## 2022-01-01 RX ADMIN — Medication 60 MILLIGRAM(S): at 05:43

## 2022-01-01 RX ADMIN — Medication 0.5 MILLIGRAM(S): at 18:04

## 2022-01-01 RX ADMIN — Medication 3 MILLILITER(S): at 23:40

## 2022-01-01 RX ADMIN — MIDODRINE HYDROCHLORIDE 10 MILLIGRAM(S): 2.5 TABLET ORAL at 21:31

## 2022-01-01 RX ADMIN — Medication 1 MILLIGRAM(S): at 05:51

## 2022-01-01 RX ADMIN — MORPHINE SULFATE 4 MILLIGRAM(S): 50 CAPSULE, EXTENDED RELEASE ORAL at 11:45

## 2022-01-01 RX ADMIN — CEFEPIME 100 MILLIGRAM(S): 1 INJECTION, POWDER, FOR SOLUTION INTRAMUSCULAR; INTRAVENOUS at 17:58

## 2022-01-01 RX ADMIN — CEFEPIME 100 MILLIGRAM(S): 1 INJECTION, POWDER, FOR SOLUTION INTRAMUSCULAR; INTRAVENOUS at 05:51

## 2022-01-01 RX ADMIN — FENTANYL CITRATE 3.5 MICROGRAM(S)/KG/HR: 50 INJECTION INTRAVENOUS at 01:44

## 2022-01-01 RX ADMIN — QUETIAPINE FUMARATE 75 MILLIGRAM(S): 200 TABLET, FILM COATED ORAL at 17:44

## 2022-01-01 RX ADMIN — Medication 650 MILLIGRAM(S): at 22:30

## 2022-01-01 RX ADMIN — ENOXAPARIN SODIUM 40 MILLIGRAM(S): 100 INJECTION SUBCUTANEOUS at 10:57

## 2022-01-01 RX ADMIN — Medication 150 GRAM(S): at 23:16

## 2022-01-01 RX ADMIN — ENOXAPARIN SODIUM 40 MILLIGRAM(S): 100 INJECTION SUBCUTANEOUS at 11:46

## 2022-01-01 RX ADMIN — Medication 60 MILLIGRAM(S): at 05:30

## 2022-01-01 RX ADMIN — PANTOPRAZOLE SODIUM 40 MILLIGRAM(S): 20 TABLET, DELAYED RELEASE ORAL at 07:11

## 2022-01-01 RX ADMIN — Medication 125 MILLIGRAM(S): at 23:16

## 2022-01-01 RX ADMIN — Medication 0.5 MILLIGRAM(S): at 06:54

## 2022-01-01 RX ADMIN — Medication 650 MILLIGRAM(S): at 08:41

## 2022-01-01 RX ADMIN — Medication 2: at 06:28

## 2022-01-01 RX ADMIN — Medication 8 PUFF(S): at 09:50

## 2022-01-01 RX ADMIN — Medication 2: at 06:02

## 2022-01-01 RX ADMIN — CHLORHEXIDINE GLUCONATE 15 MILLILITER(S): 213 SOLUTION TOPICAL at 17:56

## 2022-01-01 RX ADMIN — QUETIAPINE FUMARATE 75 MILLIGRAM(S): 200 TABLET, FILM COATED ORAL at 17:12

## 2022-01-01 RX ADMIN — PANTOPRAZOLE SODIUM 40 MILLIGRAM(S): 20 TABLET, DELAYED RELEASE ORAL at 07:17

## 2022-01-01 RX ADMIN — Medication 0.5 MILLIGRAM(S): at 05:27

## 2022-01-01 RX ADMIN — MIDODRINE HYDROCHLORIDE 10 MILLIGRAM(S): 2.5 TABLET ORAL at 05:25

## 2022-01-01 RX ADMIN — QUETIAPINE FUMARATE 50 MILLIGRAM(S): 200 TABLET, FILM COATED ORAL at 18:00

## 2022-01-01 RX ADMIN — MIDAZOLAM HYDROCHLORIDE 2 MILLIGRAM(S): 1 INJECTION, SOLUTION INTRAMUSCULAR; INTRAVENOUS at 08:00

## 2022-01-01 RX ADMIN — POLYETHYLENE GLYCOL 3350 17 GRAM(S): 17 POWDER, FOR SOLUTION ORAL at 11:49

## 2022-01-01 RX ADMIN — ALBUTEROL 8 PUFF(S): 90 AEROSOL, METERED ORAL at 04:01

## 2022-01-01 RX ADMIN — Medication 2: at 17:32

## 2022-01-01 RX ADMIN — SENNA PLUS 2 TABLET(S): 8.6 TABLET ORAL at 21:52

## 2022-01-01 RX ADMIN — MIDAZOLAM HYDROCHLORIDE 2 MILLIGRAM(S): 1 INJECTION, SOLUTION INTRAMUSCULAR; INTRAVENOUS at 02:14

## 2022-01-01 RX ADMIN — SENNA PLUS 2 TABLET(S): 8.6 TABLET ORAL at 21:17

## 2022-01-01 RX ADMIN — Medication 3 MILLILITER(S): at 23:20

## 2022-01-01 RX ADMIN — DEXMEDETOMIDINE HYDROCHLORIDE IN 0.9% SODIUM CHLORIDE 0.63 MICROGRAM(S)/KG/HR: 4 INJECTION INTRAVENOUS at 10:01

## 2022-01-01 RX ADMIN — Medication 50 MILLILITER(S): at 14:12

## 2022-01-01 RX ADMIN — Medication 60 MILLIGRAM(S): at 06:06

## 2022-01-01 RX ADMIN — POLYETHYLENE GLYCOL 3350 17 GRAM(S): 17 POWDER, FOR SOLUTION ORAL at 12:40

## 2022-01-01 RX ADMIN — CEFEPIME 100 MILLIGRAM(S): 1 INJECTION, POWDER, FOR SOLUTION INTRAMUSCULAR; INTRAVENOUS at 17:11

## 2022-01-01 RX ADMIN — CEFEPIME 100 MILLIGRAM(S): 1 INJECTION, POWDER, FOR SOLUTION INTRAMUSCULAR; INTRAVENOUS at 05:29

## 2022-01-01 RX ADMIN — PANTOPRAZOLE SODIUM 40 MILLIGRAM(S): 20 TABLET, DELAYED RELEASE ORAL at 07:09

## 2022-01-04 NOTE — REASON FOR VISIT
[Home] : at home, [unfilled] , at the time of the visit. [Medical Office: ([facility])___] : at the medical office located in  [Verbal consent obtained from patient] : the patient, [unfilled]

## 2022-01-21 NOTE — REASON FOR VISIT
[Home] : at home, [unfilled] , at the time of the visit. [Medical Office: ([facility])___] : at the medical office located in  [Verbal consent obtained from patient] : the patient, [unfilled] [Follow-Up] : a follow-up visit [COPD] : COPD

## 2022-02-04 NOTE — DISCUSSION/SUMMARY
[FreeTextEntry1] : COPD EXACERBATION/ ON OXYGEN \par SOB/ COUGH\par ANXIETY\par SPOKE WITH PATIENT/ DAUGHTER

## 2022-02-04 NOTE — PROCEDURE
[FreeTextEntry1] : CXR PA/ LAT  SOB\par \par INREASE MARKING\par \par IMPRESSION\par \par COMPARED TO PRIOR UNCHANGED\par \par

## 2022-05-04 PROBLEM — J45.909 ASTHMA: Status: ACTIVE | Noted: 2021-03-15

## 2022-05-04 PROBLEM — J18.9 PNEUMONIA: Status: ACTIVE | Noted: 2020-01-09

## 2022-05-04 PROBLEM — R91.1 LUNG NODULE: Status: ACTIVE | Noted: 2021-03-15

## 2022-05-04 PROBLEM — J44.9 COPD (CHRONIC OBSTRUCTIVE PULMONARY DISEASE): Status: ACTIVE | Noted: 2017-11-29

## 2022-05-04 PROBLEM — J44.1 COPD WITH EXACERBATION: Status: ACTIVE | Noted: 2021-01-01

## 2022-05-04 PROBLEM — K21.9 ESOPHAGEAL REFLUX: Status: ACTIVE | Noted: 2021-03-15

## 2022-05-04 NOTE — HISTORY OF PRESENT ILLNESS
[FreeTextEntry1] : The darcy was admitted to [facility] with exacerbation of her COD . She was given Prednisone and was discharged on home 02 . She has been using home 02 daily .

## 2022-05-04 NOTE — ASSESSMENT
[FreeTextEntry1] : The patient had exacerbation of COPD and has been on home 02 . She was started on Norvasc since last visit and her BP is low normal here and she has had increased fatigue . Will decrease. She is on steroids intermittently . She did have mild pulmonary HTN on last echo

## 2022-05-04 NOTE — CARDIOLOGY SUMMARY
[___] : [unfilled] [de-identified] : 6-4-2021 NSR RSR' in V1 and V2 \par 5-4-2022 NSR RSR" in V10V3 .  [de-identified] : 4- Normal LV systolic function mild MR Mod TR RVSP was 52 mmhg

## 2022-05-04 NOTE — PHYSICAL EXAM
[Normal Oropharynx] : normal oropharynx [No Neck Mass] : no neck mass [Normal Appearance] : normal appearance [Normal S1, S2] : normal s1, s2 [Normal Rate/Rhythm] : normal rate/rhythm [No Murmurs] : no murmurs [No Abnormalities] : no abnormalities [Benign] : benign [Normal Gait] : normal gait [No Clubbing] : no clubbing [No Cyanosis] : no cyanosis [No Edema] : no edema [FROM] : FROM [Normal Color/ Pigmentation] : normal color/ pigmentation [No Focal Deficits] : no focal deficits [Oriented x3] : oriented x3 [Normal Affect] : normal affect [TextBox_2] : ILL LOOKING [TextBox_68] : DEC BS BOTH BASES

## 2022-05-04 NOTE — DISCUSSION/SUMMARY
[FreeTextEntry1] : COPD SEVERE/ ON OXYGEN \par SOB/ COUGH\par ANXIETY\par LUNG NODULE\par NEED TO STAY ON OXYGEN HER POX WAS 88% RA AT REST

## 2022-05-11 PROBLEM — I10 HYPERTENSION: Status: ACTIVE | Noted: 2022-01-01

## 2022-05-11 PROBLEM — I34.0 MITRAL REGURGITATION: Status: ACTIVE | Noted: 2018-04-05

## 2022-05-11 PROBLEM — R06.02 SOB (SHORTNESS OF BREATH): Status: ACTIVE | Noted: 2017-11-29

## 2022-06-09 NOTE — ED PROVIDER NOTE - WR INTERPRETATION 2
Reduce dose of Vit D to 2000 u daily if she completed 6 months of high dose vit D right ptx et tube in position, ng tube in position

## 2022-06-09 NOTE — ED ADULT NURSE NOTE - OBJECTIVE STATEMENT
BIBA, with c/o SOB, pt is resp distress on arrival with retractions, as per daughter pt AC broke today and has been c/o SOB. Aide called 911. pt afrbeilre on arrival

## 2022-06-09 NOTE — ED PROVIDER NOTE - ATTENDING CONTRIBUTION TO CARE
on home O2 2L, pHTN, GERD, Lucio's esophagus, pw with sob that is gradual onset today associated with respiratory distress. found by ems and bipap was intiated in field. she denies chest pain or hemoptysis. denies fevers or leg swelling. on exam she is in distress with accessory muscle use, wheezing bilaterally. edema in lower legs. plan is to obtain labs, start on bipap, cxr and reasses

## 2022-06-09 NOTE — ED PROVIDER NOTE - CARE PLAN
Principal Discharge DX:	Acute hypercapnic respiratory failure  Secondary Diagnosis:	COPD exacerbation   1 Principal Discharge DX:	Acute hypercapnic respiratory failure  Secondary Diagnosis:	COPD exacerbation  Secondary Diagnosis:	Pneumothorax, right

## 2022-06-09 NOTE — ED PROVIDER NOTE - OBJECTIVE STATEMENT
87 y/o female with PMHx of COPD on home O2 2L, pHTN, GERD, Lucio's esophagus, COVID vaccinated presented to the ED for acute onset sob. As per nurse aide, pt was asymptomatic sitting on the couch, when she became acutely sob. pt was then BIBA and here, pt is tachypnic, answering questions with one word answers, and struggling to breath. pt was placed on bipap immediately and symtpms improved and pt looks more comfortable. pt's history was taken from daughter at bedside.

## 2022-06-09 NOTE — ED PROVIDER NOTE - PHYSICAL EXAMINATION
CONSTITUTIONAL: Well-developed; well-nourished; in moderate acute distress.   SKIN: warm, dry  HEAD: Normocephalic; atraumatic.  EYES: PERRL, EOMI, normal sclera and conjunctiva   ENT: No nasal discharge; airway clear.  NECK: Supple; non tender.  CARD:  Regular rate and rhythm.   RESP: +inc WOB, sob, tachypnic, retracting intercostals  ABD: soft ntnd  EXT: Normal ROM.    LYMPH: No acute cervical adenopathy.  NEURO: Alert, oriented, grossly unremarkable  PSYCH: Cooperative, appropriate.

## 2022-06-09 NOTE — ED PROVIDER NOTE - CLINICAL SUMMARY MEDICAL DECISION MAKING FREE TEXT BOX
Patient evaluated for shortness of breath.  Found to be in acute hypercapnic respiratory failure not responding to BiPAP.  Patient was intubated for for hypercapnia.  Chest x-ray revealed pneumothorax with pigtail was placed.  Patient was admitted to the ICU

## 2022-06-09 NOTE — ED ADULT NURSE NOTE - NSIMPLEMENTINTERV_GEN_ALL_ED
Implemented All Fall with Harm Risk Interventions:  [place] to call system. Call bell, personal items and telephone within reach. Instruct patient to call for assistance. Room bathroom lighting operational. Non-slip footwear when patient is off stretcher. Physically safe environment: no spills, clutter or unnecessary equipment. Stretcher in lowest position, wheels locked, appropriate side rails in place. Provide visual cue, wrist band, yellow gown, etc. Monitor gait and stability. Monitor for mental status changes and reorient to person, place, and time. Review medications for side effects contributing to fall risk. Reinforce activity limits and safety measures with patient and family. Provide visual clues: red socks.

## 2022-06-09 NOTE — ED ADULT TRIAGE NOTE - CHIEF COMPLAINT QUOTE
BIBA, C/O resp distress that started tonight, as per daughter pt has not been sick. Denies fevers. PMH COPD

## 2022-06-09 NOTE — ED PROVIDER NOTE - PROGRESS NOTE DETAILS
continues to be tahcypnic in respiratory distress despite being on 14/6. vbg without improvement will intubate Pneumothorax noted on chest x-ray pigtail was placed

## 2022-06-10 NOTE — H&P ADULT - HISTORY OF PRESENT ILLNESS
87 y/o female with PMHx of COPD not on home O2, pHTN, GERD, Lucio's esophagus, COVID vaccinated presented to the ED for acute onset of sob. As per nurse aide, pt was asymptomatic sitting on the couch, when she became acutely sob. pt was brought to ED and was tachypneic and answering questions with one word answers, and struggling to breath.    In the ED, pt was placed on bipap immediately and symptoms improved and pt looks more comfortable. ABG initially shows pCO2 115 and was placed on bipap and soon after repeat pco2 was 100. per family and patient, patient is full code and decision was made to intubate the patient. Labs are overall unremarkable except wbc 19K. She received duoneb, Mg and solumedrol 125 mg x1 in ED.     ICU Vital Signs Last 24 Hrs  T(C): 36.1 (09 Jun 2022 22:40), Max: 36.1 (09 Jun 2022 22:40)  T(F): 97 (09 Jun 2022 22:40), Max: 97 (09 Jun 2022 22:40)  HR: 100 (10 Crow 2022 01:10) (100 - 132)  BP: 151/84 (10 Crow 2022 01:10) (120/50 - 202/94)  BP(mean): --  ABP: --  ABP(mean): --  RR: 22 (10 Crow 2022 00:30) (22 - 31)  SpO2: 100% (10 Crow 2022 01:10) (95% - 100%)

## 2022-06-10 NOTE — CONSULT NOTE ADULT - ASSESSMENT
ASSESSMENT:  88yF w/ PMHx stated above  who presented with acute respiratory failure 2/2 COPD exacerbation. CXR post intubation showed right pneumothorax. Pigtail catheter placed successfully by ED. Physical exam findings, imaging, and labs as documented above.     PLAN:  - keep pigtail to suction  - AM CXR  - monitor vent settings  -management per ICU team  -will follow    Above plan to be discussed with Attending Surgeon Dr. Shravan Tejada  , patient, patient family, and Primary team  06-10-22 @ 04:06 ASSESSMENT:  88yF w/ PMHx stated above  who presented with acute respiratory failure 2/2 COPD exacerbation. CXR post intubation showed right pneumothorax. Pigtail catheter placed successfully by ED. Physical exam findings, imaging, and labs as documented above.     PLAN:  - keep pigtail to suction - no airleak  - AM CXR  - monitor vent settings  -management per ICU team  -will follow    Above plan to be discussed with Attending Surgeon Dr. Shravan Tejada  , patient, patient family, and Primary team  06-10-22 @ 04:06 ASSESSMENT:  88yF w/ PMHx stated above  who presented with acute respiratory failure 2/2 COPD exacerbation. CXR post intubation showed right pneumothorax. Pigtail catheter placed successfully by ED. Physical exam findings, imaging, and labs as documented above.     PLAN:  - keep pigtail to suction - no airleak  - AM CXR  - monitor vent settings  -management per ICU team  -will follow    Senior Addendum: Patient seen and examined by me. Patient already with pigtail to suction. On ventilator/intubated. CT Surg will follow.     Above plan to be discussed with Attending Surgeon Dr. Shravan Tejada  , patient, patient family, and Primary team  06-10-22 @ 04:06

## 2022-06-10 NOTE — H&P ADULT - NSHPPHYSICALEXAM_GEN_ALL_CORE
GENERAL: NAD, speaks in full sentences, no signs of respiratory distress  HEAD:  Atraumatic, Normocephalic  EYES: EOMI, PERRLA, anicteric sclera  NECK: Supple, No JVD  CHEST/LUNG: Clear to auscultation bilaterally; No wheeze; No crackles; No accessory muscles used  HEART: Regular rate and rhythm; No murmurs;   ABDOMEN: Soft, Nontender, Nondistended; Bowel sounds present; No guarding  EXTREMITIES:  2+ Peripheral Pulses, No cyanosis or edema  PSYCH: AAOx3  NEUROLOGY: non-focal  SKIN: No rashes or lesions GENERAL: NAD  HEAD:  Atraumatic, Normocephalic  EYES: EOMI, PERRLA  NECK: Supple  CHEST/LUNG: Clear to auscultation bilaterally  HEART: Regular rate and rhythm; No murmurs;   ABDOMEN: Soft, Nontender, Nondistended; Bowel sounds present; No guarding  EXTREMITIES:   No cyanosis or edema  PSYCH: sedated   NEUROLOGY: non-focal  SKIN: No rashes or lesions

## 2022-06-10 NOTE — PATIENT PROFILE ADULT - FALL HARM RISK - HARM RISK INTERVENTIONS
Assistance with ambulation/Assistance OOB with selected safe patient handling equipment/Communicate Risk of Fall with Harm to all staff/Discuss with provider need for PT consult/Monitor gait and stability/Provide patient with walking aids - walker, cane, crutches/Reinforce activity limits and safety measures with patient and family/Review medications for side effects contributing to fall risk/Sit up slowly, dangle for a short time, stand at bedside before walking/Tailored Fall Risk Interventions/Toileting schedule using arm’s reach rule for commode and bathroom/Visual Cue: Yellow wristband and red socks/Bed in lowest position, wheels locked, appropriate side rails in place/Call bell, personal items and telephone in reach/Instruct patient to call for assistance before getting out of bed or chair/Non-slip footwear when patient is out of bed/[place] to call system/Physically safe environment - no spills, clutter or unnecessary equipment/Purposeful Proactive Rounding/Room/bathroom lighting operational, light cord in reach

## 2022-06-10 NOTE — H&P ADULT - NSHPLABSRESULTS_GEN_ALL_CORE
14.5   19.89 )-----------( 446      ( 09 Jun 2022 22:55 )             44.9       06-09    140  |  99  |  31<H>  ----------------------------<  238<H>  4.9   |  29  |  0.9    Ca    10.1      09 Jun 2022 22:55    TPro  7.1  /  Alb  4.5  /  TBili  0.3  /  DBili  x   /  AST  29  /  ALT  22  /  AlkPhos  54  06-09                  PT/INR - ( 09 Jun 2022 22:55 )   PT: 10.80 sec;   INR: 0.94 ratio         PTT - ( 09 Jun 2022 22:55 )  PTT:26.6 sec    Lactate Trend      CARDIAC MARKERS ( 09 Jun 2022 22:55 )  x     / 0.02 ng/mL / x     / x     / x            CAPILLARY BLOOD GLUCOSE

## 2022-06-10 NOTE — CONSULT NOTE ADULT - SUBJECTIVE AND OBJECTIVE BOX
THORACIC SURGERY CONSULT NOTE    Patient: KIMBERLI LEIGH , 88y ([birth year])Female   MRN: [id number]  Location: [facility] 014 A  Visit: 06-10-22 Inpatient  Date: 06-10-22 @ 04:06    HPI:  87 y/o female with PMHx of COPD not on home O2, pHTN, GERD, Lucio's esophagus, COVID vaccinated presented to the ED for acute onset of sob. As per nurse aide, pt was asymptomatic sitting on the couch, when she became acutely sob. pt was brought to ED and was tachypneic and answering questions with one word answers, and struggling to breath.  In the ED, pt was placed on bipap immediately and symptoms improved and pt looks more comfortable. ABG initially shows pCO2 115 and was placed on bipap and soon after repeat pco2 was 100. per family and patient, patient is full code and decision was made to intubate the patient. Labs are overall unremarkable except wbc 19K. She received duoneb, Mg and solumedrol 125 mg x1 in ED.   After intubation, CXR showed large right pneumothorax. ED team placed a pigtail catheter [id number] and lung was re-expanded. Patient remains with stable vitals on ventilator.     ICU Vital Signs Last 24 Hrs  T(C): 36.1 (09 Jun 2022 22:40), Max: 36.1 (09 Jun 2022 22:40)  T(F): 97 (09 Jun 2022 22:40), Max: 97 (09 Jun 2022 22:40)  HR: 100 (10 Crow 2022 01:10) (100 - 132)  BP: 151/84 (10 Crow 2022 01:10) (120/50 - 202/94)  BP(mean): --  ABP: --  ABP(mean): --  RR: 22 (10 Crow 2022 00:30) (22 - 31)  SpO2: 100% (10 Crow 2022 01:10) (95% - 100%)   (10 Crow 2022 01:25)      PAST MEDICAL & SURGICAL HISTORY:  COPD (chronic obstructive pulmonary disease)      GERD (gastroesophageal reflux disease)      Glaucoma      Anxiety      Pancreatitis      History of tonsillectomy      H/O colonoscopy  5 years ago          Home Medications:  albuterol 90 mcg/inh inhalation aerosol: 2 puff(s) inhaled every 6 hours, As needed, Shortness of Breath and/or Wheezing (27 Dec 2021 10:38)  Ambien 10 mg oral tablet: 1 tab(s) orally once a day (at bedtime) (19 Dec 2021 01:41)  escitalopram 10 mg oral tablet: 1 tab(s) orally once a day (27 Dec 2021 10:38)  famotidine 20 mg oral tablet: 1 tab(s) orally once a day (27 Dec 2021 10:38)  fluticasone 0.5 mg/2 mL inhalation suspension:  (19 Dec 2021 01:41)  levalbuterol 45 mcg/inh inhalation aerosol: 2 puff(s) inhaled every 4 hours (19 Dec 2021 01:41)  Wixela Inhub 250 mcg-50 mcg inhalation powder: 1 puff(s) inhaled 2 times a day (19 Dec 2021 01:41)        VITALS:  T(F): 97 (06-09-22 @ 22:40), Max: 97 (06-09-22 @ 22:40)  HR: 88 (06-10-22 @ 04:00) (84 - 132)  BP: 126/58 (06-10-22 @ 04:00) (120/50 - 202/94)  RR: 20 (06-10-22 @ 04:00) (20 - 31)  SpO2: 100% (06-10-22 @ 04:00) (95% - 100%)    PHYSICAL EXAM:  GENERAL: NAD, sedated  CHEST/LUNG: Mechanically ventilated, Lungs Clear to auscultation bilaterally, right sided pigtail catheter in place, symmetric chest rise, saturating well   HEART: Regular rate and rhythm  ABDOMEN: Soft, Nontender, Nondistended;   EXTREMITIES:  No clubbing, cyanosis, or edema      MEDICATIONS  (STANDING):  enoxaparin Injectable 40 milliGRAM(s) SubCutaneous every 24 hours  fentaNYL   Infusion. 0.5 MICROgram(s)/kG/Hr (3.5 mL/Hr) IV Continuous <Continuous>  methylPREDNISolone sodium succinate Injectable 60 milliGRAM(s) IV Push every 12 hours  polyethylene glycol 3350 17 Gram(s) Oral daily  senna 2 Tablet(s) Oral at bedtime    MEDICATIONS  (PRN):  acetaminophen     Tablet .. 650 milliGRAM(s) Oral every 6 hours PRN Temp greater or equal to 38C (100.4F), Mild Pain (1 - 3)      LAB/STUDIES:                        14.5   19.89 )-----------( 446      ( 09 Jun 2022 22:55 )             44.9     06-09    140  |  99  |  31<H>  ----------------------------<  238<H>  4.9   |  29  |  0.9    Ca    10.1      09 Jun 2022 22:55    TPro  7.1  /  Alb  4.5  /  TBili  0.3  /  DBili  x   /  AST  29  /  ALT  22  /  AlkPhos  54  06-09    PT/INR - ( 09 Jun 2022 22:55 )   PT: 10.80 sec;   INR: 0.94 ratio         PTT - ( 09 Jun 2022 22:55 )  PTT:26.6 sec  LIVER FUNCTIONS - ( 09 Jun 2022 22:55 )  Alb: 4.5 g/dL / Pro: 7.1 g/dL / ALK PHOS: 54 U/L / ALT: 22 U/L / AST: 29 U/L / GGT: x             CARDIAC MARKERS ( 09 Jun 2022 22:55 )  x     / 0.02 ng/mL / x     / x     / x          IMAGING:      ACCESS DEVICES:  [X ] Peripheral IV  [ ] Central Venous Line	[ ] R	[ ] L	[ ] IJ	[ ] Fem	[ ] SC	Placed:   [ ] Arterial Line		[ ] R	[ ] L	[ ] Fem	[ ] Rad	[ ] Ax	Placed:   [ ] PICC:					[ ] Mediport  [X ] Urinary Catheter, Date Placed:

## 2022-06-10 NOTE — CONSULT NOTE ADULT - ASSESSMENT
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  Add Precedex.  Wean Fentanyl     HEENT: Oral care    PULMONARY:  HOB @ 45 degrees.  Vent changes as follows: RR 16.  FiO2 30.  Chest tube to suction.  Daily CXR.  Nebs Q4 and PRN.  Solumedrol 60 mg daily     CARDIOVASCULAR:  Avoid overload.      GI: GI prophylaxis.  OG Feeding.  Bowel regimen     RENAL:  Follow up lytes.  Correct as needed    INFECTIOUS DISEASE: Follow up cultures.  Procal.  Azithromycin     HEMATOLOGICAL:  DVT prophylaxis.  Dimer.      ENDOCRINE:  Follow up FS.  Insulin protocol if needed.    MUSCULOSKELETAL:  Bed rest     ARIANNA salguero at the bed side

## 2022-06-10 NOTE — H&P ADULT - ASSESSMENT
IMPRESSION:  Acute hypercapnic respiratory failure likely 2/2 to COPD exacerbation  h/o COPD   h/o pulmonary HTN    PLAN:    CNS: keep sedated     HEENT: Oral care    PULMONARY: HOB @ 45 degrees. Aspiration precautions.     CARDIOVASCULAR: echo. trend trop. Keep MAP >60.     GI: GI prophylaxis. Feeding: . Bowel regimen    RENAL: Follow up renal function and lytes. Correct as needed. Keep potassium >4 and magnesium >2    INFECTIOUS DISEASE: Monitor vital signs. Follow up cultures. procal. mrsa nares. strep/legionella, RVP panel    HEMATOLOGICAL: DVT prophylaxis    ENDOCRINE: Follow up fasting sugar. Insulin protocol if needed    MUSCULOSKELETAL: bedrest    DISPO: MICU    Full code-discussed with daughter  IMPRESSION:  Acute hypercapnic respiratory failure likely 2/2 to COPD exacerbation  Right Pneumothorax s/p chest tube  h/o COPD   h/o pulmonary HTN    PLAN:    CNS: keep sedated     HEENT: Oral care    PULMONARY: HOB @ 45 degrees. Aspiration precautions. keep chest tube on suction. Daily CXR. Thoracic surgery consult    CARDIOVASCULAR: echo. trend trop. Keep MAP >60.     GI: GI prophylaxis. Feeding: . Bowel regimen    RENAL: Follow up renal function and lytes. Correct as needed. Keep potassium >4 and magnesium >2    INFECTIOUS DISEASE: Monitor vital signs. Follow up cultures. procal. mrsa nares. strep/legionella, RVP panel    HEMATOLOGICAL: DVT prophylaxis    ENDOCRINE: Follow up fasting sugar. Insulin protocol if needed    MUSCULOSKELETAL: bedrest    DISPO: MICU    Full code-discussed with daughter  IMPRESSION:  Acute hypercapnic respiratory failure likely 2/2 to COPD exacerbation  Right Pneumothorax s/p chest tube  h/o COPD   h/o pulmonary HTN    PLAN:    CNS: keep sedated     HEENT: Oral care    PULMONARY: HOB @ 45 degrees. Aspiration precautions. keep chest tube on suction. Daily CXR. Thoracic surgery consult    CARDIOVASCULAR: echo. trend trop. Keep MAP >60.     GI: GI prophylaxis. Feeding: . Bowel regimen    RENAL: Follow up renal function and lytes. Correct as needed. Keep potassium >4 and magnesium >2    INFECTIOUS DISEASE: Monitor vital signs. Follow up cultures. procal. mrsa nares. strep/legionella, RVP panel    HEMATOLOGICAL: DVT prophylaxis    ENDOCRINE: Follow up fasting sugar. Insulin protocol if needed    MUSCULOSKELETAL: bedrest    DISPO: MICU    Full code-discussed with daughter     ***Attempted to do med rec but calling daughter who did not know patient's meds, pharmacy ([facility] [address]) is closed and did not answer and nothing listed on surescripts. Please attempt to do med rec in am IMPRESSION:  Acute hypercapnic respiratory failure likely 2/2 to COPD exacerbation  Right Pneumothorax s/p chest tube  h/o COPD   h/o pulmonary HTN    PLAN:    CNS: keep sedated     HEENT: Oral care    PULMONARY: HOB @ 45 degrees. Aspiration precautions. keep chest tube on suction. Daily CXR. Thoracic surgery consult. ABG stat and am    CARDIOVASCULAR: echo. trend trop. Keep MAP >60.     GI: GI prophylaxis. Feeding: . Bowel regimen    RENAL: Follow up renal function and lytes. Correct as needed. Keep potassium >4 and magnesium >2    INFECTIOUS DISEASE: Monitor vital signs. Follow up cultures. procal. mrsa nares. strep/legionella, RVP panel    HEMATOLOGICAL: DVT prophylaxis. D-dimer    ENDOCRINE: Follow up fasting sugar. Insulin protocol if needed    MUSCULOSKELETAL: bedrest    DISPO: MICU    Full code-discussed with daughter     ***Attempted to do med rec but calling daughter who did not know patient's meds, pharmacy ([facility]) is closed and did not answer and nothing listed on surescripts. Please attempt to do med rec in am IMPRESSION:  Acute hypercapnic respiratory failure likely 2/2 to COPD exacerbation  Right Pneumothorax s/p chest tube  h/o COPD   h/o pulmonary HTN    PLAN:    CNS: keep sedated     HEENT: Oral care    PULMONARY: HOB @ 45 degrees. Aspiration precautions. keep chest tube on suction. Daily CXR. Thoracic surgery consult. ABG stat and am. Solumedrol 60 bid    CARDIOVASCULAR: echo. trend trop. Keep MAP >60.     GI: GI prophylaxis. Feeding: . Bowel regimen    RENAL: Follow up renal function and lytes. Correct as needed. Keep potassium >4 and magnesium >2    INFECTIOUS DISEASE: Monitor vital signs. Follow up cultures. procal. mrsa nares. strep/legionella, RVP panel    HEMATOLOGICAL: DVT prophylaxis. D-dimer    ENDOCRINE: Follow up fasting sugar. Insulin protocol if needed    MUSCULOSKELETAL: bedrest    DISPO: MICU    Full code-discussed with daughter     ***Attempted to do med rec but calling daughter who did not know patient's meds, pharmacy ([facility]) is closed and did not answer and nothing listed on surescripts. Please attempt to do med rec in am

## 2022-06-10 NOTE — CONSULT NOTE ADULT - SUBJECTIVE AND OBJECTIVE BOX
Patient is a 88y old  Female who presents with a chief complaint of respiratory distress (10 Crow 2022 04:06)      HPI:  89 y/o female with PMHx of COPD not on home O2, pHTN, GERD, Lucio's esophagus, COVID vaccinated presented to the ED for acute onset of sob. As per nurse aide, pt was asymptomatic sitting on the couch, when she became acutely sob. pt was brought to ED and was tachypneic and answering questions with one word answers, and struggling to breath.    In the ED, pt was placed on bipap immediately and symptoms improved and pt looks more comfortable. ABG initially shows pCO2 115 and was placed on bipap and soon after repeat pco2 was 100. per family and patient, patient is full code and decision was made to intubate the patient. Labs are overall unremarkable except wbc 19K. She received duoneb, Mg and solumedrol 125 mg x1 in ED.     ICU Vital Signs Last 24 Hrs  T(C): 36.1 (09 Jun 2022 22:40), Max: 36.1 (09 Jun 2022 22:40)  T(F): 97 (09 Jun 2022 22:40), Max: 97 (09 Jun 2022 22:40)  HR: 100 (10 Crow 2022 01:10) (100 - 132)  BP: 151/84 (10 Crow 2022 01:10) (120/50 - 202/94)  BP(mean): --  ABP: --  ABP(mean): --  RR: 22 (10 Crow 2022 00:30) (22 - 31)  SpO2: 100% (10 Crow 2022 01:10) (95% - 100%)   (10 Crow 2022 01:25)      PAST MEDICAL & SURGICAL HISTORY:  COPD (chronic obstructive pulmonary disease)      GERD (gastroesophageal reflux disease)      Glaucoma      Anxiety      Pancreatitis      History of tonsillectomy      H/O colonoscopy  5 years ago          SOCIAL HX:   Smoking      Former                    ETOH                            Other    FAMILY HISTORY:  No pertinent family history in first degree relatives    :  No known cardiovacular family hisotry     Review Of Systems:     All ROS are negative except per HPI       Allergies    No Known Allergies    Intolerances          PHYSICAL EXAM    ICU Vital Signs Last 24 Hrs  T(C): 36.1 (09 Jun 2022 22:40), Max: 36.1 (09 Jun 2022 22:40)  T(F): 97 (09 Jun 2022 22:40), Max: 97 (09 Jun 2022 22:40)  HR: 83 (10 Crow 2022 06:55) (78 - 132)  BP: 118/60 (10 Crow 2022 06:55) (50/68 - 202/94)  BP(mean): 83 (10 Crow 2022 06:55) (46 - 92)  ABP: --  ABP(mean): --  RR: 20 (10 Crow 2022 04:54) (20 - 31)  SpO2: 99% (10 Crow 2022 04:55) (95% - 100%)      CONSTITUTIONAL:  IN NAD    ENT:   Airway patent,   Mouth with normal mucosa.   +ET     CARDIAC:   Normal rate,   Regular rhythm.      RESPIRATORY:   No wheezing  Bilateral BS   Not tachypneic,  No use of accessory muscles    GASTROINTESTINAL:  Abdomen soft,   Non-tender,   No guarding,   + BS      NEUROLOGICAL:   Sedated   No motor deficits.    SKIN:   Skin normal color for race,   No evidence of rash.                  LABS:                          14.5   19.89 )-----------( 446      ( 09 Jun 2022 22:55 )             44.9                                               06-09    140  |  99  |  31<H>  ----------------------------<  238<H>  4.9   |  29  |  0.9    Ca    10.1      09 Jun 2022 22:55    TPro  7.1  /  Alb  4.5  /  TBili  0.3  /  DBili  x   /  AST  29  /  ALT  22  /  AlkPhos  54  06-09      PT/INR - ( 09 Jun 2022 22:55 )   PT: 10.80 sec;   INR: 0.94 ratio         PTT - ( 09 Jun 2022 22:55 )  PTT:26.6 sec                                           CARDIAC MARKERS ( 09 Jun 2022 22:55 )  x     / 0.02 ng/mL / x     / x     / x                                                LIVER FUNCTIONS - ( 09 Jun 2022 22:55 )  Alb: 4.5 g/dL / Pro: 7.1 g/dL / ALK PHOS: 54 U/L / ALT: 22 U/L / AST: 29 U/L / GGT: x                                                                                               Mode: AC/ CMV (Assist Control/ Continuous Mandatory Ventilation)  RR (machine): 20  TV (machine): 350  FiO2: 40  PEEP: 5  ITime: 1  MAP: 10  PIP: 31                                      ABG - ( 10 Crow 2022 04:25 )  pH, Arterial: 7.34  pH, Blood: x     /  pCO2: 56    /  pO2: 167   / HCO3: 30    / Base Excess: 3.0   /  SaO2: 100.0               X-Rays reviewed                                                                                     ECHO      MEDICATIONS  (STANDING):  enoxaparin Injectable 40 milliGRAM(s) SubCutaneous every 24 hours  fentaNYL   Infusion. 0.5 MICROgram(s)/kG/Hr (3.5 mL/Hr) IV Continuous <Continuous>  methylPREDNISolone sodium succinate Injectable 60 milliGRAM(s) IV Push every 12 hours  midazolam Infusion 0.02 mG/kG/Hr (1 mL/Hr) IV Continuous <Continuous>  norepinephrine Infusion 0.05 MICROgram(s)/kG/Min (4.69 mL/Hr) IV Continuous <Continuous>  polyethylene glycol 3350 17 Gram(s) Oral daily  senna 2 Tablet(s) Oral at bedtime    MEDICATIONS  (PRN):  acetaminophen     Tablet .. 650 milliGRAM(s) Oral every 6 hours PRN Temp greater or equal to 38C (100.4F), Mild Pain (1 - 3)

## 2022-06-10 NOTE — ED PROCEDURE NOTE - CPROC ED POST RADIOGRAPHY1
post-procedure radiography performed/positive pneumothorax/chest tube in correct position
post-procedure radiography performed/gastric tube in stomach/duodenum

## 2022-06-10 NOTE — CONSULT NOTE ADULT - ATTENDING COMMENTS
I, David Tejada reviewed the diagnostic images of patient Leatha Peralta on 06/10/22 and agreed with Dr. Prakash’s clinical note, physical examination, and treatment plan. Ms. Peralta is an 88-year-old female admitted with diagnosis of COPD exacerbation, required intubation and ventilatory support, developed large right pneumothorax treated with percutaneous pleural drain with adequate lung re-expansion. Thoracic surgery consulted for assistance in care. Currently in low ventilatory support. CXR: drain in good position, no residual pneumothorax. Plan: continue drain to suction.

## 2022-06-11 NOTE — PROGRESS NOTE ADULT - ATTENDING COMMENTS
Attending Statement: I have personally performed a face to face diagnostic evaluation on this patient. The patient is suffering from:  Acute on Chronic Hypercapnic Respiratory Failure   COPD exacerbation   Secondary Spontaneous Pneumothorax S  I have made amendments to the documentation where necessary. I have personally seen and examined this patient.  I have fully participated in the care of this patient.  I have reviewed all pertinent clinical information, including history, physical exam, plan and note.

## 2022-06-11 NOTE — PROGRESS NOTE ADULT - ASSESSMENT
88yF w/ PMHx stated above  who presented with acute respiratory failure 2/2 COPD exacerbation. CXR post intubation showed right pneumothorax. Pigtail catheter placed successfully by ED. Physical exam findings, imaging, and labs as documented above.     PLAN:  - keep pigtail to suction - no airleak  - AM CXR  - monitor vent settings  -management per ICU team  -will follow    [id number]

## 2022-06-11 NOTE — PROGRESS NOTE ADULT - SUBJECTIVE AND OBJECTIVE BOX
GENERAL SURGERY PROGRESS NOTE    Patient: KIMBERLI LEIGH , 88y (34)Female   MRN: [id number]  Location: [facility]  A  Visit: 06-10-22 Inpatient  Date: 22 @ 12:21    Hospital Day #: 3  Post-Op Day #:    Procedure/Dx/Injuries: Acute respiratory failure, right ptx    Events of past 24 hours: Right sided pigtail placed by ED with appropriate lung re-expansion, CT surgery consulted for management of pigtail.     PAST MEDICAL & SURGICAL HISTORY:  COPD (chronic obstructive pulmonary disease)  GERD (gastroesophageal reflux disease)  Glaucoma  Anxiety  Pancreatitis  History of tonsillectomy  H/O colonoscopy  5 years ago      Vitals:   T(F): 98 (22 @ 08:00), Max: 98 (22 @ 08:00)  HR: 130 (22 @ 11:00)  BP: 153/70 (22 @ 11:00)  RR: 18 (22 @ 11:00)  SpO2: 99% (22 @ 11:00)  Mode: AC/ CMV (Assist Control/ Continuous Mandatory Ventilation), RR (machine): 16, TV (machine): 350, FiO2: 30, PEEP: 5, ITime: 1, MAP: 10, PIP: 22    Diet, NPO with Tube Feed:   Tube Feeding Modality: Orogastric  Jevity 1.2 Agus  Total Volume for 24 Hours (mL): 1080  Continuous  Starting Tube Feed Rate mL per Hour: 20  Increase Tube Feed Rate by (mL): 10     Every 4 hours  Until Goal Tube Feed Rate (mL per Hour): 45  Tube Feed Duration (in Hours): 24  Tube Feed Start Time: 12:00  Bolus   Total Volume per Flush (mL): 150   Frequency: Every 4 Hours      Fluids: lactated ringers Bolus:   500 milliLiter(s), IV Bolus, once, infuse over 30 Minute(s), Stop After 1 Doses  lactated ringers.: Solution, 1000 milliLiter(s) infuse at 75 mL/Hr, Stop After 12 Hours      I & O's:    06-10-22 @ 07:01  -  22 @ 07:00  --------------------------------------------------------  IN:    Dexmedetomidine: 84.3 mL    FentaNYL: 107.8 mL    IV PiggyBack: 250 mL    Jevity 1.2: 200 mL    Lactated Ringers: 300 mL    Norepinephrine: 157.6 mL  Total IN: 1099.7 mL    OUT:    Chest Tube (mL): 5 mL    Indwelling Catheter - Urethral (mL): 970 mL  Total OUT: 975 mL    Total NET: 124.7 mL      PHYSICAL EXAM:  GENERAL: NAD, sedated  CHEST/LUNG: Mechanically ventilated, Lungs Clear to auscultation bilaterally, right sided pigtail catheter in place, symmetric chest rise, saturating well   HEART: Regular rate and rhythm  ABDOMEN: Soft, Nontender, Nondistended;   EXTREMITIES:  No clubbing, cyanosis, or edema    MEDICATIONS  (STANDING):  azithromycin  IVPB 500 milliGRAM(s) IV Intermittent every 24 hours  chlorhexidine 0.12% Liquid 15 milliLiter(s) Oral Mucosa two times a day  chlorhexidine 4% Liquid 1 Application(s) Topical <User Schedule>  dexMEDEtomidine Infusion 0.05 MICROgram(s)/kG/Hr (0.63 mL/Hr) IV Continuous <Continuous>  enoxaparin Injectable 40 milliGRAM(s) SubCutaneous every 24 hours  fentaNYL   Infusion. 0.5 MICROgram(s)/kG/Hr (3.5 mL/Hr) IV Continuous <Continuous>  lactated ringers Bolus 500 milliLiter(s) IV Bolus once  lactated ringers. 1000 milliLiter(s) (75 mL/Hr) IV Continuous <Continuous>  methylPREDNISolone sodium succinate Injectable 60 milliGRAM(s) IV Push every 24 hours  norepinephrine Infusion 0.05 MICROgram(s)/kG/Min (4.69 mL/Hr) IV Continuous <Continuous>  pantoprazole   Suspension 40 milliGRAM(s) Oral before breakfast  polyethylene glycol 3350 17 Gram(s) Oral daily  senna 2 Tablet(s) Oral at bedtime    MEDICATIONS  (PRN):  acetaminophen     Tablet .. 650 milliGRAM(s) Oral every 6 hours PRN Temp greater or equal to 38C (100.4F), Mild Pain (1 - 3)      DVT PROPHYLAXIS: enoxaparin Injectable 40 milliGRAM(s) SubCutaneous every 24 hours    GI PROPHYLAXIS: pantoprazole   Suspension 40 milliGRAM(s) Oral before breakfast    ANTICOAGULATION:   ANTIBIOTICS:  azithromycin  IVPB 500 milliGRAM(s)      LAB/STUDIES:  Labs:  CAPILLARY BLOOD GLUCOSE                          12.8   25.58 )-----------( 343      ( 2022 04:38 )             38.6       Auto Neutrophil %: 86.1 % (22 @ 04:38)  Auto Immature Granulocyte %: 0.5 % (22 @ 04:38)        140  |  102  |  42<H>  ----------------------------<  156<H>  5.3<H>   |  24  |  1.0    Calcium, Total Serum: 8.9 mg/dL (22 @ 04:38)    LFTs:             5.6  | 0.3  | 26       ------------------[41      ( 2022 04:38 )  3.6  | x    | 23           Blood Gas Arterial, Lactate: 0.80 mmol/L (22 @ 02:53)  Blood Gas Arterial, Lactate: 1.40 mmol/L (06-10-22 @ 04:25)  Blood Gas Venous - Lactate: 1.00 mmol/L (06-10-22 @ 00:10)  Blood Gas Venous - Lactate: 1.40 mmol/L (22 @ 22:40)    ABG - ( 2022 02:53 )  pH: 7.36  /  pCO2: 53    /  pO2: 118   / HCO3: 30    / Base Excess: 3.2   /  SaO2: 99.0      ABG - ( 10 Crow 2022 04:25 )  pH: 7.34  /  pCO2: 56    /  pO2: 167   / HCO3: 30    / Base Excess: 3.0   /  SaO2: 100.0       Coags:     9.80   ----< 0.85    ( 10 Crow 2022 08:13 )     26.6        CARDIAC MARKERS ( 10 Crow 2022 10:30 )  x     / 0.25 ng/mL / x     / x     / x      CARDIAC MARKERS ( 10 Crow 2022 08:13 )  x     / 0.31 ng/mL / x     / x     / x      CARDIAC MARKERS ( 2022 22:55 )  x     / 0.02 ng/mL / x     / x     / x          Serum Pro-Brain Natriuretic Peptide: 254 pg/mL (22 @ 22:55)  Urinalysis Basic - ( 10 Crow 2022 17:46 )    Color: Yellow / Appearance: Clear / S.024 / pH: x  Gluc: x / Ketone: Trace  / Bili: Negative / Urobili: <2 mg/dL   Blood: x / Protein: 100 mg/dL / Nitrite: Negative   Leuk Esterase: Negative / RBC: 3 /HPF / WBC 1 /HPF   Sq Epi: x / Non Sq Epi: 1 /HPF / Bacteria: Negative    Culture - Blood (collected 2022 23:40)  Source: .Blood Blood-Peripheral  Preliminary Report (2022 09:01):    No growth to date.    Culture - Blood (collected 2022 23:25)  Source: .Blood Blood-Peripheral  Preliminary Report (2022 09:01):    No growth to date.      IMAGING:  < from: Xray Chest 1 View- PORTABLE-Routine (Xray Chest 1 View- PORTABLE-Routine in AM.) (22 @ 06:25) >    Impression:  1.  Support lines/tubes, as described.  2.  No pneumothorax is seen.  3.  No other radiographic evidence of acute cardiopulmonary disease.        --- End of Report ---    < end of copied text >      ACCESS/ DEVICES:  [x ] Peripheral IV

## 2022-06-11 NOTE — PROGRESS NOTE ADULT - SUBJECTIVE AND OBJECTIVE BOX
Patient is a 88y old  Female who presents with a chief complaint of respiratory distress (10 Crow 2022 07:41)      Over Night Events:        PHYSICAL EXAM    ICU Vital Signs Last 24 Hrs  T(C): 36.4 (2022 04:00), Max: 36.4 (2022 04:00)  T(F): 97.6 (2022 04:00), Max: 97.6 (2022 04:00)  HR: 58 (:00) (56 - 148)  BP: 95/51 (:00) (64/38 - 163/87)  BP(mean): 71 (:00) (50 - 120)  RR: 27 (:) (7 - 32)  SpO2: 99% (:) (95% - 100%)      CONSTITUTIONAL:   Ill appearing.  NAD    ENT:   Airway patent,   Mouth with normal mucosa.   No thrush    EYES:   Pupils equal,   Round and reactive to light.    CARDIAC:   Normal rate,   Regular rhythm.    No edema      Vascular:  Normal systolic impulse  No Carotid bruits    RESPIRATORY:   No wheezing  Bilateral BS  Normal chest expansion  Not tachypneic,  No use of accessory muscles    GASTROINTESTINAL:  Abdomen soft,   Non-tender,   No guarding,   + BS    GENITOURINARY  normal genitalia for sex  no edema    MUSCULOSKELETAL:   Range of motion is not limited,  No clubbing, cyanosis    NEUROLOGICAL:   Alert and oriented   No motor  deficits.  pertinent DTRs normal    SKIN:   Skin normal color for race,   Warm and dry  No evidence of rash.    PSYCHIATRIC:   Normal mood and affect.   No apparent risk to self or others.    HEMATOLOGICAL:  No cervical  lymphadenopathy.  no inguinal lymphadenopathy      06-10-22 @ 07:01  -  22 @ 07:00  --------------------------------------------------------  IN:    Dexmedetomidine: 84.3 mL    FentaNYL: 107.8 mL    IV PiggyBack: 250 mL    Jevity 1.2: 200 mL    Lactated Ringers: 300 mL    Norepinephrine: 157.6 mL  Total IN: 1099.7 mL    OUT:    Chest Tube (mL): 5 mL    Indwelling Catheter - Urethral (mL): 970 mL  Total OUT: 975 mL    Total NET: 124.7 mL          LABS:                            12.8   25.58 )-----------( 343      ( 2022 04:38 )             38.6                                               06-11    140  |  102  |  42<H>  ----------------------------<  156<H>  5.3<H>   |  24  |  1.0    Ca    8.9      2022 04:38  Mg     2.5     06-11    TPro  5.6<L>  /  Alb  3.6  /  TBili  0.3  /  DBili  x   /  AST  26  /  ALT  23  /  AlkPhos  41  06-11      PT/INR - ( 10 Crow 2022 08:13 )   PT: 9.80 sec;   INR: 0.85 ratio         PTT - ( 10 Crow 2022 08:13 )  PTT:26.6 sec                                       Urinalysis Basic - ( 10 Crow 2022 17:46 )    Color: Yellow / Appearance: Clear / S.024 / pH: x  Gluc: x / Ketone: Trace  / Bili: Negative / Urobili: <2 mg/dL   Blood: x / Protein: 100 mg/dL / Nitrite: Negative   Leuk Esterase: Negative / RBC: 3 /HPF / WBC 1 /HPF   Sq Epi: x / Non Sq Epi: 1 /HPF / Bacteria: Negative        CARDIAC MARKERS ( 10 Crow 2022 10:30 )  x     / 0.25 ng/mL / x     / x     / x      CARDIAC MARKERS ( 10 Crow 2022 08:13 )  x     / 0.31 ng/mL / x     / x     / x      CARDIAC MARKERS ( 2022 22:55 )  x     / 0.02 ng/mL / x     / x     / x                                                LIVER FUNCTIONS - ( 2022 04:38 )  Alb: 3.6 g/dL / Pro: 5.6 g/dL / ALK PHOS: 41 U/L / ALT: 23 U/L / AST: 26 U/L / GGT: x                                                                                               Mode: AC/ CMV (Assist Control/ Continuous Mandatory Ventilation)  RR (machine): 16  TV (machine): 350  FiO2: 30  PEEP: 5  ITime: 1  MAP: 9  PIP: 28                                      ABG - ( 2022 02:53 )  pH, Arterial: 7.36  pH, Blood: x     /  pCO2: 53    /  pO2: 118   / HCO3: 30    / Base Excess: 3.2   /  SaO2: 99.0                MEDICATIONS  (STANDING):  azithromycin  IVPB 500 milliGRAM(s) IV Intermittent every 24 hours  chlorhexidine 0.12% Liquid 15 milliLiter(s) Oral Mucosa two times a day  chlorhexidine 4% Liquid 1 Application(s) Topical <User Schedule>  dexMEDEtomidine Infusion 0.05 MICROgram(s)/kG/Hr (0.63 mL/Hr) IV Continuous <Continuous>  enoxaparin Injectable 40 milliGRAM(s) SubCutaneous every 24 hours  fentaNYL   Infusion. 0.5 MICROgram(s)/kG/Hr (3.5 mL/Hr) IV Continuous <Continuous>  lactated ringers. 1000 milliLiter(s) (75 mL/Hr) IV Continuous <Continuous>  methylPREDNISolone sodium succinate Injectable 60 milliGRAM(s) IV Push every 24 hours  norepinephrine Infusion 0.05 MICROgram(s)/kG/Min (4.69 mL/Hr) IV Continuous <Continuous>  pantoprazole   Suspension 40 milliGRAM(s) Oral before breakfast  polyethylene glycol 3350 17 Gram(s) Oral daily  senna 2 Tablet(s) Oral at bedtime    MEDICATIONS  (PRN):  acetaminophen     Tablet .. 650 milliGRAM(s) Oral every 6 hours PRN Temp greater or equal to 38C (100.4F), Mild Pain (1 - 3)          CXR interpreted by me:  ETT in place, right pigtail in place, near resolution of right sided pneumothorax     Patient is a 88y old  Female who presents with a chief complaint of respiratory distress (10 Crow 2022 07:41)      Over Night Events: no acute events overnight, sedated on fentanyl, precedex, on pressors Levo 0.07, LR@75       PHYSICAL EXAM    ICU Vital Signs Last 24 Hrs  T(C): 36.4 (2022 04:00), Max: 36.4 (2022 04:00)  T(F): 97.6 (2022 04:00), Max: 97.6 (2022 04:00)  HR: 58 (:00) (56 - 148)  BP: 95/51 (:00) (64/38 - 163/87)  BP(mean): 71 (:) (50 - 120)  RR: 27 (:00) (7 - 32)  SpO2: 99% (:00) (95% - 100%)      CONSTITUTIONAL:   Ill appearing.  NAD    ENT:   Airway patent,   Mouth with normal mucosa.   No thrush    EYES:   Pupils equal,   Round and reactive to light.    CARDIAC:   Normal rate,   Regular rhythm.    No edema      Vascular:  Normal systolic impulse  No Carotid bruits    RESPIRATORY:   No wheezing  Bilateral BS  Normal chest expansion  Not tachypneic,  No use of accessory muscles    GASTROINTESTINAL:  Abdomen soft,   Non-tender,   No guarding,   + BS    GENITOURINARY  normal genitalia for sex  no edema    MUSCULOSKELETAL:   Range of motion is not limited,  No clubbing, cyanosis    NEUROLOGICAL:   follows command    SKIN:   Skin normal color for race,   Warm and dry  No evidence of rash.        06-10-22 @ 07:01  -  22 @ 07:00  --------------------------------------------------------  IN:    Dexmedetomidine: 84.3 mL    FentaNYL: 107.8 mL    IV PiggyBack: 250 mL    Jevity 1.2: 200 mL    Lactated Ringers: 300 mL    Norepinephrine: 157.6 mL  Total IN: 1099.7 mL    OUT:    Chest Tube (mL): 5 mL    Indwelling Catheter - Urethral (mL): 970 mL  Total OUT: 975 mL    Total NET: 124.7 mL          LABS:                            12.8   25.58 )-----------( 343      ( 2022 04:38 )             38.6                                               06-11    140  |  102  |  42<H>  ----------------------------<  156<H>  5.3<H>   |  24  |  1.0    Ca    8.9      2022 04:38  Mg     2.5     06-11    TPro  5.6<L>  /  Alb  3.6  /  TBili  0.3  /  DBili  x   /  AST  26  /  ALT  23  /  AlkPhos  41  06-11      PT/INR - ( 10 Crow 2022 08:13 )   PT: 9.80 sec;   INR: 0.85 ratio         PTT - ( 10 Crow 2022 08:13 )  PTT:26.6 sec                                       Urinalysis Basic - ( 10 Crow 2022 17:46 )    Color: Yellow / Appearance: Clear / S.024 / pH: x  Gluc: x / Ketone: Trace  / Bili: Negative / Urobili: <2 mg/dL   Blood: x / Protein: 100 mg/dL / Nitrite: Negative   Leuk Esterase: Negative / RBC: 3 /HPF / WBC 1 /HPF   Sq Epi: x / Non Sq Epi: 1 /HPF / Bacteria: Negative        CARDIAC MARKERS ( 10 Crow 2022 10:30 )  x     / 0.25 ng/mL / x     / x     / x      CARDIAC MARKERS ( 10 Crow 2022 08:13 )  x     / 0.31 ng/mL / x     / x     / x      CARDIAC MARKERS ( 2022 22:55 )  x     / 0.02 ng/mL / x     / x     / x                                                LIVER FUNCTIONS - ( 2022 04:38 )  Alb: 3.6 g/dL / Pro: 5.6 g/dL / ALK PHOS: 41 U/L / ALT: 23 U/L / AST: 26 U/L / GGT: x                                                                                               Mode: AC/ CMV (Assist Control/ Continuous Mandatory Ventilation)  RR (machine): 16  TV (machine): 350  FiO2: 30  PEEP: 5  ITime: 1  MAP: 9  PIP: 28                                      ABG - ( 2022 02:53 )  pH, Arterial: 7.36  pH, Blood: x     /  pCO2: 53    /  pO2: 118   / HCO3: 30    / Base Excess: 3.2   /  SaO2: 99.0              MEDICATIONS  (STANDING):  azithromycin  IVPB 500 milliGRAM(s) IV Intermittent every 24 hours  chlorhexidine 0.12% Liquid 15 milliLiter(s) Oral Mucosa two times a day  chlorhexidine 4% Liquid 1 Application(s) Topical <User Schedule>  dexMEDEtomidine Infusion 0.05 MICROgram(s)/kG/Hr (0.63 mL/Hr) IV Continuous <Continuous>  enoxaparin Injectable 40 milliGRAM(s) SubCutaneous every 24 hours  fentaNYL   Infusion. 0.5 MICROgram(s)/kG/Hr (3.5 mL/Hr) IV Continuous <Continuous>  lactated ringers. 1000 milliLiter(s) (75 mL/Hr) IV Continuous <Continuous>  methylPREDNISolone sodium succinate Injectable 60 milliGRAM(s) IV Push every 24 hours  norepinephrine Infusion 0.05 MICROgram(s)/kG/Min (4.69 mL/Hr) IV Continuous <Continuous>  pantoprazole   Suspension 40 milliGRAM(s) Oral before breakfast  polyethylene glycol 3350 17 Gram(s) Oral daily  senna 2 Tablet(s) Oral at bedtime    MEDICATIONS  (PRN):  acetaminophen     Tablet .. 650 milliGRAM(s) Oral every 6 hours PRN Temp greater or equal to 38C (100.4F), Mild Pain (1 - 3)          CXR interpreted by me:  ETT in place, right pigtail in place, near resolution of right sided pneumothorax

## 2022-06-11 NOTE — PROGRESS NOTE ADULT - ASSESSMENT
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  Add Precedex.  Wean Fentanyl     HEENT: Oral care    PULMONARY:  HOB @ 45 degrees.  Vent changes as follows: RR 16.  FiO2 30.  Chest tube to suction.  Daily CXR.  Nebs Q4 and PRN.  Solumedrol 60 mg daily     CARDIOVASCULAR:  Avoid overload.      GI: GI prophylaxis.  OG Feeding.  Bowel regimen     RENAL:  Follow up lytes.  Correct as needed    INFECTIOUS DISEASE: Follow up cultures.  Procal.  Azithromycin     HEMATOLOGICAL:  DVT prophylaxis.  Dimer.      ENDOCRINE:  Follow up FS.  Insulin protocol if needed.    MUSCULOSKELETAL:  Bed rest     ARIANNA salguero at the bed side      IMPRESSION:    Acute on Chronic Hypercapnic Respiratory Failure   COPD exacerbation   Secondary Spontaneous Pneumothorax SP pig tail     PLAN:    CNS: SAT. Precedex for agitation    HEENT: Oral care    PULMONARY:  HOB @ 45 degrees.  no vent changes, chest tube to suction.  Daily CXR.  Nebs Q4 and PRN.  Solumedrol 60 mg daily     CARDIOVASCULAR:  Avoid overload.  wean down Levophed, trend CE    GI: GI prophylaxis.  OG Feeding.  Bowel regiment     RENAL:  Follow up lytes.  Correct as needed    INFECTIOUS DISEASE: Follow up cultures.  Procal 0.51.  complete Azithromycin     HEMATOLOGICAL:  DVT prophylaxis.  Dimer 579    ENDOCRINE:  Follow up FS.  Insulin protocol if needed.    MUSCULOSKELETAL:  Bed rest     MICU monitoring     GOC   IMPRESSION:    Acute on Chronic Hypercapnic Respiratory Failure   COPD exacerbation   Secondary Spontaneous Pneumothorax SP pig tail     PLAN:    CNS: SAT. Precedex for agitation    HEENT: Oral care    PULMONARY:  HOB @ 45 degrees.  no vent changes, chest tube to suction.  Daily CXR.  Nebs Q4 and PRN.  Solumedrol 60 mg daily     CARDIOVASCULAR:  Avoid overload.  wean down Levophed, trend CE, 500cc LR bolus, continue IVF    GI: GI prophylaxis.  OG Feeding.  Bowel regiment     RENAL:  Follow up lytes.  Correct as needed. RBUS monitor UO.    INFECTIOUS DISEASE: Follow up cultures.  Procal 0.51.  complete Azithromycin     HEMATOLOGICAL:  DVT prophylaxis.  Dimer 579    ENDOCRINE:  Follow up FS.  Insulin protocol if needed.    MUSCULOSKELETAL:  Bed rest     MICU monitoring     GOC

## 2022-06-12 NOTE — DIETITIAN INITIAL EVALUATION ADULT - OTHER INFO
Pertinent Medical Information: Admitted with respiratory distress. Acute hypercapnic respiratory failure; COPD exacerbation. s/p intubation. R 2/2 spontaneous pneumothorax s/p pigtail. GERD noted. Lucio's esophagus noted. Tmax 24 hours 37.8. VE 10.2.

## 2022-06-12 NOTE — PROGRESS NOTE ADULT - SUBJECTIVE AND OBJECTIVE BOX
Patient is a 88y old  Female who presents with a chief complaint of respiratory distress (2022 02:32)        Over Night Events:        ROS:  See HPI    PHYSICAL EXAM    ICU Vital Signs Last 24 Hrs  T(C): 36.4 (2022 04:00), Max: 37.8 (2022 16:00)  T(F): 97.5 (2022 04:00), Max: 100 (2022 16:00)  HR: 64 (2022 06:00) (58 - 150)  BP: 74/52 (2022 06:00) (65/40 - 181/165)  BP(mean): 69 (2022 06:00) (42 - 171)  ABP: --  ABP(mean): --  RR: 16 (2022 06:00) (15 - 35)  SpO2: 97% (2022 06:00) (95% - 100%)      22 @ 07:01  -  - @ 07:00  --------------------------------------------------------  IN:    Dexmedetomidine: 226.3 mL    Enteral Tube Flush: 50 mL    FentaNYL: 196 mL    IV PiggyBack: 250 mL    Jevity 1.2: 1320 mL    Lactated Ringers: 675 mL    Lactated Ringers: 1125 mL    Lactated Ringers Bolus: 500 mL    Norepinephrine: 60.7 mL  Total IN: 4403 mL    OUT:    Chest Tube (mL): 28 mL    Indwelling Catheter - Urethral (mL): 510 mL  Total OUT: 538 mL    Total NET: 3865 mL            CONSTITUTIONAL:  ill appearing .    ENT:   ETT in place  No thrush    EYES:   Clear bilaterally,   pupils equal,   round and reactive to light.    CARDIAC:   Normal rate,   regular rhythm.    no edema      CAROTID:   normal systolic impulse  no bruits    RESPIRATORY:   No wheezing  Normal chest expansion  Not tachypneic,  No use of accessory muscles    GASTROINTESTINAL:  Abdomen soft,   non-tender,   no guarding,   + BS    MUSCULOSKELETAL:   range of motion is not limited,  no clubbing, cyanosis    NEUROLOGICAL:   Alert and oriented   no motor deficits.    SKIN:   Skin normal color for race,   No evidence of rash.    PSYCHIATRIC:   normal mood and affect.   no apparent risk to self or others.        LABS:                            11.8   19.12 )-----------( 277      ( 2022 04:40 )             36.0                                               06-11    140  |  102  |  42<H>  ----------------------------<  156<H>  5.3<H>   |  24  |  1.0    Ca    8.9      2022 04:38  Mg     2.5         TPro  5.6<L>  /  Alb  3.6  /  TBili  0.3  /  DBili  x   /  AST  26  /  ALT  23  /  AlkPhos  41  -11      PT/INR - ( 10 Crow 2022 08:13 )   PT: 9.80 sec;   INR: 0.85 ratio         PTT - ( 10 Crow 2022 08:13 )  PTT:26.6 sec                                       Urinalysis Basic - ( 10 Crow 2022 17:46 )    Color: Yellow / Appearance: Clear / S.024 / pH: x  Gluc: x / Ketone: Trace  / Bili: Negative / Urobili: <2 mg/dL   Blood: x / Protein: 100 mg/dL / Nitrite: Negative   Leuk Esterase: Negative / RBC: 3 /HPF / WBC 1 /HPF   Sq Epi: x / Non Sq Epi: 1 /HPF / Bacteria: Negative        CARDIAC MARKERS ( 2022 18:43 )  x     / 0.09 ng/mL / x     / x     / x      CARDIAC MARKERS ( 10 Crow 2022 10:30 )  x     / 0.25 ng/mL / x     / x     / x      CARDIAC MARKERS ( 10 Crow 2022 08:13 )  x     / 0.31 ng/mL / x     / x     / x                                                LIVER FUNCTIONS - ( 2022 04:38 )  Alb: 3.6 g/dL / Pro: 5.6 g/dL / ALK PHOS: 41 U/L / ALT: 23 U/L / AST: 26 U/L / GGT: x                    Procalcitonin, Serum: 0.52 ng/mL (06-10-22 @ 08:13)  D-Dimer Assay, Quantitative: 579 ng/mL DDU (06-10-22 @ 08:13)                                        Culture - Urine (collected 10 Crow 2022 17:46)  Source: Clean Catch Clean Catch (Midstream)  Final Report (2022 21:28):    No growth    Culture - Blood (collected 2022 23:40)  Source: .Blood Blood-Peripheral  Preliminary Report (2022 09:01):    No growth to date.    Culture - Blood (collected 2022 23:25)  Source: .Blood Blood-Peripheral  Preliminary Report (2022 09:01):    No growth to date.                                                   Mode: AC/ CMV (Assist Control/ Continuous Mandatory Ventilation)  RR (machine): 16  TV (machine): 350  FiO2: 30  PEEP: 5  ITime: 1  MAP: 10  PIP: 28                                      ABG - ( 2022 02:38 )  pH, Arterial: 7.38  pH, Blood: x     /  pCO2: 55    /  pO2: 95    / HCO3: 32    / Base Excess: 6.0   /  SaO2: 99.7                MEDICATIONS  (STANDING):  azithromycin  IVPB 500 milliGRAM(s) IV Intermittent every 24 hours  chlorhexidine 0.12% Liquid 15 milliLiter(s) Oral Mucosa two times a day  chlorhexidine 4% Liquid 1 Application(s) Topical <User Schedule>  dexMEDEtomidine Infusion 0.05 MICROgram(s)/kG/Hr (0.63 mL/Hr) IV Continuous <Continuous>  enoxaparin Injectable 40 milliGRAM(s) SubCutaneous every 24 hours  fentaNYL   Infusion. 0.5 MICROgram(s)/kG/Hr (3.5 mL/Hr) IV Continuous <Continuous>  lactated ringers. 1000 milliLiter(s) (75 mL/Hr) IV Continuous <Continuous>  methylPREDNISolone sodium succinate Injectable 60 milliGRAM(s) IV Push every 24 hours  norepinephrine Infusion 0.05 MICROgram(s)/kG/Min (4.69 mL/Hr) IV Continuous <Continuous>  pantoprazole   Suspension 40 milliGRAM(s) Oral before breakfast  polyethylene glycol 3350 17 Gram(s) Oral daily  senna 2 Tablet(s) Oral at bedtime    MEDICATIONS  (PRN):  acetaminophen     Tablet .. 650 milliGRAM(s) Oral every 6 hours PRN Temp greater or equal to 38C (100.4F), Mild Pain (1 - 3)      Xrays:                                                                                     ECHO     Patient is a 88y old  Female who presents with a chief complaint of respiratory distress (2022 02:32)        Over Night Events:    Hospital day 2. Remains intubated. Has right chest tube.    ROS:  See HPI    PHYSICAL EXAM    ICU Vital Signs Last 24 Hrs  T(C): 36.4 (2022 04:00), Max: 37.8 (2022 16:00)  T(F): 97.5 (2022 04:00), Max: 100 (2022 16:00)  HR: 64 (2022 06:00) (58 - 150)  BP: 74/52 (2022 06:00) (65/40 - 181/165)  BP(mean): 69 (2022 06:00) (42 - 171)  ABP: --  ABP(mean): --  RR: 16 (2022 06:00) (15 - 35)  SpO2: 97% (2022 06:00) (95% - 100%)      22 @ 07:01  -  - @ 07:00  --------------------------------------------------------  IN:    Dexmedetomidine: 226.3 mL    Enteral Tube Flush: 50 mL    FentaNYL: 196 mL    IV PiggyBack: 250 mL    Jevity 1.2: 1320 mL    Lactated Ringers: 675 mL    Lactated Ringers: 1125 mL    Lactated Ringers Bolus: 500 mL    Norepinephrine: 60.7 mL  Total IN: 4403 mL    OUT:    Chest Tube (mL): 28 mL    Indwelling Catheter - Urethral (mL): 510 mL  Total OUT: 538 mL    Total NET: 3865 mL            CONSTITUTIONAL:  ill appearing .    ENT:   ETT in place  No thrush    EYES:   Clear bilaterally,   pupils equal,   round and reactive to light.    CARDIAC:   Normal rate,   regular rhythm.    no edema      CAROTID:   normal systolic impulse  no bruits    RESPIRATORY:   No wheezing  Normal chest expansion  Not tachypneic,  No use of accessory muscles    GASTROINTESTINAL:  Abdomen soft,   non-tender,   no guarding,   + BS    MUSCULOSKELETAL:   range of motion is not limited,  no clubbing, cyanosis    NEUROLOGICAL:   Alert and oriented   no motor deficits.    SKIN:   Skin normal color for race,   No evidence of rash.    PSYCHIATRIC:   normal mood and affect.   no apparent risk to self or others.        LABS:                            11.8   19.12 )-----------( 277      ( 2022 04:40 )             36.0                                               06-11    140  |  102  |  42<H>  ----------------------------<  156<H>  5.3<H>   |  24  |  1.0    Ca    8.9      2022 04:38  Mg     2.5         TPro  5.6<L>  /  Alb  3.6  /  TBili  0.3  /  DBili  x   /  AST  26  /  ALT  23  /  AlkPhos  41  11      PT/INR - ( 10 Crow 2022 08:13 )   PT: 9.80 sec;   INR: 0.85 ratio         PTT - ( 10 Crow 2022 08:13 )  PTT:26.6 sec                                       Urinalysis Basic - ( 10 Crow 2022 17:46 )    Color: Yellow / Appearance: Clear / S.024 / pH: x  Gluc: x / Ketone: Trace  / Bili: Negative / Urobili: <2 mg/dL   Blood: x / Protein: 100 mg/dL / Nitrite: Negative   Leuk Esterase: Negative / RBC: 3 /HPF / WBC 1 /HPF   Sq Epi: x / Non Sq Epi: 1 /HPF / Bacteria: Negative        CARDIAC MARKERS ( 2022 18:43 )  x     / 0.09 ng/mL / x     / x     / x      CARDIAC MARKERS ( 10 Crow 2022 10:30 )  x     / 0.25 ng/mL / x     / x     / x      CARDIAC MARKERS ( 10 Crow 2022 08:13 )  x     / 0.31 ng/mL / x     / x     / x                                                LIVER FUNCTIONS - ( 2022 04:38 )  Alb: 3.6 g/dL / Pro: 5.6 g/dL / ALK PHOS: 41 U/L / ALT: 23 U/L / AST: 26 U/L / GGT: x                    Procalcitonin, Serum: 0.52 ng/mL (06-10-22 @ 08:13)  D-Dimer Assay, Quantitative: 579 ng/mL DDU (06-10-22 @ 08:13)                                        Culture - Urine (collected 10 Crow 2022 17:46)  Source: Clean Catch Clean Catch (Midstream)  Final Report (2022 21:28):    No growth    Culture - Blood (collected 2022 23:40)  Source: .Blood Blood-Peripheral  Preliminary Report (2022 09:01):    No growth to date.    Culture - Blood (collected 2022 23:25)  Source: .Blood Blood-Peripheral  Preliminary Report (2022 09:01):    No growth to date.                                                   Mode: AC/ CMV (Assist Control/ Continuous Mandatory Ventilation)  RR (machine): 16  TV (machine): 400  FiO2: 30  PEEP: 5  ITime: 1  MAP: 10  PIP: 28                                      ABG - ( 2022 02:38 )  pH, Arterial: 7.38  pH, Blood: x     /  pCO2: 55    /  pO2: 95    / HCO3: 32    / Base Excess: 6.0   /  SaO2: 99.7                MEDICATIONS  (STANDING):  azithromycin  IVPB 500 milliGRAM(s) IV Intermittent every 24 hours  chlorhexidine 0.12% Liquid 15 milliLiter(s) Oral Mucosa two times a day  chlorhexidine 4% Liquid 1 Application(s) Topical <User Schedule>  dexMEDEtomidine Infusion 0.05 MICROgram(s)/kG/Hr (0.63 mL/Hr) IV Continuous <Continuous>  enoxaparin Injectable 40 milliGRAM(s) SubCutaneous every 24 hours  fentaNYL   Infusion. 0.5 MICROgram(s)/kG/Hr (3.5 mL/Hr) IV Continuous <Continuous>  lactated ringers. 1000 milliLiter(s) (75 mL/Hr) IV Continuous <Continuous>  methylPREDNISolone sodium succinate Injectable 60 milliGRAM(s) IV Push every 24 hours  norepinephrine Infusion 0.05 MICROgram(s)/kG/Min (4.69 mL/Hr) IV Continuous <Continuous>  pantoprazole   Suspension 40 milliGRAM(s) Oral before breakfast  polyethylene glycol 3350 17 Gram(s) Oral daily  senna 2 Tablet(s) Oral at bedtime    MEDICATIONS  (PRN):  acetaminophen     Tablet .. 650 milliGRAM(s) Oral every 6 hours PRN Temp greater or equal to 38C (100.4F), Mild Pain (1 - 3)      Xrays:                                                                                     ECHO

## 2022-06-12 NOTE — DIETITIAN INITIAL EVALUATION ADULT - NUTRITIONGOAL OUTCOME1
Pt to demonstrate tolerance to EN regimen, meeting >85% & <105% of estimated nutrient needs over next 4 days. Pt at high nutrition risk.

## 2022-06-12 NOTE — PROGRESS NOTE ADULT - ASSESSMENT
ASSESSMENT & PLAN  HPI:  87 y/o female with PMHx of COPD not on home O2, pHTN, GERD, Lucio's esophagus, COVID vaccinated presented to the ED for acute onset of sob. As per nurse aide, pt was asymptomatic sitting on the couch, when she became acutely sob. pt was brought to ED and was tachypneic and answering questions with one word answers, and struggling to breath.    In the ED, pt was placed on bipap immediately and symptoms improved and pt looks more comfortable. ABG initially shows pCO2 115 and was placed on bipap and soon after repeat pco2 was 100. per family and patient, patient is full code and decision was made to intubate the patient. Labs are overall unremarkable except wbc 19K. She received duoneb, Mg and solumedrol 125 mg x1 in ED.     ICU Vital Signs Last 24 Hrs  T(C): 36.1 (09 Jun 2022 22:40), Max: 36.1 (09 Jun 2022 22:40)  T(F): 97 (09 Jun 2022 22:40), Max: 97 (09 Jun 2022 22:40)  HR: 100 (10 Crow 2022 01:10) (100 - 132)  BP: 151/84 (10 Crow 2022 01:10) (120/50 - 202/94)  BP(mean): --  ABP: --  ABP(mean): --  RR: 22 (10 Crwo 2022 00:30) (22 - 31)  SpO2: 100% (10 Crow 2022 01:10) (95% - 100%)   (10 Crow 2022 01:25)        # DM2  - FS:   - a1c   - home meds:   - cont lantus, lispro, ISS  - FS qac, qhs    # HTN  - BP: 89/68 (64/38 - 181/165)  - home meds:     PT/REHAB   ACTIVITY: Activity - Bedrest    DVT PPX: enoxaparin Injectable    GI PPX: pantoprazole   Suspension     DIET: Diet, NPO with Tube Feed         CODE:   Disposition:   Pending:   ASSESSMENT & PLAN  87 y/o female with PMHx of COPD not on home O2, pHTN, GERD, Lucio's esophagus, COVID vaccinated presented to the ED for acute onset of sob.    # Acute on chronic hypercapnic respiratory failure s/p intubation  # COPD exacerbation  # R secondary spontaneous pneumothorax s/p pigtail  - presented to ED with SOB, found to be hypercapnic to 115, intubated  - post intubation CXR showed R PTX s/p R pigtail catheter  - s/p CTSX eval - keep pigtail to suction, daily CXR  - cont solumedrol 60mg qd  - Nebs q4 and PRN  - vent changes per pulm  - wean levophed as tolerated  - cont IVF for now  - Procal 0.52, D-dimer 579  - WBC 19.9->20.3->25.6; afebrile  - cont azithromycin 500q24 x5d (finish 6/15)  - daily ABG, CXR, SAT    # H/o HTN  - BP: 89/68 (64/38 - 181/165)  - on amlodipine 5mg qd at home -- held for now    # GERD  # Lucio's Esophagus  - PPI    PT/REHAB: n/a  ACTIVITY: Activity - Bedrest  DVT PPX: enoxaparin Injectable  GI PPX: pantoprazole   Suspension  DIET: Diet, NPO with Tube Feed   CODE: Full  Disposition: MICU   Pending: SAT/SBT today. CTSX f/u   ASSESSMENT & PLAN  89 y/o female with PMHx of COPD not on home O2, pHTN, GERD, Lucio's esophagus, COVID vaccinated presented to the ED for acute onset of sob.    # Acute on chronic hypercapnic respiratory failure s/p intubation  # COPD exacerbation  # R secondary spontaneous pneumothorax s/p pigtail  - presented to ED with SOB, found to be hypercapnic to 115, intubated  - post intubation CXR showed R PTX s/p R pigtail catheter  - s/p CTSX eval - keep pigtail to suction, daily CXR  - cont solumedrol 60mg qd  - Nebs q4 and PRN  - vent changes per pulm  - wean levophed as tolerated  - cont IVF for now  - Procal 0.52, D-dimer 579  - WBC 19.9->20.3->25.6; afebrile  - cont azithromycin 500q24 x5d (finish 6/15)  - daily ABG, CXR, SAT  - check RVP  - SBT today -> extubate?    # H/o HTN  - BP: 89/68 (64/38 - 181/165)  - on amlodipine 5mg qd at home -- held for now    # GERD  # Lucio's Esophagus  - PPI    # Elevated D-dimer  - d-dimer 579  - check LE duplex    PT/REHAB: n/a  ACTIVITY: Activity - Bedrest  DVT PPX: enoxaparin Injectable  GI PPX: pantoprazole   Suspension  DIET: Diet, NPO with Tube Feed   CODE: Full  Disposition: MICU   Pending: SAT/SBT today. CTSX f/u. LE duplex.

## 2022-06-12 NOTE — DIETITIAN INITIAL EVALUATION ADULT - NSFNSGIIOFT_GEN_A_CORE
06-11-22 @ 07:01  -  06-12-22 @ 07:00  --------------------------------------------------------  OUT:    Chest Tube (mL): 28 mL  Total OUT: 28 mL    Total NET: 1292 mL      06-12-22 @ 07:01  -  06-13-22 @ 03:11  --------------------------------------------------------  OUT:    Chest Tube (mL): 52 mL  Total OUT: 52 mL    Total NET: 908 mL

## 2022-06-12 NOTE — PROGRESS NOTE ADULT - ASSESSMENT
ASSESSMENT:  88yF w/ PMHx stated above  who presented with acute respiratory failure 2/2 COPD exacerbation. CXR post intubation showed right pneumothorax. Pigtail catheter placed successfully by ED. Physical exam findings, imaging, and labs as documented above.     PLAN:  - changed pigtail to WS - no airleak  - 4 hour post WS X-ray   - monitor vent settings  -management per ICU team  -will follow - for CT and PTX managment    [id number]

## 2022-06-12 NOTE — DIETITIAN INITIAL EVALUATION ADULT - PHYSCIAL ASSESSMENT
No signs of significant muscle wasting/subcutaneous fat loss observed however unable to perform comprehensive nutrition focused physical assessment at this time.

## 2022-06-12 NOTE — PROGRESS NOTE ADULT - SUBJECTIVE AND OBJECTIVE BOX
GENERAL SURGERY PROGRESS NOTE    Patient: KIMBERLI LEIGH , 88y (34)Female   MRN: [id number]  Location: [facility]  A  Visit: 06-10-22 Inpatient  Date: 22 @ 13:19    Hospital Day #: 4    Procedure/Dx/Injuries: Acute respiratory failure, right ptx      Events of past 24 hours:  No acute overnight events Pt failed SBT. Chest to suction w/o air leak placed on WS during rounds at @ 10am f/u CXR at @2pm    PAST MEDICAL & SURGICAL HISTORY:  COPD (chronic obstructive pulmonary disease)      GERD (gastroesophageal reflux disease)      Glaucoma      Anxiety      Pancreatitis      History of tonsillectomy      H/O colonoscopy  5 years ago          Vitals:   T(F): 99.9 (22 @ 12:00), Max: 100 (22 @ 16:00)  HR: 112 (22 @ 12:45)  BP: 115/63 (22 @ 12:45)  RR: 21 (22 @ 12:45)  SpO2: 92% (22 @ 12:45)  Mode: AC/ CMV (Assist Control/ Continuous Mandatory Ventilation), RR (machine): 16, TV (machine): 350, FiO2: 30, PEEP: 5    Diet, NPO with Tube Feed:   Tube Feeding Modality: Orogastric  Jevity 1.2 Agus  Total Volume for 24 Hours (mL): 1080  Continuous  Starting Tube Feed Rate mL per Hour: 20  Increase Tube Feed Rate by (mL): 10     Every 4 hours  Until Goal Tube Feed Rate (mL per Hour): 45  Tube Feed Duration (in Hours): 24  Tube Feed Start Time: 12:00  Bolus   Total Volume per Flush (mL): 150   Frequency: Every 4 Hours      Fluids: lactated ringers.: Solution, 1000 milliLiter(s) infuse at 75 mL/Hr      I & O's:    22 @ 07:01  -  22 @ 07:00  --------------------------------------------------------  IN:    Dexmedetomidine: 226.3 mL    Enteral Tube Flush: 50 mL    FentaNYL: 196 mL    IV PiggyBack: 250 mL    Jevity 1.2: 1320 mL    Lactated Ringers: 675 mL    Lactated Ringers: 1125 mL    Lactated Ringers Bolus: 500 mL    Norepinephrine: 62.7 mL  Total IN: 4405 mL    OUT:    Chest Tube (mL): 28 mL    Indwelling Catheter - Urethral (mL): 530 mL  Total OUT: 558 mL    Total NET: 3847 mL        Bowel Movement: : [] YES [] NO  Flatus: : [] YES [] NO    PHYSICAL EXAM:  GENERAL: NAD, sedated, induated  CHEST/LUNG: Mechanically ventilated,  ventilatory breath sounds b/l , right sided pigtail catheter in place, symmetric chest rise, saturating well   HEART: Regular rate and rhythm  ABDOMEN: Soft, Nontender, Nondistended;   EXTREMITIES:  No clubbing, cyanosis, or edema      MEDICATIONS  (STANDING):  azithromycin  IVPB 500 milliGRAM(s) IV Intermittent every 24 hours  chlorhexidine 0.12% Liquid 15 milliLiter(s) Oral Mucosa two times a day  chlorhexidine 4% Liquid 1 Application(s) Topical <User Schedule>  dexMEDEtomidine Infusion 0.05 MICROgram(s)/kG/Hr (0.63 mL/Hr) IV Continuous <Continuous>  enoxaparin Injectable 40 milliGRAM(s) SubCutaneous every 24 hours  fentaNYL   Infusion. 0.5 MICROgram(s)/kG/Hr (3.5 mL/Hr) IV Continuous <Continuous>  lactated ringers. 1000 milliLiter(s) (75 mL/Hr) IV Continuous <Continuous>  methylPREDNISolone sodium succinate Injectable 60 milliGRAM(s) IV Push every 24 hours  norepinephrine Infusion 0.05 MICROgram(s)/kG/Min (4.69 mL/Hr) IV Continuous <Continuous>  pantoprazole   Suspension 40 milliGRAM(s) Oral before breakfast  polyethylene glycol 3350 17 Gram(s) Oral daily  senna 2 Tablet(s) Oral at bedtime    MEDICATIONS  (PRN):  acetaminophen     Tablet .. 650 milliGRAM(s) Oral every 6 hours PRN Temp greater or equal to 38C (100.4F), Mild Pain (1 - 3)      DVT PROPHYLAXIS: enoxaparin Injectable 40 milliGRAM(s) SubCutaneous every 24 hours    GI PROPHYLAXIS: pantoprazole   Suspension 40 milliGRAM(s) Oral before breakfast    ANTICOAGULATION:   ANTIBIOTICS:  azithromycin  IVPB 500 milliGRAM(s)            LAB/STUDIES:  Labs:  CAPILLARY BLOOD GLUCOSE                              11.8   19.12 )-----------( 277      ( 2022 04:40 )             36.0       Auto Neutrophil %: 86.9 % (22 @ 04:40)  Auto Immature Granulocyte %: 0.5 % (22 @ 04:40)        137  |  101  |  43<H>  ----------------------------<  165<H>  5.5<H>   |  22  |  0.8      Calcium, Total Serum: 8.5 mg/dL (22 @ 04:40)      LFTs:             5.1  | 0.2  | 25       ------------------[42      ( 2022 04:40 )  3.1  | x    | 23          Lipase:x      Amylase:x         Blood Gas Arterial, Lactate: 2.10 mmol/L (22 @ 12:15)  Blood Gas Arterial, Lactate: 1.20 mmol/L (22 @ 02:38)  Blood Gas Arterial, Lactate: 1.00 mmol/L (22 @ 15:56)  Blood Gas Arterial, Lactate: 0.80 mmol/L (22 @ 02:53)  Blood Gas Arterial, Lactate: 1.40 mmol/L (06-10-22 @ 04:25)  Blood Gas Venous - Lactate: 1.00 mmol/L (06-10-22 @ 00:10)  Blood Gas Venous - Lactate: 1.40 mmol/L (22 @ 22:40)    ABG - ( 2022 12:15 )  pH: 7.21  /  pCO2: 71    /  pO2: 66    / HCO3: 28    / Base Excess: -1.3  /  SaO2: 88.0            ABG - ( 2022 02:38 )  pH: 7.38  /  pCO2: 55    /  pO2: 95    / HCO3: 32    / Base Excess: 6.0   /  SaO2: 99.7            ABG - ( 2022 15:56 )  pH: 7.34  /  pCO2: 57    /  pO2: 77    / HCO3: 31    / Base Excess: 3.5   /  SaO2: x                 Coags:    CARDIAC MARKERS ( 2022 04:40 )  x     / 0.04 ng/mL / x     / x     / x      CARDIAC MARKERS ( 2022 18:43 )  x     / 0.09 ng/mL / x     / x     / x          Serum Pro-Brain Natriuretic Peptide: 254 pg/mL (22 @ 22:55)      Urinalysis Basic - ( 10 Crow 2022 17:46 )    Color: Yellow / Appearance: Clear / S.024 / pH: x  Gluc: x / Ketone: Trace  / Bili: Negative / Urobili: <2 mg/dL   Blood: x / Protein: 100 mg/dL / Nitrite: Negative   Leuk Esterase: Negative / RBC: 3 /HPF / WBC 1 /HPF   Sq Epi: x / Non Sq Epi: 1 /HPF / Bacteria: Negative        Culture - Urine (collected 10 Crow 2022 17:46)  Source: Clean Catch Clean Catch (Midstream)  Final Report (2022 21:28):    No growth    Culture - Blood (collected 2022 23:40)  Source: .Blood Blood-Peripheral  Preliminary Report (2022 09:01):    No growth to date.    Culture - Blood (collected 2022 23:25)  Source: .Blood Blood-Peripheral  Preliminary Report (2022 09:01):    No growth to date.              IMAGING:  < from: Xray Chest 1 View- PORTABLE-Routine (Xray Chest 1 View- PORTABLE-Routine in AM.) (22 @ 06:50) >    Impression:    1.  Support lines/tubes, as described.  2.  No pneumothorax is seen.  3.  No other radiographic evidence of acute cardiopulmonary disease.    < end of copied text >      ACCESS/ DEVICES:  [x] Peripheral IV  [ ] Central Venous Line	[ ] R	[ ] L	[ ] IJ	[ ] Fem	[ ] SC	Placed:   [ ] Arterial Line		[ ] R	[ ] L	[ ] Fem	[ ] Rad	[ ] Ax	Placed:   [ ] PICC:					[ ] Mediport  [ ] Urinary Catheter,  Date Placed:   [ ] Chest tube: [ ] Right, [ ] Left  [ ] CRISTELA/Migel Drains

## 2022-06-12 NOTE — DIETITIAN INITIAL EVALUATION ADULT - OTHER CALCULATIONS
Using ABW 40.3 kg. (IBW is 43.2 kg)  Energy: ~9252-6587 kcal/day; obtained by comparing 1111 kcal/day ([employer] 2003b equation) vs 484-1008 kcal/day (12-25 kcal/kg ABW)  Protein: 56-69 g/day (1.4-1.7 g/kg ABW)  Fluids: 7036-3607 mL free H2O (25-30 mL/kg ABW), otherwise per LIP  Current EN regimen at goal provides 118% kcal & 100% protein needs at goal

## 2022-06-12 NOTE — DIETITIAN INITIAL EVALUATION ADULT - PERTINENT MEDS FT
enoxaparin, lactated ringers, methylprednisolone, protonix, levophed at 4.69 mL free H2O, miralax, senna

## 2022-06-12 NOTE — DIETITIAN INITIAL EVALUATION ADULT - NS FNS DIET ORDER
Jevity 1.2 goal rate 45 mL/hr; ordered 6/10. Regimen at goal to provide 1296 kcal, 60 g protein, 875 mL free H2O. EN regimen initiated 6/11; goal rate reached later that evening. EN held for 4 hours today for extubation trial; resumed at this time at goal rate 60 mL/hr.

## 2022-06-12 NOTE — PROGRESS NOTE ADULT - ATTENDING COMMENTS
Ms. Peralta is an 88-year-old female admitted with diagnosis of COPD exacerbation, required intubation and ventilatory support, developed large right pneumothorax treated with percutaneous pleural drain with adequate lung re-expansion. Thoracic surgery consulted for assistance in care. Currently in low ventilatory support. CXR: drain in good position, no residual pneumothorax.   06/12/12: Pleural drain working well, no air leak, CXR: no pneumothorax  Plan:   Pleural drain to water seal

## 2022-06-12 NOTE — PROGRESS NOTE ADULT - SUBJECTIVE AND OBJECTIVE BOX
KIMBERLI LEIGH MRN#: [id number]  CODE STATUS: FULL CODE     SUBJECTIVE   Chief complaint: respiratory distress    Currently admitted to medicine with the primary diagnosis of ACUTE HYPERCAPNIC RESPIRATORY FAILURE; COPD EXACERBATION.....      Today is hospital day 2d,   INTERVAL HPI/OVERNIGHT EVENTS:     No acute events overnight. Chest tube remains. Intubated and sedated. On low dose levophed.       PAST MEDICAL & SURGICAL HISTORY  COPD (chronic obstructive pulmonary disease)    GERD (gastroesophageal reflux disease)    Glaucoma    Anxiety    Pancreatitis    History of tonsillectomy    H/O colonoscopy  5 years ago      ALLERGIES:  No Known Allergies    HOME MEDICATIONS:  Home Medications:  albuterol 90 mcg/inh inhalation aerosol: 2 puff(s) inhaled every 6 hours, As needed, Shortness of Breath and/or Wheezing (27 Dec 2021 10:38)  Ambien 10 mg oral tablet: 1 tab(s) orally once a day (at bedtime) (19 Dec 2021 01:41)  escitalopram 10 mg oral tablet: 1 tab(s) orally once a day (27 Dec 2021 10:38)  famotidine 20 mg oral tablet: 1 tab(s) orally once a day (27 Dec 2021 10:38)  fluticasone 0.5 mg/2 mL inhalation suspension:  (19 Dec 2021 01:41)  levalbuterol 45 mcg/inh inhalation aerosol: 2 puff(s) inhaled every 4 hours (19 Dec 2021 01:41)  Wixela Inhub 250 mcg-50 mcg inhalation powder: 1 puff(s) inhaled 2 times a day (19 Dec 2021 01:41)    MEDICATIONS:  STANDING MEDICATIONS  MEDICATIONS  (STANDING):  azithromycin  IVPB 500 milliGRAM(s) IV Intermittent every 24 hours  chlorhexidine 0.12% Liquid 15 milliLiter(s) Oral Mucosa two times a day  chlorhexidine 4% Liquid 1 Application(s) Topical <User Schedule>  dexMEDEtomidine Infusion 0.05 MICROgram(s)/kG/Hr (0.63 mL/Hr) IV Continuous <Continuous>  enoxaparin Injectable 40 milliGRAM(s) SubCutaneous every 24 hours  fentaNYL   Infusion. 0.5 MICROgram(s)/kG/Hr (3.5 mL/Hr) IV Continuous <Continuous>  lactated ringers. 1000 milliLiter(s) (75 mL/Hr) IV Continuous <Continuous>  methylPREDNISolone sodium succinate Injectable 60 milliGRAM(s) IV Push every 24 hours  norepinephrine Infusion 0.05 MICROgram(s)/kG/Min (4.69 mL/Hr) IV Continuous <Continuous>  pantoprazole   Suspension 40 milliGRAM(s) Oral before breakfast  polyethylene glycol 3350 17 Gram(s) Oral daily  senna 2 Tablet(s) Oral at bedtime    PRN MEDICATIONS  MEDICATIONS  (PRN):  acetaminophen     Tablet .. 650 milliGRAM(s) Oral every 6 hours PRN Temp greater or equal to 38C (100.4F), Mild Pain (1 - 3)      OBJECTIVE    VITAL SIGNS  T(F): 99.4 ( @ 20:00), Max: 100 ( @ 16:00)  HR: 64 ( @ 01:00) (56 - 150)  BP: 89/68 ( @ 01:00) (64/38 - 181/165)  RR: 15 ( @ 01:00) (15 - 35)  SpO2: 98% ( @ 01:00) (95% - 100%)    LABS:                        12.8   25.58 )-----------( 343      ( 2022 04:38 )             38.6     06-11    140  |  102  |  42<H>  ----------------------------<  156<H>  5.3<H>   |  24  |  1.0    Ca    8.9      2022 04:38  Mg     2.5     06-11    TPro  5.6<L>  /  Alb  3.6  /  TBili  0.3  /  DBili  x   /  AST  26  /  ALT  23  /  AlkPhos  41  06-11    PT/INR - ( 10 Crow 2022 08:13 )   PT: 9.80 sec;   INR: 0.85 ratio         PTT - ( 10 Crow 2022 08:13 )  PTT:26.6 sec  Urinalysis Basic - ( 10 Crow 2022 17:46 )    Color: Yellow / Appearance: Clear / S.024 / pH: x  Gluc: x / Ketone: Trace  / Bili: Negative / Urobili: <2 mg/dL   Blood: x / Protein: 100 mg/dL / Nitrite: Negative   Leuk Esterase: Negative / RBC: 3 /HPF / WBC 1 /HPF   Sq Epi: x / Non Sq Epi: 1 /HPF / Bacteria: Negative      CARDIAC MARKERS ( 2022 18:43 )  x     / 0.09 ng/mL / x     / x     / x      CARDIAC MARKERS ( 10 Crow 2022 10:30 )  x     / 0.25 ng/mL / x     / x     / x      CARDIAC MARKERS ( 10 Crow 2022 08:13 )  x     / 0.31 ng/mL / x     / x     / x            Culture - Urine (collected 10 Crow 2022 17:46)  Source: Clean Catch Clean Catch (Midstream)  Final Report:    No growth    Culture - Blood (collected 2022 23:40)  Source: .Blood Blood-Peripheral  Preliminary Report:    No growth to date.    Culture - Blood (collected 2022 23:25)  Source: .Blood Blood-Peripheral  Preliminary Report:    No growth to date.      ABG - ( 2022 15:56 )  pH, Arterial: 7.34  pH, Blood: x     /  pCO2: 57    /  pO2: 77    / HCO3: 31    / Base Excess: 3.5   /  SaO2: x           RADIOLOGY:   Reviewed        PHYSICAL EXAM:  General: Laying on the hospital bed. Ill-appearing  HEENT: Atraumatic. Normocephalic. EOMI. Conjunctiva and sclera clear.  Cardiac: Normal S1, S2. RRR. No murmurs, rubs, or gallops.  Pulm: CTA b/l no wheezes, rhonchi, or rales.  GI: Soft, nontender, nondistended. BSx4q  Vasc: Normal capillary refill. 2+ pulses.  No edema, cyanosis.  MSK: Moving all 4 extremities.  Neuro: CN II-XII grossly intact  Skin: warm, dry and intact    Vent Settings: Mode: AC/ CMV (Assist Control/ Continuous Mandatory Ventilation)  RR (machine): 16  TV (machine): 350  FiO2: 30  PEEP: 5  PIP: 28   KIMBERLI LEIGH MRN#: [id number]  CODE STATUS: FULL CODE     SUBJECTIVE   Chief complaint: respiratory distress    Currently admitted to medicine with the primary diagnosis of ACUTE HYPERCAPNIC RESPIRATORY FAILURE; COPD EXACERBATION.....      Today is hospital day 2d,   INTERVAL HPI/OVERNIGHT EVENTS:     No acute events overnight. Chest tube remains. Intubated and sedated. On low dose levophed.       PAST MEDICAL & SURGICAL HISTORY  COPD (chronic obstructive pulmonary disease)    GERD (gastroesophageal reflux disease)    Glaucoma    Anxiety    Pancreatitis    History of tonsillectomy    H/O colonoscopy  5 years ago      ALLERGIES:  No Known Allergies    HOME MEDICATIONS:  Home Medications:  albuterol 90 mcg/inh inhalation aerosol: 2 puff(s) inhaled every 6 hours, As needed, Shortness of Breath and/or Wheezing (27 Dec 2021 10:38)  Ambien 10 mg oral tablet: 1 tab(s) orally once a day (at bedtime) (19 Dec 2021 01:41)  escitalopram 10 mg oral tablet: 1 tab(s) orally once a day (27 Dec 2021 10:38)  famotidine 20 mg oral tablet: 1 tab(s) orally once a day (27 Dec 2021 10:38)  fluticasone 0.5 mg/2 mL inhalation suspension:  (19 Dec 2021 01:41)  levalbuterol 45 mcg/inh inhalation aerosol: 2 puff(s) inhaled every 4 hours (19 Dec 2021 01:41)  Wixela Inhub 250 mcg-50 mcg inhalation powder: 1 puff(s) inhaled 2 times a day (19 Dec 2021 01:41)    MEDICATIONS:  STANDING MEDICATIONS  MEDICATIONS  (STANDING):  azithromycin  IVPB 500 milliGRAM(s) IV Intermittent every 24 hours  chlorhexidine 0.12% Liquid 15 milliLiter(s) Oral Mucosa two times a day  chlorhexidine 4% Liquid 1 Application(s) Topical <User Schedule>  dexMEDEtomidine Infusion 0.05 MICROgram(s)/kG/Hr (0.63 mL/Hr) IV Continuous <Continuous>  enoxaparin Injectable 40 milliGRAM(s) SubCutaneous every 24 hours  fentaNYL   Infusion. 0.5 MICROgram(s)/kG/Hr (3.5 mL/Hr) IV Continuous <Continuous>  lactated ringers. 1000 milliLiter(s) (75 mL/Hr) IV Continuous <Continuous>  methylPREDNISolone sodium succinate Injectable 60 milliGRAM(s) IV Push every 24 hours  norepinephrine Infusion 0.05 MICROgram(s)/kG/Min (4.69 mL/Hr) IV Continuous <Continuous>  pantoprazole   Suspension 40 milliGRAM(s) Oral before breakfast  polyethylene glycol 3350 17 Gram(s) Oral daily  senna 2 Tablet(s) Oral at bedtime    PRN MEDICATIONS  MEDICATIONS  (PRN):  acetaminophen     Tablet .. 650 milliGRAM(s) Oral every 6 hours PRN Temp greater or equal to 38C (100.4F), Mild Pain (1 - 3)      OBJECTIVE    VITAL SIGNS  T(F): 99.4 ( @ 20:00), Max: 100 ( @ 16:00)  HR: 64 ( @ 01:00) (56 - 150)  BP: 89/68 ( @ 01:00) (64/38 - 181/165)  RR: 15 ( @ 01:00) (15 - 35)  SpO2: 98% ( @ 01:00) (95% - 100%)    LABS:                        12.8   25.58 )-----------( 343      ( 2022 04:38 )             38.6     06-11    140  |  102  |  42<H>  ----------------------------<  156<H>  5.3<H>   |  24  |  1.0    Ca    8.9      2022 04:38  Mg     2.5     06-11    TPro  5.6<L>  /  Alb  3.6  /  TBili  0.3  /  DBili  x   /  AST  26  /  ALT  23  /  AlkPhos  41  06-11    PT/INR - ( 10 Crow 2022 08:13 )   PT: 9.80 sec;   INR: 0.85 ratio         PTT - ( 10 Crow 2022 08:13 )  PTT:26.6 sec  Urinalysis Basic - ( 10 Crow 2022 17:46 )    Color: Yellow / Appearance: Clear / S.024 / pH: x  Gluc: x / Ketone: Trace  / Bili: Negative / Urobili: <2 mg/dL   Blood: x / Protein: 100 mg/dL / Nitrite: Negative   Leuk Esterase: Negative / RBC: 3 /HPF / WBC 1 /HPF   Sq Epi: x / Non Sq Epi: 1 /HPF / Bacteria: Negative      CARDIAC MARKERS ( 2022 18:43 )  x     / 0.09 ng/mL / x     / x     / x      CARDIAC MARKERS ( 10 Crow 2022 10:30 )  x     / 0.25 ng/mL / x     / x     / x      CARDIAC MARKERS ( 10 Crow 2022 08:13 )  x     / 0.31 ng/mL / x     / x     / x            Culture - Urine (collected 10 Crow 2022 17:46)  Source: Clean Catch Clean Catch (Midstream)  Final Report:    No growth    Culture - Blood (collected 2022 23:40)  Source: .Blood Blood-Peripheral  Preliminary Report:    No growth to date.    Culture - Blood (collected 2022 23:25)  Source: .Blood Blood-Peripheral  Preliminary Report:    No growth to date.      ABG - ( 2022 15:56 )  pH, Arterial: 7.34  pH, Blood: x     /  pCO2: 57    /  pO2: 77    / HCO3: 31    / Base Excess: 3.5   /  SaO2: x           RADIOLOGY:   Reviewed  CXR ETT 4cm above carine, OGT in place, R pigtail, OGT in place, no PTX      PHYSICAL EXAM:  General: Laying on the hospital bed. Ill-appearing  HEENT: Atraumatic. Normocephalic.  Cardiac: Normal S1, S2. RRR. No murmurs, rubs, or gallops.  Pulm: b/l ronchi. intubated on mv.  GI: Soft, nontender, nondistended.  Vasc: 2+ pulses.  No edema, cyanosis.  Neuro: sedated  Skin: warm, dry and intact    Vent Settings: Mode: AC/ CMV (Assist Control/ Continuous Mandatory Ventilation)  RR (machine): 16  TV (machine): 350  FiO2: 30  PEEP: 5  PIP: 28

## 2022-06-12 NOTE — PROGRESS NOTE ADULT - ATTENDING COMMENTS
Attending Statement: I have personally performed a face to face diagnostic evaluation on this patient. The patient is suffering from:  Acute on Chronic Hypercapnic Respiratory Failure requiring MV  COPD exacerbation   Secondary Spontaneous Pneumothorax SP pig tail   [facility]  I have made amendments to the documentation where necessary. I have personally seen and examined this patient.  I have fully participated in the care of this patient.  I have reviewed all pertinent clinical information, including history, physical exam, plan and note.

## 2022-06-12 NOTE — DIETITIAN INITIAL EVALUATION ADULT - ORAL INTAKE PTA/DIET HISTORY
Unable to obtain nutrition hx from pt at this time in setting of intubation; no family at bedside & no response from contact information listed in chart at this time.

## 2022-06-12 NOTE — DIETITIAN INITIAL EVALUATION ADULT - ENTERAL
Change EN formula to Replete provided at goal rate 45 mL/hr. Regimen at goal to provide 1080 kcal, 68 g protein, 1728 mg K+, 864 mg PO4, 907 mL free H2O. Change EN formula to Replete provided at goal rate 45 mL/hr. Regimen at goal to provide 1080 kcal, 68 g protein, 1728 mg K+, 864 mg PO4, 907 mL free H2O. Provide 75 mL flushes q6hrs, otherwise flushes per LIP.

## 2022-06-12 NOTE — PROGRESS NOTE ADULT - ASSESSMENT
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 Precedex for agitation    HEENT: Oral care    PULMONARY:  HOB @ 45 degrees.  no vent changes, chest tube to suction.  Daily CXR.  Nebs Q4 and PRN.  Solumedrol 60 mg daily.    CARDIOVASCULAR: wean down Levophed, trend CE    GI: GI prophylaxis.  OG Feeding.  Bowel regimen     RENAL:  Follow up lytes.  Correct as needed. RBUS showed parapelvic renal cysts, o/p follow up with urology. monitor UO.    INFECTIOUS DISEASE: Follow up cultures.  Procal 0.51.  complete Azithromycin     HEMATOLOGICAL:  DVT prophylaxis.  Dimer 579    ENDOCRINE:  Follow up FS.  Insulin protocol if needed.    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 Precedex for agitation    HEENT: Oral care    PULMONARY:  HOB @ 45 degrees.  no vent changes, chest tube to suction.  Daily CXR.  Nebs Q4 and PRN.  Solumedrol 60 mg daily. SBT.    CARDIOVASCULAR: wean down Levophed, trend CE    GI: GI prophylaxis.  OG Feeding.  Bowel regimen     RENAL:  Follow up lytes.  Correct as needed. RBUS showed parapelvic renal cysts, o/p follow up with urology. monitor UO.    INFECTIOUS DISEASE: Follow up cultures.  Procal 0.51.  complete Azithromycin. Full RVP.     HEMATOLOGICAL:  DVT prophylaxis.  Check duplex     ENDOCRINE:  Follow up FS.  Insulin protocol if needed.    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 Precedex for agitation    HEENT: Oral care    PULMONARY:  HOB @ 45 degrees.  no vent changes, chest tube to suction.  Daily CXR.  Nebs Q4 and PRN.  Solumedrol 60 mg daily. SBT.    CARDIOVASCULAR: wean down Levophed, trend CE    GI: GI prophylaxis.  OG Feeding.  Bowel regimen     RENAL:  Follow up lytes.  Correct as needed. RBUS showed parapelvic renal cysts, o/p follow up with urology. monitor UO.    INFECTIOUS DISEASE: Follow up cultures.  Procal 0.51.  complete Azithromycin. Full RVP.     HEMATOLOGICAL:  DVT prophylaxis.  Check duplex     ENDOCRINE:  Follow up FS.  Insulin protocol if needed.    MUSCULOSKELETAL:  Bed rest     MICU monitoring     GOC

## 2022-06-13 NOTE — PROGRESS NOTE ADULT - ASSESSMENT
ASSESSMENT & PLAN  87 y/o female with PMHx of COPD not on home O2, pHTN, GERD, Lucio's esophagus, COVID vaccinated presented to the ED for acute onset of sob.    # Acute on chronic hypercapnic respiratory failure s/p intubation  # COPD exacerbation  # R secondary spontaneous pneumothorax s/p pigtail  - presented to ED with SOB, found to be hypercapnic to 115, intubated  - post intubation CXR showed R PTX s/p R pigtail catheter  - CTSX following  - R chest tube to water seal  - resume solumedrol @ 60q12  - Nebs q4 and PRN  - vent changes per pulm: none today  - wean levophed as tolerated  - off IVF  - Procal 0.52, D-dimer 579  - BCX x2 NGTD, UCX neg, RVP neg, urine strep/legionella neg  - WBC 19.9->20.3->25.6->19.1->24.5; afebrile  - cont azithromycin 500q24 x5d (finish 6/15)  - start cefepime 1g q12h today  - daily ABG, CXR  - no SAT/SBT today    # H/o HTN  # H/o HFmrEF (45%)  # H/o mild pHTN  - BP: 109/53 (65/38 - 139/83)  - on amlodipine 5mg qd at home -- held for now    # GERD  # Lucio's Esophagus  - PPI    # Platelets downtrending  - Plt 446 on admission, today 224  - has received LMWH  - check HIT ab    # Elevated D-dimer  - d-dimer 579  - check LE duplex    ARVIND noland    PT/REHAB: n/a  ACTIVITY: Activity - Bedrest  DVT PPX: enoxaparin Injectable  GI PPX: pantoprazole  DIET: Diet, NPO with Tube Feed   CODE: Full  Disposition: MICU  Pending: CTSX f/u. LE duplex.

## 2022-06-13 NOTE — PROGRESS NOTE ADULT - SUBJECTIVE AND OBJECTIVE BOX
Over Night Events: events noted, still intubated, ventilated, on precedex, levophed 0.019, failed weaning trial    PHYSICAL EXAM    ICU Vital Signs Last 24 Hrs  T(C): 37.3 (13 Jun 2022 04:00), Max: 37.8 (12 Jun 2022 16:00)  T(F): 99.2 (13 Jun 2022 04:00), Max: 100 (12 Jun 2022 16:00)  HR: 86 (13 Jun 2022 08:01) (60 - 150)  BP: 109/53 (13 Jun 2022 07:00) (65/38 - 139/83)  BP(mean): 68 (13 Jun 2022 07:00) (45 - 143)  RR: 16 (13 Jun 2022 07:00) (12 - 21)  SpO2: 99% (13 Jun 2022 08:01) (87% - 100%)      General: ill looking  Lungs: dec bs both bases  Cardiovascular: JULIO CESAR 2.6  Abdomen: Soft, Positive BS  Extremities: No clubbing   Skin: Warm  Neurological: Non focal       06-12-22 @ 07:01  -  06-13-22 @ 07:00  --------------------------------------------------------  IN:    Dexmedetomidine: 271.4 mL    Enteral Tube Flush: 95 mL    FentaNYL: 149.8 mL    IV PiggyBack: 250 mL    Jevity 1.2: 1095 mL    Lactated Ringers: 1800 mL    Norepinephrine: 23.1 mL  Total IN: 3684.3 mL    OUT:    Chest Tube (mL): 78 mL    Indwelling Catheter - Urethral (mL): 620 mL  Total OUT: 698 mL    Total NET: 2986.3 mL          LABS:                          12.0   24.53 )-----------( 224      ( 13 Jun 2022 05:04 )             36.2                                               06-13    138  |  102  |  55<H>  ----------------------------<  144<H>  5.5<H>   |  23  |  1.0    Ca    8.6      13 Jun 2022 05:04  Mg     2.3     06-13    TPro  4.9<L>  /  Alb  2.8<L>  /  TBili  0.3  /  DBili  x   /  AST  32  /  ALT  27  /  AlkPhos  44  06-13                                                 CARDIAC MARKERS ( 12 Jun 2022 04:40 )  x     / 0.04 ng/mL / x     / x     / x      CARDIAC MARKERS ( 11 Jun 2022 18:43 )  x     / 0.09 ng/mL / x     / x     / x                                                LIVER FUNCTIONS - ( 13 Jun 2022 05:04 )  Alb: 2.8 g/dL / Pro: 4.9 g/dL / ALK PHOS: 44 U/L / ALT: 27 U/L / AST: 32 U/L / GGT: x                                                  Culture - Urine (collected 10 Crow 2022 17:46)  Source: Clean Catch Clean Catch (Midstream)  Final Report (11 Jun 2022 21:28):    No growth                                                   Mode: AC/ CMV (Assist Control/ Continuous Mandatory Ventilation)  RR (machine): 16  TV (machine): 350  FiO2: 30  PEEP: 5  ITime: 1  MAP: 8  PIP: 18                                      ABG - ( 13 Jun 2022 02:49 )  pH, Arterial: 7.42  pH, Blood: x     /  pCO2: 49    /  pO2: 110   / HCO3: 32    / Base Excess: 6.1   /  SaO2: 99.3        p/p 18/11        MEDICATIONS  (STANDING):  azithromycin  IVPB 500 milliGRAM(s) IV Intermittent every 24 hours  chlorhexidine 0.12% Liquid 15 milliLiter(s) Oral Mucosa two times a day  chlorhexidine 4% Liquid 1 Application(s) Topical <User Schedule>  dexMEDEtomidine Infusion 0.05 MICROgram(s)/kG/Hr (0.63 mL/Hr) IV Continuous <Continuous>  enoxaparin Injectable 40 milliGRAM(s) SubCutaneous every 24 hours  fentaNYL   Infusion. 0.5 MICROgram(s)/kG/Hr (3.5 mL/Hr) IV Continuous <Continuous>  lactated ringers. 1000 milliLiter(s) (75 mL/Hr) IV Continuous <Continuous>  methylPREDNISolone sodium succinate Injectable 60 milliGRAM(s) IV Push every 24 hours  midazolam Injectable 2 milliGRAM(s) IV Push once  norepinephrine Infusion 0.05 MICROgram(s)/kG/Min (4.69 mL/Hr) IV Continuous <Continuous>  pantoprazole   Suspension 40 milliGRAM(s) Oral before breakfast  polyethylene glycol 3350 17 Gram(s) Oral daily  senna 2 Tablet(s) Oral at bedtime    MEDICATIONS  (PRN):  acetaminophen     Tablet .. 650 milliGRAM(s) Oral every 6 hours PRN Temp greater or equal to 38C (100.4F), Mild Pain (1 - 3)      CXR reviewed

## 2022-06-13 NOTE — PROGRESS NOTE ADULT - ASSESSMENT
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 Precedex for agitation    HEENT: Oral care    PULMONARY:  HOB @ 45 degrees.  no vent changes, chest tube to suction.  Daily CXR.  Nebs Q4 and PRN.  Solumedrol 60 mg q 12.     CARDIOVASCULAR: taper pressors, dc VIF    GI: GI prophylaxis.  OG Feeding.  Bowel regimen     RENAL:  Follow up lytes.  Correct as needed.    INFECTIOUS DISEASE: ADD CEFEPIME    HEMATOLOGICAL:  DVT prophylaxis.  Check duplex  fup result    ENDOCRINE:  Follow up FS.  Insulin protocol if needed.    MUSCULOSKELETAL:  Bed rest     MICU monitoring     GOC    HUBER + DC

## 2022-06-13 NOTE — PROGRESS NOTE ADULT - ASSESSMENT
ASSESSMENT:  88yF w/ PMHx stated above who presented with acute respiratory failure 2/2 COPD exacerbation. CXR post intubation showed right pneumothorax. Pigtail catheter placed successfully by ED. Physical exam findings, imaging, and labs as documented above.     PLAN:  -  pigtail to WS - no airleak  - daily AM cxr  - monitor vent settings  -management per ICU team  -will follow - for CT and PTX managment    [id number]   ASSESSMENT:  88yF w/ PMHx stated above who presented with acute respiratory failure 2/2 COPD exacerbation. CXR post intubation showed right pneumothorax. Pigtail catheter placed successfully by ED. Physical exam findings, imaging, and labs as documented above.     PLAN:  - pigtail to WS - no airleak  - daily AM cxr  - monitor vent settings  - management per ICU team, sbt as tolerated  - will follow - for CT and PTX management    [id number]

## 2022-06-13 NOTE — PROGRESS NOTE ADULT - ATTENDING COMMENTS
Ms. Peralta is an 88-year-old female admitted with diagnosis of COPD exacerbation, required intubation and ventilatory support, developed large right pneumothorax treated with percutaneous pleural drain with adequate lung re-expansion. Thoracic surgery consulted for assistance in care. Currently in low ventilatory support. CXR: drain in good position, no residual pneumothorax.   06/13/12: Pleural drain working well, no air leak, CXR: no pneumothorax. SBT today for possible extubation, will keep pleural drain until extubated  Plan:   Continue pleural drain to water seal

## 2022-06-13 NOTE — PROVIDER CONTACT NOTE (OTHER) - ACTION/TREATMENT ORDERED:
after versed pt pt remained agitated and tachy. MD Marr ordered propofol for pt said to trial her on that as long as her hr and bp tolerates it to switch off the precedex.

## 2022-06-13 NOTE — PROGRESS NOTE ADULT - SUBJECTIVE AND OBJECTIVE BOX
KIMBERLI LEIGH MRN#: [id number]  CODE STATUS: FULL CODE     SUBJECTIVE   Chief complaint: respiratory distress    Currently admitted to medicine with the primary diagnosis of ACUTE HYPERCAPNIC RESPIRATORY FAILURE; COPD EXACERBATION.....      Today is hospital day 3d,   INTERVAL HPI/OVERNIGHT EVENTS:     Chest tube changed to WS yesterday afternoon. Agitated when off sedation. Required IVP versed o/n and this AM. Remains intubated, sedated. On levo @0.019.       PAST MEDICAL & SURGICAL HISTORY  COPD (chronic obstructive pulmonary disease)    GERD (gastroesophageal reflux disease)    Glaucoma    Anxiety    Pancreatitis    History of tonsillectomy    H/O colonoscopy  5 years ago      ALLERGIES:  No Known Allergies    HOME MEDICATIONS:  Home Medications:  albuterol 90 mcg/inh inhalation aerosol: 2 puff(s) inhaled every 6 hours, As needed, Shortness of Breath and/or Wheezing (27 Dec 2021 10:38)  Ambien 10 mg oral tablet: 1 tab(s) orally once a day (at bedtime) (19 Dec 2021 01:41)  escitalopram 10 mg oral tablet: 1 tab(s) orally once a day (27 Dec 2021 10:38)  famotidine 20 mg oral tablet: 1 tab(s) orally once a day (27 Dec 2021 10:38)  fluticasone 0.5 mg/2 mL inhalation suspension:  (19 Dec 2021 01:41)  levalbuterol 45 mcg/inh inhalation aerosol: 2 puff(s) inhaled every 4 hours (19 Dec 2021 01:41)  Wixela Inhub 250 mcg-50 mcg inhalation powder: 1 puff(s) inhaled 2 times a day (19 Dec 2021 01:41)    MEDICATIONS:  STANDING MEDICATIONS  MEDICATIONS  (STANDING):  azithromycin  IVPB 500 milliGRAM(s) IV Intermittent every 24 hours  cefepime   IVPB 1000 milliGRAM(s) IV Intermittent every 12 hours  chlorhexidine 0.12% Liquid 15 milliLiter(s) Oral Mucosa two times a day  chlorhexidine 4% Liquid 1 Application(s) Topical <User Schedule>  dexMEDEtomidine Infusion 0.05 MICROgram(s)/kG/Hr (0.63 mL/Hr) IV Continuous <Continuous>  enoxaparin Injectable 40 milliGRAM(s) SubCutaneous every 24 hours  fentaNYL   Infusion. 0.5 MICROgram(s)/kG/Hr (3.5 mL/Hr) IV Continuous <Continuous>  methylPREDNISolone sodium succinate Injectable 60 milliGRAM(s) IV Push every 12 hours  norepinephrine Infusion 0.05 MICROgram(s)/kG/Min (4.69 mL/Hr) IV Continuous <Continuous>  pantoprazole   Suspension 40 milliGRAM(s) Oral before breakfast  polyethylene glycol 3350 17 Gram(s) Oral daily  senna 2 Tablet(s) Oral at bedtime    PRN MEDICATIONS  MEDICATIONS  (PRN):  acetaminophen     Tablet .. 650 milliGRAM(s) Oral every 6 hours PRN Temp greater or equal to 38C (100.4F), Mild Pain (1 - 3)      OBJECTIVE    VITAL SIGNS  T(F): 99.2 (06-13 @ 04:00), Max: 100 (06-12 @ 16:00)  HR: 86 (06-13 @ 08:01) (60 - 150)  BP: 109/53 (06-13 @ 07:00) (65/38 - 139/83)  RR: 16 (06-13 @ 07:00) (12 - 21)  SpO2: 99% (06-13 @ 08:01) (87% - 100%)    LABS:                        12.0   24.53 )-----------( 224      ( 13 Jun 2022 05:04 )             36.2     06-13    138  |  102  |  55<H>  ----------------------------<  144<H>  5.5<H>   |  23  |  1.0    Ca    8.6      13 Jun 2022 05:04  Mg     2.3     06-13    TPro  4.9<L>  /  Alb  2.8<L>  /  TBili  0.3  /  DBili  x   /  AST  32  /  ALT  27  /  AlkPhos  44  06-13        CARDIAC MARKERS ( 12 Jun 2022 04:40 )  x     / 0.04 ng/mL / x     / x     / x      CARDIAC MARKERS ( 11 Jun 2022 18:43 )  x     / 0.09 ng/mL / x     / x     / x            Culture - Urine (collected 10 Crow 2022 17:46)  Source: Clean Catch Clean Catch (Midstream)  Final Report:    No growth      ABG - ( 13 Jun 2022 02:49 )  pH, Arterial: 7.42  pH, Blood: x     /  pCO2: 49    /  pO2: 110   / HCO3: 32    / Base Excess: 6.1   /  SaO2: 99.3          RADIOLOGY:   Reviewed  CXR ETT 3cm above luba, OGT in place, R pigtail.      PHYSICAL EXAM:  General: Laying on the hospital bed. Ill-appearing  HEENT: Atraumatic. Normocephalic.   Cardiac: Normal S1, S2. RRR. No murmurs, rubs, or gallops.  Pulm: Decreased basilar bs b/l. Intubated on mv  GI: Soft, nontender, nondistended.  Vasc: 2+ pulses.  No edema, cyanosis.  MSK: Moving all 4 extremities.  Neuro: sedated  Skin: warm, dry and intact    Vent Settings: Mode: AC/ CMV (Assist Control/ Continuous Mandatory Ventilation)  RR (machine): 16  TV (machine): 350  FiO2: 30  PEEP: 5  PIP: 18

## 2022-06-13 NOTE — PROGRESS NOTE ADULT - SUBJECTIVE AND OBJECTIVE BOX
GENERAL SURGERY PROGRESS NOTE    Patient: KIMBERLI LEIGH , 88y ([birth year])Female   MRN: [id number]  Location: [facility]  A  Visit: 06-10-22 Inpatient    Hospital Day #: 5    Procedure/Dx/Injuries: Acute respiratory failure, right ptx      Events of past 24 hours:  No acute overnight events Pt failed SBT. Chest on WS      PHYSICAL EXAM:  GENERAL: NAD, sedated, intubated  CHEST/LUNG: Mechanically ventilated,  ventilatory breath sounds b/l , right sided pigtail catheter in place, symmetric chest rise, saturating well   HEART: Regular rate and rhythm  ABDOMEN: Soft, Nontender, Nondistended;   EXTREMITIES:  warm dry    Vitals:   T(F): 98.7 (06-13-22 @ 12:00), Max: 100 (06-12-22 @ 16:00)  HR: 84 (06-13-22 @ 12:00)  BP: 118/51 (06-13-22 @ 12:00)  RR: 17 (06-13-22 @ 12:00)  SpO2: 99% (06-13-22 @ 12:00)  Mode: AC/ CMV (Assist Control/ Continuous Mandatory Ventilation), RR (machine): 16, TV (machine): 350, FiO2: 30, PEEP: 5, ITime: 1, MAP: 8, PIP: 18    Diet, NPO with Tube Feed:   Tube Feeding Modality: Orogastric  Replete  Total Volume for 24 Hours (mL): 1080  Continuous  Starting Tube Feed Rate mL per Hour: 45  Until Goal Tube Feed Rate (mL per Hour): 45  Tube Feed Duration (in Hours): 24  Tube Feed Start Time: 00:00      Fluids:     I & O's:    06-12-22 @ 07:01  -  06-13-22 @ 07:00  --------------------------------------------------------  IN:    Dexmedetomidine: 271.4 mL    Enteral Tube Flush: 95 mL    FentaNYL: 149.8 mL    IV PiggyBack: 250 mL    Jevity 1.2: 1095 mL    Lactated Ringers: 1800 mL    Norepinephrine: 23.1 mL  Total IN: 3684.3 mL    OUT:    Chest Tube (mL): 78 mL    Indwelling Catheter - Urethral (mL): 620 mL  Total OUT: 698 mL    Total NET: 2986.3 mL      MEDICATIONS  (STANDING):  azithromycin  IVPB 500 milliGRAM(s) IV Intermittent every 24 hours  cefepime   IVPB 1000 milliGRAM(s) IV Intermittent every 12 hours  chlorhexidine 0.12% Liquid 15 milliLiter(s) Oral Mucosa two times a day  chlorhexidine 4% Liquid 1 Application(s) Topical <User Schedule>  dexMEDEtomidine Infusion 0.05 MICROgram(s)/kG/Hr (0.63 mL/Hr) IV Continuous <Continuous>  enoxaparin Injectable 40 milliGRAM(s) SubCutaneous every 24 hours  fentaNYL   Infusion. 0.5 MICROgram(s)/kG/Hr (3.5 mL/Hr) IV Continuous <Continuous>  methylPREDNISolone sodium succinate Injectable 60 milliGRAM(s) IV Push every 12 hours  norepinephrine Infusion 0.05 MICROgram(s)/kG/Min (4.69 mL/Hr) IV Continuous <Continuous>  pantoprazole   Suspension 40 milliGRAM(s) Oral before breakfast  polyethylene glycol 3350 17 Gram(s) Oral daily  senna 2 Tablet(s) Oral at bedtime    MEDICATIONS  (PRN):  acetaminophen     Tablet .. 650 milliGRAM(s) Oral every 6 hours PRN Temp greater or equal to 38C (100.4F), Mild Pain (1 - 3)      DVT PROPHYLAXIS: enoxaparin Injectable 40 milliGRAM(s) SubCutaneous every 24 hours    GI PROPHYLAXIS: pantoprazole   Suspension 40 milliGRAM(s) Oral before breakfast    ANTICOAGULATION:   ANTIBIOTICS:  azithromycin  IVPB 500 milliGRAM(s)  cefepime   IVPB 1000 milliGRAM(s)            LAB/STUDIES:                        12.0   24.53 )-----------( 224      ( 13 Jun 2022 05:04 )             36.2       Auto Neutrophil %: 84.2 % (06-13-22 @ 05:04)  Auto Immature Granulocyte %: 0.5 % (06-13-22 @ 05:04)    06-13    138  |  102  |  55<H>  ----------------------------<  144<H>  5.5<H>   |  23  |  1.0      Calcium, Total Serum: 8.6 mg/dL (06-13-22 @ 05:04)      LFTs:             4.9  | 0.3  | 32       ------------------[44      ( 13 Jun 2022 05:04 )  2.8  | x    | 27          Lipase:x      Amylase:x         Blood Gas Arterial, Lactate: 0.80 mmol/L (06-13-22 @ 02:49)  Blood Gas Arterial, Lactate: 1.20 mmol/L (06-12-22 @ 13:40)  Blood Gas Arterial, Lactate: 2.10 mmol/L (06-12-22 @ 12:15)  Blood Gas Arterial, Lactate: 1.20 mmol/L (06-12-22 @ 02:38)  Blood Gas Arterial, Lactate: 1.00 mmol/L (06-11-22 @ 15:56)  Blood Gas Arterial, Lactate: 0.80 mmol/L (06-11-22 @ 02:53)    ABG - ( 13 Jun 2022 02:49 )  pH: 7.42  /  pCO2: 49    /  pO2: 110   / HCO3: 32    / Base Excess: 6.1   /  SaO2: 99.3            ABG - ( 12 Jun 2022 13:40 )  pH: 7.33  /  pCO2: 56    /  pO2: 70    / HCO3: 30    / Base Excess: 2.3   /  SaO2: 96.0            ABG - ( 12 Jun 2022 12:15 )  pH: 7.21  /  pCO2: 71    /  pO2: 66    / HCO3: 28    / Base Excess: -1.3  /  SaO2: 88.0              Coags:    CARDIAC MARKERS ( 12 Jun 2022 04:40 )  x     / 0.04 ng/mL / x     / x     / x      CARDIAC MARKERS ( 11 Jun 2022 18:43 )  x     / 0.09 ng/mL / x     / x     / x          Serum Pro-Brain Natriuretic Peptide: 254 pg/mL (06-09-22 @ 22:55)          Culture - Urine (collected 10 Crow 2022 17:46)  Source: Clean Catch Clean Catch (Midstream)  Final Report (11 Jun 2022 21:28):    No growth      [facility] [employer] [facility] #[id number]

## 2022-06-14 NOTE — PROGRESS NOTE ADULT - ASSESSMENT
88yF w/ PMHx stated above who presented with acute respiratory failure 2/2 COPD exacerbation. CXR post intubation showed right pneumothorax, pigtail catheter placed successfully by ED.  Pt evaluated at bedside NAD, intubated with pressors.     PLAN:  - pigtail to WS - no airleak  - daily AM cxr  - monitor vent settings  - wean of pressors as tolerated  - wean of vent as tolerated  - management per ICU team, sbt as tolerated    2719

## 2022-06-14 NOTE — PROGRESS NOTE ADULT - SUBJECTIVE AND OBJECTIVE BOX
KIMBERLI LEIGH MRN#: [id number]  CODE STATUS: FULL CODE     SUBJECTIVE   Chief complaint: respiratory distress    Currently admitted to medicine with the primary diagnosis of ACUTE HYPERCAPNIC RESPIRATORY FAILURE; COPD EXACERBATION.....      Today is hospital day 4d,   INTERVAL HPI/OVERNIGHT EVENTS:     Required IVP versed x3 o/n due to agitation causing desaturation and tachycardia. Remains intubated and sedated. On low dose Levophed @ 0.025. Sedated with fentanyl @ 2.5, Precedex @ 1.5. Chest tube remains to water seal.       PAST MEDICAL & SURGICAL HISTORY  COPD (chronic obstructive pulmonary disease)    GERD (gastroesophageal reflux disease)    Glaucoma    Anxiety    Pancreatitis    History of tonsillectomy    H/O colonoscopy  5 years ago      ALLERGIES:  No Known Allergies    HOME MEDICATIONS:  Home Medications:  albuterol 90 mcg/inh inhalation aerosol: 2 puff(s) inhaled every 6 hours, As needed, Shortness of Breath and/or Wheezing (27 Dec 2021 10:38)  Ambien 10 mg oral tablet: 1 tab(s) orally once a day (at bedtime) (19 Dec 2021 01:41)  escitalopram 10 mg oral tablet: 1 tab(s) orally once a day (27 Dec 2021 10:38)  famotidine 20 mg oral tablet: 1 tab(s) orally once a day (27 Dec 2021 10:38)  fluticasone 0.5 mg/2 mL inhalation suspension:  (19 Dec 2021 01:41)  levalbuterol 45 mcg/inh inhalation aerosol: 2 puff(s) inhaled every 4 hours (19 Dec 2021 01:41)  Wixela Inhub 250 mcg-50 mcg inhalation powder: 1 puff(s) inhaled 2 times a day (19 Dec 2021 01:41)    MEDICATIONS:  STANDING MEDICATIONS  MEDICATIONS  (STANDING):  azithromycin  IVPB 500 milliGRAM(s) IV Intermittent every 24 hours  cefepime   IVPB 1000 milliGRAM(s) IV Intermittent every 12 hours  chlorhexidine 0.12% Liquid 15 milliLiter(s) Oral Mucosa two times a day  chlorhexidine 4% Liquid 1 Application(s) Topical <User Schedule>  dexMEDEtomidine Infusion 0.05 MICROgram(s)/kG/Hr (0.63 mL/Hr) IV Continuous <Continuous>  enoxaparin Injectable 40 milliGRAM(s) SubCutaneous every 24 hours  fentaNYL   Infusion. 0.5 MICROgram(s)/kG/Hr (3.5 mL/Hr) IV Continuous <Continuous>  methylPREDNISolone sodium succinate Injectable 60 milliGRAM(s) IV Push every 12 hours  norepinephrine Infusion 0.05 MICROgram(s)/kG/Min (4.69 mL/Hr) IV Continuous <Continuous>  pantoprazole   Suspension 40 milliGRAM(s) Oral before breakfast  polyethylene glycol 3350 17 Gram(s) Oral daily  propofol Infusion 5.004 MICROgram(s)/kG/Min (1.21 mL/Hr) IV Continuous <Continuous>  senna 2 Tablet(s) Oral at bedtime    PRN MEDICATIONS  MEDICATIONS  (PRN):  acetaminophen     Tablet .. 650 milliGRAM(s) Oral every 6 hours PRN Temp greater or equal to 38C (100.4F), Mild Pain (1 - 3)      OBJECTIVE    VITAL SIGNS  T(F): 97.1 (06-14 @ 08:00), Max: 98.7 (06-13 @ 12:00)  HR: 54 (06-14 @ 08:00) (52 - 166)  BP: 105/47 (06-14 @ 08:00) (74/42 - 216/84)  RR: 15 (06-14 @ 08:00) (14 - 33)  SpO2: 100% (06-14 @ 08:00) (85% - 100%)    LABS:                        12.0   24.53 )-----------( 224      ( 13 Jun 2022 05:04 )             36.2     06-13    138  |  102  |  55<H>  ----------------------------<  144<H>  5.5<H>   |  23  |  1.0    Ca    8.6      13 Jun 2022 05:04  Mg     2.3     06-13    TPro  4.9<L>  /  Alb  2.8<L>  /  TBili  0.3  /  DBili  x   /  AST  32  /  ALT  27  /  AlkPhos  44  06-13      ABG - ( 14 Jun 2022 02:56 )  pH, Arterial: 7.39  pH, Blood: x     /  pCO2: 53    /  pO2: 104   / HCO3: 32    / Base Excess: 5.7   /  SaO2: 99.0        RADIOLOGY:   Reviewed  CXR       PHYSICAL EXAM:  General: Laying on the hospital bed. Ill-appearing  HEENT: Atraumatic. Normocephalic. EOMI. Conjunctiva and sclera clear.  Cardiac: Normal S1, S2. RRR. No murmurs, rubs, or gallops.  Pulm: CTA b/l no wheezes, rhonchi, or rales.  GI: Soft, nontender, nondistended. BSx4q  Vasc: Normal capillary refill. 2+ pulses.  No edema, cyanosis.  MSK: Moving all 4 extremities.  Neuro: CN II-XII grossly intact  Skin: warm, dry and intact    Vent Settings: Mode: AC/ CMV (Assist Control/ Continuous Mandatory Ventilation)  RR (machine): 16  TV (machine): 350  FiO2: 30  PEEP: 5  PIP: 29   KIMBERLI LEIGH MRN#: [id number]  CODE STATUS: FULL CODE     SUBJECTIVE   Chief complaint: respiratory distress    Currently admitted to medicine with the primary diagnosis of ACUTE HYPERCAPNIC RESPIRATORY FAILURE; COPD EXACERBATION.....      Today is hospital day 4d,   INTERVAL HPI/OVERNIGHT EVENTS:     Required IVP versed x3 o/n due to agitation causing desaturation and tachycardia. Remains intubated and sedated. On low dose Levophed @ 0.025. Sedated with fentanyl @ 2.5, Precedex @ 1.5. Chest tube remains to water seal.       PAST MEDICAL & SURGICAL HISTORY  COPD (chronic obstructive pulmonary disease)    GERD (gastroesophageal reflux disease)    Glaucoma    Anxiety    Pancreatitis    History of tonsillectomy    H/O colonoscopy  5 years ago      ALLERGIES:  No Known Allergies    HOME MEDICATIONS:  Home Medications:  albuterol 90 mcg/inh inhalation aerosol: 2 puff(s) inhaled every 6 hours, As needed, Shortness of Breath and/or Wheezing (27 Dec 2021 10:38)  Ambien 10 mg oral tablet: 1 tab(s) orally once a day (at bedtime) (19 Dec 2021 01:41)  escitalopram 10 mg oral tablet: 1 tab(s) orally once a day (27 Dec 2021 10:38)  famotidine 20 mg oral tablet: 1 tab(s) orally once a day (27 Dec 2021 10:38)  fluticasone 0.5 mg/2 mL inhalation suspension:  (19 Dec 2021 01:41)  levalbuterol 45 mcg/inh inhalation aerosol: 2 puff(s) inhaled every 4 hours (19 Dec 2021 01:41)  Wixela Inhub 250 mcg-50 mcg inhalation powder: 1 puff(s) inhaled 2 times a day (19 Dec 2021 01:41)    MEDICATIONS:  STANDING MEDICATIONS  MEDICATIONS  (STANDING):  azithromycin  IVPB 500 milliGRAM(s) IV Intermittent every 24 hours  cefepime   IVPB 1000 milliGRAM(s) IV Intermittent every 12 hours  chlorhexidine 0.12% Liquid 15 milliLiter(s) Oral Mucosa two times a day  chlorhexidine 4% Liquid 1 Application(s) Topical <User Schedule>  dexMEDEtomidine Infusion 0.05 MICROgram(s)/kG/Hr (0.63 mL/Hr) IV Continuous <Continuous>  enoxaparin Injectable 40 milliGRAM(s) SubCutaneous every 24 hours  fentaNYL   Infusion. 0.5 MICROgram(s)/kG/Hr (3.5 mL/Hr) IV Continuous <Continuous>  methylPREDNISolone sodium succinate Injectable 60 milliGRAM(s) IV Push every 12 hours  norepinephrine Infusion 0.05 MICROgram(s)/kG/Min (4.69 mL/Hr) IV Continuous <Continuous>  pantoprazole   Suspension 40 milliGRAM(s) Oral before breakfast  polyethylene glycol 3350 17 Gram(s) Oral daily  propofol Infusion 5.004 MICROgram(s)/kG/Min (1.21 mL/Hr) IV Continuous <Continuous>  senna 2 Tablet(s) Oral at bedtime    PRN MEDICATIONS  MEDICATIONS  (PRN):  acetaminophen     Tablet .. 650 milliGRAM(s) Oral every 6 hours PRN Temp greater or equal to 38C (100.4F), Mild Pain (1 - 3)      OBJECTIVE    VITAL SIGNS  T(F): 97.1 (06-14 @ 08:00), Max: 98.7 (06-13 @ 12:00)  HR: 54 (06-14 @ 08:00) (52 - 166)  BP: 105/47 (06-14 @ 08:00) (74/42 - 216/84)  RR: 15 (06-14 @ 08:00) (14 - 33)  SpO2: 100% (06-14 @ 08:00) (85% - 100%)    LABS:                        12.0   24.53 )-----------( 224      ( 13 Jun 2022 05:04 )             36.2     06-13    138  |  102  |  55<H>  ----------------------------<  144<H>  5.5<H>   |  23  |  1.0    Ca    8.6      13 Jun 2022 05:04  Mg     2.3     06-13    TPro  4.9<L>  /  Alb  2.8<L>  /  TBili  0.3  /  DBili  x   /  AST  32  /  ALT  27  /  AlkPhos  44  06-13      ABG - ( 14 Jun 2022 02:56 )  pH, Arterial: 7.39  pH, Blood: x     /  pCO2: 53    /  pO2: 104   / HCO3: 32    / Base Excess: 5.7   /  SaO2: 99.0        RADIOLOGY:   Reviewed  CXR ETT ~2.5cm above carine, OGT coursing below diaphragm, R pigtail, no infiltrates or effusions.      PHYSICAL EXAM:  General: Laying on the hospital bed. Ill-appearing  HEENT: Atraumatic. Normocephalic  Cardiac: Normal S1, S2. RRR. No murmurs, rubs, or gallops.  Pulm: Decreased basilar bs b/l. intubated on mv  GI: Soft, nontender, nondistended.  Vasc: 2+ pulses.  No edema, cyanosis.  MSK: Moving all 4 extremities.  Neuro: sedated  Skin: warm, dry and intact    Vent Settings: Mode: AC/ CMV (Assist Control/ Continuous Mandatory Ventilation)  RR (machine): 16  TV (machine): 350  FiO2: 30  PEEP: 5  PIP: 29

## 2022-06-14 NOTE — PROGRESS NOTE ADULT - ASSESSMENT
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 Precedex for agitation    HEENT: Oral care    PULMONARY:  HOB @ 45 degrees.  no vent changes, chest tube to suction.  Daily CXR.  Nebs Q4 and PRN.  Solumedrol 60 mg q 12. sbt    CARDIOVASCULAR: taper pressors,    GI: GI prophylaxis.  OG Feeding.  Bowel regimen     RENAL:  Follow up lytes.  Correct as needed.    INFECTIOUS DISEASE: finish abx    HEMATOLOGICAL:  DVT prophylaxis.  LE doppler -    ENDOCRINE:  Follow up FS.  Insulin protocol if needed.    MUSCULOSKELETAL:  Bed rest     MICU monitoring     GOC    HUBER sp straight cath

## 2022-06-14 NOTE — PROGRESS NOTE ADULT - ATTENDING COMMENTS
Ms. Peralta is an 88-year-old female admitted with diagnosis of COPD exacerbation, required intubation and ventilatory support, developed large right pneumothorax treated with percutaneous pleural drain with adequate lung re-expansion. Thoracic surgery consulted for assistance in care. Currently in low ventilatory support. CXR: drain in good position, no residual pneumothorax.   06/14/12: Pleural drain working well, no air leak, CXR: no pneumothorax. Failed SBT yesterday, SBT today for possible extubation, will keep pleural drain until extubated  Plan:   Continue pleural drain to water seal

## 2022-06-14 NOTE — PROGRESS NOTE ADULT - ASSESSMENT
ASSESSMENT & PLAN  89 y/o female with PMHx of COPD not on home O2, pHTN, GERD, Lucio's esophagus, COVID vaccinated presented to the ED for acute onset of sob.    # Acute on chronic hypercapnic respiratory failure s/p intubation  # COPD exacerbation  # R secondary spontaneous pneumothorax s/p pigtail  - presented to ED with SOB, found to be hypercapnic to 115, intubated  - post intubation CXR showed R PTX s/p R pigtail catheter  - CTSX following  - R chest tube to water seal  - resume solumedrol @ 60q12  - Nebs q4 and PRN  - vent changes per pulm: none today  - wean levophed as tolerated  - off IVF  - Procal 0.52, D-dimer 579  - BCX x2 NGTD, UCX neg, RVP neg, urine strep/legionella neg  - WBC 19.9->20.3->25.6->19.1->24.5; afebrile  - cont azithromycin 500q24 x5d (finish 6/15)  - start cefepime 1g q12h today  - daily ABG, CXR  - no SAT/SBT today    # H/o HTN  # H/o HFmrEF (45%)  # H/o mild pHTN  - BP: 109/53 (65/38 - 139/83)  - on amlodipine 5mg qd at home -- held for now    # GERD  # Lucio's Esophagus  - PPI    # Platelets downtrending  - Plt 446 on admission, today 224  - has received LMWH  - check HIT ab    # Elevated D-dimer  - d-dimer 579  - check LE duplex    ARVIND noland    PT/REHAB: n/a  ACTIVITY: Activity - Bedrest  DVT PPX: enoxaparin Injectable  GI PPX: pantoprazole  DIET: Diet, NPO with Tube Feed   CODE: Full  Disposition: MICU  Pending: CTSX f/u. LE duplex.   ASSESSMENT & PLAN  89 y/o female with PMHx of COPD not on home O2, pHTN, GERD, Lucio's esophagus, COVID vaccinated presented to the ED for acute onset of sob.    # Acute on chronic hypercapnic respiratory failure s/p intubation  # COPD exacerbation  # R secondary spontaneous pneumothorax s/p pigtail  - presented to ED with SOB, found to be hypercapnic to 115, intubated  - post intubation CXR showed R PTX s/p R pigtail catheter  - CTSX following  - R chest tube to water seal  - cont solumedrol @ 60q12  - Nebs q4 and PRN  - vent changes per pulm: none today  - cont sedation with fentanyl, precedex. Consider propofol.   - wean levophed as tolerated  - off IVF  - Procal 0.52, D-dimer 579  - BCX x2 NGTD, UCX neg, RVP neg, urine strep/legionella neg  - WBC 19.9->20.3->25.6->19.1->24.5; afebrile  - cont azithromycin 500q24 x5d (finish 6/15)  - cont cefepime 1g q12h x7d (finish 6/19)  - daily ABG, CXR  - SAT -> SBT today    # H/o HTN  # H/o HFmrEF (45%)  # H/o mild pHTN  - BP: 105/47 (74/42 - 216/84)  - on amlodipine 5mg qd at home -- held for now    # GERD  # Lucio's Esophagus  - PPI    # Platelets downtrending  - Plt 446 on admission, yesterday 224 - f/u plt today  - has received LMWH  - check HIT ab    # Elevated D-dimer  - d-dimer 579  - LE duplex negative    Noland dc'd yesterday. Straight cath'd o/n. If continues to retain - replace noland.     PT/REHAB: n/a  ACTIVITY: Activity - Bedrest  DVT PPX: enoxaparin Injectable  GI PPX: pantoprazole  DIET: Diet, NPO with Tube Feed   CODE: Full  Disposition: MICU  Pending: SAT/SBT. 11AM labs. HIT ab.

## 2022-06-14 NOTE — PROGRESS NOTE ADULT - SUBJECTIVE AND OBJECTIVE BOX
Over Night Events: events noted, still intubated, ventilated, levophed 0.02, fentanyl, precedex    PHYSICAL EXAM    ICU Vital Signs Last 24 Hrs  T(C): 36.1 (14 Jun 2022 04:00), Max: 37.8 (13 Jun 2022 08:00)  T(F): 97 (14 Jun 2022 04:00), Max: 100 (13 Jun 2022 08:00)  HR: 54 (14 Jun 2022 07:52) (52 - 166)  BP: 114/50 (14 Jun 2022 07:00) (74/42 - 216/84)  BP(mean): 66 (14 Jun 2022 07:00) (51 - 118)  RR: 16 (14 Jun 2022 07:00) (14 - 33)  SpO2: 100% (14 Jun 2022 07:52) (85% - 100%)      General: ill looking  HEENT: ETT            Lungs: dec bs both bases  Cardiovascular: JULIO CESAR 26  Abdomen: Soft, Positive BS  Extremities: No clubbing   Skin: Warm  Neurological: Non focal       06-13-22 @ 07:01  -  06-14-22 @ 07:00  --------------------------------------------------------  IN:    Dexmedetomidine: 317.1 mL    Enteral Tube Flush: 130 mL    FentaNYL: 224 mL    IV PiggyBack: 400 mL    Jevity 1.2: 1080 mL    Lactated Ringers: 75 mL    Norepinephrine: 67.5 mL    Propofol: 8.4 mL  Total IN: 2302 mL    OUT:    Chest Tube (mL): 42 mL    Indwelling Catheter - Urethral (mL): 85 mL    Intermittent Catheterization - Urethral (mL): 400 mL    Voided (mL): 350 mL  Total OUT: 877 mL    Total NET: 1425 mL          LABS:                          12.0   24.53 )-----------( 224      ( 13 Jun 2022 05:04 )             36.2                                               06-13    138  |  102  |  55<H>  ----------------------------<  144<H>  5.5<H>   |  23  |  1.0    Ca    8.6      13 Jun 2022 05:04  Mg     2.3     06-13    TPro  4.9<L>  /  Alb  2.8<L>  /  TBili  0.3  /  DBili  x   /  AST  32  /  ALT  27  /  AlkPhos  44  06-13                                                                                           LIVER FUNCTIONS - ( 13 Jun 2022 05:04 )  Alb: 2.8 g/dL / Pro: 4.9 g/dL / ALK PHOS: 44 U/L / ALT: 27 U/L / AST: 32 U/L / GGT: x                                                                                               Mode: AC/ CMV (Assist Control/ Continuous Mandatory Ventilation)  RR (machine): 16  TV (machine): 350  FiO2: 30  PEEP: 5  ITime: 1  MAP: 10  PIP: 29                                      ABG - ( 14 Jun 2022 02:56 )  pH, Arterial: 7.39  pH, Blood: x     /  pCO2: 53    /  pO2: 104   / HCO3: 32    / Base Excess: 5.7   /  SaO2: 99.0        p/p         MEDICATIONS  (STANDING):  azithromycin  IVPB 500 milliGRAM(s) IV Intermittent every 24 hours  cefepime   IVPB 1000 milliGRAM(s) IV Intermittent every 12 hours  chlorhexidine 0.12% Liquid 15 milliLiter(s) Oral Mucosa two times a day  chlorhexidine 4% Liquid 1 Application(s) Topical <User Schedule>  dexMEDEtomidine Infusion 0.05 MICROgram(s)/kG/Hr (0.63 mL/Hr) IV Continuous <Continuous>  enoxaparin Injectable 40 milliGRAM(s) SubCutaneous every 24 hours  fentaNYL   Infusion. 0.5 MICROgram(s)/kG/Hr (3.5 mL/Hr) IV Continuous <Continuous>  methylPREDNISolone sodium succinate Injectable 60 milliGRAM(s) IV Push every 12 hours  norepinephrine Infusion 0.05 MICROgram(s)/kG/Min (4.69 mL/Hr) IV Continuous <Continuous>  pantoprazole   Suspension 40 milliGRAM(s) Oral before breakfast  polyethylene glycol 3350 17 Gram(s) Oral daily  propofol Infusion 5 MICROgram(s)/kG/Min (1.21 mL/Hr) IV Continuous <Continuous>  senna 2 Tablet(s) Oral at bedtime    MEDICATIONS  (PRN):  acetaminophen     Tablet .. 650 milliGRAM(s) Oral every 6 hours PRN Temp greater or equal to 38C (100.4F), Mild Pain (1 - 3)      CXR reviewed

## 2022-06-14 NOTE — PROGRESS NOTE ADULT - SUBJECTIVE AND OBJECTIVE BOX
GENERAL SURGERY PROGRESS NOTE    Patient: KIMBERLI LEIGH , 88y ([birth year])Female   MRN: [id number]  Location: [facility]  A  Visit: 06-10-22 Inpatient  Date: 06-14-22 @ 13:44      Procedure/Dx/Injuries: pneumothorax, CT x WS    Events of past 24 hours: no acute events    PAST MEDICAL & SURGICAL HISTORY:  COPD (chronic obstructive pulmonary disease)      GERD (gastroesophageal reflux disease)      Glaucoma      Anxiety      Pancreatitis      History of tonsillectomy      H/O colonoscopy  5 years ago          Vitals:   T(F): 97.7 (06-14-22 @ 12:00), Max: 98.6 (06-13-22 @ 17:00)  HR: 62 (06-14-22 @ 12:30)  BP: 85/53 (06-14-22 @ 12:30)  RR: 16 (06-14-22 @ 12:30)  SpO2: 100% (06-14-22 @ 12:30)  Mode: AC/ CMV (Assist Control/ Continuous Mandatory Ventilation), RR (machine): 16, TV (machine): 350, FiO2: 30, PEEP: 5, ITime: 1, MAP: 11, PIP: 33    Diet, NPO with Tube Feed:   Tube Feeding Modality: Orogastric  Replete  Total Volume for 24 Hours (mL): 1080  Continuous  Starting Tube Feed Rate mL per Hour: 45  Until Goal Tube Feed Rate (mL per Hour): 45  Tube Feed Duration (in Hours): 24  Tube Feed Start Time: 00:00      Fluids:     I & O's:    06-13-22 @ 07:01  -  06-14-22 @ 07:00  --------------------------------------------------------  IN:    Dexmedetomidine: 317.1 mL    Enteral Tube Flush: 130 mL    FentaNYL: 224 mL    IV PiggyBack: 400 mL    Jevity 1.2: 1080 mL    Lactated Ringers: 75 mL    Norepinephrine: 67.5 mL    Propofol: 8.4 mL  Total IN: 2302 mL    OUT:    Chest Tube (mL): 42 mL    Indwelling Catheter - Urethral (mL): 85 mL    Intermittent Catheterization - Urethral (mL): 400 mL    Voided (mL): 350 mL  Total OUT: 877 mL    Total NET: 1425 mL          PHYSICAL EXAM:  General Appearance: NAD  Heart: RRR  Lungs: no increased work of breathing, intubated  Abdomen:  soft, non tender  MSK/Extremities:  no cyanosis    MEDICATIONS  (STANDING):  cefepime   IVPB 1000 milliGRAM(s) IV Intermittent every 12 hours  chlorhexidine 0.12% Liquid 15 milliLiter(s) Oral Mucosa two times a day  chlorhexidine 4% Liquid 1 Application(s) Topical <User Schedule>  dexMEDEtomidine Infusion 0.05 MICROgram(s)/kG/Hr (0.63 mL/Hr) IV Continuous <Continuous>  dextrose 5%. 1000 milliLiter(s) (50 mL/Hr) IV Continuous <Continuous>  dextrose 5%. 1000 milliLiter(s) (100 mL/Hr) IV Continuous <Continuous>  dextrose 50% Injectable 50 milliLiter(s) IV Push once  dextrose 50% Injectable 25 Gram(s) IV Push once  dextrose 50% Injectable 12.5 Gram(s) IV Push once  dextrose 50% Injectable 25 Gram(s) IV Push once  enoxaparin Injectable 40 milliGRAM(s) SubCutaneous every 24 hours  fentaNYL   Infusion. 0.5 MICROgram(s)/kG/Hr (3.5 mL/Hr) IV Continuous <Continuous>  glucagon  Injectable 1 milliGRAM(s) IntraMuscular once  insulin lispro (ADMELOG) corrective regimen sliding scale   SubCutaneous every 6 hours  insulin regular  human recombinant 10 Unit(s) IV Push once  methylPREDNISolone sodium succinate Injectable 60 milliGRAM(s) IV Push every 12 hours  norepinephrine Infusion 0.05 MICROgram(s)/kG/Min (4.69 mL/Hr) IV Continuous <Continuous>  pantoprazole   Suspension 40 milliGRAM(s) Oral before breakfast  polyethylene glycol 3350 17 Gram(s) Oral daily  propofol Infusion 5.004 MICROgram(s)/kG/Min (1.21 mL/Hr) IV Continuous <Continuous>  senna 2 Tablet(s) Oral at bedtime  sodium zirconium cyclosilicate 10 Gram(s) Oral once    MEDICATIONS  (PRN):  acetaminophen     Tablet .. 650 milliGRAM(s) Oral every 6 hours PRN Temp greater or equal to 38C (100.4F), Mild Pain (1 - 3)  dextrose Oral Gel 15 Gram(s) Oral once PRN Blood Glucose LESS THAN 70 milliGRAM(s)/deciliter      DVT PROPHYLAXIS: enoxaparin Injectable 40 milliGRAM(s) SubCutaneous every 24 hours    GI PROPHYLAXIS: pantoprazole   Suspension 40 milliGRAM(s) Oral before breakfast    ANTICOAGULATION:   ANTIBIOTICS:  cefepime   IVPB 1000 milliGRAM(s)            LAB/STUDIES:  Labs:  CAPILLARY BLOOD GLUCOSE      POCT Blood Glucose.: 241 mg/dL (14 Jun 2022 03:42)  POCT Blood Glucose.: 272 mg/dL (14 Jun 2022 01:39)  POCT Blood Glucose.: 286 mg/dL (13 Jun 2022 23:35)  POCT Blood Glucose.: 212 mg/dL (13 Jun 2022 14:31)                          13.2   23.47 )-----------( 344      ( 14 Jun 2022 12:18 )             40.6       Auto Neutrophil %: 94.1 % (06-14-22 @ 12:18)  Auto Immature Granulocyte %: 0.5 % (06-14-22 @ 12:18)    06-14    138  |  100  |  55<H>  ----------------------------<  247<H>  5.7<H>   |  28  |  0.8      Calcium, Total Serum: 8.7 mg/dL (06-14-22 @ 12:18)      LFTs:             5.7  | 0.3  | 33       ------------------[51      ( 14 Jun 2022 12:18 )  3.2  | x    | 38          Lipase:x      Amylase:x         Blood Gas Arterial, Lactate: 1.20 mmol/L (06-14-22 @ 02:56)  Blood Gas Arterial, Lactate: 1.10 mmol/L (06-13-22 @ 14:07)  Blood Gas Arterial, Lactate: 0.80 mmol/L (06-13-22 @ 02:49)  Blood Gas Arterial, Lactate: 1.20 mmol/L (06-12-22 @ 13:40)  Blood Gas Arterial, Lactate: 2.10 mmol/L (06-12-22 @ 12:15)  Blood Gas Arterial, Lactate: 1.20 mmol/L (06-12-22 @ 02:38)  Blood Gas Arterial, Lactate: 1.00 mmol/L (06-11-22 @ 15:56)    ABG - ( 14 Jun 2022 02:56 )  pH: 7.39  /  pCO2: 53    /  pO2: 104   / HCO3: 32    / Base Excess: 5.7   /  SaO2: 99.0            ABG - ( 13 Jun 2022 14:07 )  pH: 7.37  /  pCO2: 53    /  pO2: 98    / HCO3: 31    / Base Excess: 4.1   /  SaO2: 98.6            ABG - ( 13 Jun 2022 02:49 )  pH: 7.42  /  pCO2: 49    /  pO2: 110   / HCO3: 32    / Base Excess: 6.1   /  SaO2: 99.3              Coags:        Serum Pro-Brain Natriuretic Peptide: 254 pg/mL (06-09-22 @ 22:55)                  IMAGING:      ACCESS/ DEVICES:  [ ] Peripheral IV  [ ] Central Venous Line	[ ] R	[ ] L	[ ] IJ	[ ] Fem	[ ] SC	Placed:   [ ] Arterial Line		[ ] R	[ ] L	[ ] Fem	[ ] Rad	[ ] Ax	Placed:   [ ] PICC:					[ ] Mediport  [ ] Urinary Catheter,  Date Placed:   [ ] Chest tube: [ ] Right, [ ] Left  [ ] CRISTELA/Migel Drains           GENERAL SURGERY PROGRESS NOTE    Patient: KIMBERLI LEIGH , 88y ([birth year])Female   MRN: [id number]  Location: [facility]  A  Visit: 06-10-22 Inpatient  Date: 06-14-22 @ 13:44      Procedure/Dx/Injuries: pneumothorax, CT x WS    Events of past 24 hours: no acute events    PAST MEDICAL & SURGICAL HISTORY:  COPD (chronic obstructive pulmonary disease)      GERD (gastroesophageal reflux disease)      Glaucoma      Anxiety      Pancreatitis      History of tonsillectomy      H/O colonoscopy  5 years ago          Vitals:   T(F): 97.7 (06-14-22 @ 12:00), Max: 98.6 (06-13-22 @ 17:00)  HR: 62 (06-14-22 @ 12:30)  BP: 85/53 (06-14-22 @ 12:30)  RR: 16 (06-14-22 @ 12:30)  SpO2: 100% (06-14-22 @ 12:30)  Mode: AC/ CMV (Assist Control/ Continuous Mandatory Ventilation), RR (machine): 16, TV (machine): 350, FiO2: 30, PEEP: 5, ITime: 1, MAP: 11, PIP: 33    Diet, NPO with Tube Feed:   Tube Feeding Modality: Orogastric  Replete  Total Volume for 24 Hours (mL): 1080  Continuous  Starting Tube Feed Rate mL per Hour: 45  Until Goal Tube Feed Rate (mL per Hour): 45  Tube Feed Duration (in Hours): 24  Tube Feed Start Time: 00:00      Fluids:     I & O's:    06-13-22 @ 07:01  -  06-14-22 @ 07:00  --------------------------------------------------------  IN:    Dexmedetomidine: 317.1 mL    Enteral Tube Flush: 130 mL    FentaNYL: 224 mL    IV PiggyBack: 400 mL    Jevity 1.2: 1080 mL    Lactated Ringers: 75 mL    Norepinephrine: 67.5 mL    Propofol: 8.4 mL  Total IN: 2302 mL    OUT:    Chest Tube (mL): 42 mL    Indwelling Catheter - Urethral (mL): 85 mL    Intermittent Catheterization - Urethral (mL): 400 mL    Voided (mL): 350 mL  Total OUT: 877 mL    Total NET: 1425 mL          PHYSICAL EXAM:  General Appearance: NAD  Heart: RRR  Lungs: no increased work of breathing, intubated, CT x WS  Abdomen:  soft, non tender  MSK/Extremities:  no cyanosis    MEDICATIONS  (STANDING):  cefepime   IVPB 1000 milliGRAM(s) IV Intermittent every 12 hours  chlorhexidine 0.12% Liquid 15 milliLiter(s) Oral Mucosa two times a day  chlorhexidine 4% Liquid 1 Application(s) Topical <User Schedule>  dexMEDEtomidine Infusion 0.05 MICROgram(s)/kG/Hr (0.63 mL/Hr) IV Continuous <Continuous>  dextrose 5%. 1000 milliLiter(s) (50 mL/Hr) IV Continuous <Continuous>  dextrose 5%. 1000 milliLiter(s) (100 mL/Hr) IV Continuous <Continuous>  dextrose 50% Injectable 50 milliLiter(s) IV Push once  dextrose 50% Injectable 25 Gram(s) IV Push once  dextrose 50% Injectable 12.5 Gram(s) IV Push once  dextrose 50% Injectable 25 Gram(s) IV Push once  enoxaparin Injectable 40 milliGRAM(s) SubCutaneous every 24 hours  fentaNYL   Infusion. 0.5 MICROgram(s)/kG/Hr (3.5 mL/Hr) IV Continuous <Continuous>  glucagon  Injectable 1 milliGRAM(s) IntraMuscular once  insulin lispro (ADMELOG) corrective regimen sliding scale   SubCutaneous every 6 hours  insulin regular  human recombinant 10 Unit(s) IV Push once  methylPREDNISolone sodium succinate Injectable 60 milliGRAM(s) IV Push every 12 hours  norepinephrine Infusion 0.05 MICROgram(s)/kG/Min (4.69 mL/Hr) IV Continuous <Continuous>  pantoprazole   Suspension 40 milliGRAM(s) Oral before breakfast  polyethylene glycol 3350 17 Gram(s) Oral daily  propofol Infusion 5.004 MICROgram(s)/kG/Min (1.21 mL/Hr) IV Continuous <Continuous>  senna 2 Tablet(s) Oral at bedtime  sodium zirconium cyclosilicate 10 Gram(s) Oral once    MEDICATIONS  (PRN):  acetaminophen     Tablet .. 650 milliGRAM(s) Oral every 6 hours PRN Temp greater or equal to 38C (100.4F), Mild Pain (1 - 3)  dextrose Oral Gel 15 Gram(s) Oral once PRN Blood Glucose LESS THAN 70 milliGRAM(s)/deciliter      DVT PROPHYLAXIS: enoxaparin Injectable 40 milliGRAM(s) SubCutaneous every 24 hours    GI PROPHYLAXIS: pantoprazole   Suspension 40 milliGRAM(s) Oral before breakfast    ANTICOAGULATION:   ANTIBIOTICS:  cefepime   IVPB 1000 milliGRAM(s)            LAB/STUDIES:  Labs:  CAPILLARY BLOOD GLUCOSE      POCT Blood Glucose.: 241 mg/dL (14 Jun 2022 03:42)  POCT Blood Glucose.: 272 mg/dL (14 Jun 2022 01:39)  POCT Blood Glucose.: 286 mg/dL (13 Jun 2022 23:35)  POCT Blood Glucose.: 212 mg/dL (13 Jun 2022 14:31)                          13.2   23.47 )-----------( 344      ( 14 Jun 2022 12:18 )             40.6       Auto Neutrophil %: 94.1 % (06-14-22 @ 12:18)  Auto Immature Granulocyte %: 0.5 % (06-14-22 @ 12:18)    06-14    138  |  100  |  55<H>  ----------------------------<  247<H>  5.7<H>   |  28  |  0.8      Calcium, Total Serum: 8.7 mg/dL (06-14-22 @ 12:18)      LFTs:             5.7  | 0.3  | 33       ------------------[51      ( 14 Jun 2022 12:18 )  3.2  | x    | 38          Lipase:x      Amylase:x         Blood Gas Arterial, Lactate: 1.20 mmol/L (06-14-22 @ 02:56)  Blood Gas Arterial, Lactate: 1.10 mmol/L (06-13-22 @ 14:07)  Blood Gas Arterial, Lactate: 0.80 mmol/L (06-13-22 @ 02:49)  Blood Gas Arterial, Lactate: 1.20 mmol/L (06-12-22 @ 13:40)  Blood Gas Arterial, Lactate: 2.10 mmol/L (06-12-22 @ 12:15)  Blood Gas Arterial, Lactate: 1.20 mmol/L (06-12-22 @ 02:38)  Blood Gas Arterial, Lactate: 1.00 mmol/L (06-11-22 @ 15:56)    ABG - ( 14 Jun 2022 02:56 )  pH: 7.39  /  pCO2: 53    /  pO2: 104   / HCO3: 32    / Base Excess: 5.7   /  SaO2: 99.0            ABG - ( 13 Jun 2022 14:07 )  pH: 7.37  /  pCO2: 53    /  pO2: 98    / HCO3: 31    / Base Excess: 4.1   /  SaO2: 98.6            ABG - ( 13 Jun 2022 02:49 )  pH: 7.42  /  pCO2: 49    /  pO2: 110   / HCO3: 32    / Base Excess: 6.1   /  SaO2: 99.3              Coags:        Serum Pro-Brain Natriuretic Peptide: 254 pg/mL (06-09-22 @ 22:55)                  IMAGING:      ACCESS/ DEVICES:  [ ] Peripheral IV  [ ] Central Venous Line	[ ] R	[ ] L	[ ] IJ	[ ] Fem	[ ] SC	Placed:   [ ] Arterial Line		[ ] R	[ ] L	[ ] Fem	[ ] Rad	[ ] Ax	Placed:   [ ] PICC:					[ ] Mediport  [ ] Urinary Catheter,  Date Placed:   [ ] Chest tube: [ ] Right, [ ] Left  [ ] CRISTELA/Migel Drains

## 2022-06-15 NOTE — PROVIDER CONTACT NOTE (EICU) - BACKGROUND
89 y/o female with PMHx of COPD not on home O2, pHTN, GERD, Lucio's esophagus, COVID vaccinated presented to the ED for acute onset of sob. Now with R PTX and pigtail cath

## 2022-06-15 NOTE — PROVIDER CONTACT NOTE (MEDICATION) - SITUATION
patient agitated, rising out of bed, biting on tube unable to sedate adequately. At this time only on propofol and levophed. Patient 's or higher, BP increasing.

## 2022-06-15 NOTE — PROGRESS NOTE ADULT - ASSESSMENT
ASSESSMENT & PLAN  87 y/o female with PMHx of COPD on home O2, pHTN, GERD, Lucio's esophagus, COVID vaccinated presented to the ED for acute onset of sob.    # Acute on chronic hypercapnic respiratory failure s/p intubation  # COPD exacerbation  # HO COPD, O2 dependent  # R secondary spontaneous pneumothorax s/p pigtail  - presented to ED with SOB, found to be hypercapnic to 115, intubated  - post intubation CXR showed R PTX s/p R pigtail catheter  - CTSX following  - R chest tube to water seal -- dislodged o/n  - cont solumedrol @ 60q12  - Nebs q4 and PRN  - vent changes per pulm: none today  - cont sedation with fentanyl, precedex. Consider propofol.   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

## 2022-06-15 NOTE — CONSULT NOTE ADULT - ASSESSMENT
IMP:  - acute hypercapnic resp failure  - h/o Lucio's esophagus  - hyperglycemia, likely DM  - hyperkalemia    est REE by PSE today 868 kcal, protein needs likely ~ 50-60 gm/d  propofol added - if current dose continues, will provide 317 kcal/d of IVFE  suggest:  - change feeds now to Vital Hi Pro at 30 ml/h --> 62 gm pro, 711 kcal, + propofol = 1028 k/d, containing total 29 mEq K/d  - note that propofol contains phos  - if off of propofol, sugest Peptamen AF at 30 ml/h --> 55 gm of whey protein, 864 total kcal and 31 total mEq K/d with lower carb content

## 2022-06-15 NOTE — PROGRESS NOTE ADULT - SUBJECTIVE AND OBJECTIVE BOX
Over Night Events: events noted, still intubated, ventilated, on precedex, fentanyl, off levophed    PHYSICAL EXAM    ICU Vital Signs Last 24 Hrs  T(C): 36.7 (15 Crow 2022 04:00), Max: 36.7 (14 Jun 2022 20:00)  T(F): 98 (15 Crow 2022 04:00), Max: 98 (14 Jun 2022 20:00)  HR: 76 (15 Crow 2022 06:15) (58 - 162)  BP: 128/50 (15 Crow 2022 06:15) (54/42 - 179/68)  BP(mean): 72 (15 Crow 2022 06:15) (31 - 143)  RR: 16 (15 Crow 2022 06:15) (8 - 23)  SpO2: 100% (15 Crow 2022 06:15) (86% - 100%)      General: ill looking  HEENT: ETT  Lungs: dec bs both bases  Cardiovascular: JULIO CESAR 2.6  Abdomen: Soft, Positive BS  Extremities: No clubbing   not following commands      06-14-22 @ 07:01  -  06-15-22 @ 07:00  --------------------------------------------------------  IN:    Dexmedetomidine: 175 mL    FentaNYL: 48 mL    Jevity 1.2: 855 mL    Norepinephrine: 128.1 mL    Propofol: 200 mL  Total IN: 1406.1 mL    OUT:    Chest Tube (mL): 0 mL    Indwelling Catheter - Urethral (mL): 955 mL  Total OUT: 955 mL    Total NET: 451.1 mL          LABS:                          12.7   30.25 )-----------( 385      ( 15 Crow 2022 05:02 )             37.7                                               06-15    137  |  99  |  54<H>  ----------------------------<  175<H>  5.6<H>   |  27  |  0.8    Ca    9.0      15 Crow 2022 05:02  Mg     2.2     06-15    TPro  5.2<L>  /  Alb  3.1<L>  /  TBili  0.4  /  DBili  x   /  AST  26  /  ALT  44<H>  /  AlkPhos  47  06-15                                                                                           LIVER FUNCTIONS - ( 15 Crow 2022 05:02 )  Alb: 3.1 g/dL / Pro: 5.2 g/dL / ALK PHOS: 47 U/L / ALT: 44 U/L / AST: 26 U/L / GGT: x                                                                                               Mode: AC/ CMV (Assist Control/ Continuous Mandatory Ventilation)  RR (machine): 16  TV (machine): 350  FiO2: 30  PEEP: 5  ITime: 1  MAP: 11  PIP: 38                                      ABG - ( 15 Crow 2022 02:33 )  pH, Arterial: 7.40  pH, Blood: x     /  pCO2: 51    /  pO2: 90    / HCO3: 32    / Base Excess: 5.7   /  SaO2: 98.7                MEDICATIONS  (STANDING):  cefepime   IVPB 1000 milliGRAM(s) IV Intermittent every 12 hours  chlorhexidine 0.12% Liquid 15 milliLiter(s) Oral Mucosa two times a day  chlorhexidine 4% Liquid 1 Application(s) Topical <User Schedule>  dexMEDEtomidine Infusion 0.05 MICROgram(s)/kG/Hr (0.63 mL/Hr) IV Continuous <Continuous>  dextrose 5%. 1000 milliLiter(s) (50 mL/Hr) IV Continuous <Continuous>  dextrose 5%. 1000 milliLiter(s) (100 mL/Hr) IV Continuous <Continuous>  dextrose 50% Injectable 25 Gram(s) IV Push once  dextrose 50% Injectable 12.5 Gram(s) IV Push once  dextrose 50% Injectable 25 Gram(s) IV Push once  enoxaparin Injectable 40 milliGRAM(s) SubCutaneous every 24 hours  fentaNYL   Infusion. 0.5 MICROgram(s)/kG/Hr (3.5 mL/Hr) IV Continuous <Continuous>  glucagon  Injectable 1 milliGRAM(s) IntraMuscular once  insulin lispro (ADMELOG) corrective regimen sliding scale   SubCutaneous every 6 hours  methylPREDNISolone sodium succinate Injectable 60 milliGRAM(s) IV Push every 12 hours  midazolam Injectable 2 milliGRAM(s) IV Push every 4 hours  norepinephrine Infusion 0.05 MICROgram(s)/kG/Min (4.69 mL/Hr) IV Continuous <Continuous>  pantoprazole   Suspension 40 milliGRAM(s) Oral before breakfast  polyethylene glycol 3350 17 Gram(s) Oral daily  propofol Infusion 5.004 MICROgram(s)/kG/Min (1.21 mL/Hr) IV Continuous <Continuous>  senna 2 Tablet(s) Oral at bedtime    MEDICATIONS  (PRN):  acetaminophen     Tablet .. 650 milliGRAM(s) Oral every 6 hours PRN Temp greater or equal to 38C (100.4F), Mild Pain (1 - 3)  dextrose Oral Gel 15 Gram(s) Oral once PRN Blood Glucose LESS THAN 70 milliGRAM(s)/deciliter

## 2022-06-15 NOTE — PROVIDER CONTACT NOTE (EICU) - ASSESSMENT
/68  fentanyl 0.4 mcg/kg/hr, precedex 0.4 mcg/kg/hr, propofol 40  Eyes open, appears uncomfortable slid down in bed, biting ETT

## 2022-06-15 NOTE — PROGRESS NOTE ADULT - ASSESSMENT
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 Precedex for agitation    HEENT: Oral care    PULMONARY:  HOB @ 45 degrees.  no vent changes, chest tube to suction.  Daily CXR.  Nebs Q4 and PRN.  Solumedrol 60 mg q 12. monitor P/P, replace CT    CARDIOVASCULAR: avoid overload    GI: GI prophylaxis.  OG Feeding.  Bowel regimen     RENAL:  Follow up lytes.  Correct as needed.    INFECTIOUS DISEASE: finish abx    HEMATOLOGICAL:  DVT prophylaxis.  LE doppler -    ENDOCRINE:  Follow up FS.  Insulin protocol if needed.    MUSCULOSKELETAL:  Bed rest     MICU monitoring     GOC    HUBER sp straight cath

## 2022-06-15 NOTE — PROGRESS NOTE ADULT - SUBJECTIVE AND OBJECTIVE BOX
KIMBERLI LEIGH MRN#: [id number]  CODE STATUS: FULL CODE     SUBJECTIVE   Chief complaint: respiratory distress    Currently admitted to medicine with the primary diagnosis of ACUTE HYPERCAPNIC RESPIRATORY FAILURE; COPD EXACERBATION.....      Today is hospital day 5d,   INTERVAL HPI/OVERNIGHT EVENTS:     Severe agitation. Inadequate sedation with propofol, precedex, fentanyl. Chest tube dislodged. Off pressors. Remains sedated and intubated on MV.       PAST MEDICAL & SURGICAL HISTORY  COPD (chronic obstructive pulmonary disease)    GERD (gastroesophageal reflux disease)    Glaucoma    Anxiety    Pancreatitis    History of tonsillectomy    H/O colonoscopy  5 years ago      ALLERGIES:  No Known Allergies    HOME MEDICATIONS:  Home Medications:  albuterol 90 mcg/inh inhalation aerosol: 2 puff(s) inhaled every 6 hours, As needed, Shortness of Breath and/or Wheezing (27 Dec 2021 10:38)  Ambien 10 mg oral tablet: 1 tab(s) orally once a day (at bedtime) (19 Dec 2021 01:41)  escitalopram 10 mg oral tablet: 1 tab(s) orally once a day (27 Dec 2021 10:38)  famotidine 20 mg oral tablet: 1 tab(s) orally once a day (27 Dec 2021 10:38)  fluticasone 0.5 mg/2 mL inhalation suspension:  (19 Dec 2021 01:41)  levalbuterol 45 mcg/inh inhalation aerosol: 2 puff(s) inhaled every 4 hours (19 Dec 2021 01:41)  Wixela Inhub 250 mcg-50 mcg inhalation powder: 1 puff(s) inhaled 2 times a day (19 Dec 2021 01:41)    MEDICATIONS:  STANDING MEDICATIONS  MEDICATIONS  (STANDING):  cefepime   IVPB 1000 milliGRAM(s) IV Intermittent every 12 hours  chlorhexidine 0.12% Liquid 15 milliLiter(s) Oral Mucosa two times a day  chlorhexidine 4% Liquid 1 Application(s) Topical <User Schedule>  dexMEDEtomidine Infusion 0.05 MICROgram(s)/kG/Hr (0.63 mL/Hr) IV Continuous <Continuous>  dextrose 5%. 1000 milliLiter(s) (50 mL/Hr) IV Continuous <Continuous>  dextrose 5%. 1000 milliLiter(s) (100 mL/Hr) IV Continuous <Continuous>  dextrose 50% Injectable 25 Gram(s) IV Push once  dextrose 50% Injectable 12.5 Gram(s) IV Push once  dextrose 50% Injectable 25 Gram(s) IV Push once  enoxaparin Injectable 40 milliGRAM(s) SubCutaneous every 24 hours  fentaNYL   Infusion. 0.5 MICROgram(s)/kG/Hr (3.5 mL/Hr) IV Continuous <Continuous>  glucagon  Injectable 1 milliGRAM(s) IntraMuscular once  insulin lispro (ADMELOG) corrective regimen sliding scale   SubCutaneous every 6 hours  methylPREDNISolone sodium succinate Injectable 60 milliGRAM(s) IV Push every 12 hours  midazolam Injectable 2 milliGRAM(s) IV Push once  norepinephrine Infusion 0.05 MICROgram(s)/kG/Min (4.69 mL/Hr) IV Continuous <Continuous>  pantoprazole   Suspension 40 milliGRAM(s) Oral before breakfast  polyethylene glycol 3350 17 Gram(s) Oral daily  propofol Infusion 5.004 MICROgram(s)/kG/Min (1.21 mL/Hr) IV Continuous <Continuous>  senna 2 Tablet(s) Oral at bedtime  sodium zirconium cyclosilicate 10 Gram(s) Oral every 8 hours    PRN MEDICATIONS  MEDICATIONS  (PRN):  acetaminophen     Tablet .. 650 milliGRAM(s) Oral every 6 hours PRN Temp greater or equal to 38C (100.4F), Mild Pain (1 - 3)  dextrose Oral Gel 15 Gram(s) Oral once PRN Blood Glucose LESS THAN 70 milliGRAM(s)/deciliter  midazolam Injectable 2 milliGRAM(s) IV Push every 2 hours PRN severe agitation, inadequate sedation with propofol, precedex, fentanyl      OBJECTIVE    VITAL SIGNS  T(F): 97.5 (06-15 @ 08:00), Max: 98 (06-14 @ 20:00)  HR: 100 (06-15 @ 09:30) (58 - 162)  BP: 127/59 (06-15 @ 09:30) (54/42 - 179/68)  RR: 15 (06-15 @ 09:30) (8 - 36)  SpO2: 99% (06-15 @ 09:30) (94% - 100%)    LABS:                        12.7   30.25 )-----------( 385      ( 15 Crow 2022 05:02 )             37.7     06-15    137  |  99  |  54<H>  ----------------------------<  175<H>  5.6<H>   |  27  |  0.8    Ca    9.0      15 Crow 2022 05:02  Mg     2.2     06-15    TPro  5.2<L>  /  Alb  3.1<L>  /  TBili  0.4  /  DBili  x   /  AST  26  /  ALT  44<H>  /  AlkPhos  47  06-15        ABG - ( 15 Crow 2022 02:33 )  pH, Arterial: 7.40  pH, Blood: x     /  pCO2: 51    /  pO2: 90    / HCO3: 32    / Base Excess: 5.7   /  SaO2: 98.7          RADIOLOGY:   Reviewed  CXR R PTX, dislodged pigtail. OGT in place. ETT in place.       PHYSICAL EXAM:  General: Laying on the hospital bed. Ill-appearing  HEENT: Atraumatic. Normocephalic.  Cardiac: Normal S1, S2. RRR. No murmurs, rubs, or gallops.  Pulm: Decreased bs b/l. Intubated on MV. R pigtail dislodged.   GI: Soft, nontender, nondistended.  Vasc: Normal capillary refill. 2+ pulses.  No edema, cyanosis.  MSK: Moving all 4 extremities.  Neuro: Sedated but agitated, appears uncomfortable  Skin: warm, dry and intact    Vent Settings: Mode: AC/ CMV (Assist Control/ Continuous Mandatory Ventilation)  RR (machine): 16  TV (machine): 350  FiO2: 30  PEEP: 5  PIP: 26

## 2022-06-15 NOTE — PROGRESS NOTE ADULT - ASSESSMENT
ASSESSMENT:  88y F w/ right pneumothorax s/p pigtail placement     PLAN:  pigtail to waterseal  Daily AM cxr  follow up Chest tube output  monitor for airleak  monitor O2 sat  incentive spirometry  pain control  SCDs  DVT/GI prophylaxis    spectra [id number] ASSESSMENT:  88y F w/ right pneumothorax s/p pigtail placement. Chest tube out of chest cavity on 3pm cxr 6/14.     PLAN:  replaced a pigtail at bedside  Daily AM cxr  follow up Chest tube output  monitor for airleak  monitor O2 sat  incentive spirometry  pain control  SCDs  DVT/GI prophylaxis    spectra [id number]

## 2022-06-15 NOTE — PROCEDURE NOTE - ADDITIONAL PROCEDURE DETAILS
Follow up CXR STAT  Keep chest tube to continuous suction at 80 cmH2O Follow up CXR STAT  Keep chest tube to continuous suction at 20 cmH2O

## 2022-06-15 NOTE — CONSULT NOTE ADULT - PROBLEM SELECTOR RECOMMENDATION 3
On Sedation, difficult to ween off. Supportive care Full code  ICU care  Daughter, next of kin did not want to speak to palliative care at this time  Please reconsult as needed for GOC or symptom needs

## 2022-06-15 NOTE — CONSULT NOTE ADULT - PROBLEM SELECTOR RECOMMENDATION 9
Full code  ICU care  Daughter, next of kin did not want to speak to palliative care at this time Vented, with chest tube  Ongoing ICU management

## 2022-06-15 NOTE — PROVIDER CONTACT NOTE (EICU) - REASON
Patient still agitated even more so after starting more sedation meds( fentanyl and precedex)
agitation

## 2022-06-15 NOTE — CONSULT NOTE ADULT - SUBJECTIVE AND OBJECTIVE BOX
87 y/o female with PMHx of COPD not on home O2, pHTN, GERD, Lucio's esophagus, COVID vaccinated presented to the ED for acute onset of sob. As per nurse aide, pt was asymptomatic sitting on the couch, when she became acutely sob. pt was brought to ED and was tachypneic and answering questions with one word answers, and struggling to breath.  In the ED, pt was placed on bipap immediately and symptoms improved and pt looks more comfortable. ABG initially shows pCO2 115 and was placed on bipap and soon after repeat pco2 was 100. per family and patient, patient is full code and decision was made to intubate the patient. Labs are overall unremarkable except wbc 19K. She received duoneb, Mg and solumedrol 125 mg x1 in ED.   pt subsequently intubated and sedated, but becomes agitated    Vital Signs Last 24 Hrs  T(C): 36.2 (15 Crow 2022 16:00), Max: 36.7 (14 Jun 2022 20:00)  T(F): 97.1 (15 Crow 2022 16:00), Max: 98 (14 Jun 2022 20:00)  HR: 50 (15 Crow 2022 16:00) (50 - 162)  BP: 100/56 (15 Crow 2022 16:00) (63/37 - 179/68)  BP(mean): 73 (15 Crow 2022 16:00) (45 - 143)  RR: 16 (15 Crow 2022 16:00) (14 - 36)  SpO2: 100% (15 Crow 2022 16:00) (94% - 100%)  Drug Dosing Weight  Height (cm): 147.3 (10 Crow 2022 22:45)  Weight (kg): 40.3 (10 Crow 2022 22:45)  BMI (kg/m2): 18.6 (10 Crow 2022 22:45)  BSA (m2): 1.29 (10 Crow 2022 22:45)  pt sedated, moves when examined  abd soft, ND, NT, +OG large [place]  R chest tube  +Guzman with 10-30 ml/h past few hours  arms with multiple areas of ecchymoses  skin delicate with + skin tears, radha L forearm  ++ edema  mod loss of LBM c/w age, no evidence of acute loss on exam    MEDICATIONS  (STANDING):  cefepime   IVPB 1000 milliGRAM(s) IV Intermittent every 12 hours  chlorhexidine 0.12% Liquid 15 milliLiter(s) Oral Mucosa two times a day  chlorhexidine 4% Liquid 1 Application(s) Topical <User Schedule>  dexMEDEtomidine Infusion 0.05 MICROgram(s)/kG/Hr (0.63 mL/Hr) IV Continuous <Continuous>  enoxaparin Injectable 40 milliGRAM(s) SubCutaneous every 24 hours  fentaNYL   Infusion. 0.5 MICROgram(s)/kG/Hr (3.5 mL/Hr) IV Continuous <Continuous>  insulin lispro (ADMELOG) corrective regimen sliding scale   SubCutaneous every 6 hours  methylPREDNISolone sodium succinate Injectable 60 milliGRAM(s) IV Push every 12 hours  norepinephrine Infusion 0.05 MICROgram(s)/kG/Min (4.69 mL/Hr) IV Continuous <Continuous>  pantoprazole   Suspension 40 milliGRAM(s) Oral before breakfast  polyethylene glycol 3350 17 Gram(s) Oral daily  propofol Infusion 5.004 MICROgram(s)/kG/Min (1.21 mL/Hr) IV Continuous <Continuous>--added later this afternoon, now at 12 ml/h  senna 2 Tablet(s) Oral at bedtime  sodium zirconium cyclosilicate 10 Gram(s) Oral every 8 hours                        12.7   30.25 )-----------( 385      ( 15 Crow 2022 05:02 )             37.7   06-15    137  |  99  |  54<H>  ----------------------------<  175<H>  5.6<H>   |  27  |  0.8    Ca    9.0      15 Crow 2022 05:02  Mg     2.2     06-15    TPro  5.2<L>  /  Alb  3.1<L>  /  TBili  0.4  /  DBili  x   /  AST  26  /  ALT  44<H>  /  AlkPhos  47  06-15  no phos      POCT Blood Glucose.: 238 mg/dL (15 Crow 2022 17:02)  POCT Blood Glucose.: 248 mg/dL (15 Crow 2022 10:58)  POCT Blood Glucose.: 174 mg/dL (15 Crow 2022 05:43)  POCT Blood Glucose.: 220 mg/dL (15 Crow 2022 00:17)  POCT Blood Glucose.: 203 mg/dL (14 Jun 2022 22:12)  A1C with Estimated Average Glucose (06.15.22 @ 05:02)   A1C with Estimated Average Glucose Result: 6.2:    Mode: AC/ CMV (Assist Control/ Continuous Mandatory Ventilation)  RR (machine): 16  TV (machine): 350  FiO2: 30  PEEP: 5  ITime: 1  MAP: 10  PIP: 29  ABG - ( 15 Crow 2022 02:33 )  pH, Arterial: 7.40  pH, Blood: x     /  pCO2: 51    /  pO2: 90    / HCO3: 32    / Base Excess: 5.7   /  SaO2: 98.7      < from: Xray Chest 1 View- PORTABLE-Routine (Xray Chest 1 View- PORTABLE-Routine in AM.) (06.15.22 @ 13:20) >  SUPPORT DEVICES: Right-sided chest tube appears retracted and within the   right chest wall soft tissues. Stable endotracheal tube and enteric   catheter.    CARDIAC/MEDIASTINUM/HILUM: Stable.    LUNG PARENCHYMA/PLEURA: New right-sided pneumothorax with approximate   maximal air gap of 3.5 cm. No consolidation or effusion.    < end of copied text >

## 2022-06-15 NOTE — PROVIDER CONTACT NOTE (EICU) - SITUATION
Patient not properly sedated still despite starting more sedation meds. HR increasing to 180's and blood pressure systolic 170's

## 2022-06-15 NOTE — CONSULT NOTE ADULT - SUBJECTIVE AND OBJECTIVE BOX
HPI:  87 y/o female with PMHx of COPD not on home O2, pHTN, GERD, Lucio's esophagus, COVID vaccinated presented to the ED for acute onset of sob. As per nurse aide, pt was asymptomatic sitting on the couch, when she became acutely sob. pt was brought to ED and was tachypneic and answering questions with one word answers, and struggling to breath.    In the ED, pt was placed on bipap immediately and symptoms improved and pt looks more comfortable. ABG initially shows pCO2 115 and was placed on bipap and soon after repeat pco2 was 100. per family and patient, patient is full code and decision was made to intubate the patient. Labs are overall unremarkable except wbc 19K. She received duoneb, Mg and solumedrol 125 mg x1 in ED.     ICU Vital Signs Last 24 Hrs  T(C): 36.1 (09 Jun 2022 22:40), Max: 36.1 (09 Jun 2022 22:40)  T(F): 97 (09 Jun 2022 22:40), Max: 97 (09 Jun 2022 22:40)  HR: 100 (10 Crow 2022 01:10) (100 - 132)  BP: 151/84 (10 Crow 2022 01:10) (120/50 - 202/94)  BP(mean): --  ABP: --  ABP(mean): --  RR: 22 (10 Crow 2022 00:30) (22 - 31)  SpO2: 100% (10 Crow 2022 01:10) (95% - 100%)   (10 Crow 2022 01:25)    PERTINENT PM/SXH:   COPD (chronic obstructive pulmonary disease)    GERD (gastroesophageal reflux disease)    Glaucoma    Anxiety    Pancreatitis      History of tonsillectomy    H/O colonoscopy      FAMILY HISTORY:  No pertinent family history in first degree relatives      ITEMS NOT CHECKED ARE NOT PRESENT    SOCIAL HISTORY:   Significant other/partner[ ]  Children[x ]  Shinto/Spirituality:  Substance hx:  [ ]   Tobacco hx:  [ ]   Alcohol hx: [ ]   Living Situation: [ x]Home  [ ]Long term care  [ ]Rehab [ ]Other  Home Services: [ ] HHA [ ] Ru RN [ ] Hospice  Occupation:  Home Opioid hx:  [ ] Y [x ] N [ ] I-Stop Reference No:  This report was requested by: Sharon Alston | Reference #: [id number]    Others' Prescriptions  Patient Name: Leatha Hoang Date: 05/09/1934  Address: [address] [place], [place] [address]Sex: Female  Rx Written	Rx Dispensed	Drug	Quantity	Days Supply	Prescriber Name	Prescriber Pura #	Payment Method	Dispenser  05/21/2022	05/25/2022	zolpidem tartrate 10 mg tablet	30	30	Datiashvili, Carlota	[id number]	Medicare	[id number]  04/20/2022	04/25/2022	zolpidem tartrate 10 mg tablet	30	30	Datiashvili, Carlota	[id number]	Medicare	[id number]  03/25/2022	03/28/2022	zolpidem tartrate 10 mg tablet	30	30	Datiashvili, Carlota	[id number]	Medicare	[id number]  12/29/2021	02/28/2022	zolpidem tartrate 10 mg tablet	30	30	Datiashvili, Carlota	[id number]	Medicare	[id number]  12/29/2021	01/30/2022	zolpidem tartrate 10 mg tablet	30	30	Datiashvili, Carlota	[id number]	Medicare	[id number]  12/29/2021	12/29/2021	zolpidem tartrate 10 mg tablet	30	30	Datiashvili, Carlota	[id number]	Medicare	[id number]  07/08/2021	09/27/2021	zolpidem tartrate 10 mg tablet	30	30	Datiashvili, Carlota	[id number]	Medicare	[id number]  07/08/2021	08/07/2021	zolpidem tartrate 10 mg tablet	30	30	Datiashvili, Carlota	[id number]	Medicare	[id number]  07/08/2021	07/09/2021	zolpidem tartrate 10 mg tablet	30	30	Datiashvili, Carlota	[id number]	Medicare	[id number]  * - Drugs marked with an asterisk are compound drugs. If the compound drug is made up of more than one controlled substance, then each controlled substance will be a separate row in the table.      ADVANCE DIRECTIVES:    DNR  MOLST  [ ]  Living Will  [ ]   DECISION MAKER(s):  [ ] Health Care Proxy(s)  [ ] Surrogate(s)  [ ] Guardian           Name(s): Phone Number(s):    BASELINE (I)ADL(s) (prior to admission):  Hiram: [ ]Total  [ ] Moderate [x ]Dependent  Palliative Performance Status Version 2:         %    http://[facility].org/files/news/palliative_performance_scale_ppsv2.pdf    Allergies    No Known Allergies    Intolerances    MEDICATIONS  (STANDING):  cefepime   IVPB 1000 milliGRAM(s) IV Intermittent every 12 hours  chlorhexidine 0.12% Liquid 15 milliLiter(s) Oral Mucosa two times a day  chlorhexidine 4% Liquid 1 Application(s) Topical <User Schedule>  dexMEDEtomidine Infusion 0.05 MICROgram(s)/kG/Hr (0.63 mL/Hr) IV Continuous <Continuous>  dextrose 5%. 1000 milliLiter(s) (50 mL/Hr) IV Continuous <Continuous>  dextrose 5%. 1000 milliLiter(s) (100 mL/Hr) IV Continuous <Continuous>  dextrose 50% Injectable 25 Gram(s) IV Push once  dextrose 50% Injectable 12.5 Gram(s) IV Push once  dextrose 50% Injectable 25 Gram(s) IV Push once  enoxaparin Injectable 40 milliGRAM(s) SubCutaneous every 24 hours  fentaNYL   Infusion. 0.5 MICROgram(s)/kG/Hr (3.5 mL/Hr) IV Continuous <Continuous>  glucagon  Injectable 1 milliGRAM(s) IntraMuscular once  insulin lispro (ADMELOG) corrective regimen sliding scale   SubCutaneous every 6 hours  methylPREDNISolone sodium succinate Injectable 60 milliGRAM(s) IV Push every 12 hours  norepinephrine Infusion 0.05 MICROgram(s)/kG/Min (4.69 mL/Hr) IV Continuous <Continuous>  pantoprazole   Suspension 40 milliGRAM(s) Oral before breakfast  polyethylene glycol 3350 17 Gram(s) Oral daily  propofol Infusion 5.004 MICROgram(s)/kG/Min (1.21 mL/Hr) IV Continuous <Continuous>  senna 2 Tablet(s) Oral at bedtime  sodium zirconium cyclosilicate 10 Gram(s) Oral every 8 hours    MEDICATIONS  (PRN):  acetaminophen     Tablet .. 650 milliGRAM(s) Oral every 6 hours PRN Temp greater or equal to 38C (100.4F), Mild Pain (1 - 3)  dextrose Oral Gel 15 Gram(s) Oral once PRN Blood Glucose LESS THAN 70 milliGRAM(s)/deciliter  midazolam Injectable 2 milliGRAM(s) IV Push every 2 hours PRN severe agitation, inadequate sedation with propofol, precedex, fentanyl    PRESENT SYMPTOMS: [ x]Unable to obtain due to poor mentation   Source if other than patient:  [ ]Family   [ ]Team     Pain: [ ]yes [ ]no  QOL impact -   Location -                    Aggravating factors -  Quality -  Radiation -  Timing-  Severity (0-10 scale):  Minimal acceptable level (0-10 scale):     CPOT:    https://www.[facility].org/getattachment/[email or web address]-[email or web address]/Critical-Care-Pain-Observation-Tool-(CPOT)      PAIN AD Score:     http://geriatrictoolkit.[facility]/cog/painad.pdf (press ctrl +  left click to view)    Dyspnea:                           [ ]Mild [ ]Moderate [ ]Severe  Anxiety:                             [ ]Mild [ ]Moderate [ ]Severe  Fatigue:                             [ ]Mild [ ]Moderate [ ]Severe  Nausea:                             [ ]Mild [ ]Moderate [ ]Severe  Loss of appetite:              [ ]Mild [ ]Moderate [ ]Severe  Constipation:                    [ ]Mild [ ]Moderate [ ]Severe    Other Symptoms:  [ ]All other review of systems negative     Palliative Performance Status Version 2:         %    http://npcrc.org/files/news/palliative_performance_scale_ppsv2.pdf  PHYSICAL EXAM:  Vital Signs Last 24 Hrs  T(C): 36.4 (15 Crow 2022 08:00), Max: 36.7 (14 Jun 2022 20:00)  T(F): 97.5 (15 Crow 2022 08:00), Max: 98 (14 Jun 2022 20:00)  HR: 78 (15 Crow 2022 10:00) (58 - 162)  BP: 98/49 (15 Crow 2022 10:00) (63/37 - 179/68)  BP(mean): 65 (15 Crow 2022 10:00) (31 - 143)  RR: 16 (15 Crow 2022 10:00) (11 - 36)  SpO2: 99% (15 Crow 2022 10:00) (94% - 100%) I&O's Summary    14 Jun 2022 07:01  -  15 Crow 2022 07:00  --------------------------------------------------------  IN: 1406.1 mL / OUT: 955 mL / NET: 451.1 mL    15 Crow 2022 07:01  -  15 Crow 2022 13:30  --------------------------------------------------------  IN: 344 mL / OUT: 150 mL / NET: 194 mL      GENERAL:  [ ]Alert  [ ]Oriented x   [ ]Lethargic  [x ]Cachexia  [ ]Unarousable  [ ]Verbal  [ ]Non-Verbal  Behavioral: unable to assess   [ ] Anxiety  [ ] Delirium [ ] Agitation [ ] Other  HEENT:  [ ]Normal   [ ]Dry mouth   [x ]ET Tube/Trach  [ ]Oral lesions  PULMONARY:   [ ]Clear [ ]Tachypnea  [ ]Audible excessive secretions   [ ]Rhonchi        [ ]Right [ ]Left [ ]Bilateral  [ ]Crackles        [ ]Right [ ]Left [ ]Bilateral  [ ]Wheezing     [ ]Right [ ]Left [ ]Bilateral  [ ]Diminished breath sounds [ ]right [ ]left [ ]bilateral  CARDIOVASCULAR:    [ x]Regular [ ]Irregular [ ]Tachy  [ ]Lucas [ ]Murmur [ ]Other  GASTROINTESTINAL:  [x ]Soft  [ ]Distended   [ ]+BS  [ ]Non tender [ ]Tender  [ ]PEG [ x]OGT/ NGT  Last BM:   GENITOURINARY:  [ ]Normal [ ] Incontinent   [ ]Oliguria/Anuria   [x ]Guzman  MUSCULOSKELETAL:   [x ]Normal   [ ]Weakness  [ ]Bed/Wheelchair bound [ ]Edema  NEUROLOGIC:   [x ]No focal deficits  [ ]Cognitive impairment  [ ]Dysphagia [ ]Dysarthria [ ]Paresis [ ]Other   SKIN:   [x ]Normal    [ ]Rash  [ ]Pressure ulcer(s)       Present on admission [ ]y [ ]n    CRITICAL CARE:  [ ] Shock Present  [ ]Septic [ ]Cardiogenic [ ]Neurologic [ ]Hypovolemic  [ x]  Vasopressors [ ]  Inotropes   [ x]Respiratory failure present [ x]Mechanical ventilation [ ]Non-invasive ventilatory support [ ]High flow  [x ]Acute  [ ]Chronic [ ]Hypoxic  [ ]Hypercarbic [ ]Other  [ ]Other organ failure     LABS:                        12.7   30.25 )-----------( 385      ( 15 Crow 2022 05:02 )             37.7   06-15    137  |  99  |  54<H>  ----------------------------<  175<H>  5.6<H>   |  27  |  0.8    Ca    9.0      15 Crow 2022 05:02  Mg     2.2     06-15    TPro  5.2<L>  /  Alb  3.1<L>  /  TBili  0.4  /  DBili  x   /  AST  26  /  ALT  44<H>  /  AlkPhos  47  06-15        RADIOLOGY & ADDITIONAL STUDIES:    PROTEIN CALORIE MALNUTRITION PRESENT: [ ]mild [ ]moderate [ ]severe [ ]underweight [ ]morbid obesity  https://www.andeal.org/vault/2440/web/files/ONC/Table_Clinical%20Characteristics%20to%20Document%20Malnutrition-White%20JV%20et%20al%202012.pdf    Height (cm): 147.3 (06-10-22 @ 22:45), 147.3 (12-18-21 @ 20:05)  Weight (kg): 40.3 (06-10-22 @ 22:45)  BMI (kg/m2): 18.6 (06-10-22 @ 22:45)    [ ]PPSV2 < or = to 30% [ ]significant weight loss  [ ]poor nutritional intake  [ ]anasarca      [ ]Artificial Nutrition      REFERRALS:   [ ]Chaplaincy  [ ]Hospice  [ ]Child Life  [x ]Social Work  [x ]Case management [ ]Holistic Therapy      HPI:  89 y/o female with PMHx of COPD not on home O2, pHTN, GERD, Lucio's esophagus, COVID vaccinated presented to the ED for acute onset of sob. As per nurse aide, pt was asymptomatic sitting on the couch, when she became acutely sob. pt was brought to ED and was tachypneic and answering questions with one word answers, and struggling to breath.    In the ED, pt was placed on bipap immediately and symptoms improved and pt looks more comfortable. ABG initially shows pCO2 115 and was placed on bipap and soon after repeat pco2 was 100. per family and patient, patient is full code and decision was made to intubate the patient. Labs are overall unremarkable except wbc 19K. She received duoneb, Mg and solumedrol 125 mg x1 in ED.     ICU Vital Signs Last 24 Hrs  T(C): 36.1 (09 Jun 2022 22:40), Max: 36.1 (09 Jun 2022 22:40)  T(F): 97 (09 Jun 2022 22:40), Max: 97 (09 Jun 2022 22:40)  HR: 100 (10 Crow 2022 01:10) (100 - 132)  BP: 151/84 (10 Crow 2022 01:10) (120/50 - 202/94)  BP(mean): --  ABP: --  ABP(mean): --  RR: 22 (10 Crow 2022 00:30) (22 - 31)  SpO2: 100% (10 Crow 2022 01:10) (95% - 100%)   (10 Crow 2022 01:25)    PERTINENT PM/SXH:   COPD (chronic obstructive pulmonary disease)    GERD (gastroesophageal reflux disease)    Glaucoma    Anxiety    Pancreatitis      History of tonsillectomy    H/O colonoscopy      FAMILY HISTORY:  Cannot obtain from patient    ITEMS NOT CHECKED ARE NOT PRESENT    SOCIAL HISTORY:   Significant other/partner[ ]  Children[x ]  Moravian/Spirituality:  Substance hx:  [ ]   Tobacco hx:  [ ]   Alcohol hx: [ ]   Living Situation: [ x]Home  [ ]Long term care  [ ]Rehab [ ]Other  Home Services: [ ] HHA [ ] Ru RN [ ] Hospice  Occupation:  Home Opioid hx:  [ ] Y [x ] N [ ] I-Stop Reference No:  This report was requested by: Sharon Alston | Reference #: [id number]    Others' Prescriptions  Patient Name: Leatha Hoang Date: 05/09/1934  Address: [address] [place] [id number]: Female  Rx Written	Rx Dispensed	Drug	Quantity	Days Supply	Prescriber Name	Prescriber Pura #	Payment Method	Dispenser  05/21/2022	05/25/2022	zolpidem tartrate 10 mg tablet	30	30	Datiashvili, Carlota	[id number]	Medicare	[id number]  04/20/2022	04/25/2022	zolpidem tartrate 10 mg tablet	30	30	Datiashvili, Carlota	[id number]	Medicare	[id number]  03/25/2022	03/28/2022	zolpidem tartrate 10 mg tablet	30	30	Datiashvili, Carlota	[id number]	Medicare	[id number]  12/29/2021	02/28/2022	zolpidem tartrate 10 mg tablet	30	30	Datiashvili, Carlota	[id number]	Medicare	[id number]  12/29/2021	01/30/2022	zolpidem tartrate 10 mg tablet	30	30	Datiashvili, Carlota	[id number]	Medicare	[id number]  12/29/2021	12/29/2021	zolpidem tartrate 10 mg tablet	30	30	Datiashvili, Carlota	[id number]	Medicare	[id number]  07/08/2021	09/27/2021	zolpidem tartrate 10 mg tablet	30	30	Datiashvili, Carlota	[id number]	Medicare	[id number]  07/08/2021	08/07/2021	zolpidem tartrate 10 mg tablet	30	30	Datiashvili, Carlota	[id number]	Medicare	[id number]  07/08/2021	07/09/2021	zolpidem tartrate 10 mg tablet	30	30	Datiashvili, Carlota	[id number]	Medicare	[id number]  * - Drugs marked with an asterisk are compound drugs. If the compound drug is made up of more than one controlled substance, then each controlled substance will be a separate row in the table.      ADVANCE DIRECTIVES:    DNR  MOLST  [ ]  Living Will  [ ]   DECISION MAKER(s):  [ ] Health Care Proxy(s)  [ ] Surrogate(s)  [ ] Guardian           Name(s): Phone Number(s):    BASELINE (I)ADL(s) (prior to admission):  Norman: [ ]Total  [ ] Moderate [x ]Dependent  Palliative Performance Status Version 2:         %    http://[facility].org/files/news/palliative_performance_scale_ppsv2.pdf    Allergies    No Known Allergies    Intolerances    MEDICATIONS  (STANDING):  cefepime   IVPB 1000 milliGRAM(s) IV Intermittent every 12 hours  chlorhexidine 0.12% Liquid 15 milliLiter(s) Oral Mucosa two times a day  chlorhexidine 4% Liquid 1 Application(s) Topical <User Schedule>  dexMEDEtomidine Infusion 0.05 MICROgram(s)/kG/Hr (0.63 mL/Hr) IV Continuous <Continuous>  dextrose 5%. 1000 milliLiter(s) (50 mL/Hr) IV Continuous <Continuous>  dextrose 5%. 1000 milliLiter(s) (100 mL/Hr) IV Continuous <Continuous>  dextrose 50% Injectable 25 Gram(s) IV Push once  dextrose 50% Injectable 12.5 Gram(s) IV Push once  dextrose 50% Injectable 25 Gram(s) IV Push once  enoxaparin Injectable 40 milliGRAM(s) SubCutaneous every 24 hours  fentaNYL   Infusion. 0.5 MICROgram(s)/kG/Hr (3.5 mL/Hr) IV Continuous <Continuous>  glucagon  Injectable 1 milliGRAM(s) IntraMuscular once  insulin lispro (ADMELOG) corrective regimen sliding scale   SubCutaneous every 6 hours  methylPREDNISolone sodium succinate Injectable 60 milliGRAM(s) IV Push every 12 hours  norepinephrine Infusion 0.05 MICROgram(s)/kG/Min (4.69 mL/Hr) IV Continuous <Continuous>  pantoprazole   Suspension 40 milliGRAM(s) Oral before breakfast  polyethylene glycol 3350 17 Gram(s) Oral daily  propofol Infusion 5.004 MICROgram(s)/kG/Min (1.21 mL/Hr) IV Continuous <Continuous>  senna 2 Tablet(s) Oral at bedtime  sodium zirconium cyclosilicate 10 Gram(s) Oral every 8 hours    MEDICATIONS  (PRN):  acetaminophen     Tablet .. 650 milliGRAM(s) Oral every 6 hours PRN Temp greater or equal to 38C (100.4F), Mild Pain (1 - 3)  dextrose Oral Gel 15 Gram(s) Oral once PRN Blood Glucose LESS THAN 70 milliGRAM(s)/deciliter  midazolam Injectable 2 milliGRAM(s) IV Push every 2 hours PRN severe agitation, inadequate sedation with propofol, precedex, fentanyl    PRESENT SYMPTOMS: [ x]Unable to obtain due to poor mentation   Source if other than patient:  [ ]Family   [ ]Team     Pain: [ ]yes [ ]no  QOL impact -   Location -                    Aggravating factors -  Quality -  Radiation -  Timing-  Severity (0-10 scale):  Minimal acceptable level (0-10 scale):     CPOT:    https://www.[facility].org/getattachment/[email or web address]/Critical-Care-Pain-Observation-Tool-(CPOT)      PAIN AD Score:     http://geriatrictoolkit.[facility]/cog/painad.pdf (press ctrl +  left click to view)    Dyspnea:                           [ ]Mild [ ]Moderate [ ]Severe  Anxiety:                             [ ]Mild [ ]Moderate [ ]Severe  Fatigue:                             [ ]Mild [ ]Moderate [ ]Severe  Nausea:                             [ ]Mild [ ]Moderate [ ]Severe  Loss of appetite:              [ ]Mild [ ]Moderate [ ]Severe  Constipation:                    [ ]Mild [ ]Moderate [ ]Severe    Other Symptoms:  [ ]All other review of systems negative     Palliative Performance Status Version 2:         %    http://npcrc.org/files/news/palliative_performance_scale_ppsv2.pdf  PHYSICAL EXAM:  Vital Signs Last 24 Hrs  T(C): 36.4 (15 Crow 2022 08:00), Max: 36.7 (14 Jun 2022 20:00)  T(F): 97.5 (15 Crow 2022 08:00), Max: 98 (14 Jun 2022 20:00)  HR: 78 (15 Crow 2022 10:00) (58 - 162)  BP: 98/49 (15 Crow 2022 10:00) (63/37 - 179/68)  BP(mean): 65 (15 Crow 2022 10:00) (31 - 143)  RR: 16 (15 Crow 2022 10:00) (11 - 36)  SpO2: 99% (15 Crow 2022 10:00) (94% - 100%) I&O's Summary    14 Jun 2022 07:01  -  15 Crow 2022 07:00  --------------------------------------------------------  IN: 1406.1 mL / OUT: 955 mL / NET: 451.1 mL    15 Crow 2022 07:01  -  15 Crow 2022 13:30  --------------------------------------------------------  IN: 344 mL / OUT: 150 mL / NET: 194 mL      GENERAL:  [ ]Alert  [ ]Oriented x   [ ]Lethargic  [x ]Cachexia  [ ]Unarousable  [ ]Verbal  [ ]Non-Verbal  Behavioral: unable to assess   [ ] Anxiety  [ ] Delirium [ ] Agitation [ ] Other  HEENT:  [ ]Normal   [ ]Dry mouth   [x ]ET Tube/Trach  [ ]Oral lesions  PULMONARY:   [ ]Clear [ ]Tachypnea  [ ]Audible excessive secretions   [ ]Rhonchi        [ ]Right [ ]Left [ ]Bilateral  [ ]Crackles        [ ]Right [ ]Left [ ]Bilateral  [ ]Wheezing     [ ]Right [ ]Left [ ]Bilateral  [ ]Diminished breath sounds [ ]right [ ]left [ ]bilateral  CARDIOVASCULAR:    [ x]Regular [ ]Irregular [ ]Tachy  [ ]Lucas [ ]Murmur [ ]Other  GASTROINTESTINAL:  [x ]Soft  [ ]Distended   [ ]+BS  [ ]Non tender [ ]Tender  [ ]PEG [ x]OGT/ NGT  Last BM:   GENITOURINARY:  [ ]Normal [ ] Incontinent   [ ]Oliguria/Anuria   [x ]Guzamn  MUSCULOSKELETAL:   [x ]Normal   [ ]Weakness  [ ]Bed/Wheelchair bound [ ]Edema  NEUROLOGIC:   [x ]No focal deficits  [ ]Cognitive impairment  [ ]Dysphagia [ ]Dysarthria [ ]Paresis [ ]Other   SKIN:   [x ]Normal    [ ]Rash  [ ]Pressure ulcer(s)       Present on admission [ ]y [ ]n    CRITICAL CARE:  [ ] Shock Present  [ ]Septic [ ]Cardiogenic [ ]Neurologic [ ]Hypovolemic  [ x]  Vasopressors [ ]  Inotropes   [ x]Respiratory failure present [ x]Mechanical ventilation [ ]Non-invasive ventilatory support [ ]High flow  [x ]Acute  [ ]Chronic [ ]Hypoxic  [ ]Hypercarbic [ ]Other  [ ]Other organ failure     LABS:                        12.7   30.25 )-----------( 385      ( 15 Crow 2022 05:02 )             37.7   06-15    137  |  99  |  54<H>  ----------------------------<  175<H>  5.6<H>   |  27  |  0.8    Ca    9.0      15 Crow 2022 05:02  Mg     2.2     06-15    TPro  5.2<L>  /  Alb  3.1<L>  /  TBili  0.4  /  DBili  x   /  AST  26  /  ALT  44<H>  /  AlkPhos  47  06-15        RADIOLOGY & ADDITIONAL STUDIES:    PROTEIN CALORIE MALNUTRITION PRESENT: [ ]mild [ ]moderate [ ]severe [ ]underweight [ ]morbid obesity  https://www.andeal.org/vault/2440/web/files/ONC/Table_Clinical%20Characteristics%20to%20Document%20Malnutrition-White%20JV%20et%20al%202012.pdf    Height (cm): 147.3 (06-10-22 @ 22:45), 147.3 (12-18-21 @ 20:05)  Weight (kg): 40.3 (06-10-22 @ 22:45)  BMI (kg/m2): 18.6 (06-10-22 @ 22:45)    [ ]PPSV2 < or = to 30% [ ]significant weight loss  [ ]poor nutritional intake  [ ]anasarca      [ ]Artificial Nutrition      REFERRALS:   [ ]Chaplaincy  [ ]Hospice  [ ]Child Life  [x ]Social Work  [x ]Case management [ ]Holistic Therapy

## 2022-06-15 NOTE — CONSULT NOTE ADULT - PROBLEM SELECTOR RECOMMENDATION 2
Vented, with chest tube  Ongoing ICU management High risk patient on IV sedation, difficult to wean off. Supportive care

## 2022-06-15 NOTE — CONSULT NOTE ADULT - ASSESSMENT
88yFemale being evaluated for goals of care and symptom management. Pt on sedation (Fentanyl and Propofol) and on pressor support. According to nurse pt with significant agitation and tachycardia when sedation is lowered. Unable to properly assess ability to ween due to agitation. Pt with chest tube and appears frail. Pt had sudden change in condition at home and became extremely short of breath. Family spoke to ER team (see note above and wanted full code and intubation.     Spoke to ICU resident who updated daughter, and introduced palliative care. He said daughter did not want to speak to palliative care.           See Recs below.    Please call [phone] with questions or concerns 24/7.   We will continue to follow.

## 2022-06-15 NOTE — PROVIDER CONTACT NOTE (EICU) - RECOMMENDATIONS
Increase analgesia- give tylenol, IVP 50mcg fentanyl  consider versed as reported improvement earlier  increase propofol  monitor BP
Dr. Hartmann advised going up on fentanyl and precedex, d'cing levophed, administer 50 mcg fentanyl push and 2mg versed push after 5-10 mins no improvement.

## 2022-06-15 NOTE — PROGRESS NOTE ADULT - SUBJECTIVE AND OBJECTIVE BOX
GENERAL SURGERY PROGRESS NOTE    Patient: KIMBERLI LEIGH , 88y ([birth year])Female   MRN: [id number]  Location: [facility]  A  Visit: 06-10-22 Inpatient  Date: 06-15-22 @ 00:36    Procedure/Dx/Injuries: right pneumothorax s/p pigtail placement     Events of past 24 hours: patient remains intubated,     PAST MEDICAL & SURGICAL HISTORY:  COPD (chronic obstructive pulmonary disease)      GERD (gastroesophageal reflux disease)      Glaucoma      Anxiety      Pancreatitis      History of tonsillectomy      H/O colonoscopy  5 years ago          Vitals:   T(F): 98 (06-14-22 @ 20:00), Max: 98 (06-14-22 @ 20:00)  HR: 120 (06-14-22 @ 22:30)  BP: 119/64 (06-14-22 @ 22:15)  RR: 15 (06-14-22 @ 22:30)  SpO2: 100% (06-14-22 @ 22:30)  Mode: AC/ CMV (Assist Control/ Continuous Mandatory Ventilation), RR (machine): 16, TV (machine): 350, FiO2: 30, PEEP: 5, ITime: 1, MAP: 10, PIP: 29    Diet, NPO with Tube Feed:   Tube Feeding Modality: Orogastric  Replete  Total Volume for 24 Hours (mL): 1080  Continuous  Starting Tube Feed Rate mL per Hour: 45  Until Goal Tube Feed Rate (mL per Hour): 45  Tube Feed Duration (in Hours): 24  Tube Feed Start Time: 00:00      Fluids:     I & O's:    06-13-22 @ 07:01  -  06-14-22 @ 07:00  --------------------------------------------------------  IN:    Dexmedetomidine: 317.1 mL    Enteral Tube Flush: 130 mL    FentaNYL: 224 mL    IV PiggyBack: 400 mL    Jevity 1.2: 1080 mL    Lactated Ringers: 75 mL    Norepinephrine: 67.5 mL    Propofol: 8.4 mL  Total IN: 2302 mL    OUT:    Chest Tube (mL): 42 mL    Indwelling Catheter - Urethral (mL): 85 mL    Intermittent Catheterization - Urethral (mL): 400 mL    Voided (mL): 350 mL  Total OUT: 877 mL    Total NET: 1425 mL        Bowel Movement: : [] YES [] NO  Flatus: : [] YES [] NO    PHYSICAL EXAM:  General: NAD, intubated  Cardiac: RRR S1, S2,   Respiratory: CTAB, right pigtail to waterseal, -airleak  Abdomen: Soft, non-distended,     MEDICATIONS  (STANDING):  cefepime   IVPB 1000 milliGRAM(s) IV Intermittent every 12 hours  chlorhexidine 0.12% Liquid 15 milliLiter(s) Oral Mucosa two times a day  chlorhexidine 4% Liquid 1 Application(s) Topical <User Schedule>  dexMEDEtomidine Infusion 0.05 MICROgram(s)/kG/Hr (0.63 mL/Hr) IV Continuous <Continuous>  dextrose 5%. 1000 milliLiter(s) (50 mL/Hr) IV Continuous <Continuous>  dextrose 5%. 1000 milliLiter(s) (100 mL/Hr) IV Continuous <Continuous>  dextrose 50% Injectable 25 Gram(s) IV Push once  dextrose 50% Injectable 12.5 Gram(s) IV Push once  dextrose 50% Injectable 25 Gram(s) IV Push once  enoxaparin Injectable 40 milliGRAM(s) SubCutaneous every 24 hours  fentaNYL   Infusion. 0.5 MICROgram(s)/kG/Hr (3.5 mL/Hr) IV Continuous <Continuous>  glucagon  Injectable 1 milliGRAM(s) IntraMuscular once  insulin lispro (ADMELOG) corrective regimen sliding scale   SubCutaneous every 6 hours  methylPREDNISolone sodium succinate Injectable 60 milliGRAM(s) IV Push every 12 hours  midazolam Injectable 2 milliGRAM(s) IV Push every 4 hours  norepinephrine Infusion 0.05 MICROgram(s)/kG/Min (4.69 mL/Hr) IV Continuous <Continuous>  pantoprazole   Suspension 40 milliGRAM(s) Oral before breakfast  polyethylene glycol 3350 17 Gram(s) Oral daily  propofol Infusion 5.004 MICROgram(s)/kG/Min (1.21 mL/Hr) IV Continuous <Continuous>  senna 2 Tablet(s) Oral at bedtime    MEDICATIONS  (PRN):  acetaminophen     Tablet .. 650 milliGRAM(s) Oral every 6 hours PRN Temp greater or equal to 38C (100.4F), Mild Pain (1 - 3)  dextrose Oral Gel 15 Gram(s) Oral once PRN Blood Glucose LESS THAN 70 milliGRAM(s)/deciliter      DVT PROPHYLAXIS: enoxaparin Injectable 40 milliGRAM(s) SubCutaneous every 24 hours    GI PROPHYLAXIS: pantoprazole   Suspension 40 milliGRAM(s) Oral before breakfast    ANTICOAGULATION:   ANTIBIOTICS:  cefepime   IVPB 1000 milliGRAM(s)            LAB/STUDIES:  Labs:  CAPILLARY BLOOD GLUCOSE      POCT Blood Glucose.: 220 mg/dL (15 Crow 2022 00:17)  POCT Blood Glucose.: 203 mg/dL (14 Jun 2022 22:12)  POCT Blood Glucose.: 134 mg/dL (14 Jun 2022 17:41)  POCT Blood Glucose.: 320 mg/dL (14 Jun 2022 14:06)  POCT Blood Glucose.: 241 mg/dL (14 Jun 2022 03:42)  POCT Blood Glucose.: 272 mg/dL (14 Jun 2022 01:39)                          13.2   23.47 )-----------( 344      ( 14 Jun 2022 12:18 )             40.6       Auto Neutrophil %: 94.1 % (06-14-22 @ 12:18)  Auto Immature Granulocyte %: 0.5 % (06-14-22 @ 12:18)    06-14    138  |  100  |  55<H>  ----------------------------<  247<H>  5.7<H>   |  28  |  0.8      Calcium, Total Serum: 8.7 mg/dL (06-14-22 @ 12:18)      LFTs:             5.7  | 0.3  | 33       ------------------[51      ( 14 Jun 2022 12:18 )  3.2  | x    | 38          Lipase:x      Amylase:x         Blood Gas Arterial, Lactate: 1.20 mmol/L (06-14-22 @ 02:56)  Blood Gas Arterial, Lactate: 1.10 mmol/L (06-13-22 @ 14:07)  Blood Gas Arterial, Lactate: 0.80 mmol/L (06-13-22 @ 02:49)  Blood Gas Arterial, Lactate: 1.20 mmol/L (06-12-22 @ 13:40)  Blood Gas Arterial, Lactate: 2.10 mmol/L (06-12-22 @ 12:15)  Blood Gas Arterial, Lactate: 1.20 mmol/L (06-12-22 @ 02:38)    ABG - ( 14 Jun 2022 02:56 )  pH: 7.39  /  pCO2: 53    /  pO2: 104   / HCO3: 32    / Base Excess: 5.7   /  SaO2: 99.0            ABG - ( 13 Jun 2022 14:07 )  pH: 7.37  /  pCO2: 53    /  pO2: 98    / HCO3: 31    / Base Excess: 4.1   /  SaO2: 98.6            ABG - ( 13 Jun 2022 02:49 )  pH: 7.42  /  pCO2: 49    /  pO2: 110   / HCO3: 32    / Base Excess: 6.1   /  SaO2: 99.3        Serum Pro-Brain Natriuretic Peptide: 254 pg/mL (06-09-22 @ 22:55)    IMAGING:  < from: Xray Chest 1 View- PORTABLE-Urgent (Xray Chest 1 View- PORTABLE-Urgent .) (06.14.22 @ 15:51) >  impression:    Support devices: Endotracheal tube in satisfactory position. Feeding tube   coursing below the field-of-view. Right-sided chest tube.    Cardiac/mediastinum/hilum: Unchanged    Lung parenchyma/Pleura: Right upper lobe reticular opacities   redemonstrated. No pneumothorax.    Skeleton/soft tissues: Unchanged    < end of copied text >      ACCESS/ DEVICES:  [x ] Peripheral IV  [ ] Central Venous Line	[ ] R	[ ] L	[ ] IJ	[ ] Fem	[ ] SC	Placed:   [ ] Arterial Line		[ ] R	[ ] L	[ ] Fem	[ ] Rad	[ ] Ax	Placed:   [ ] PICC:					[ ] Mediport  [ ] Urinary Catheter,  Date Placed:   [x ] Chest tube: [x ] Right, [ ] Left  [ ] CRISTELA/Migel Drains         GENERAL SURGERY PROGRESS NOTE    Patient: KIMBERLI LEIGH , 88y ([birth year])Female   MRN: [id number]  Location: [facility]  A  Visit: 06-10-22 Inpatient  Date: 06-15-22 @ 00:36    Procedure/Dx/Injuries: right pneumothorax s/p pigtail placement     Events of past 24 hours: patient remains intubated, upon review of CXR it appears pigtail is not in thoracic cavity on 3pm cxr    PAST MEDICAL & SURGICAL HISTORY:  COPD (chronic obstructive pulmonary disease)  GERD (gastroesophageal reflux disease)  Glaucoma  Anxiety  Pancreatitis  History of tonsillectomy  H/O colonoscopy  5 years ago    Vitals:   T(F): 98 (06-14-22 @ 20:00), Max: 98 (06-14-22 @ 20:00)  HR: 120 (06-14-22 @ 22:30)  BP: 119/64 (06-14-22 @ 22:15)  RR: 15 (06-14-22 @ 22:30)  SpO2: 100% (06-14-22 @ 22:30)  Mode: AC/ CMV (Assist Control/ Continuous Mandatory Ventilation), RR (machine): 16, TV (machine): 350, FiO2: 30, PEEP: 5, ITime: 1, MAP: 10, PIP: 29    Diet, NPO with Tube Feed:   Tube Feeding Modality: Orogastric  Replete  Total Volume for 24 Hours (mL): 1080  Continuous  Starting Tube Feed Rate mL per Hour: 45  Until Goal Tube Feed Rate (mL per Hour): 45  Tube Feed Duration (in Hours): 24  Tube Feed Start Time: 00:00      Fluids:     I & O's:    06-13-22 @ 07:01  -  06-14-22 @ 07:00  --------------------------------------------------------  IN:    Dexmedetomidine: 317.1 mL    Enteral Tube Flush: 130 mL    FentaNYL: 224 mL    IV PiggyBack: 400 mL    Jevity 1.2: 1080 mL    Lactated Ringers: 75 mL    Norepinephrine: 67.5 mL    Propofol: 8.4 mL  Total IN: 2302 mL    OUT:    Chest Tube (mL): 42 mL    Indwelling Catheter - Urethral (mL): 85 mL    Intermittent Catheterization - Urethral (mL): 400 mL    Voided (mL): 350 mL  Total OUT: 877 mL    Total NET: 1425 mL    PHYSICAL EXAM:  General: NAD, intubated  Cardiac: RRR S1, S2,   Respiratory: CTAB, right pigtail to waterseal, -airleak  Abdomen: Soft, non-distended,     MEDICATIONS  (STANDING):  cefepime   IVPB 1000 milliGRAM(s) IV Intermittent every 12 hours  chlorhexidine 0.12% Liquid 15 milliLiter(s) Oral Mucosa two times a day  chlorhexidine 4% Liquid 1 Application(s) Topical <User Schedule>  dexMEDEtomidine Infusion 0.05 MICROgram(s)/kG/Hr (0.63 mL/Hr) IV Continuous <Continuous>  dextrose 5%. 1000 milliLiter(s) (50 mL/Hr) IV Continuous <Continuous>  dextrose 5%. 1000 milliLiter(s) (100 mL/Hr) IV Continuous <Continuous>  dextrose 50% Injectable 25 Gram(s) IV Push once  dextrose 50% Injectable 12.5 Gram(s) IV Push once  dextrose 50% Injectable 25 Gram(s) IV Push once  enoxaparin Injectable 40 milliGRAM(s) SubCutaneous every 24 hours  fentaNYL   Infusion. 0.5 MICROgram(s)/kG/Hr (3.5 mL/Hr) IV Continuous <Continuous>  glucagon  Injectable 1 milliGRAM(s) IntraMuscular once  insulin lispro (ADMELOG) corrective regimen sliding scale   SubCutaneous every 6 hours  methylPREDNISolone sodium succinate Injectable 60 milliGRAM(s) IV Push every 12 hours  midazolam Injectable 2 milliGRAM(s) IV Push every 4 hours  norepinephrine Infusion 0.05 MICROgram(s)/kG/Min (4.69 mL/Hr) IV Continuous <Continuous>  pantoprazole   Suspension 40 milliGRAM(s) Oral before breakfast  polyethylene glycol 3350 17 Gram(s) Oral daily  propofol Infusion 5.004 MICROgram(s)/kG/Min (1.21 mL/Hr) IV Continuous <Continuous>  senna 2 Tablet(s) Oral at bedtime    MEDICATIONS  (PRN):  acetaminophen     Tablet .. 650 milliGRAM(s) Oral every 6 hours PRN Temp greater or equal to 38C (100.4F), Mild Pain (1 - 3)  dextrose Oral Gel 15 Gram(s) Oral once PRN Blood Glucose LESS THAN 70 milliGRAM(s)/deciliter    DVT PROPHYLAXIS: enoxaparin Injectable 40 milliGRAM(s) SubCutaneous every 24 hours  GI PROPHYLAXIS: pantoprazole   Suspension 40 milliGRAM(s) Oral before breakfast    ANTIBIOTICS:  cefepime   IVPB 1000 milliGRAM(s)        LAB/STUDIES:  Labs:  CAPILLARY BLOOD GLUCOSE  POCT Blood Glucose.: 220 mg/dL (15 Crow 2022 00:17)  POCT Blood Glucose.: 203 mg/dL (14 Jun 2022 22:12)  POCT Blood Glucose.: 134 mg/dL (14 Jun 2022 17:41)  POCT Blood Glucose.: 320 mg/dL (14 Jun 2022 14:06)  POCT Blood Glucose.: 241 mg/dL (14 Jun 2022 03:42)  POCT Blood Glucose.: 272 mg/dL (14 Jun 2022 01:39)                        13.2   23.47 )-----------( 344      ( 14 Jun 2022 12:18 )             40.6       Auto Neutrophil %: 94.1 % (06-14-22 @ 12:18)  Auto Immature Granulocyte %: 0.5 % (06-14-22 @ 12:18)    06-14    138  |  100  |  55<H>  ----------------------------<  247<H>  5.7<H>   |  28  |  0.8      Calcium, Total Serum: 8.7 mg/dL (06-14-22 @ 12:18)      LFTs:         5.7  | 0.3  | 33       ------------------[51      ( 14 Jun 2022 12:18 )  3.2  | x    | 38           Blood Gas Arterial, Lactate: 1.20 mmol/L (06-14-22 @ 02:56)  Blood Gas Arterial, Lactate: 1.10 mmol/L (06-13-22 @ 14:07)  Blood Gas Arterial, Lactate: 0.80 mmol/L (06-13-22 @ 02:49)  Blood Gas Arterial, Lactate: 1.20 mmol/L (06-12-22 @ 13:40)  Blood Gas Arterial, Lactate: 2.10 mmol/L (06-12-22 @ 12:15)  Blood Gas Arterial, Lactate: 1.20 mmol/L (06-12-22 @ 02:38)    ABG - ( 14 Jun 2022 02:56 )  pH: 7.39  /  pCO2: 53    /  pO2: 104   / HCO3: 32    / Base Excess: 5.7   /  SaO2: 99.0      ABG - ( 13 Jun 2022 14:07 )  pH: 7.37  /  pCO2: 53    /  pO2: 98    / HCO3: 31    / Base Excess: 4.1   /  SaO2: 98.6      ABG - ( 13 Jun 2022 02:49 )  pH: 7.42  /  pCO2: 49    /  pO2: 110   / HCO3: 32    / Base Excess: 6.1   /  SaO2: 99.3      Serum Pro-Brain Natriuretic Peptide: 254 pg/mL (06-09-22 @ 22:55)    IMAGING:  < from: Xray Chest 1 View- PORTABLE-Urgent (Xray Chest 1 View- PORTABLE-Urgent .) (06.14.22 @ 15:51) >  impression:  Support devices: Endotracheal tube in satisfactory position. Feeding tube coursing below the field-of-view. Right-sided chest tube.    Cardiac/mediastinum/hilum: Unchanged    Lung parenchyma/Pleura: Right upper lobe reticular opacities redemonstrated. No pneumothorax.    Skeleton/soft tissues: Unchanged  < end of copied text >      < from: Xray Chest 1 View- PORTABLE-Routine (Xray Chest 1 View- PORTABLE-Routine in AM.) (06.15.22 @ 13:20) >  IMPRESSION: Right-sided pneumothorax with 3.5 cm air gap. Retracted right chest tube.  < end of copied text >    < from: Xray Chest 1 View-PORTABLE IMMEDIATE (Xray Chest 1 View-PORTABLE IMMEDIATE .) (06.15.22 @ 09:48) >  Impression: Interval right pleural catheter advancement pleural cavity.  Interval resolution of previously seen right pneumothorax. Stable endotracheal tube, enteric tube.    < end of copied text >      ACCESS/ DEVICES:  [x ] Peripheral IV  [x ] Chest tube: [x ] Right, [ ] Left

## 2022-06-16 NOTE — PROGRESS NOTE ADULT - SUBJECTIVE AND OBJECTIVE BOX
Over Night Events: events noted, still intubated, ventilated, on propofol 50 sp versed    PHYSICAL EXAM    ICU Vital Signs Last 24 Hrs  T(C): 36.7 (16 Jun 2022 08:00), Max: 36.7 (16 Jun 2022 08:00)  T(F): 98 (16 Jun 2022 08:00), Max: 98 (16 Jun 2022 08:00)  HR: 58 (16 Jun 2022 08:00) (44 - 130)  BP: 137/55 (16 Jun 2022 07:00) (90/50 - 174/86)  BP(mean): 76 (16 Jun 2022 07:00) (61 - 114)  RR: 16 (16 Jun 2022 08:00) (15 - 36)  SpO2: 100% (16 Jun 2022 08:00) (94% - 100%)      General: ill looking  HEENT: ETT  Lungs: Bdec bs both base  Cardiovascular JULIO CESAR 2/6  Abdomen: Soft, Positive BS  Extremities: No clubbing   Skin: Warm  Neurological: Non focal       06-15-22 @ 07:01  -  06-16-22 @ 07:00  --------------------------------------------------------  IN:    Dexmedetomidine: 140 mL    Enteral Tube Flush: 180 mL    FentaNYL: 182.5 mL    Jevity 1.2: 180 mL    Propofol: 126 mL    Vital HN: 360 mL  Total IN: 1168.5 mL    OUT:    Chest Tube (mL): 30 mL    Indwelling Catheter - Urethral (mL): 770 mL  Total OUT: 800 mL    Total NET: 368.5 mL      06-16-22 @ 07:01  -  06-16-22 @ 08:16  --------------------------------------------------------  IN:    Propofol: 24 mL    Vital HN: 60 mL  Total IN: 84 mL    OUT:    Indwelling Catheter - Urethral (mL): 40 mL  Total OUT: 40 mL    Total NET: 44 mL          LABS:                          11.9   22.98 )-----------( 300      ( 16 Jun 2022 05:50 )             36.2                                               06-16    136  |  97<L>  |  53<H>  ----------------------------<  205<H>  5.5<H>   |  25  |  0.8    Ca    8.9      16 Jun 2022 05:50  Mg     2.2     06-16    TPro  5.3<L>  /  Alb  3.0<L>  /  TBili  0.5  /  DBili  x   /  AST  26  /  ALT  47<H>  /  AlkPhos  48  06-16                                                                                           LIVER FUNCTIONS - ( 16 Jun 2022 05:50 )  Alb: 3.0 g/dL / Pro: 5.3 g/dL / ALK PHOS: 48 U/L / ALT: 47 U/L / AST: 26 U/L / GGT: x                                                                                               Mode: AC/ CMV (Assist Control/ Continuous Mandatory Ventilation)  RR (machine): 16  TV (machine): 350  FiO2: 30  PEEP: 5  ITime: 1  MAP: 12  PIP: 41                                      ABG - ( 16 Jun 2022 03:13 )  pH, Arterial: 7.49  pH, Blood: x     /  pCO2: 46    /  pO2: 113   / HCO3: 35    / Base Excess: 10.3  /  SaO2: 99.5                MEDICATIONS  (STANDING):  cefepime   IVPB 1000 milliGRAM(s) IV Intermittent every 12 hours  chlorhexidine 0.12% Liquid 15 milliLiter(s) Oral Mucosa two times a day  chlorhexidine 4% Liquid 1 Application(s) Topical <User Schedule>  dexMEDEtomidine Infusion 0.05 MICROgram(s)/kG/Hr (0.63 mL/Hr) IV Continuous <Continuous>  dextrose 5%. 1000 milliLiter(s) (50 mL/Hr) IV Continuous <Continuous>  dextrose 5%. 1000 milliLiter(s) (100 mL/Hr) IV Continuous <Continuous>  dextrose 50% Injectable 25 Gram(s) IV Push once  dextrose 50% Injectable 12.5 Gram(s) IV Push once  dextrose 50% Injectable 25 Gram(s) IV Push once  enoxaparin Injectable 40 milliGRAM(s) SubCutaneous every 24 hours  fentaNYL   Infusion. 0.5 MICROgram(s)/kG/Hr (3.5 mL/Hr) IV Continuous <Continuous>  glucagon  Injectable 1 milliGRAM(s) IntraMuscular once  insulin lispro (ADMELOG) corrective regimen sliding scale   SubCutaneous every 6 hours  methylPREDNISolone sodium succinate Injectable 60 milliGRAM(s) IV Push every 12 hours  norepinephrine Infusion 0.05 MICROgram(s)/kG/Min (4.69 mL/Hr) IV Continuous <Continuous>  pantoprazole   Suspension 40 milliGRAM(s) Oral before breakfast  polyethylene glycol 3350 17 Gram(s) Oral daily  propofol Infusion 5.004 MICROgram(s)/kG/Min (1.21 mL/Hr) IV Continuous <Continuous>  senna 2 Tablet(s) Oral at bedtime    MEDICATIONS  (PRN):  acetaminophen     Tablet .. 650 milliGRAM(s) Oral every 6 hours PRN Temp greater or equal to 38C (100.4F), Mild Pain (1 - 3)  dextrose Oral Gel 15 Gram(s) Oral once PRN Blood Glucose LESS THAN 70 milliGRAM(s)/deciliter  midazolam Injectable 2 milliGRAM(s) IV Push every 2 hours PRN severe agitation, inadequate sedation with propofol, precedex, fentanyl      Xrays:                                                                                     ECHO

## 2022-06-16 NOTE — PROCEDURE NOTE - NSPROCDETAILS_GEN_ALL_CORE
guidewire recovered/lumen(s) aspirated and flushed/sterile dressing applied/sterile technique, catheter placed/ultrasound guidance with use of sterile gel and probe cove
Seldinger technique/dressing applied/secured in place/sterile dressing applied/supine position/percutaneous/thoracostomy tube placed percutaneously

## 2022-06-16 NOTE — PROGRESS NOTE ADULT - SUBJECTIVE AND OBJECTIVE BOX
KIMBERLI LEIGH MRN#: [id number]  CODE STATUS: FULL CODE     SUBJECTIVE   Chief complaint: respiratory distress    Currently admitted to medicine with the primary diagnosis of ACUTE HYPERCAPNIC RESPIRATORY FAILURE; COPD EXACERBATION.....      Today is hospital day 6d,   INTERVAL HPI/OVERNIGHT EVENTS:     Difficulty with venous access and sedation o/n. Remains intubated on MV. Sedated. Off pressors.       PAST MEDICAL & SURGICAL HISTORY  COPD (chronic obstructive pulmonary disease)    GERD (gastroesophageal reflux disease)    Glaucoma    Anxiety    Pancreatitis    History of tonsillectomy    H/O colonoscopy  5 years ago      ALLERGIES:  No Known Allergies    HOME MEDICATIONS:  Home Medications:  albuterol 90 mcg/inh inhalation aerosol: 2 puff(s) inhaled every 6 hours, As needed, Shortness of Breath and/or Wheezing (27 Dec 2021 10:38)  Ambien 10 mg oral tablet: 1 tab(s) orally once a day (at bedtime) (19 Dec 2021 01:41)  escitalopram 10 mg oral tablet: 1 tab(s) orally once a day (27 Dec 2021 10:38)  famotidine 20 mg oral tablet: 1 tab(s) orally once a day (27 Dec 2021 10:38)  fluticasone 0.5 mg/2 mL inhalation suspension:  (19 Dec 2021 01:41)  levalbuterol 45 mcg/inh inhalation aerosol: 2 puff(s) inhaled every 4 hours (19 Dec 2021 01:41)  Wixela Inhub 250 mcg-50 mcg inhalation powder: 1 puff(s) inhaled 2 times a day (19 Dec 2021 01:41)    MEDICATIONS:  STANDING MEDICATIONS  MEDICATIONS  (STANDING):  cefepime   IVPB 1000 milliGRAM(s) IV Intermittent every 12 hours  chlorhexidine 0.12% Liquid 15 milliLiter(s) Oral Mucosa two times a day  chlorhexidine 4% Liquid 1 Application(s) Topical <User Schedule>  clonazePAM  Tablet 1 milliGRAM(s) Oral two times a day  dexMEDEtomidine Infusion 0.05 MICROgram(s)/kG/Hr (0.63 mL/Hr) IV Continuous <Continuous>  dextrose 5%. 1000 milliLiter(s) (50 mL/Hr) IV Continuous <Continuous>  dextrose 5%. 1000 milliLiter(s) (100 mL/Hr) IV Continuous <Continuous>  dextrose 50% Injectable 25 Gram(s) IV Push once  dextrose 50% Injectable 12.5 Gram(s) IV Push once  dextrose 50% Injectable 25 Gram(s) IV Push once  enoxaparin Injectable 40 milliGRAM(s) SubCutaneous every 24 hours  fentaNYL   Infusion. 0.5 MICROgram(s)/kG/Hr (3.5 mL/Hr) IV Continuous <Continuous>  glucagon  Injectable 1 milliGRAM(s) IntraMuscular once  insulin lispro (ADMELOG) corrective regimen sliding scale   SubCutaneous every 6 hours  methylPREDNISolone sodium succinate Injectable 60 milliGRAM(s) IV Push every 12 hours  norepinephrine Infusion 0.05 MICROgram(s)/kG/Min (4.69 mL/Hr) IV Continuous <Continuous>  pantoprazole   Suspension 40 milliGRAM(s) Oral before breakfast  polyethylene glycol 3350 17 Gram(s) Oral daily  propofol Infusion 5.004 MICROgram(s)/kG/Min (1.21 mL/Hr) IV Continuous <Continuous>  QUEtiapine 25 milliGRAM(s) Oral two times a day  senna 2 Tablet(s) Oral at bedtime  sodium zirconium cyclosilicate 10 Gram(s) Oral every 8 hours    PRN MEDICATIONS  MEDICATIONS  (PRN):  acetaminophen     Tablet .. 650 milliGRAM(s) Oral every 6 hours PRN Temp greater or equal to 38C (100.4F), Mild Pain (1 - 3)  dextrose Oral Gel 15 Gram(s) Oral once PRN Blood Glucose LESS THAN 70 milliGRAM(s)/deciliter  midazolam Injectable 2 milliGRAM(s) IV Push every 2 hours PRN severe agitation, inadequate sedation with propofol, precedex, fentanyl      OBJECTIVE    VITAL SIGNS  T(F): 98 (06-16 @ 08:00), Max: 98 (06-16 @ 08:00)  HR: 54 (06-16 @ 09:00) (44 - 130)  BP: 92/48 (06-16 @ 09:00) (90/50 - 169/51)  RR: 16 (06-16 @ 09:00) (15 - 20)  SpO2: 100% (06-16 @ 09:00) (97% - 100%)    LABS:                        11.9   22.98 )-----------( 300      ( 16 Jun 2022 05:50 )             36.2     06-16    136  |  97<L>  |  53<H>  ----------------------------<  205<H>  5.5<H>   |  25  |  0.8    Ca    8.9      16 Jun 2022 05:50  Mg     2.2     06-16    TPro  5.3<L>  /  Alb  3.0<L>  /  TBili  0.5  /  DBili  x   /  AST  26  /  ALT  47<H>  /  AlkPhos  48  06-16        ABG - ( 16 Jun 2022 03:13 )  pH, Arterial: 7.49  pH, Blood: x     /  pCO2: 46    /  pO2: 113   / HCO3: 35    / Base Excess: 10.3  /  SaO2: 99.5          RADIOLOGY:   Reviewed  CXR R PTX improved, R pigtail in place, OGT in place, ETT ~4cm above carine      PHYSICAL EXAM:  General: Laying on the hospital bed. Ill-appearing  HEENT: Atraumatic. Normocephalic.  Cardiac: Normal S1, S2. RRR. No murmurs, rubs, or gallops.  Pulm: Decreased basilar bs b/l. intubated on mv.   GI: Soft, nontender, nondistended.  Vasc: 2+ pulses.  No edema, cyanosis.  MSK: Moving all 4 extremities.  Neuro: sedated  Skin: warm, dry and intact    Vent Settings: Mode: AC/ CMV (Assist Control/ Continuous Mandatory Ventilation)  RR (machine): 16  TV (machine): 350  FiO2: 30  PEEP: 5  PIP: 41

## 2022-06-16 NOTE — PROGRESS NOTE ADULT - ASSESSMENT
ASSESSMENT:  88y F w/ right pneumothorax s/p pigtail placement. Chest tube out of chest cavity on 3pm cxr 6/14.     PLAN:  - Daily AM CXR  - Monitor airleak   - Monitor O2 sat  - Follow up Chest tube output   - IS  - Pain control   - Care as per primary team       GREEN [employer] SPECTRA: [id number]

## 2022-06-16 NOTE — PROGRESS NOTE ADULT - ASSESSMENT
ASSESSMENT & PLAN  87 y/o female with PMHx of COPD on home O2, pHTN, GERD, Lucio's esophagus, COVID vaccinated presented to the ED for acute onset of sob.    # Acute on chronic hypercapnic respiratory failure s/p intubation  # COPD exacerbation  # HO COPD, O2 dependent  # R secondary spontaneous pneumothorax s/p pigtail  - presented to ED with SOB, found to be hypercapnic to 115, intubated  - post intubation CXR showed R PTX s/p R pigtail catheter, dislodged, replaced 6/15  - CTSX following  - cont solumedrol @ 60q12  - Nebs q4 and PRN  - vent changes per pulm: none today  - sedation with propofol - attempt SAT/SBT on propofol  - start seroquel 25mg BID, monitor qtc  - off pressors  - Procal 0.52->0.13, D-dimer 579  - BCX x2 NGTD, UCX neg, RVP neg, urine strep/legionella neg  - leukocytosis (improving), afebrile  - s/p azithromycin x5d, cont cefepime 1g q12h x7d (finish 6/19)  - daily ABG, CXR    # H/o HTN  # 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

## 2022-06-16 NOTE — PROGRESS NOTE ADULT - SUBJECTIVE AND OBJECTIVE BOX
GENERAL SURGERY PROGRESS NOTE    Patient: KIMBERLI LEIGH , 88y ([birth year])Female   MRN: [id number]  Location: [facility]  A  Visit: 06-10-22 Inpatient  Date: 06-16-22 @ 01:01    Hospital Day #: 7  Events of past 24 hours: R pigtail to suction. Negative air leak. Patient vent settings 30/5.     PAST MEDICAL & SURGICAL HISTORY:  COPD (chronic obstructive pulmonary disease)      GERD (gastroesophageal reflux disease)      Glaucoma      Anxiety      Pancreatitis      History of tonsillectomy      H/O colonoscopy  5 years ago          Vitals:   T(F): 97.1 (06-15-22 @ 16:00), Max: 98 (06-15-22 @ 04:00)  HR: 98 (06-15-22 @ 21:18)  BP: 169/51 (06-15-22 @ 21:00)  RR: 15 (06-15-22 @ 21:00)  SpO2: 100% (06-15-22 @ 21:18)  Mode: AC/ CMV (Assist Control/ Continuous Mandatory Ventilation), RR (machine): 16, TV (machine): 350, FiO2: 30, PEEP: 5, ITime: 1, MAP: 10, PIP: 31    Diet, NPO with Tube Feed:   Tube Feeding Modality: Orogastric  Vital High Protein  Total Volume for 24 Hours (mL): 720  Continuous  Until Goal Tube Feed Rate (mL per Hour): 30  Tube Feed Duration (in Hours): 24  Tube Feed Start Time: 18:30      Fluids:     I & O's:    06-14-22 @ 07:01  -  06-15-22 @ 07:00  --------------------------------------------------------  IN:    Dexmedetomidine: 175 mL    FentaNYL: 48 mL    Jevity 1.2: 855 mL    Norepinephrine: 128.1 mL    Propofol: 200 mL  Total IN: 1406.1 mL    OUT:    Chest Tube (mL): 0 mL    Indwelling Catheter - Urethral (mL): 955 mL  Total OUT: 955 mL    Total NET: 451.1 mL        Bowel Movement: : [] YES [] NO  Flatus: : [] YES [] NO    PHYSICAL EXAM:  General: NAD, intubated  Cardiac: RRR S1, S2,   Respiratory: CTAB, right pigtail to suction, -airleak  Abdomen: Soft, non-distended,     MEDICATIONS  (STANDING):  cefepime   IVPB 1000 milliGRAM(s) IV Intermittent every 12 hours  chlorhexidine 0.12% Liquid 15 milliLiter(s) Oral Mucosa two times a day  chlorhexidine 4% Liquid 1 Application(s) Topical <User Schedule>  dexMEDEtomidine Infusion 0.05 MICROgram(s)/kG/Hr (0.63 mL/Hr) IV Continuous <Continuous>  dextrose 5%. 1000 milliLiter(s) (50 mL/Hr) IV Continuous <Continuous>  dextrose 5%. 1000 milliLiter(s) (100 mL/Hr) IV Continuous <Continuous>  dextrose 50% Injectable 25 Gram(s) IV Push once  dextrose 50% Injectable 12.5 Gram(s) IV Push once  dextrose 50% Injectable 25 Gram(s) IV Push once  enoxaparin Injectable 40 milliGRAM(s) SubCutaneous every 24 hours  fentaNYL   Infusion. 0.5 MICROgram(s)/kG/Hr (3.5 mL/Hr) IV Continuous <Continuous>  glucagon  Injectable 1 milliGRAM(s) IntraMuscular once  insulin lispro (ADMELOG) corrective regimen sliding scale   SubCutaneous every 6 hours  methylPREDNISolone sodium succinate Injectable 60 milliGRAM(s) IV Push every 12 hours  norepinephrine Infusion 0.05 MICROgram(s)/kG/Min (4.69 mL/Hr) IV Continuous <Continuous>  pantoprazole   Suspension 40 milliGRAM(s) Oral before breakfast  polyethylene glycol 3350 17 Gram(s) Oral daily  propofol Infusion 5.004 MICROgram(s)/kG/Min (1.21 mL/Hr) IV Continuous <Continuous>  senna 2 Tablet(s) Oral at bedtime    MEDICATIONS  (PRN):  acetaminophen     Tablet .. 650 milliGRAM(s) Oral every 6 hours PRN Temp greater or equal to 38C (100.4F), Mild Pain (1 - 3)  dextrose Oral Gel 15 Gram(s) Oral once PRN Blood Glucose LESS THAN 70 milliGRAM(s)/deciliter  midazolam Injectable 2 milliGRAM(s) IV Push every 2 hours PRN severe agitation, inadequate sedation with propofol, precedex, fentanyl      DVT PROPHYLAXIS: enoxaparin Injectable 40 milliGRAM(s) SubCutaneous every 24 hours    GI PROPHYLAXIS: pantoprazole   Suspension 40 milliGRAM(s) Oral before breakfast    ANTICOAGULATION:   ANTIBIOTICS:  cefepime   IVPB 1000 milliGRAM(s)            LAB/STUDIES:  Labs:  CAPILLARY BLOOD GLUCOSE      POCT Blood Glucose.: 238 mg/dL (15 Crow 2022 17:02)  POCT Blood Glucose.: 248 mg/dL (15 Crow 2022 10:58)  POCT Blood Glucose.: 174 mg/dL (15 Crow 2022 05:43)                          12.7   30.25 )-----------( 385      ( 15 Crow 2022 05:02 )             37.7         06-15    137  |  99  |  54<H>  ----------------------------<  175<H>  5.6<H>   |  27  |  0.8      Calcium, Total Serum: 9.0 mg/dL (06-15-22 @ 05:02)      LFTs:             5.2  | 0.4  | 26       ------------------[47      ( 15 Crow 2022 05:02 )  3.1  | x    | 44          Lipase:x      Amylase:x         Blood Gas Arterial, Lactate: 1.50 mmol/L (06-15-22 @ 02:33)  Blood Gas Arterial, Lactate: 1.20 mmol/L (06-14-22 @ 02:56)  Blood Gas Arterial, Lactate: 1.10 mmol/L (06-13-22 @ 14:07)  Blood Gas Arterial, Lactate: 0.80 mmol/L (06-13-22 @ 02:49)    ABG - ( 15 Crow 2022 02:33 )  pH: 7.40  /  pCO2: 51    /  pO2: 90    / HCO3: 32    / Base Excess: 5.7   /  SaO2: 98.7            ABG - ( 14 Jun 2022 02:56 )  pH: 7.39  /  pCO2: 53    /  pO2: 104   / HCO3: 32    / Base Excess: 5.7   /  SaO2: 99.0            ABG - ( 13 Jun 2022 14:07 )  pH: 7.37  /  pCO2: 53    /  pO2: 98    / HCO3: 31    / Base Excess: 4.1   /  SaO2: 98.6              Coags:        Serum Pro-Brain Natriuretic Peptide: 254 pg/mL (06-09-22 @ 22:55)                  IMAGING:

## 2022-06-16 NOTE — PROGRESS NOTE ADULT - ASSESSMENT
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 propofol, seroquel, klonopin, qt    HEENT: Oral care    PULMONARY:  HOB @ 45 degrees.  no vent changes, chest tube to suction.  Daily CXR.  Nebs Q4 and PRN.  Solumedrol 60 mg q 12. monitor P/P,  CT suction, SBT    CARDIOVASCULAR: avoid overload    GI: GI prophylaxis.  OG Feeding.  Bowel regimen     RENAL:  Follow up lytes.  Correct as needed.    INFECTIOUS DISEASE: finish abx    HEMATOLOGICAL:  DVT prophylaxis.  LE doppler -    ENDOCRINE:  Follow up FS.  Insulin protocol if needed.    MUSCULOSKELETAL:  Bed rest     MICU monitoring     GOC    HUBER FOR RETENTION

## 2022-06-17 NOTE — ADVANCED PRACTICE NURSE CONSULT - RECOMMEDATIONS
1. Stage 3 coccyx-b/l buttock pressure injury- Cleanse wound bed w/ normal saline, gently pat dry.  Apply thin layer of Coloplast Triad hydrophilic ointment to absorb wound exudate and provide protective layer. Cover w/ dry gauze dressing, and foam Allevyn dressing. Apply Triad & change dressing q12h and prn for strike-through drainage or soiling. If top layer of Triad soiled, gently remove w/ perineal cleanser and re-apply ointment. Do not scrub off as this may cause further skin breakdown. Do not apply foam Allevyn dressing directly over Triad (use gauze in between) as ointment gets absorbed into dressing as opposed to being in direct contact w/ wound bed.  -Assess skin/wound qshift, report changes to primary provider.     Additional recs:   -Continue turning/positioning patient from side-to-side q2h while in bed, or in accordance w/ pt's plan of care. Continue utilizing pillows to assist w/ turning/positioning.   -Continue to offload heels from bed surface with soft pillow under calfs or by applying offloading boots to BLEs.   -Continue applying Coloplast Bryon Protect moisture barrier cream to buttock and perineal area daily and prn after each incontinent episode.    -Continue utilizing one underpad underneath patient to contain incontinence episodes; change pad when saturated/soiled.   -When/if noland d/c'ed & patient w/ consistent urinary incontonence, consider utilizing female incontinence device/PureWick (if patient candidate) to contain urinary incontinence.   -Continue nutrition consult for optimal wound healing & nutritional status.     Plan of Care: Primary RN Mirna at bedside & made aware of above recs. Spoke w/  covering/primary ICU MD in regards to above. No further needs/recs from [facility] service at this time. Staff RN to perform routine skin/wound assessment and manage wound care. Questions or concerns or if wound worsens and reconsult needed, please contact [facility], [phone].

## 2022-06-17 NOTE — PROGRESS NOTE ADULT - ASSESSMENT
ASSESSMENT & PLAN  89 y/o female with PMHx of COPD on home O2, pHTN, GERD, Lucio's esophagus, COVID vaccinated presented to the ED for acute onset of sob.    # Acute on chronic hypercapnic respiratory failure s/p intubation  # COPD exacerbation  # HO COPD, O2 dependent  # R secondary spontaneous pneumothorax s/p pigtail  - presented to ED with SOB, found to be hypercapnic to 115, intubated  - post intubation CXR showed R PTX s/p R pigtail catheter, dislodged, replaced 6/15  - CTSX following  - cont solumedrol @ 60q12  - Nebs q4 and PRN  - vent changes per pulm: none today  - sedation with propofol - attempt SAT/SBT on propofol  - increase seroquel to 50mg BID, monitor qtc  - on levo @ 0.02, titrate as tolerated  - Procal 0.52->0.13, D-dimer 579  - BCX x2 NGTD, UCX neg, RVP neg, urine strep/legionella neg  - leukocytosis, afebrile  - s/p azithromycin x5d, cont cefepime 1g q12h x7d (finish 6/19)  - daily ABG, CXR, SAT/SBT    # H/o HTN  # 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

## 2022-06-17 NOTE — PROGRESS NOTE ADULT - SUBJECTIVE AND OBJECTIVE BOX
Patient is a 88y old  Female who presents with a chief complaint of respiratory distress   PHYSICAL EXAM:  Vital Signs Last 24 Hrs  T(C): 36.6 (17 Jun 2022 12:00), Max: 37 (17 Jun 2022 04:00)  T(F): 97.9 (17 Jun 2022 12:00), Max: 98.6 (17 Jun 2022 04:00)  HR: 65 (17 Jun 2022 19:00) (56 - 126)  BP: 97/48 (17 Jun 2022 18:45) (75/36 - 194/80)  BP(mean): 69 (17 Jun 2022 18:45) (44 - 140)  RR: 17 (17 Jun 2022 19:00) (10 - 22)  SpO2: 100% (17 Jun 2022 19:00) (93% - 100%)  pt remains vented, sedated with fentanyl and propofol  + levo  R chest tube - C-T note f/u appreciated  right IJ c/d/i, +NG large Ness  abd soft, ND  d/w RN at bedside  Mode: AC/ CMV (Assist Control/ Continuous Mandatory Ventilation)  RR (machine): 16  TV (machine): 350  FiO2: 30  PEEP: 5  ITime: 1  MAP: 11  PIP: 30  ABG - ( 17 Jun 2022 03:04 )  pH, Arterial: 7.44  pH, Blood: x     /  pCO2: 52    /  pO2: 113   / HCO3: 35    / Base Excess: 9.4   /  SaO2: 100.0       MEDICATIONS  (STANDING):  cefepime   IVPB 1000 milliGRAM(s) IV Intermittent every 12 hours  chlorhexidine 0.12% Liquid 15 milliLiter(s) Oral Mucosa two times a day  chlorhexidine 4% Liquid 1 Application(s) Topical <User Schedule>  clonazePAM  Tablet 1 milliGRAM(s) Oral two times a day  dexMEDEtomidine Infusion 0.05 MICROgram(s)/kG/Hr (0.63 mL/Hr) IV Continuous <Continuous>  enoxaparin Injectable 40 milliGRAM(s) SubCutaneous every 24 hours  escitalopram 10 milliGRAM(s) Oral daily  fentaNYL   Infusion. 0.5 MICROgram(s)/kG/Hr (3.5 mL/Hr) IV Continuous <Continuous>  insulin glargine Injectable (LANTUS) 6 Unit(s) SubCutaneous at bedtime  insulin lispro (ADMELOG) corrective regimen sliding scale   SubCutaneous every 6 hours  methylPREDNISolone sodium succinate Injectable 60 milliGRAM(s) IV Push every 12 hours  norepinephrine Infusion 0.05 MICROgram(s)/kG/Min (4.69 mL/Hr) IV Continuous <Continuous>  pantoprazole   Suspension 40 milliGRAM(s) Oral before breakfast  polyethylene glycol 3350 17 Gram(s) Oral daily  propofol Infusion 5.004 MICROgram(s)/kG/Min (1.21 mL/Hr) IV Continuous <Continuous>  QUEtiapine 50 milliGRAM(s) Oral two times a day  senna 2 Tablet(s) Oral at bedtime    MEDICATIONS  (PRN):  acetaminophen     Tablet .. 650 milliGRAM(s) Oral every 6 hours PRN Temp greater or equal to 38C (100.4F), Mild Pain (1 - 3)  dextrose Oral Gel 15 Gram(s) Oral once PRN Blood Glucose LESS THAN 70 milliGRAM(s)/deciliter  midazolam Injectable 2 milliGRAM(s) IV Push every 2 hours PRN severe agitation, inadequate sedation with propofol, precedex, fentanyl    CAPILLARY BLOOD GLUCOSE  POCT Blood Glucose.: 156 mg/dL (17 Jun 2022 17:31)  POCT Blood Glucose.: 176 mg/dL (17 Jun 2022 11:51)  POCT Blood Glucose.: 144 mg/dL (17 Jun 2022 05:36)  POCT Blood Glucose.: 204 mg/dL (16 Jun 2022 22:34)  POCT Blood Glucose.: 183 mg/dL (16 Jun 2022 21:16)    I&O's Summary    16 Jun 2022 07:01  -  17 Jun 2022 07:00  --------------------------------------------------------  IN: 1492.2 mL / OUT: 1010 mL / NET: 482.2 mL    17 Jun 2022 07:01  -  17 Jun 2022 20:04  --------------------------------------------------------  IN: 836.7 mL / OUT: 665 mL / NET: 171.7 mL            LABS:                        11.1   23.85 )-----------( 379      ( 17 Jun 2022 04:55 )             33.3     06-17    141  |  101  |  47<H>  ----------------------------<  160<H>  4.6   |  33<H>  |  0.7    Ca    8.9      17 Jun 2022 04:55  Mg     2.2     06-17    TPro  4.4<L>  /  Alb  2.6<L>  /  TBili  0.2  /  DBili  x   /  AST  17  /  ALT  44<H>  /  AlkPhos  42  06-17  no phos levels  POCT Blood Glucose.: 156 mg/dL (17 Jun 2022 17:31)  POCT Blood Glucose.: 176 mg/dL (17 Jun 2022 11:51)  POCT Blood Glucose.: 144 mg/dL (17 Jun 2022 05:36)  POCT Blood Glucose.: 204 mg/dL (16 Jun 2022 22:34)  POCT Blood Glucose.: 183 mg/dL (16 Jun 2022 21:16)    < from: Xray Chest 1 View- PORTABLE-Urgent (Xray Chest 1 View- PORTABLE-Urgent .) (06.17.22 @ 13:05) >  Support devices: Right IJ catheter again noted. Endotracheal tube in   satisfactory position. Nasogastric tube with tip in the region of the   stomach.    Cardiac/mediastinum/hilum: No change    Lung parenchyma/Pleura: Mild interstitial edema. Small right apical   pneumothorax. Findings are essentially unchanged.    < end of copied text >

## 2022-06-17 NOTE — PROGRESS NOTE ADULT - ASSESSMENT
IMP:  - acute hypercapnic resp failure  - h/o Lucio's esophagus  - hyperglycemia, likely DM  - hyperkalemia    est REE by  kcal, protein needs likely ~ 50-60 gm/d  on propofol - if current dose continues, will provide 317 kcal/d of IVFE - dose decreased a bit this afternoon  suggest:  - change feeds now to Vital Hi Pro at 30 ml/h --> 62 gm pro, 711 kcal, + propofol = 1028 k/d, containing total 29 mEq K/d  - f/u phos level  - note that propofol contains phos  - if off of propofol, suggest Peptamen AF at 30 ml/h --> 55 gm of whey protein, 864 total kcal and 31 total mEq K/d with lower carb content

## 2022-06-17 NOTE — PROGRESS NOTE ADULT - SUBJECTIVE AND OBJECTIVE BOX
Over Night Events: events noted, still intubated, ventilated, on propofol, fentanyl, levophed 0.02    PHYSICAL EXAM    ICU Vital Signs Last 24 Hrs  T(C): 37 (17 Jun 2022 04:00), Max: 37.3 (16 Jun 2022 20:00)  T(F): 98.6 (17 Jun 2022 04:00), Max: 99.2 (16 Jun 2022 20:00)  HR: 72 (17 Jun 2022 06:30) (54 - 126)  BP: 90/42 (17 Jun 2022 06:30) (75/36 - 186/72)  BP(mean): 59 (17 Jun 2022 06:30) (44 - 140)  RR: 16 (17 Jun 2022 06:30) (12 - 22)  SpO2: 100% (17 Jun 2022 06:30) (96% - 100%)      General: ill looking  Lungs: dec bs both bases  CardiovascularSEM 2/6  Abdomen: Soft, Positive BS  Extremities: No clubbing   Skin: see Nursing documentation  Neurological: Non focal       06-16-22 @ 07:01  -  06-17-22 @ 07:00  --------------------------------------------------------  IN:    Dexmedetomidine: 3 mL    Enteral Tube Flush: 200 mL    FentaNYL: 179.7 mL    IV PiggyBack: 100 mL    Norepinephrine: 11.3 mL    Propofol: 259 mL    Vital HN: 690 mL  Total IN: 1443 mL    OUT:    Chest Tube (mL): 30 mL    Indwelling Catheter - Urethral (mL): 930 mL  Total OUT: 960 mL    Total NET: 483 mL          LABS:                          11.1   23.85 )-----------( 379      ( 17 Jun 2022 04:55 )             33.3                                               06-17    141  |  101  |  47<H>  ----------------------------<  160<H>  4.6   |  33<H>  |  0.7    Ca    8.9      17 Jun 2022 04:55  Mg     2.2     06-17    TPro  4.4<L>  /  Alb  2.6<L>  /  TBili  0.2  /  DBili  x   /  AST  17  /  ALT  44<H>  /  AlkPhos  42  06-17                                                                                           LIVER FUNCTIONS - ( 17 Jun 2022 04:55 )  Alb: 2.6 g/dL / Pro: 4.4 g/dL / ALK PHOS: 42 U/L / ALT: 44 U/L / AST: 17 U/L / GGT: x                                                                                               Mode: AC/ CMV (Assist Control/ Continuous Mandatory Ventilation)  RR (machine): 16  TV (machine): 350  FiO2: 30  PEEP: 5  ITime: 1  MAP: 10  PIP: 30                                      ABG - ( 17 Jun 2022 03:04 )  pH, Arterial: 7.44  pH, Blood: x     /  pCO2: 52    /  pO2: 113   / HCO3: 35    / Base Excess: 9.4   /  SaO2: 100.0       38/16        MEDICATIONS  (STANDING):  cefepime   IVPB 1000 milliGRAM(s) IV Intermittent every 12 hours  chlorhexidine 0.12% Liquid 15 milliLiter(s) Oral Mucosa two times a day  chlorhexidine 4% Liquid 1 Application(s) Topical <User Schedule>  clonazePAM  Tablet 1 milliGRAM(s) Oral two times a day  dexMEDEtomidine Infusion 0.05 MICROgram(s)/kG/Hr (0.63 mL/Hr) IV Continuous <Continuous>  dextrose 5%. 1000 milliLiter(s) (100 mL/Hr) IV Continuous <Continuous>  dextrose 5%. 1000 milliLiter(s) (50 mL/Hr) IV Continuous <Continuous>  dextrose 50% Injectable 25 Gram(s) IV Push once  dextrose 50% Injectable 12.5 Gram(s) IV Push once  dextrose 50% Injectable 25 Gram(s) IV Push once  enoxaparin Injectable 40 milliGRAM(s) SubCutaneous every 24 hours  fentaNYL   Infusion. 0.5 MICROgram(s)/kG/Hr (3.5 mL/Hr) IV Continuous <Continuous>  glucagon  Injectable 1 milliGRAM(s) IntraMuscular once  insulin glargine Injectable (LANTUS) 6 Unit(s) SubCutaneous at bedtime  insulin lispro (ADMELOG) corrective regimen sliding scale   SubCutaneous every 6 hours  methylPREDNISolone sodium succinate Injectable 60 milliGRAM(s) IV Push every 12 hours  norepinephrine Infusion 0.05 MICROgram(s)/kG/Min (4.69 mL/Hr) IV Continuous <Continuous>  pantoprazole   Suspension 40 milliGRAM(s) Oral before breakfast  polyethylene glycol 3350 17 Gram(s) Oral daily  propofol Infusion 5.004 MICROgram(s)/kG/Min (1.21 mL/Hr) IV Continuous <Continuous>  QUEtiapine 25 milliGRAM(s) Oral two times a day  senna 2 Tablet(s) Oral at bedtime    MEDICATIONS  (PRN):  acetaminophen     Tablet .. 650 milliGRAM(s) Oral every 6 hours PRN Temp greater or equal to 38C (100.4F), Mild Pain (1 - 3)  dextrose Oral Gel 15 Gram(s) Oral once PRN Blood Glucose LESS THAN 70 milliGRAM(s)/deciliter  midazolam Injectable 2 milliGRAM(s) IV Push every 2 hours PRN severe agitation, inadequate sedation with propofol, precedex, fentanyl         Over Night Events: events noted, still intubated, ventilated, remain critically ill,  on propofol, fentanyl, levophed 0.02    PHYSICAL EXAM    ICU Vital Signs Last 24 Hrs  T(C): 37 (17 Jun 2022 04:00), Max: 37.3 (16 Jun 2022 20:00)  T(F): 98.6 (17 Jun 2022 04:00), Max: 99.2 (16 Jun 2022 20:00)  HR: 72 (17 Jun 2022 06:30) (54 - 126)  BP: 90/42 (17 Jun 2022 06:30) (75/36 - 186/72)  BP(mean): 59 (17 Jun 2022 06:30) (44 - 140)  RR: 16 (17 Jun 2022 06:30) (12 - 22)  SpO2: 100% (17 Jun 2022 06:30) (96% - 100%)      General: ill looking  Lungs: dec bs both bases  CardiovascularSEM 2/6  Abdomen: Soft, Positive BS  Extremities: No clubbing   Skin: see Nursing documentation  Neurological: Non focal       06-16-22 @ 07:01  -  06-17-22 @ 07:00  --------------------------------------------------------  IN:    Dexmedetomidine: 3 mL    Enteral Tube Flush: 200 mL    FentaNYL: 179.7 mL    IV PiggyBack: 100 mL    Norepinephrine: 11.3 mL    Propofol: 259 mL    Vital HN: 690 mL  Total IN: 1443 mL    OUT:    Chest Tube (mL): 30 mL    Indwelling Catheter - Urethral (mL): 930 mL  Total OUT: 960 mL    Total NET: 483 mL          LABS:                          11.1   23.85 )-----------( 379      ( 17 Jun 2022 04:55 )             33.3                                               06-17    141  |  101  |  47<H>  ----------------------------<  160<H>  4.6   |  33<H>  |  0.7    Ca    8.9      17 Jun 2022 04:55  Mg     2.2     06-17    TPro  4.4<L>  /  Alb  2.6<L>  /  TBili  0.2  /  DBili  x   /  AST  17  /  ALT  44<H>  /  AlkPhos  42  06-17                                                                                           LIVER FUNCTIONS - ( 17 Jun 2022 04:55 )  Alb: 2.6 g/dL / Pro: 4.4 g/dL / ALK PHOS: 42 U/L / ALT: 44 U/L / AST: 17 U/L / GGT: x                                                                                               Mode: AC/ CMV (Assist Control/ Continuous Mandatory Ventilation)  RR (machine): 16  TV (machine): 350  FiO2: 30  PEEP: 5  ITime: 1  MAP: 10  PIP: 30                                      ABG - ( 17 Jun 2022 03:04 )  pH, Arterial: 7.44  pH, Blood: x     /  pCO2: 52    /  pO2: 113   / HCO3: 35    / Base Excess: 9.4   /  SaO2: 100.0       38/16        MEDICATIONS  (STANDING):  cefepime   IVPB 1000 milliGRAM(s) IV Intermittent every 12 hours  chlorhexidine 0.12% Liquid 15 milliLiter(s) Oral Mucosa two times a day  chlorhexidine 4% Liquid 1 Application(s) Topical <User Schedule>  clonazePAM  Tablet 1 milliGRAM(s) Oral two times a day  dexMEDEtomidine Infusion 0.05 MICROgram(s)/kG/Hr (0.63 mL/Hr) IV Continuous <Continuous>  dextrose 5%. 1000 milliLiter(s) (100 mL/Hr) IV Continuous <Continuous>  dextrose 5%. 1000 milliLiter(s) (50 mL/Hr) IV Continuous <Continuous>  dextrose 50% Injectable 25 Gram(s) IV Push once  dextrose 50% Injectable 12.5 Gram(s) IV Push once  dextrose 50% Injectable 25 Gram(s) IV Push once  enoxaparin Injectable 40 milliGRAM(s) SubCutaneous every 24 hours  fentaNYL   Infusion. 0.5 MICROgram(s)/kG/Hr (3.5 mL/Hr) IV Continuous <Continuous>  glucagon  Injectable 1 milliGRAM(s) IntraMuscular once  insulin glargine Injectable (LANTUS) 6 Unit(s) SubCutaneous at bedtime  insulin lispro (ADMELOG) corrective regimen sliding scale   SubCutaneous every 6 hours  methylPREDNISolone sodium succinate Injectable 60 milliGRAM(s) IV Push every 12 hours  norepinephrine Infusion 0.05 MICROgram(s)/kG/Min (4.69 mL/Hr) IV Continuous <Continuous>  pantoprazole   Suspension 40 milliGRAM(s) Oral before breakfast  polyethylene glycol 3350 17 Gram(s) Oral daily  propofol Infusion 5.004 MICROgram(s)/kG/Min (1.21 mL/Hr) IV Continuous <Continuous>  QUEtiapine 25 milliGRAM(s) Oral two times a day  senna 2 Tablet(s) Oral at bedtime    MEDICATIONS  (PRN):  acetaminophen     Tablet .. 650 milliGRAM(s) Oral every 6 hours PRN Temp greater or equal to 38C (100.4F), Mild Pain (1 - 3)  dextrose Oral Gel 15 Gram(s) Oral once PRN Blood Glucose LESS THAN 70 milliGRAM(s)/deciliter  midazolam Injectable 2 milliGRAM(s) IV Push every 2 hours PRN severe agitation, inadequate sedation with propofol, precedex, fentanyl

## 2022-06-17 NOTE — PROGRESS NOTE ADULT - ASSESSMENT
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 propofol, seroquel 50 Q 12,  klonopin, qt    HEENT: Oral care    PULMONARY:  HOB @ 45 degrees.  no vent changes, chest tube to suction.  Daily CXR.  Nebs Q4 and PRN.  Solumedrol 60 mg q 12. monitor P/P,  CT suction, SBT    CARDIOVASCULAR: avoid overload    GI: GI prophylaxis.  OG Feeding.  Bowel regimen     RENAL:  Follow up lytes.  Correct as needed.    INFECTIOUS DISEASE: finish abx    HEMATOLOGICAL:  DVT prophylaxis.  LE doppler -    ENDOCRINE:  Follow up FS.  Insulin protocol if needed.    MUSCULOSKELETAL:  Bed rest     MICU monitoring     GOC    HUBER FOR RETENTION  R IJ 6/16

## 2022-06-17 NOTE — ADVANCED PRACTICE NURSE CONSULT - ASSESSMENT
87 y/o female with PMHx of COPD not on home O2, pHTN, GERD, Lucio's esophagus, COVID vaccinated presented to the ED (6/10)  for acute onset of sob. As per nurse aide, pt was asymptomatic sitting on the couch, when she became acutely sob. pt was brought to ED and was tachypneic and answering questions with one word answers, and struggling to breath.  In the ED, pt was placed on bipap immediately and symptoms improved and pt looks more comfortable. ABG initially shows pCO2 115 and was placed on bipap and soon after repeat pco2 was 100. per family and patient, patient is full code and decision was made to intubate the patient. Labs are overall unremarkable except wbc 19K. She received duoneb, Mg and solumedrol 125 mg x1 in ED.  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     Received patient in ICU,  laying supine in bed, turned to right side (pillow under left side, BUEs & underneath BLEs elevating heels), HOB elevated 30 degrees. Pt  intubated, sedated, pressor infusing, chest tube to left chest in place. Primary RN Mirna at bedside made aware of purpose of WOCN visit, agreeable to consult.     Bilateral heels intact.   With assistance from RN,  turned patient completely to right side for skin assessment.  Foam Allevyn dressing to coccyx in place, dressing removed.     Type of wound:  Stage 3 pressure injury; evolved from Deep tissue pressure injury/DTPI; POA  Location: coccyx- b/l buttock  Wound measurements: multiple areas in close proximity to each other & measured together at 6cm x 8cm x 0.2cm     Tunneling/Undermining: none  Wound bed: pink & yellow subcutaneous tissue, light purple tissue to periphery, peeling & macerated epidermal layer  Wound edges: attached, flush, irregular  Periwound: blanchable erythema   Wound exudate: minimal amount of serosanguinous drainage present on removed dressing   Wound odor: none  Induration, erythema, crepitus, warmth: none   Wound pain: no s/s pain present on assessment     Patient bedbound, immobile, + noland, incontinence of small amount of loose stool during WOCN visit. Receiving TF via NGT.

## 2022-06-17 NOTE — PROGRESS NOTE ADULT - SUBJECTIVE AND OBJECTIVE BOX
GENERAL SURGERY PROGRESS NOTE    Patient: KIMBERLI LEIGH , 88y ([birth year])Female   MRN: [id number]  Location: [facility]  A  Visit: 06-10-22 Inpatient  Date: 06-17-22 @ 08:52      Procedure/Dx/Injuries: acute respiratory failure and right PTX    Events of past 24 hours: right pigtail to suction with - airleak, patient remains intubated on propofol, fentanyl and levo .02    PAST MEDICAL & SURGICAL HISTORY:  COPD (chronic obstructive pulmonary disease)      GERD (gastroesophageal reflux disease)      Glaucoma      Anxiety      Pancreatitis      History of tonsillectomy      H/O colonoscopy  5 years ago          Vitals:   T(F): 98.6 (06-17-22 @ 04:00), Max: 99.2 (06-16-22 @ 20:00)  HR: 72 (06-17-22 @ 06:30)  BP: 90/42 (06-17-22 @ 06:30)  RR: 16 (06-17-22 @ 06:30)  SpO2: 100% (06-17-22 @ 06:30)  Mode: AC/ CMV (Assist Control/ Continuous Mandatory Ventilation), RR (machine): 16, TV (machine): 350, FiO2: 30, PEEP: 5, ITime: 1, MAP: 10, PIP: 30    Diet, NPO with Tube Feed:   Tube Feeding Modality: Orogastric  Vital High Protein  Total Volume for 24 Hours (mL): 720  Continuous  Until Goal Tube Feed Rate (mL per Hour): 30  Tube Feed Duration (in Hours): 24  Tube Feed Start Time: 18:30      Fluids:     I & O's:    06-16-22 @ 07:01  -  06-17-22 @ 07:00  --------------------------------------------------------  IN:    Dexmedetomidine: 3 mL    Enteral Tube Flush: 200 mL    FentaNYL: 179.7 mL    IV PiggyBack: 100 mL    Norepinephrine: 11.3 mL    Propofol: 259 mL    Vital HN: 690 mL  Total IN: 1443 mL    OUT:    Chest Tube (mL): 30 mL    Indwelling Catheter - Urethral (mL): 930 mL  Total OUT: 960 mL    Total NET: 483 mL      PHYSICAL EXAM:  General: NAD, intubated  Cardiac: RRR  Respiratory: vented, right pigtail to suction, -airleak  Abdomen: Soft, non-distended      MEDICATIONS  (STANDING):  cefepime   IVPB 1000 milliGRAM(s) IV Intermittent every 12 hours  chlorhexidine 0.12% Liquid 15 milliLiter(s) Oral Mucosa two times a day  chlorhexidine 4% Liquid 1 Application(s) Topical <User Schedule>  clonazePAM  Tablet 1 milliGRAM(s) Oral two times a day  dexMEDEtomidine Infusion 0.05 MICROgram(s)/kG/Hr (0.63 mL/Hr) IV Continuous <Continuous>  dextrose 5%. 1000 milliLiter(s) (100 mL/Hr) IV Continuous <Continuous>  dextrose 5%. 1000 milliLiter(s) (50 mL/Hr) IV Continuous <Continuous>  dextrose 50% Injectable 25 Gram(s) IV Push once  dextrose 50% Injectable 12.5 Gram(s) IV Push once  dextrose 50% Injectable 25 Gram(s) IV Push once  enoxaparin Injectable 40 milliGRAM(s) SubCutaneous every 24 hours  fentaNYL   Infusion. 0.5 MICROgram(s)/kG/Hr (3.5 mL/Hr) IV Continuous <Continuous>  glucagon  Injectable 1 milliGRAM(s) IntraMuscular once  insulin glargine Injectable (LANTUS) 6 Unit(s) SubCutaneous at bedtime  insulin lispro (ADMELOG) corrective regimen sliding scale   SubCutaneous every 6 hours  methylPREDNISolone sodium succinate Injectable 60 milliGRAM(s) IV Push every 12 hours  norepinephrine Infusion 0.05 MICROgram(s)/kG/Min (4.69 mL/Hr) IV Continuous <Continuous>  pantoprazole   Suspension 40 milliGRAM(s) Oral before breakfast  polyethylene glycol 3350 17 Gram(s) Oral daily  propofol Infusion 5.004 MICROgram(s)/kG/Min (1.21 mL/Hr) IV Continuous <Continuous>  QUEtiapine 50 milliGRAM(s) Oral two times a day  senna 2 Tablet(s) Oral at bedtime    MEDICATIONS  (PRN):  acetaminophen     Tablet .. 650 milliGRAM(s) Oral every 6 hours PRN Temp greater or equal to 38C (100.4F), Mild Pain (1 - 3)  dextrose Oral Gel 15 Gram(s) Oral once PRN Blood Glucose LESS THAN 70 milliGRAM(s)/deciliter  midazolam Injectable 2 milliGRAM(s) IV Push every 2 hours PRN severe agitation, inadequate sedation with propofol, precedex, fentanyl      DVT PROPHYLAXIS: enoxaparin Injectable 40 milliGRAM(s) SubCutaneous every 24 hours    GI PROPHYLAXIS: pantoprazole   Suspension 40 milliGRAM(s) Oral before breakfast    ANTICOAGULATION:   ANTIBIOTICS:  cefepime   IVPB 1000 milliGRAM(s)            LAB/STUDIES:  Labs:  CAPILLARY BLOOD GLUCOSE      POCT Blood Glucose.: 144 mg/dL (17 Jun 2022 05:36)  POCT Blood Glucose.: 204 mg/dL (16 Jun 2022 22:34)  POCT Blood Glucose.: 183 mg/dL (16 Jun 2022 21:16)  POCT Blood Glucose.: 135 mg/dL (16 Jun 2022 17:05)  POCT Blood Glucose.: 182 mg/dL (16 Jun 2022 10:53)                          11.1   23.85 )-----------( 379      ( 17 Jun 2022 04:55 )             33.3         06-17    141  |  101  |  47<H>  ----------------------------<  160<H>  4.6   |  33<H>  |  0.7      Calcium, Total Serum: 8.9 mg/dL (06-17-22 @ 04:55)      LFTs:             4.4  | 0.2  | 17       ------------------[42      ( 17 Jun 2022 04:55 )  2.6  | x    | 44          Lipase:x      Amylase:x         Blood Gas Arterial, Lactate: 1.40 mmol/L (06-17-22 @ 03:04)  Blood Gas Arterial, Lactate: 1.30 mmol/L (06-16-22 @ 03:13)  Blood Gas Arterial, Lactate: 1.50 mmol/L (06-15-22 @ 02:33)    ABG - ( 17 Jun 2022 03:04 )  pH: 7.44  /  pCO2: 52    /  pO2: 113   / HCO3: 35    / Base Excess: 9.4   /  SaO2: 100.0           ABG - ( 16 Jun 2022 03:13 )  pH: 7.49  /  pCO2: 46    /  pO2: 113   / HCO3: 35    / Base Excess: 10.3  /  SaO2: 99.5            ABG - ( 15 Crow 2022 02:33 )  pH: 7.40  /  pCO2: 51    /  pO2: 90    / HCO3: 32    / Base Excess: 5.7   /  SaO2: 98.7              Coags:        Serum Pro-Brain Natriuretic Peptide: 254 pg/mL (06-09-22 @ 22:55)                  IMAGING:    < from: Xray Chest 1 View- PORTABLE-Urgent (Xray Chest 1 View- PORTABLE-Urgent .) (06.16.22 @ 15:26) >    Lung parenchyma/Pleura: Low lung volumes with small bibasilar   opacities/atelectasis. No definite pneumothorax.

## 2022-06-17 NOTE — PROGRESS NOTE ADULT - SUBJECTIVE AND OBJECTIVE BOX
KIMBERLI LEIGH MRN#: [id number]  CODE STATUS: FULL CODE     SUBJECTIVE   Chief complaint: respiratory distress    Currently admitted to medicine with the primary diagnosis of ACUTE HYPERCAPNIC RESPIRATORY FAILURE; COPD EXACERBATION.....      Today is hospital day 7d,   INTERVAL HPI/OVERNIGHT EVENTS:     Failed SAT. Agitated o/n requiring versed IVP.       PAST MEDICAL & SURGICAL HISTORY  COPD (chronic obstructive pulmonary disease)    GERD (gastroesophageal reflux disease)    Glaucoma    Anxiety    Pancreatitis    History of tonsillectomy    H/O colonoscopy  5 years ago      ALLERGIES:  No Known Allergies    HOME MEDICATIONS:  Home Medications:  albuterol 90 mcg/inh inhalation aerosol: 2 puff(s) inhaled every 6 hours, As needed, Shortness of Breath and/or Wheezing (27 Dec 2021 10:38)  Ambien 10 mg oral tablet: 1 tab(s) orally once a day (at bedtime) (19 Dec 2021 01:41)  escitalopram 10 mg oral tablet: 1 tab(s) orally once a day (27 Dec 2021 10:38)  famotidine 20 mg oral tablet: 1 tab(s) orally once a day (27 Dec 2021 10:38)  fluticasone 0.5 mg/2 mL inhalation suspension:  (19 Dec 2021 01:41)  levalbuterol 45 mcg/inh inhalation aerosol: 2 puff(s) inhaled every 4 hours (19 Dec 2021 01:41)  Wixela Inhub 250 mcg-50 mcg inhalation powder: 1 puff(s) inhaled 2 times a day (19 Dec 2021 01:41)    MEDICATIONS:  STANDING MEDICATIONS  MEDICATIONS  (STANDING):  cefepime   IVPB 1000 milliGRAM(s) IV Intermittent every 12 hours  chlorhexidine 0.12% Liquid 15 milliLiter(s) Oral Mucosa two times a day  chlorhexidine 4% Liquid 1 Application(s) Topical <User Schedule>  clonazePAM  Tablet 1 milliGRAM(s) Oral two times a day  dexMEDEtomidine Infusion 0.05 MICROgram(s)/kG/Hr (0.63 mL/Hr) IV Continuous <Continuous>  dextrose 5%. 1000 milliLiter(s) (100 mL/Hr) IV Continuous <Continuous>  dextrose 5%. 1000 milliLiter(s) (50 mL/Hr) IV Continuous <Continuous>  dextrose 50% Injectable 25 Gram(s) IV Push once  dextrose 50% Injectable 12.5 Gram(s) IV Push once  dextrose 50% Injectable 25 Gram(s) IV Push once  enoxaparin Injectable 40 milliGRAM(s) SubCutaneous every 24 hours  fentaNYL   Infusion. 0.5 MICROgram(s)/kG/Hr (3.5 mL/Hr) IV Continuous <Continuous>  glucagon  Injectable 1 milliGRAM(s) IntraMuscular once  insulin glargine Injectable (LANTUS) 6 Unit(s) SubCutaneous at bedtime  insulin lispro (ADMELOG) corrective regimen sliding scale   SubCutaneous every 6 hours  methylPREDNISolone sodium succinate Injectable 60 milliGRAM(s) IV Push every 12 hours  norepinephrine Infusion 0.05 MICROgram(s)/kG/Min (4.69 mL/Hr) IV Continuous <Continuous>  pantoprazole   Suspension 40 milliGRAM(s) Oral before breakfast  polyethylene glycol 3350 17 Gram(s) Oral daily  propofol Infusion 5.004 MICROgram(s)/kG/Min (1.21 mL/Hr) IV Continuous <Continuous>  QUEtiapine 50 milliGRAM(s) Oral two times a day  senna 2 Tablet(s) Oral at bedtime    PRN MEDICATIONS  MEDICATIONS  (PRN):  acetaminophen     Tablet .. 650 milliGRAM(s) Oral every 6 hours PRN Temp greater or equal to 38C (100.4F), Mild Pain (1 - 3)  dextrose Oral Gel 15 Gram(s) Oral once PRN Blood Glucose LESS THAN 70 milliGRAM(s)/deciliter  midazolam Injectable 2 milliGRAM(s) IV Push every 2 hours PRN severe agitation, inadequate sedation with propofol, precedex, fentanyl      OBJECTIVE    VITAL SIGNS  T(F): 98.6 (06-17 @ 04:00), Max: 99.2 (06-16 @ 20:00)  HR: 72 (06-17 @ 06:30) (56 - 126)  BP: 90/42 (06-17 @ 06:30) (75/36 - 186/72)  RR: 16 (06-17 @ 06:30) (12 - 22)  SpO2: 100% (06-17 @ 06:30) (96% - 100%)    LABS:                        11.1   23.85 )-----------( 379      ( 17 Jun 2022 04:55 )             33.3     06-17    141  |  101  |  47<H>  ----------------------------<  160<H>  4.6   |  33<H>  |  0.7    Ca    8.9      17 Jun 2022 04:55  Mg     2.2     06-17    TPro  4.4<L>  /  Alb  2.6<L>  /  TBili  0.2  /  DBili  x   /  AST  17  /  ALT  44<H>  /  AlkPhos  42  06-17      ABG - ( 17 Jun 2022 03:04 )  pH, Arterial: 7.44  pH, Blood: x     /  pCO2: 52    /  pO2: 113   / HCO3: 35    / Base Excess: 9.4   /  SaO2: 100.0         RADIOLOGY:   Reviewed  CXR ETT 4.3cm above carine, OGT in place, RIJ TLC in place, R pigtail in place, no PTX.      PHYSICAL EXAM:  General: Laying on the hospital bed. Ill-appearing  HEENT: Atraumatic. Normocephalic.  Cardiac: Normal S1, S2. RRR. No murmurs, rubs, or gallops. QTc 420ms  Pulm: Decreased basilar bs b/l. intubated on MV  GI: Soft, nontender, nondistended  Vasc: Normal capillary refill. 2+ pulses.  MSK: Moving all 4 extremities.  Neuro: sedated. follows commands off sedation.  Skin: sacral DTI. skin tears to b/l UE and LE    Vent Settings: Mode: AC/ CMV (Assist Control/ Continuous Mandatory Ventilation)  RR (machine): 16  TV (machine): 350  FiO2: 30  PEEP: 5  PIP: 30   KIMBERLI LEIGH MRN#: [id number]  CODE STATUS: FULL CODE     SUBJECTIVE   Chief complaint: respiratory distress    Currently admitted to medicine with the primary diagnosis of ACUTE HYPERCAPNIC RESPIRATORY FAILURE; COPD EXACERBATION.....      Today is hospital day 7d,   INTERVAL HPI/OVERNIGHT EVENTS:     Failed SAT. Agitated o/n requiring versed IVP.       PAST MEDICAL & SURGICAL HISTORY  COPD (chronic obstructive pulmonary disease)    GERD (gastroesophageal reflux disease)    Glaucoma    Anxiety    Pancreatitis    History of tonsillectomy    H/O colonoscopy  5 years ago      ALLERGIES:  No Known Allergies    HOME MEDICATIONS:  Home Medications:  albuterol 90 mcg/inh inhalation aerosol: 2 puff(s) inhaled every 6 hours, As needed, Shortness of Breath and/or Wheezing (27 Dec 2021 10:38)  Ambien 10 mg oral tablet: 1 tab(s) orally once a day (at bedtime) (19 Dec 2021 01:41)  escitalopram 10 mg oral tablet: 1 tab(s) orally once a day (27 Dec 2021 10:38)  famotidine 20 mg oral tablet: 1 tab(s) orally once a day (27 Dec 2021 10:38)  fluticasone 0.5 mg/2 mL inhalation suspension:  (19 Dec 2021 01:41)  levalbuterol 45 mcg/inh inhalation aerosol: 2 puff(s) inhaled every 4 hours (19 Dec 2021 01:41)  Wixela Inhub 250 mcg-50 mcg inhalation powder: 1 puff(s) inhaled 2 times a day (19 Dec 2021 01:41)    MEDICATIONS:  STANDING MEDICATIONS  MEDICATIONS  (STANDING):  cefepime   IVPB 1000 milliGRAM(s) IV Intermittent every 12 hours  chlorhexidine 0.12% Liquid 15 milliLiter(s) Oral Mucosa two times a day  chlorhexidine 4% Liquid 1 Application(s) Topical <User Schedule>  clonazePAM  Tablet 1 milliGRAM(s) Oral two times a day  dexMEDEtomidine Infusion 0.05 MICROgram(s)/kG/Hr (0.63 mL/Hr) IV Continuous <Continuous>  dextrose 5%. 1000 milliLiter(s) (100 mL/Hr) IV Continuous <Continuous>  dextrose 5%. 1000 milliLiter(s) (50 mL/Hr) IV Continuous <Continuous>  dextrose 50% Injectable 25 Gram(s) IV Push once  dextrose 50% Injectable 12.5 Gram(s) IV Push once  dextrose 50% Injectable 25 Gram(s) IV Push once  enoxaparin Injectable 40 milliGRAM(s) SubCutaneous every 24 hours  fentaNYL   Infusion. 0.5 MICROgram(s)/kG/Hr (3.5 mL/Hr) IV Continuous <Continuous>  glucagon  Injectable 1 milliGRAM(s) IntraMuscular once  insulin glargine Injectable (LANTUS) 6 Unit(s) SubCutaneous at bedtime  insulin lispro (ADMELOG) corrective regimen sliding scale   SubCutaneous every 6 hours  methylPREDNISolone sodium succinate Injectable 60 milliGRAM(s) IV Push every 12 hours  norepinephrine Infusion 0.05 MICROgram(s)/kG/Min (4.69 mL/Hr) IV Continuous <Continuous>  pantoprazole   Suspension 40 milliGRAM(s) Oral before breakfast  polyethylene glycol 3350 17 Gram(s) Oral daily  propofol Infusion 5.004 MICROgram(s)/kG/Min (1.21 mL/Hr) IV Continuous <Continuous>  QUEtiapine 50 milliGRAM(s) Oral two times a day  senna 2 Tablet(s) Oral at bedtime    PRN MEDICATIONS  MEDICATIONS  (PRN):  acetaminophen     Tablet .. 650 milliGRAM(s) Oral every 6 hours PRN Temp greater or equal to 38C (100.4F), Mild Pain (1 - 3)  dextrose Oral Gel 15 Gram(s) Oral once PRN Blood Glucose LESS THAN 70 milliGRAM(s)/deciliter  midazolam Injectable 2 milliGRAM(s) IV Push every 2 hours PRN severe agitation, inadequate sedation with propofol, precedex, fentanyl      OBJECTIVE    VITAL SIGNS  T(F): 98.6 (06-17 @ 04:00), Max: 99.2 (06-16 @ 20:00)  HR: 72 (06-17 @ 06:30) (56 - 126)  BP: 90/42 (06-17 @ 06:30) (75/36 - 186/72)  RR: 16 (06-17 @ 06:30) (12 - 22)  SpO2: 100% (06-17 @ 06:30) (96% - 100%)    LABS:                        11.1   23.85 )-----------( 379      ( 17 Jun 2022 04:55 )             33.3     06-17    141  |  101  |  47<H>  ----------------------------<  160<H>  4.6   |  33<H>  |  0.7    Ca    8.9      17 Jun 2022 04:55  Mg     2.2     06-17    TPro  4.4<L>  /  Alb  2.6<L>  /  TBili  0.2  /  DBili  x   /  AST  17  /  ALT  44<H>  /  AlkPhos  42  06-17      ABG - ( 17 Jun 2022 03:04 )  pH, Arterial: 7.44  pH, Blood: x     /  pCO2: 52    /  pO2: 113   / HCO3: 35    / Base Excess: 9.4   /  SaO2: 100.0         RADIOLOGY:   Reviewed  CXR ETT 4.3cm above carine, OGT in place, RIJ TLC in place, R pigtail in place, small R apical PTX unchanged      PHYSICAL EXAM:  General: Laying on the hospital bed. Ill-appearing  HEENT: Atraumatic. Normocephalic.  Cardiac: Normal S1, S2. RRR. No murmurs, rubs, or gallops. QTc 420ms  Pulm: Decreased basilar bs b/l. intubated on MV  GI: Soft, nontender, nondistended  Vasc: Normal capillary refill. 2+ pulses.  MSK: Moving all 4 extremities.  Neuro: sedated. follows commands off sedation.  Skin: sacral DTI. skin tears to b/l UE and LE    Vent Settings: Mode: AC/ CMV (Assist Control/ Continuous Mandatory Ventilation)  RR (machine): 16  TV (machine): 350  FiO2: 30  PEEP: 5  PIP: 30

## 2022-06-17 NOTE — PROGRESS NOTE ADULT - ASSESSMENT
ASSESSMENT:  88y F w/ right pneumothorax s/p pigtail placement. Chest tube out of chest cavity on 3pm cxr 6/14 and replaced on 6/15    PLAN:  - Care as per ICU  - CT to WS with f/u CXR  - Monitor for airleak  - Monitor vitals   - F/u SBT trial  - Daily CXR    [id number]

## 2022-06-18 NOTE — PROGRESS NOTE ADULT - ATTENDING COMMENTS
The patient is an 88-year-old lady    She was seen and examined on morning rounds as described above    We have been following her right pigtail catheter which was placed for a pneumothorax    Chest x-ray reviewed personally  Tube in good position  Lung expanded  No significant air leak clinically    Continues to be on the ventilator    Suggest  Keep chest tube to waterseal

## 2022-06-18 NOTE — PROGRESS NOTE ADULT - SUBJECTIVE AND OBJECTIVE BOX
Over Night Events:        ROS:  See HPI    PHYSICAL EXAM    ICU Vital Signs Last 24 Hrs  T(C): 37.1 (18 Jun 2022 08:00), Max: 37.1 (18 Jun 2022 08:00)  T(F): 98.8 (18 Jun 2022 08:00), Max: 98.8 (18 Jun 2022 08:00)  HR: 60 (18 Jun 2022 08:30) (54 - 120)  BP: 113/52 (18 Jun 2022 08:30) (83/43 - 171/90)  BP(mean): 76 (18 Jun 2022 08:30) (55 - 112)  ABP: --  ABP(mean): --  RR: 16 (18 Jun 2022 08:30) (15 - 19)  SpO2: 100% (18 Jun 2022 08:30) (93% - 100%)      General:  HEENT:                Lymph Nodes: NO cervical LN   Lungs: Bilateral BS  Cardiovascular: Regular   Abdomen: Soft, Positive BS  Extremities: No clubbing   Skin:   Neurological:       LABS:                          11.3   22.19 )-----------( 408      ( 18 Jun 2022 04:57 )             33.5                                               06-18    143  |  101  |  45<H>  ----------------------------<  164<H>  4.6   |  34<H>  |  0.7    Ca    8.6      18 Jun 2022 04:57  Phos  3.6     06-18  Mg     2.1     06-18    TPro  4.3<L>  /  Alb  2.6<L>  /  TBili  0.3  /  DBili  x   /  AST  18  /  ALT  53<H>  /  AlkPhos  44  06-18                                                                                           LIVER FUNCTIONS - ( 18 Jun 2022 04:57 )  Alb: 2.6 g/dL / Pro: 4.3 g/dL / ALK PHOS: 44 U/L / ALT: 53 U/L / AST: 18 U/L / GGT: x                                                                                               Mode: AC/ CMV (Assist Control/ Continuous Mandatory Ventilation)  RR (machine): 16  TV (machine): 350  FiO2: 30  PEEP: 5  ITime: 1  MAP: 10  PIP: 29                                      ABG - ( 18 Jun 2022 04:07 )  pH, Arterial: 7.47  pH, Blood: x     /  pCO2: 52    /  pO2: 91    / HCO3: 38    / Base Excess: 12.1  /  SaO2: 100.0               MEDICATIONS  (STANDING):  cefepime   IVPB 1000 milliGRAM(s) IV Intermittent every 12 hours  chlorhexidine 0.12% Liquid 15 milliLiter(s) Oral Mucosa two times a day  chlorhexidine 4% Liquid 1 Application(s) Topical <User Schedule>  clonazePAM  Tablet 1 milliGRAM(s) Oral two times a day  dexMEDEtomidine Infusion 0.05 MICROgram(s)/kG/Hr (0.63 mL/Hr) IV Continuous <Continuous>  dextrose 5%. 1000 milliLiter(s) (100 mL/Hr) IV Continuous <Continuous>  dextrose 5%. 1000 milliLiter(s) (50 mL/Hr) IV Continuous <Continuous>  dextrose 50% Injectable 25 Gram(s) IV Push once  dextrose 50% Injectable 12.5 Gram(s) IV Push once  dextrose 50% Injectable 25 Gram(s) IV Push once  enoxaparin Injectable 40 milliGRAM(s) SubCutaneous every 24 hours  escitalopram 10 milliGRAM(s) Oral daily  fentaNYL   Infusion. 0.868 MICROgram(s)/kG/Hr (3.5 mL/Hr) IV Continuous <Continuous>  glucagon  Injectable 1 milliGRAM(s) IntraMuscular once  insulin glargine Injectable (LANTUS) 6 Unit(s) SubCutaneous at bedtime  insulin lispro (ADMELOG) corrective regimen sliding scale   SubCutaneous every 6 hours  methylPREDNISolone sodium succinate Injectable 60 milliGRAM(s) IV Push every 12 hours  norepinephrine Infusion 0.05 MICROgram(s)/kG/Min (4.69 mL/Hr) IV Continuous <Continuous>  pantoprazole   Suspension 40 milliGRAM(s) Oral before breakfast  polyethylene glycol 3350 17 Gram(s) Oral daily  propofol Infusion 5.004 MICROgram(s)/kG/Min (1.21 mL/Hr) IV Continuous <Continuous>  QUEtiapine 50 milliGRAM(s) Oral two times a day  senna 2 Tablet(s) Oral at bedtime    MEDICATIONS  (PRN):  acetaminophen     Tablet .. 650 milliGRAM(s) Oral every 6 hours PRN Temp greater or equal to 38C (100.4F), Mild Pain (1 - 3)  dextrose Oral Gel 15 Gram(s) Oral once PRN Blood Glucose LESS THAN 70 milliGRAM(s)/deciliter  midazolam Injectable 2 milliGRAM(s) IV Push every 2 hours PRN severe agitation, inadequate sedation with propofol, precedex, fentanyl      Xrays:    bilateral opacities

## 2022-06-18 NOTE — PROGRESS NOTE ADULT - SUBJECTIVE AND OBJECTIVE BOX
GENERAL SURGERY PROGRESS NOTE    Patient: KIMBERLI LEIGH , 88y ([birth year])Female   MRN: [id number]  Location: [facility]  A  Visit: 06-10-22 Inpatient  Date: 06-18-22 @ 03:02    Hospital Day #: 9    Events of past 24 hours:  R Pigtail to waterseal    PAST MEDICAL & SURGICAL HISTORY:  COPD (chronic obstructive pulmonary disease)    GERD (gastroesophageal reflux disease)      Glaucoma      Anxiety      Pancreatitis      History of tonsillectomy      H/O colonoscopy  5 years ago        Vitals:   T(F): 98.5 (06-18-22 @ 00:00), Max: 98.6 (06-17-22 @ 04:00)  HR: 68 (06-18-22 @ 00:30)  BP: 136/58 (06-18-22 @ 00:15)  RR: 16 (06-18-22 @ 00:30)  SpO2: 99% (06-18-22 @ 00:30)  Mode: AC/ CMV (Assist Control/ Continuous Mandatory Ventilation), RR (machine): 16, TV (machine): 350, FiO2: 30, PEEP: 5, ITime: 1, MAP: 11, PIP: 33    Diet, NPO with Tube Feed:   Tube Feeding Modality: Orogastric  Vital High Protein  Total Volume for 24 Hours (mL): 720  Continuous  Until Goal Tube Feed Rate (mL per Hour): 30  Tube Feed Duration (in Hours): 24  Tube Feed Start Time: 18:30      Fluids:     I & O's:    06-16-22 @ 07:01  -  06-17-22 @ 07:00  --------------------------------------------------------  IN:    Dexmedetomidine: 3 mL    Enteral Tube Flush: 200 mL    FentaNYL: 187.8 mL    IV PiggyBack: 100 mL    Norepinephrine: 12.8 mL    Propofol: 268.6 mL    Vital HN: 720 mL  Total IN: 1492.2 mL    OUT:    Chest Tube (mL): 43 mL    Indwelling Catheter - Urethral (mL): 980 mL  Total OUT: 1023 mL    Total NET: 469.2 mL    PHYSICAL EXAM:  General: NAD, intubated  Cardiac: RRR  Respiratory: vented, right pigtail to waterseal  Abdomen: Soft, non-distended    MEDICATIONS  (STANDING):  cefepime   IVPB 1000 milliGRAM(s) IV Intermittent every 12 hours  chlorhexidine 0.12% Liquid 15 milliLiter(s) Oral Mucosa two times a day  chlorhexidine 4% Liquid 1 Application(s) Topical <User Schedule>  clonazePAM  Tablet 1 milliGRAM(s) Oral two times a day  dexMEDEtomidine Infusion 0.05 MICROgram(s)/kG/Hr (0.63 mL/Hr) IV Continuous <Continuous>  dextrose 5%. 1000 milliLiter(s) (50 mL/Hr) IV Continuous <Continuous>  dextrose 5%. 1000 milliLiter(s) (100 mL/Hr) IV Continuous <Continuous>  dextrose 50% Injectable 25 Gram(s) IV Push once  dextrose 50% Injectable 12.5 Gram(s) IV Push once  dextrose 50% Injectable 25 Gram(s) IV Push once  enoxaparin Injectable 40 milliGRAM(s) SubCutaneous every 24 hours  escitalopram 10 milliGRAM(s) Oral daily  glucagon  Injectable 1 milliGRAM(s) IntraMuscular once  insulin glargine Injectable (LANTUS) 6 Unit(s) SubCutaneous at bedtime  insulin lispro (ADMELOG) corrective regimen sliding scale   SubCutaneous every 6 hours  methylPREDNISolone sodium succinate Injectable 60 milliGRAM(s) IV Push every 12 hours  norepinephrine Infusion 0.05 MICROgram(s)/kG/Min (4.69 mL/Hr) IV Continuous <Continuous>  pantoprazole   Suspension 40 milliGRAM(s) Oral before breakfast  polyethylene glycol 3350 17 Gram(s) Oral daily  propofol Infusion 5.004 MICROgram(s)/kG/Min (1.21 mL/Hr) IV Continuous <Continuous>  QUEtiapine 50 milliGRAM(s) Oral two times a day  senna 2 Tablet(s) Oral at bedtime    MEDICATIONS  (PRN):  acetaminophen     Tablet .. 650 milliGRAM(s) Oral every 6 hours PRN Temp greater or equal to 38C (100.4F), Mild Pain (1 - 3)  dextrose Oral Gel 15 Gram(s) Oral once PRN Blood Glucose LESS THAN 70 milliGRAM(s)/deciliter  midazolam Injectable 2 milliGRAM(s) IV Push every 2 hours PRN severe agitation, inadequate sedation with propofol, precedex, fentanyl      DVT PROPHYLAXIS: enoxaparin Injectable 40 milliGRAM(s) SubCutaneous every 24 hours    GI PROPHYLAXIS: pantoprazole   Suspension 40 milliGRAM(s) Oral before breakfast    ANTICOAGULATION:   ANTIBIOTICS:  cefepime   IVPB 1000 milliGRAM(s)            LAB/STUDIES:  Labs:  CAPILLARY BLOOD GLUCOSE      POCT Blood Glucose.: 216 mg/dL (18 Jun 2022 00:11)  POCT Blood Glucose.: 235 mg/dL (17 Jun 2022 21:10)  POCT Blood Glucose.: 156 mg/dL (17 Jun 2022 17:31)  POCT Blood Glucose.: 176 mg/dL (17 Jun 2022 11:51)  POCT Blood Glucose.: 144 mg/dL (17 Jun 2022 05:36)                          11.1   23.85 )-----------( 379      ( 17 Jun 2022 04:55 )             33.3         06-17    141  |  101  |  47<H>  ----------------------------<  160<H>  4.6   |  33<H>  |  0.7      Calcium, Total Serum: 8.9 mg/dL (06-17-22 @ 04:55)      LFTs:             4.4  | 0.2  | 17       ------------------[42      ( 17 Jun 2022 04:55 )  2.6  | x    | 44          Lipase:x      Amylase:x         Blood Gas Arterial, Lactate: 1.40 mmol/L (06-17-22 @ 03:04)  Blood Gas Arterial, Lactate: 1.30 mmol/L (06-16-22 @ 03:13)    ABG - ( 17 Jun 2022 03:04 )  pH: 7.44  /  pCO2: 52    /  pO2: 113   / HCO3: 35    / Base Excess: 9.4   /  SaO2: 100.0           ABG - ( 16 Jun 2022 03:13 )  pH: 7.49  /  pCO2: 46    /  pO2: 113   / HCO3: 35    / Base Excess: 10.3  /  SaO2: 99.5            ABG - ( 15 Crow 2022 02:33 )  pH: 7.40  /  pCO2: 51    /  pO2: 90    / HCO3: 32    / Base Excess: 5.7   /  SaO2: 98.7              Coags:        Serum Pro-Brain Natriuretic Peptide: 254 pg/mL (06-09-22 @ 22:55)     GENERAL SURGERY PROGRESS NOTE    Patient: KIMBERLI LEIGH , 88y ([birth year])Female   MRN: [id number]  Location: [facility] ([place]  A  Visit: 06-10-22 Inpatient  Date: 06-18-22 @ 03:02    Hospital Day #: 10    Events of past 24 hours:  R Pigtail to waterseal    PAST MEDICAL & SURGICAL HISTORY:  COPD (chronic obstructive pulmonary disease)    GERD (gastroesophageal reflux disease)      Glaucoma      Anxiety      Pancreatitis      History of tonsillectomy      H/O colonoscopy  5 years ago        Vitals:   T(F): 98.5 (06-18-22 @ 00:00), Max: 98.6 (06-17-22 @ 04:00)  HR: 68 (06-18-22 @ 00:30)  BP: 136/58 (06-18-22 @ 00:15)  RR: 16 (06-18-22 @ 00:30)  SpO2: 99% (06-18-22 @ 00:30)  Mode: AC/ CMV (Assist Control/ Continuous Mandatory Ventilation), RR (machine): 16, TV (machine): 350, FiO2: 30, PEEP: 5, ITime: 1, MAP: 11, PIP: 33    Diet, NPO with Tube Feed:   Tube Feeding Modality: Orogastric  Vital High Protein  Total Volume for 24 Hours (mL): 720  Continuous  Until Goal Tube Feed Rate (mL per Hour): 30  Tube Feed Duration (in Hours): 24  Tube Feed Start Time: 18:30      Fluids:     I & O's:    06-16-22 @ 07:01  -  06-17-22 @ 07:00  --------------------------------------------------------  IN:    Dexmedetomidine: 3 mL    Enteral Tube Flush: 200 mL    FentaNYL: 187.8 mL    IV PiggyBack: 100 mL    Norepinephrine: 12.8 mL    Propofol: 268.6 mL    Vital HN: 720 mL  Total IN: 1492.2 mL    OUT:    Chest Tube (mL): 43 mL    Indwelling Catheter - Urethral (mL): 980 mL  Total OUT: 1023 mL    Total NET: 469.2 mL    PHYSICAL EXAM:  General: NAD, intubated  Cardiac: RRR  Respiratory: vented, right pigtail to waterseal  Abdomen: Soft, non-distended    MEDICATIONS  (STANDING):  cefepime   IVPB 1000 milliGRAM(s) IV Intermittent every 12 hours  chlorhexidine 0.12% Liquid 15 milliLiter(s) Oral Mucosa two times a day  chlorhexidine 4% Liquid 1 Application(s) Topical <User Schedule>  clonazePAM  Tablet 1 milliGRAM(s) Oral two times a day  dexMEDEtomidine Infusion 0.05 MICROgram(s)/kG/Hr (0.63 mL/Hr) IV Continuous <Continuous>  dextrose 5%. 1000 milliLiter(s) (50 mL/Hr) IV Continuous <Continuous>  dextrose 5%. 1000 milliLiter(s) (100 mL/Hr) IV Continuous <Continuous>  dextrose 50% Injectable 25 Gram(s) IV Push once  dextrose 50% Injectable 12.5 Gram(s) IV Push once  dextrose 50% Injectable 25 Gram(s) IV Push once  enoxaparin Injectable 40 milliGRAM(s) SubCutaneous every 24 hours  escitalopram 10 milliGRAM(s) Oral daily  glucagon  Injectable 1 milliGRAM(s) IntraMuscular once  insulin glargine Injectable (LANTUS) 6 Unit(s) SubCutaneous at bedtime  insulin lispro (ADMELOG) corrective regimen sliding scale   SubCutaneous every 6 hours  methylPREDNISolone sodium succinate Injectable 60 milliGRAM(s) IV Push every 12 hours  norepinephrine Infusion 0.05 MICROgram(s)/kG/Min (4.69 mL/Hr) IV Continuous <Continuous>  pantoprazole   Suspension 40 milliGRAM(s) Oral before breakfast  polyethylene glycol 3350 17 Gram(s) Oral daily  propofol Infusion 5.004 MICROgram(s)/kG/Min (1.21 mL/Hr) IV Continuous <Continuous>  QUEtiapine 50 milliGRAM(s) Oral two times a day  senna 2 Tablet(s) Oral at bedtime    MEDICATIONS  (PRN):  acetaminophen     Tablet .. 650 milliGRAM(s) Oral every 6 hours PRN Temp greater or equal to 38C (100.4F), Mild Pain (1 - 3)  dextrose Oral Gel 15 Gram(s) Oral once PRN Blood Glucose LESS THAN 70 milliGRAM(s)/deciliter  midazolam Injectable 2 milliGRAM(s) IV Push every 2 hours PRN severe agitation, inadequate sedation with propofol, precedex, fentanyl      DVT PROPHYLAXIS: enoxaparin Injectable 40 milliGRAM(s) SubCutaneous every 24 hours    GI PROPHYLAXIS: pantoprazole   Suspension 40 milliGRAM(s) Oral before breakfast    ANTICOAGULATION:   ANTIBIOTICS:  cefepime   IVPB 1000 milliGRAM(s)            LAB/STUDIES:  Labs:  CAPILLARY BLOOD GLUCOSE      POCT Blood Glucose.: 216 mg/dL (18 Jun 2022 00:11)  POCT Blood Glucose.: 235 mg/dL (17 Jun 2022 21:10)  POCT Blood Glucose.: 156 mg/dL (17 Jun 2022 17:31)  POCT Blood Glucose.: 176 mg/dL (17 Jun 2022 11:51)  POCT Blood Glucose.: 144 mg/dL (17 Jun 2022 05:36)                          11.1   23.85 )-----------( 379      ( 17 Jun 2022 04:55 )             33.3         06-17    141  |  101  |  47<H>  ----------------------------<  160<H>  4.6   |  33<H>  |  0.7      Calcium, Total Serum: 8.9 mg/dL (06-17-22 @ 04:55)      LFTs:             4.4  | 0.2  | 17       ------------------[42      ( 17 Jun 2022 04:55 )  2.6  | x    | 44          Lipase:x      Amylase:x         Blood Gas Arterial, Lactate: 1.40 mmol/L (06-17-22 @ 03:04)  Blood Gas Arterial, Lactate: 1.30 mmol/L (06-16-22 @ 03:13)    ABG - ( 17 Jun 2022 03:04 )  pH: 7.44  /  pCO2: 52    /  pO2: 113   / HCO3: 35    / Base Excess: 9.4   /  SaO2: 100.0           ABG - ( 16 Jun 2022 03:13 )  pH: 7.49  /  pCO2: 46    /  pO2: 113   / HCO3: 35    / Base Excess: 10.3  /  SaO2: 99.5            ABG - ( 15 Crow 2022 02:33 )  pH: 7.40  /  pCO2: 51    /  pO2: 90    / HCO3: 32    / Base Excess: 5.7   /  SaO2: 98.7              Coags:        Serum Pro-Brain Natriuretic Peptide: 254 pg/mL (06-09-22 @ 22:55)

## 2022-06-18 NOTE — PROGRESS NOTE ADULT - SUBJECTIVE AND OBJECTIVE BOX
Hospital Day:  8d    Subjective: Patient is a 88y old  Female who presents with a chief complaint of respiratory distress (17 Jun 2022 20:02)      Pt seen and evaluated at bedside.     Past Medical Hx:   COPD (chronic obstructive pulmonary disease)    GERD (gastroesophageal reflux disease)    Glaucoma    Anxiety    Pancreatitis      Past Sx:  History of tonsillectomy    H/O colonoscopy      Allergies:  No Known Allergies    Current Meds:   Standng Meds:  cefepime   IVPB 1000 milliGRAM(s) IV Intermittent every 12 hours  chlorhexidine 0.12% Liquid 15 milliLiter(s) Oral Mucosa two times a day  chlorhexidine 4% Liquid 1 Application(s) Topical <User Schedule>  clonazePAM  Tablet 1 milliGRAM(s) Oral two times a day  dexMEDEtomidine Infusion 0.05 MICROgram(s)/kG/Hr (0.63 mL/Hr) IV Continuous <Continuous>  dextrose 5%. 1000 milliLiter(s) (50 mL/Hr) IV Continuous <Continuous>  dextrose 5%. 1000 milliLiter(s) (100 mL/Hr) IV Continuous <Continuous>  dextrose 50% Injectable 25 Gram(s) IV Push once  dextrose 50% Injectable 12.5 Gram(s) IV Push once  dextrose 50% Injectable 25 Gram(s) IV Push once  enoxaparin Injectable 40 milliGRAM(s) SubCutaneous every 24 hours  escitalopram 10 milliGRAM(s) Oral daily  glucagon  Injectable 1 milliGRAM(s) IntraMuscular once  insulin glargine Injectable (LANTUS) 6 Unit(s) SubCutaneous at bedtime  insulin lispro (ADMELOG) corrective regimen sliding scale   SubCutaneous every 6 hours  methylPREDNISolone sodium succinate Injectable 60 milliGRAM(s) IV Push every 12 hours  norepinephrine Infusion 0.05 MICROgram(s)/kG/Min (4.69 mL/Hr) IV Continuous <Continuous>  pantoprazole   Suspension 40 milliGRAM(s) Oral before breakfast  polyethylene glycol 3350 17 Gram(s) Oral daily  propofol Infusion 5.004 MICROgram(s)/kG/Min (1.21 mL/Hr) IV Continuous <Continuous>  QUEtiapine 50 milliGRAM(s) Oral two times a day  senna 2 Tablet(s) Oral at bedtime    PRN Meds:  acetaminophen     Tablet .. 650 milliGRAM(s) Oral every 6 hours PRN Temp greater or equal to 38C (100.4F), Mild Pain (1 - 3)  dextrose Oral Gel 15 Gram(s) Oral once PRN Blood Glucose LESS THAN 70 milliGRAM(s)/deciliter  midazolam Injectable 2 milliGRAM(s) IV Push every 2 hours PRN severe agitation, inadequate sedation with propofol, precedex, fentanyl      Vital Signs:   T(F): 98.5 (06-18-22 @ 00:00), Max: 98.6 (06-17-22 @ 04:00)  HR: 68 (06-18-22 @ 00:30) (56 - 120)  BP: 136/58 (06-18-22 @ 00:15) (83/43 - 194/80)  RR: 16 (06-18-22 @ 00:30) (10 - 22)  SpO2: 99% (06-18-22 @ 00:30) (93% - 100%)    Physical Exam:   General: Laying on the hospital bed. Ill-appearing  HEENT: Atraumatic. Normocephalic.  Cardiac: Normal S1, S2. RRR. No murmurs, rubs, or gallops. QTc 420ms  Pulm: Decreased basilar bs b/l. intubated on MV  GI: Soft, nontender, nondistended  Vasc: Normal capillary refill. 2+ pulses.  MSK: Moving all 4 extremities.  Neuro: sedated. follows commands off sedation.  Skin: sacral DTI. skin tears to b/l UE and LE    FLUID BALANCE    06-15-22 @ 07:01  -  06-16-22 @ 07:00  --------------------------------------------------------  IN: 1168.5 mL / OUT: 800 mL / NET: 368.5 mL    06-16-22 @ 07:01  -  06-17-22 @ 07:00  --------------------------------------------------------  IN: 1492.2 mL / OUT: 1023 mL / NET: 469.2 mL    06-17-22 @ 07:01  -  06-18-22 @ 02:46  --------------------------------------------------------  IN: 1229.7 mL / OUT: 915 mL / NET: 314.7 mL        Labs:                         11.1   23.85 )-----------( 379      ( 17 Jun 2022 04:55 )             33.3       17 Jun 2022 04:55    141    |  101    |  47     ----------------------------<  160    4.6     |  33     |  0.7      Ca    8.9        17 Jun 2022 04:55  Mg     2.2       17 Jun 2022 04:55    TPro  4.4    /  Alb  2.6    /  TBili  0.2    /  DBili  x      /  AST  17     /  ALT  44     /  AlkPhos  42     17 Jun 2022 04:55            Serum Pro-Brain Natriuretic Peptide: 254 pg/mL (06-09-22 @ 22:55)                  Procalcitonin, Serum: 0.13 ng/mL (06-15-22 @ 10:43)            Radiology:

## 2022-06-18 NOTE — PROGRESS NOTE ADULT - ASSESSMENT
Assessment/Plan:  acute /chronic resp failure  -continue mechanical ventilation  hypotension  -vasopressor support as needed to maintain SBP > 100mmHg  R-PTX  -pigtail catheter placed with improvement, CT Surgery f/u

## 2022-06-18 NOTE — PROGRESS NOTE ADULT - ASSESSMENT
88y F w/ right pneumothorax s/p pigtail placement. Chest tube out of chest cavity on 3pm cxr 6/14 and replaced on 6/15    PLAN:  - Care as per ICU  - CT to WS with f/u CXR  - Monitor for airleak  - Monitor vitals   - F/u SBT trial  - Daily CXR    [id number]

## 2022-06-18 NOTE — PROGRESS NOTE ADULT - ASSESSMENT
ASSESSMENT & PLAN  87 y/o female with PMHx of COPD on home O2, pHTN, GERD, Lucio's esophagus, COVID vaccinated presented to the ED for acute onset of sob.    # Acute on chronic hypercapnic respiratory failure s/p intubation  # COPD exacerbation  # HO COPD, O2 dependent  # R secondary spontaneous pneumothorax s/p pigtail  - presented to ED with SOB, found to be hypercapnic to 115, intubated  - post intubation CXR showed R PTX s/p R pigtail catheter, dislodged, replaced 6/15  - CTSX following  - cont solumedrol @ 60q12  - Nebs q4 and PRN  - vent changes per pulm: none today  - sedation with propofol - attempt SAT/SBT on propofol  - increase seroquel to 50mg BID, monitor qtc  - on levo @ 0.02, titrate as tolerated  - Procal 0.52->0.13, D-dimer 579  - BCX x2 NGTD, UCX neg, RVP neg, urine strep/legionella neg  - leukocytosis, afebrile  - s/p azithromycin x5d, cont cefepime 1g q12h x7d (finish 6/19)  - daily ABG, CXR, SAT/SBT    # H/o HTN  # 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

## 2022-06-19 NOTE — PROGRESS NOTE ADULT - SUBJECTIVE AND OBJECTIVE BOX
GENERAL SURGERY PROGRESS NOTE    Patient: KIMBERLI LEIGH , 88y ([birth year])Female   MRN: [id number]  Location: [facility]  A  Visit: 06-10-22 Inpatient  Date: 06-19-22 @ 04:20    Hospital Day #: 11    Procedure/Dx/Injuries: Acute respiratory failure and right pneumothorax     Events of past 24 hours: NO acute events overnight. Patient still remains intubated and on vent. RIght pigtail to waterseal with + air leak.     PAST MEDICAL & SURGICAL HISTORY:  COPD (chronic obstructive pulmonary disease)      GERD (gastroesophageal reflux disease)      Glaucoma      Anxiety      Pancreatitis      History of tonsillectomy      H/O colonoscopy  5 years ago          Vitals:   T(F): 99.2 (06-19-22 @ 00:00), Max: 99.2 (06-19-22 @ 00:00)  HR: 94 (06-19-22 @ 03:45)  BP: 103/57 (06-19-22 @ 03:30)  RR: 29 (06-19-22 @ 03:45)  SpO2: 100% (06-19-22 @ 03:45)  Mode: AC/ CMV (Assist Control/ Continuous Mandatory Ventilation), RR (machine): 16, TV (machine): 350, FiO2: 30, PEEP: 5, ITime: 1, MAP: 11, PIP: 35    Diet, NPO with Tube Feed:   Tube Feeding Modality: Orogastric  Vital High Protein  Total Volume for 24 Hours (mL): 720  Continuous  Until Goal Tube Feed Rate (mL per Hour): 30  Tube Feed Duration (in Hours): 24  Tube Feed Start Time: 18:30      Fluids:     I & O's:    06-17-22 @ 07:01  -  06-18-22 @ 07:00  --------------------------------------------------------  IN:    Enteral Tube Flush: 400 mL    FentaNYL: 40.5 mL    FentaNYL: 153.4 mL    IV PiggyBack: 100 mL    Norepinephrine: 59.8 mL    Propofol: 202.5 mL    Vital HN: 720 mL  Total IN: 1676.2 mL    OUT:    Chest Tube (mL): 0 mL    Dexmedetomidine: 0 mL    Indwelling Catheter - Urethral (mL): 1295 mL  Total OUT: 1295 mL    Total NET: 381.2 mL        PHYSICAL EXAM:  General: NAD, intubated  Cardiac: RRR  Respiratory: vented, right pigtail to waterseal, + airleak  Abdomen: Soft, non-distended    MEDICATIONS  (STANDING):  cefepime   IVPB 1000 milliGRAM(s) IV Intermittent every 12 hours  chlorhexidine 0.12% Liquid 15 milliLiter(s) Oral Mucosa two times a day  chlorhexidine 4% Liquid 1 Application(s) Topical <User Schedule>  clonazePAM  Tablet 1 milliGRAM(s) Oral two times a day  dexMEDEtomidine Infusion 0.05 MICROgram(s)/kG/Hr (0.63 mL/Hr) IV Continuous <Continuous>  dextrose 5%. 1000 milliLiter(s) (50 mL/Hr) IV Continuous <Continuous>  dextrose 5%. 1000 milliLiter(s) (100 mL/Hr) IV Continuous <Continuous>  dextrose 50% Injectable 25 Gram(s) IV Push once  dextrose 50% Injectable 12.5 Gram(s) IV Push once  dextrose 50% Injectable 25 Gram(s) IV Push once  enoxaparin Injectable 40 milliGRAM(s) SubCutaneous every 24 hours  escitalopram 10 milliGRAM(s) Oral daily  fentaNYL   Infusion. 0.868 MICROgram(s)/kG/Hr (3.5 mL/Hr) IV Continuous <Continuous>  glucagon  Injectable 1 milliGRAM(s) IntraMuscular once  insulin glargine Injectable (LANTUS) 6 Unit(s) SubCutaneous at bedtime  insulin lispro (ADMELOG) corrective regimen sliding scale   SubCutaneous every 6 hours  methylPREDNISolone sodium succinate Injectable 60 milliGRAM(s) IV Push every 12 hours  norepinephrine Infusion 0.05 MICROgram(s)/kG/Min (4.69 mL/Hr) IV Continuous <Continuous>  pantoprazole   Suspension 40 milliGRAM(s) Oral before breakfast  polyethylene glycol 3350 17 Gram(s) Oral daily  propofol Infusion 5.004 MICROgram(s)/kG/Min (1.21 mL/Hr) IV Continuous <Continuous>  QUEtiapine 50 milliGRAM(s) Oral two times a day  senna 2 Tablet(s) Oral at bedtime    MEDICATIONS  (PRN):  acetaminophen     Tablet .. 650 milliGRAM(s) Oral every 6 hours PRN Temp greater or equal to 38C (100.4F), Mild Pain (1 - 3)  dextrose Oral Gel 15 Gram(s) Oral once PRN Blood Glucose LESS THAN 70 milliGRAM(s)/deciliter  midazolam Injectable 2 milliGRAM(s) IV Push every 2 hours PRN severe agitation, inadequate sedation with propofol, precedex, fentanyl      DVT PROPHYLAXIS: enoxaparin Injectable 40 milliGRAM(s) SubCutaneous every 24 hours    GI PROPHYLAXIS: pantoprazole   Suspension 40 milliGRAM(s) Oral before breakfast    ANTICOAGULATION:   ANTIBIOTICS:  cefepime   IVPB 1000 milliGRAM(s)            LAB/STUDIES:  Labs:  CAPILLARY BLOOD GLUCOSE      POCT Blood Glucose.: 219 mg/dL (19 Jun 2022 00:30)  POCT Blood Glucose.: 174 mg/dL (18 Jun 2022 21:32)  POCT Blood Glucose.: 145 mg/dL (18 Jun 2022 17:25)  POCT Blood Glucose.: 226 mg/dL (18 Jun 2022 12:52)  POCT Blood Glucose.: 158 mg/dL (18 Jun 2022 05:22)                          11.3   22.19 )-----------( 408      ( 18 Jun 2022 04:57 )             33.5         06-18    143  |  101  |  45<H>  ----------------------------<  164<H>  4.6   |  34<H>  |  0.7      Calcium, Total Serum: 8.6 mg/dL (06-18-22 @ 04:57)      LFTs:             4.3  | 0.3  | 18       ------------------[44      ( 18 Jun 2022 04:57 )  2.6  | x    | 53          Lipase:x      Amylase:x         Blood Gas Arterial, Lactate: 1.50 mmol/L (06-19-22 @ 03:23)  Blood Gas Arterial, Lactate: 1.10 mmol/L (06-18-22 @ 04:07)  Blood Gas Arterial, Lactate: 1.40 mmol/L (06-17-22 @ 03:04)    ABG - ( 19 Jun 2022 03:23 )  pH: 7.46  /  pCO2: 52    /  pO2: 102   / HCO3: 37    / Base Excess: 11.2  /  SaO2: 98.6            ABG - ( 18 Jun 2022 04:07 )  pH: 7.47  /  pCO2: 52    /  pO2: 91    / HCO3: 38    / Base Excess: 12.1  /  SaO2: 100.0           ABG - ( 17 Jun 2022 03:04 )  pH: 7.44  /  pCO2: 52    /  pO2: 113   / HCO3: 35    / Base Excess: 9.4   /  SaO2: 100.0             Coags:        Serum Pro-Brain Natriuretic Peptide: 254 pg/mL (06-09-22 @ 22:55)      IMAGING:  < from: Xray Chest 1 View- PORTABLE-Routine (Xray Chest 1 View- PORTABLE-Routine in AM.) (06.18.22 @ 06:11) >  Impression:    Right apical pneumothorax, decreased. Right opacity, unchanged. Left   opacity, decreased..    < end of copied text >                 GENERAL SURGERY PROGRESS NOTE    Patient: KIMBERLI LEIGH , 88y ([birth year])Female   MRN: [id number]  Location: [facility]  A  Visit: 06-10-22 Inpatient  Date: 06-19-22 @ 04:20    Hospital Day #: 11    Procedure/Dx/Injuries: Acute respiratory failure and right pneumothorax     Events of past 24 hours: Patient with worsening subcutaneous emphysema which is new.. Patient still remains intubated and on vent. RIght pigtail to waterseal with + air leak.     PAST MEDICAL & SURGICAL HISTORY:  COPD (chronic obstructive pulmonary disease)      GERD (gastroesophageal reflux disease)      Glaucoma      Anxiety      Pancreatitis      History of tonsillectomy      H/O colonoscopy  5 years ago          Vitals:   T(F): 99.2 (06-19-22 @ 00:00), Max: 99.2 (06-19-22 @ 00:00)  HR: 94 (06-19-22 @ 03:45)  BP: 103/57 (06-19-22 @ 03:30)  RR: 29 (06-19-22 @ 03:45)  SpO2: 100% (06-19-22 @ 03:45)  Mode: AC/ CMV (Assist Control/ Continuous Mandatory Ventilation), RR (machine): 16, TV (machine): 350, FiO2: 30, PEEP: 5, ITime: 1, MAP: 11, PIP: 35    Diet, NPO with Tube Feed:   Tube Feeding Modality: Orogastric  Vital High Protein  Total Volume for 24 Hours (mL): 720  Continuous  Until Goal Tube Feed Rate (mL per Hour): 30  Tube Feed Duration (in Hours): 24  Tube Feed Start Time: 18:30      Fluids:     I & O's:    06-17-22 @ 07:01  -  06-18-22 @ 07:00  --------------------------------------------------------  IN:    Enteral Tube Flush: 400 mL    FentaNYL: 40.5 mL    FentaNYL: 153.4 mL    IV PiggyBack: 100 mL    Norepinephrine: 59.8 mL    Propofol: 202.5 mL    Vital HN: 720 mL  Total IN: 1676.2 mL    OUT:    Chest Tube (mL): 0 mL    Dexmedetomidine: 0 mL    Indwelling Catheter - Urethral (mL): 1295 mL  Total OUT: 1295 mL    Total NET: 381.2 mL        PHYSICAL EXAM:  General: NAD, intubated  Cardiac: RRR  Respiratory: vented, right pigtail to waterseal, + airleak  Abdomen: Soft, non-distended    MEDICATIONS  (STANDING):  cefepime   IVPB 1000 milliGRAM(s) IV Intermittent every 12 hours  chlorhexidine 0.12% Liquid 15 milliLiter(s) Oral Mucosa two times a day  chlorhexidine 4% Liquid 1 Application(s) Topical <User Schedule>  clonazePAM  Tablet 1 milliGRAM(s) Oral two times a day  dexMEDEtomidine Infusion 0.05 MICROgram(s)/kG/Hr (0.63 mL/Hr) IV Continuous <Continuous>  dextrose 5%. 1000 milliLiter(s) (50 mL/Hr) IV Continuous <Continuous>  dextrose 5%. 1000 milliLiter(s) (100 mL/Hr) IV Continuous <Continuous>  dextrose 50% Injectable 25 Gram(s) IV Push once  dextrose 50% Injectable 12.5 Gram(s) IV Push once  dextrose 50% Injectable 25 Gram(s) IV Push once  enoxaparin Injectable 40 milliGRAM(s) SubCutaneous every 24 hours  escitalopram 10 milliGRAM(s) Oral daily  fentaNYL   Infusion. 0.868 MICROgram(s)/kG/Hr (3.5 mL/Hr) IV Continuous <Continuous>  glucagon  Injectable 1 milliGRAM(s) IntraMuscular once  insulin glargine Injectable (LANTUS) 6 Unit(s) SubCutaneous at bedtime  insulin lispro (ADMELOG) corrective regimen sliding scale   SubCutaneous every 6 hours  methylPREDNISolone sodium succinate Injectable 60 milliGRAM(s) IV Push every 12 hours  norepinephrine Infusion 0.05 MICROgram(s)/kG/Min (4.69 mL/Hr) IV Continuous <Continuous>  pantoprazole   Suspension 40 milliGRAM(s) Oral before breakfast  polyethylene glycol 3350 17 Gram(s) Oral daily  propofol Infusion 5.004 MICROgram(s)/kG/Min (1.21 mL/Hr) IV Continuous <Continuous>  QUEtiapine 50 milliGRAM(s) Oral two times a day  senna 2 Tablet(s) Oral at bedtime    MEDICATIONS  (PRN):  acetaminophen     Tablet .. 650 milliGRAM(s) Oral every 6 hours PRN Temp greater or equal to 38C (100.4F), Mild Pain (1 - 3)  dextrose Oral Gel 15 Gram(s) Oral once PRN Blood Glucose LESS THAN 70 milliGRAM(s)/deciliter  midazolam Injectable 2 milliGRAM(s) IV Push every 2 hours PRN severe agitation, inadequate sedation with propofol, precedex, fentanyl      DVT PROPHYLAXIS: enoxaparin Injectable 40 milliGRAM(s) SubCutaneous every 24 hours    GI PROPHYLAXIS: pantoprazole   Suspension 40 milliGRAM(s) Oral before breakfast    ANTICOAGULATION:   ANTIBIOTICS:  cefepime   IVPB 1000 milliGRAM(s)            LAB/STUDIES:  Labs:  CAPILLARY BLOOD GLUCOSE      POCT Blood Glucose.: 219 mg/dL (19 Jun 2022 00:30)  POCT Blood Glucose.: 174 mg/dL (18 Jun 2022 21:32)  POCT Blood Glucose.: 145 mg/dL (18 Jun 2022 17:25)  POCT Blood Glucose.: 226 mg/dL (18 Jun 2022 12:52)  POCT Blood Glucose.: 158 mg/dL (18 Jun 2022 05:22)                          11.3   22.19 )-----------( 408      ( 18 Jun 2022 04:57 )             33.5         06-18    143  |  101  |  45<H>  ----------------------------<  164<H>  4.6   |  34<H>  |  0.7      Calcium, Total Serum: 8.6 mg/dL (06-18-22 @ 04:57)      LFTs:             4.3  | 0.3  | 18       ------------------[44      ( 18 Jun 2022 04:57 )  2.6  | x    | 53          Lipase:x      Amylase:x         Blood Gas Arterial, Lactate: 1.50 mmol/L (06-19-22 @ 03:23)  Blood Gas Arterial, Lactate: 1.10 mmol/L (06-18-22 @ 04:07)  Blood Gas Arterial, Lactate: 1.40 mmol/L (06-17-22 @ 03:04)    ABG - ( 19 Jun 2022 03:23 )  pH: 7.46  /  pCO2: 52    /  pO2: 102   / HCO3: 37    / Base Excess: 11.2  /  SaO2: 98.6            ABG - ( 18 Jun 2022 04:07 )  pH: 7.47  /  pCO2: 52    /  pO2: 91    / HCO3: 38    / Base Excess: 12.1  /  SaO2: 100.0           ABG - ( 17 Jun 2022 03:04 )  pH: 7.44  /  pCO2: 52    /  pO2: 113   / HCO3: 35    / Base Excess: 9.4   /  SaO2: 100.0             Coags:        Serum Pro-Brain Natriuretic Peptide: 254 pg/mL (06-09-22 @ 22:55)      IMAGING:  < from: Xray Chest 1 View- PORTABLE-Routine (Xray Chest 1 View- PORTABLE-Routine in AM.) (06.18.22 @ 06:11) >  Impression:    Right apical pneumothorax, decreased. Right opacity, unchanged. Left   opacity, decreased..    < end of copied text >

## 2022-06-19 NOTE — PROGRESS NOTE ADULT - ASSESSMENT
Assessment/Plan:  acute/chronic resp failure  -continue mechanical ventilation  PNA  -IV antibiotic tx  Hypotension  -vasopressor support as needed to maintain SBP >100mmHg  R-spontaneous PTX, s/p pigtail catheter placement, now with extensive subcutaneous emphysema  -CT Surgery f/u for possible chest tube placement

## 2022-06-19 NOTE — PROCEDURE NOTE - NSINFORMCONSENT_GEN_A_CORE
Benefits, risks, and possible complications of procedure explained to patient/caregiver who verbalized understanding and gave written consent.
Daughter/Benefits, risks, and possible complications of procedure explained to patient/caregiver who verbalized understanding and gave verbal consent.
Benefits, risks, and possible complications of procedure explained to patient/caregiver who verbalized understanding and gave verbal consent.

## 2022-06-19 NOTE — PROGRESS NOTE ADULT - ASSESSMENT
ASSESSMENT:  88y F w/ right pneumothorax s/p pigtail placement. Chest tube out of chest cavity on 3pm cxr 6/14 and replaced on 6/15    PLAN:  - Care per ICU   - Right pigtail to waterseal. F/U AM CXR   - Monitor vitals   - Daily CXR.       GREEN TEAM SPECTRA: [id number]

## 2022-06-19 NOTE — PROVIDER CONTACT NOTE (OTHER) - SITUATION
pt tachy to 128 pt agitated bucking vent. trying to get out of bed. MD Jain made aware ordered versed 2 mg push for pt.
Newly developed & worsening Subcutaneous emphysema b/l chest. MD Ramirez called to bedside.

## 2022-06-19 NOTE — PROGRESS NOTE ADULT - ATTENDING COMMENTS
88-year-old lady  We have been following with her chest tube in the right chest    Earlier this morning the patient was found to have worsening subcutaneous emphysema which has been progressively getting worse.    Chest x-ray was personally reviewed  It does show worsening subcutaneous emphysema bilaterally    I had a long and detailed discussion with the patient's daughter at the bedside and suggested that we place an additional chest tube in view of this new finding.    The patient's daughter had multiple questions and these were all answered and she gave us consent for the placement of a chest tube.    I spent a significant amount of time at the patient's bedside.

## 2022-06-19 NOTE — PROCEDURAL SAFETY CHECKLIST WITH OR WITHOUT SEDATION - NSDXIMAGING_GEN_ALL_CORE
not applicable
not applicable
PAST SURGICAL HISTORY:  History of foot surgery left    History of shoulder surgery right    
done
done

## 2022-06-19 NOTE — PROGRESS NOTE ADULT - SUBJECTIVE AND OBJECTIVE BOX
Over Night Events:        ROS:  See HPI    PHYSICAL EXAM    ICU Vital Signs Last 24 Hrs  T(C): 36.9 (19 Jun 2022 08:00), Max: 37.3 (19 Jun 2022 00:00)  T(F): 98.4 (19 Jun 2022 08:00), Max: 99.2 (19 Jun 2022 00:00)  HR: 76 (19 Jun 2022 11:15) (66 - 116)  BP: 161/59 (19 Jun 2022 11:00) (74/41 - 195/94)  BP(mean): 95 (19 Jun 2022 11:00) (48 - 139)  ABP: --  ABP(mean): --  RR: 37 (19 Jun 2022 11:15) (15 - 81)  SpO2: 100% (19 Jun 2022 11:15) (89% - 100%)      General:  HEENT:                Lymph Nodes: NO cervical LN   Lungs: Bilateral BS  Cardiovascular: Regular   Abdomen: Soft, Positive BS  Extremities: No clubbing   Skin:   Neurological:       LABS:                          11.9   25.63 )-----------( 348      ( 19 Jun 2022 05:20 )             34.9                                               06-19    139  |  97<L>  |  54<H>  ----------------------------<  197<H>  5.0   |  34<H>  |  0.7    Ca    8.6      19 Jun 2022 05:20  Phos  3.6     06-18  Mg     2.2     06-19    TPro  4.7<L>  /  Alb  2.7<L>  /  TBili  0.3  /  DBili  x   /  AST  30  /  ALT  90<H>  /  AlkPhos  44  06-19                                                                                           LIVER FUNCTIONS - ( 19 Jun 2022 05:20 )  Alb: 2.7 g/dL / Pro: 4.7 g/dL / ALK PHOS: 44 U/L / ALT: 90 U/L / AST: 30 U/L / GGT: x                                                                                               Mode: AC/ CMV (Assist Control/ Continuous Mandatory Ventilation)  RR (machine): 16  TV (machine): 350  FiO2: 30  PEEP: 5  ITime: 1  MAP: 10  PIP: 30                                      ABG - ( 19 Jun 2022 03:23 )  pH, Arterial: 7.46  pH, Blood: x     /  pCO2: 52    /  pO2: 102   / HCO3: 37    / Base Excess: 11.2  /  SaO2: 98.6                MEDICATIONS  (STANDING):  cefepime   IVPB 1000 milliGRAM(s) IV Intermittent every 12 hours  chlorhexidine 0.12% Liquid 15 milliLiter(s) Oral Mucosa two times a day  chlorhexidine 4% Liquid 1 Application(s) Topical <User Schedule>  clonazePAM  Tablet 1 milliGRAM(s) Oral two times a day  dexMEDEtomidine Infusion 0.05 MICROgram(s)/kG/Hr (0.63 mL/Hr) IV Continuous <Continuous>  dextrose 5%. 1000 milliLiter(s) (50 mL/Hr) IV Continuous <Continuous>  dextrose 5%. 1000 milliLiter(s) (100 mL/Hr) IV Continuous <Continuous>  dextrose 50% Injectable 25 Gram(s) IV Push once  dextrose 50% Injectable 12.5 Gram(s) IV Push once  dextrose 50% Injectable 25 Gram(s) IV Push once  enoxaparin Injectable 40 milliGRAM(s) SubCutaneous every 24 hours  escitalopram 10 milliGRAM(s) Oral daily  fentaNYL   Infusion. 0.868 MICROgram(s)/kG/Hr (3.5 mL/Hr) IV Continuous <Continuous>  glucagon  Injectable 1 milliGRAM(s) IntraMuscular once  insulin glargine Injectable (LANTUS) 8 Unit(s) SubCutaneous at bedtime  insulin lispro (ADMELOG) corrective regimen sliding scale   SubCutaneous every 6 hours  methylPREDNISolone sodium succinate Injectable 60 milliGRAM(s) IV Push every 12 hours  norepinephrine Infusion 0.05 MICROgram(s)/kG/Min (4.69 mL/Hr) IV Continuous <Continuous>  pantoprazole   Suspension 40 milliGRAM(s) Oral before breakfast  polyethylene glycol 3350 17 Gram(s) Oral daily  propofol Infusion 5.004 MICROgram(s)/kG/Min (1.21 mL/Hr) IV Continuous <Continuous>  QUEtiapine 50 milliGRAM(s) Oral two times a day  senna 2 Tablet(s) Oral at bedtime    MEDICATIONS  (PRN):  acetaminophen     Tablet .. 650 milliGRAM(s) Oral every 6 hours PRN Temp greater or equal to 38C (100.4F), Mild Pain (1 - 3)  dextrose Oral Gel 15 Gram(s) Oral once PRN Blood Glucose LESS THAN 70 milliGRAM(s)/deciliter  midazolam Injectable 2 milliGRAM(s) IV Push every 2 hours PRN severe agitation, inadequate sedation with propofol, precedex, fentanyl      Xrays:    bilateral opacities, extensive sub-Q emphysema

## 2022-06-20 NOTE — PHARMACOTHERAPY INTERVENTION NOTE - COMMENTS
K 5.7-Lokelma 10g ng x1 ~1400, recommended x1 more dose 2200 & f/u K
Lantus 6 units sc hs--220, d/w team, increase Lantus 8 units hs
add clonazepam via ng, wean IV sedation, ordered as q24h, recommended increasing to 1mg q12h
albuterol/ Atrovent nebs q6h-pt intubated, change to albuterol & Atrovent MDI 8 puffs q6h
taper solumedrol to 40mg IV daily, start 6/21, pt received 60mg earlier

## 2022-06-20 NOTE — PROGRESS NOTE ADULT - SUBJECTIVE AND OBJECTIVE BOX
GENERAL SURGERY PROGRESS NOTE    Patient: KIMBERLI LEIGH , 88y ([birth year])Female   MRN: [id number]  Location: [facility]  A  Visit: 06-10-22 Inpatient  Date: 06-20-22 @ 02:25      Procedure/Dx/Injuries: R pneumothorax, Extensive SQ emphysema    Events of past 24 hours:  Pt evaluated yesterday with extensive SQ emphysema, R CT 20fr placed for evacuation of air. Pt tolerated procedure well, still on a vent 30/5, levo .132 increasing since AM.      PAST MEDICAL & SURGICAL HISTORY:  COPD (chronic obstructive pulmonary disease)      GERD (gastroesophageal reflux disease)      Glaucoma      Anxiety      Pancreatitis      History of tonsillectomy      H/O colonoscopy  5 years ago          Vitals:   T(F): 98.7 (06-19-22 @ 12:00), Max: 98.7 (06-19-22 @ 12:00)  HR: 100 (06-19-22 @ 20:47)  BP: 162/67 (06-19-22 @ 20:30)  RR: 33 (06-19-22 @ 20:30)  SpO2: 99% (06-19-22 @ 20:47)  Mode: AC/ CMV (Assist Control/ Continuous Mandatory Ventilation), RR (machine): 16, TV (machine): 350, FiO2: 30, PEEP: 5, ITime: 1, MAP: 10, PIP: 32    Diet, NPO with Tube Feed:   Tube Feeding Modality: Orogastric  Vital High Protein  Total Volume for 24 Hours (mL): 720  Continuous  Until Goal Tube Feed Rate (mL per Hour): 30  Tube Feed Duration (in Hours): 24  Tube Feed Start Time: 18:30      Fluids:     I & O's:    06-18-22 @ 07:01  -  06-19-22 @ 07:00  --------------------------------------------------------  IN:    Enteral Tube Flush: 100 mL    FentaNYL: 189.1 mL    IV PiggyBack: 200 mL    Norepinephrine: 70.3 mL    Propofol: 163.6 mL    Vital HN: 720 mL  Total IN: 1443 mL    OUT:    Chest Tube (mL): 10 mL    Indwelling Catheter - Urethral (mL): 935 mL  Total OUT: 945 mL    Total NET: 498 mL          PHYSICAL EXAM:  General Appearance: NAD, sedated  Heart: RRR  Lungs: R CT and R Pigtail to sxn, b/l chest with emphysema   Abdomen:  soft, non tender  MSK/Extremities:  no cyanosis, pitting edema    MEDICATIONS  (STANDING):  cefepime   IVPB 1000 milliGRAM(s) IV Intermittent every 12 hours  chlorhexidine 0.12% Liquid 15 milliLiter(s) Oral Mucosa two times a day  chlorhexidine 4% Liquid 1 Application(s) Topical <User Schedule>  clonazePAM  Tablet 1 milliGRAM(s) Oral two times a day  dexMEDEtomidine Infusion 0.05 MICROgram(s)/kG/Hr (0.63 mL/Hr) IV Continuous <Continuous>  dextrose 5%. 1000 milliLiter(s) (50 mL/Hr) IV Continuous <Continuous>  dextrose 5%. 1000 milliLiter(s) (100 mL/Hr) IV Continuous <Continuous>  dextrose 50% Injectable 25 Gram(s) IV Push once  dextrose 50% Injectable 12.5 Gram(s) IV Push once  dextrose 50% Injectable 25 Gram(s) IV Push once  enoxaparin Injectable 40 milliGRAM(s) SubCutaneous every 24 hours  escitalopram 10 milliGRAM(s) Oral daily  fentaNYL   Infusion. 0.868 MICROgram(s)/kG/Hr (3.5 mL/Hr) IV Continuous <Continuous>  glucagon  Injectable 1 milliGRAM(s) IntraMuscular once  insulin glargine Injectable (LANTUS) 8 Unit(s) SubCutaneous at bedtime  insulin lispro (ADMELOG) corrective regimen sliding scale   SubCutaneous every 6 hours  methylPREDNISolone sodium succinate Injectable 60 milliGRAM(s) IV Push every 12 hours  norepinephrine Infusion 0.05 MICROgram(s)/kG/Min (4.69 mL/Hr) IV Continuous <Continuous>  pantoprazole   Suspension 40 milliGRAM(s) Oral before breakfast  polyethylene glycol 3350 17 Gram(s) Oral daily  propofol Infusion 5.004 MICROgram(s)/kG/Min (1.21 mL/Hr) IV Continuous <Continuous>  QUEtiapine 50 milliGRAM(s) Oral two times a day  senna 2 Tablet(s) Oral at bedtime    MEDICATIONS  (PRN):  acetaminophen     Tablet .. 650 milliGRAM(s) Oral every 6 hours PRN Temp greater or equal to 38C (100.4F), Mild Pain (1 - 3)  dextrose Oral Gel 15 Gram(s) Oral once PRN Blood Glucose LESS THAN 70 milliGRAM(s)/deciliter  midazolam Injectable 2 milliGRAM(s) IV Push every 2 hours PRN severe agitation, inadequate sedation with propofol, precedex, fentanyl      DVT PROPHYLAXIS: enoxaparin Injectable 40 milliGRAM(s) SubCutaneous every 24 hours    GI PROPHYLAXIS: pantoprazole   Suspension 40 milliGRAM(s) Oral before breakfast    ANTICOAGULATION:   ANTIBIOTICS:  cefepime   IVPB 1000 milliGRAM(s)            LAB/STUDIES:  Labs:  CAPILLARY BLOOD GLUCOSE      POCT Blood Glucose.: 260 mg/dL (19 Jun 2022 23:01)  POCT Blood Glucose.: 131 mg/dL (19 Jun 2022 16:40)  POCT Blood Glucose.: 125 mg/dL (19 Jun 2022 13:00)  POCT Blood Glucose.: 181 mg/dL (19 Jun 2022 06:26)                          11.9   25.63 )-----------( 348      ( 19 Jun 2022 05:20 )             34.9         06-19    139  |  97<L>  |  54<H>  ----------------------------<  197<H>  5.0   |  34<H>  |  0.7      Calcium, Total Serum: 8.6 mg/dL (06-19-22 @ 05:20)      LFTs:             4.7  | 0.3  | 30       ------------------[44      ( 19 Jun 2022 05:20 )  2.7  | x    | 90          Lipase:x      Amylase:x         Blood Gas Arterial, Lactate: 1.50 mmol/L (06-19-22 @ 03:23)  Blood Gas Arterial, Lactate: 1.10 mmol/L (06-18-22 @ 04:07)  Blood Gas Arterial, Lactate: 1.40 mmol/L (06-17-22 @ 03:04)    ABG - ( 19 Jun 2022 03:23 )  pH: 7.46  /  pCO2: 52    /  pO2: 102   / HCO3: 37    / Base Excess: 11.2  /  SaO2: 98.6            ABG - ( 18 Jun 2022 04:07 )  pH: 7.47  /  pCO2: 52    /  pO2: 91    / HCO3: 38    / Base Excess: 12.1  /  SaO2: 100.0           ABG - ( 17 Jun 2022 03:04 )  pH: 7.44  /  pCO2: 52    /  pO2: 113   / HCO3: 35    / Base Excess: 9.4   /  SaO2: 100.0             Coags:                        IMAGING:      ACCESS/ DEVICES:  [ x] Peripheral IV  [x ] Central Venous Line	[x ] R	[ ] L	[ x] IJ	[ ] Fem	[ ] SC	Placed:   [ ] Arterial Line		[ ] R	[ ] L	[ ] Fem	[ ] Rad	[ ] Ax	Placed:   [ ] PICC:					[ ] Mediport  [x ] Urinary Catheter,  Date Placed:   [ x] Chest tube: [xx ] Right, [ ] Left  [ ] CRISTELA/Migel Drains

## 2022-06-20 NOTE — PROGRESS NOTE ADULT - ASSESSMENT
88y F w/ right pneumothorax s/p pigtail placement. Chest tube out of chest cavity on 3pm cxr 6/14 and replaced on 6/15. Pt evaluated yesterday with extensive SQ emphysema, R CT 20fr placed for evacuation of air. Pt tolerated procedure well, still on a vent 30/5, levo .132 increasing since AM.    PLAN:  - Care per ICU   - Right pigtail to sxn  - R chest tube to sxn  - Wean of vent as tolerated  - Wean of pressors as tolerated  - Daily CXR.       GREEN TEAM SPECTRA: [id number]

## 2022-06-20 NOTE — PROGRESS NOTE ADULT - ASSESSMENT
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      HEENT: Oral care    PULMONARY:  HOB @ 45 degrees.  Vent changes: decrease RR to 14   Keep chest tubes to suction.  Daily CXR.  Nebs Q6 and PRN.  Decrease Solumedrol to 40 mg QD.     CARDIOVASCULAR:  Avoid overload.  Wean Levophed     GI: GI prophylaxis.  OG Feeding.  Bowel regimen    RENAL:  Follow up lytes.  Correct as needed.  KEON Lujan.    INFECTIOUS DISEASE:  Cefepime.  MRSA nares neg.  Panculture.   Procalcitonin.  Fungitell.    HEMATOLOGICAL:  DVT prophylaxis.     ENDOCRINE:  Follow up FS.  Insulin protocol if needed.  TG level.    MUSCULOSKELETAL:  Bed rest     MICU monitoring     [facility]    LIZETTE SMITH 6/16

## 2022-06-20 NOTE — PROGRESS NOTE ADULT - SUBJECTIVE AND OBJECTIVE BOX
Patient is a 88y old  Female who presents with a chief complaint of respiratory distress (20 Jun 2022 02:25)    Over Night Events:  Remains intubated on MV.  Sedated on Fentanyl and Propofol.  On Levophed 0.07.  Febrile 100.9F    ROS:   All ROS are negative except HPI     PHYSICAL EXAM  ICU Vital Signs Last 24 Hrs  T(C): 37.9 (19 Jun 2022 20:00), Max: 37.9 (19 Jun 2022 20:00)  T(F): 100.2 (19 Jun 2022 20:00), Max: 100.2 (19 Jun 2022 20:00)  HR: 102 (20 Jun 2022 07:45) (68 - 112)  BP: 97/52 (20 Jun 2022 07:00) (80/37 - 204/85)  BP(mean): 61 (20 Jun 2022 07:00) (51 - 125)  RR: 34 (20 Jun 2022 07:00) (15 - 83)  SpO2: 100% (20 Jun 2022 07:45) (94% - 100%)      CONSTITUTIONAL:  Ill appearing  In NAD    ENT:   Airway patent,   Mouth with normal mucosa.   No thrush    EYES:   Pupils equal,   Round and reactive to light.    CARDIAC:   Normal rate,   Regular rhythm.      RESPIRATORY:   No wheezing  Bilateral BS  Normal chest expansion  Not tachypneic,  No use of accessory muscles    GASTROINTESTINAL:  Abdomen soft,   Non-tender,   No guarding,   + BS    MUSCULOSKELETAL:   Range of motion is not limited,  No clubbing, cyanosis    NEUROLOGICAL:   Sedated  Moves all extremities    SKIN:   Stage III sacral ulcer      06-19-22 @ 07:01  -  06-20-22 @ 07:00  --------------------------------------------------------  IN:    FentaNYL: 152.3 mL    IV PiggyBack: 100 mL    Norepinephrine: 104.1 mL    Propofol: 109.2 mL    Vital HN: 690 mL  Total IN: 1155.6 mL    OUT:    Chest Tube (mL): 0 mL    Chest Tube (mL): 0 mL    Dexmedetomidine: 0 mL    Indwelling Catheter - Urethral (mL): 795 mL  Total OUT: 795 mL    Total NET: 360.6 mL      LABS:               12.6   32.05 )-----------( 378      ( 20 Jun 2022 05:20 )             37.7     WBC Count: 32.05 K/uL (06-20 @ 05:20)  WBC Count: 25.63 K/uL (06-19 @ 05:20)  WBC Count: 22.19 K/uL (06-18 @ 04:57)  WBC Count: 23.85 K/uL (06-17 @ 04:55)  WBC Count: 22.98 K/uL (06-16 @ 05:50)      137  |  98  |  53<H>  ----------------------------<  228<H>  4.6   |  36<H>  |  0.7        Ca    8.5      20 Jun 2022 05:20  Mg     2.1     06-20    TPro  4.8<L>  /  Alb  2.7<L>  /  TBili  0.3  /  DBili  x   /  AST  20  /  ALT  78<H>  /  AlkPhos  46  06-20    LIVER FUNCTIONS - ( 20 Jun 2022 05:20 )  Alb: 2.7 g/dL / Pro: 4.8 g/dL / ALK PHOS: 46 U/L / ALT: 78 U/L / AST: 20 U/L / GGT: x                                              Mode: AC/ CMV (Assist Control/ Continuous Mandatory Ventilation)  RR (machine): 16  TV (machine): 350  FiO2: 30  PEEP: 5  ITime: 1  MAP: 10  PIP: 30     ABG - ( 20 Jun 2022 02:49 )  pH, Arterial: 7.49  pH, Blood: x     /  pCO2: 48    /  pO2: 105   / HCO3: 37    / Base Excess: 11.5  /  SaO2: 99.2    Lac 1.5  PL 16    MEDICATIONS  (STANDING):  cefepime   IVPB 1000 milliGRAM(s) IV Intermittent every 12 hours  chlorhexidine 0.12% Liquid 15 milliLiter(s) Oral Mucosa two times a day  chlorhexidine 4% Liquid 1 Application(s) Topical <User Schedule>  clonazePAM  Tablet 1 milliGRAM(s) Oral two times a day  dexMEDEtomidine Infusion 0.05 MICROgram(s)/kG/Hr (0.63 mL/Hr) IV Continuous <Continuous>  dextrose 5%. 1000 milliLiter(s) (50 mL/Hr) IV Continuous <Continuous>  dextrose 5%. 1000 milliLiter(s) (100 mL/Hr) IV Continuous <Continuous>  dextrose 50% Injectable 25 Gram(s) IV Push once  dextrose 50% Injectable 25 Gram(s) IV Push once  dextrose 50% Injectable 12.5 Gram(s) IV Push once  enoxaparin Injectable 40 milliGRAM(s) SubCutaneous every 24 hours  escitalopram 10 milliGRAM(s) Oral daily  fentaNYL   Infusion. 0.868 MICROgram(s)/kG/Hr (3.5 mL/Hr) IV Continuous <Continuous>  glucagon  Injectable 1 milliGRAM(s) IntraMuscular once  insulin glargine Injectable (LANTUS) 8 Unit(s) SubCutaneous at bedtime  insulin lispro (ADMELOG) corrective regimen sliding scale   SubCutaneous every 6 hours  methylPREDNISolone sodium succinate Injectable 60 milliGRAM(s) IV Push every 12 hours  norepinephrine Infusion 0.05 MICROgram(s)/kG/Min (4.69 mL/Hr) IV Continuous <Continuous>  pantoprazole   Suspension 40 milliGRAM(s) Oral before breakfast  polyethylene glycol 3350 17 Gram(s) Oral daily  propofol Infusion 5.004 MICROgram(s)/kG/Min (1.21 mL/Hr) IV Continuous <Continuous>  QUEtiapine 50 milliGRAM(s) Oral two times a day  senna 2 Tablet(s) Oral at bedtime    MEDICATIONS  (PRN):  acetaminophen     Tablet .. 650 milliGRAM(s) Oral every 6 hours PRN Temp greater or equal to 38C (100.4F), Mild Pain (1 - 3)  dextrose Oral Gel 15 Gram(s) Oral once PRN Blood Glucose LESS THAN 70 milliGRAM(s)/deciliter  midazolam Injectable 2 milliGRAM(s) IV Push every 2 hours PRN severe agitation, inadequate sedation with propofol, precedex, fentanyl      New X-rays reviewed:                                                                                  ECHO

## 2022-06-21 NOTE — PROGRESS NOTE ADULT - SUBJECTIVE AND OBJECTIVE BOX
Patient is a 88y old  Female who presents with a chief complaint of respiratory distress (2022 10:01)      INTERVAL HPI/OVERNIGHT EVENTS:   No overnight events   Afebrile, hemodynamically stable     ICU Vital Signs Last 24 Hrs  T(C): 37.4 (2022 12:00), Max: 38 (2022 15:15)  T(F): 99.4 (2022 12:00), Max: 100.4 (2022 15:15)  HR: 106 (2022 13:00) (96 - 132)  BP: 103/56 (2022 13:00) (81/46 - 177/76)  BP(mean): 68 (:00) (49 - 107)  ABP: --  ABP(mean): --  RR: 23 (2022 13:00) (0 - 30)  SpO2: 100% (2022 13:00) (83% - 100%)    I&O's Summary    2022 07:01  -  2022 07:00  --------------------------------------------------------  IN: 1024.6 mL / OUT: 500 mL / NET: 524.6 mL    2022 07:01  -  2022 13:18  --------------------------------------------------------  IN: 106.6 mL / OUT: 0 mL / NET: 106.6 mL      Mode: AC/ CMV (Assist Control/ Continuous Mandatory Ventilation)  RR (machine): 14  TV (machine): 300  FiO2: 30  PEEP: 7  ITime: 1  MAP: 12  PIP: 28      LABS:                        11.9   41.02 )-----------( 346      ( 2022 04:40 )             35.4     06-21    140  |  101  |  58<H>  ----------------------------<  143<H>  4.6   |  36<H>  |  0.7    Ca    8.8      2022 04:40  Mg     2.2     -    TPro  4.5<L>  /  Alb  2.6<L>  /  TBili  0.3  /  DBili  x   /  AST  13  /  ALT  50<H>  /  AlkPhos  51  -      Urinalysis Basic - ( 2022 09:40 )    Color: Yellow / Appearance: Slightly Turbid / S.035 / pH: x  Gluc: x / Ketone: Trace  / Bili: Negative / Urobili: <2 mg/dL   Blood: x / Protein: 100 mg/dL / Nitrite: Negative   Leuk Esterase: Moderate / RBC: 9 /HPF / WBC 10 /HPF   Sq Epi: x / Non Sq Epi: 5 /HPF / Bacteria: Few      CAPILLARY BLOOD GLUCOSE      POCT Blood Glucose.: 230 mg/dL (2022 11:51)  POCT Blood Glucose.: 142 mg/dL (2022 04:41)  POCT Blood Glucose.: 231 mg/dL (2022 22:56)  POCT Blood Glucose.: 249 mg/dL (2022 17:56)    ABG - ( 2022 03:47 )  pH, Arterial: 7.47  pH, Blood: x     /  pCO2: 52    /  pO2: 83    / HCO3: 38    / Base Excess: 12.1  /  SaO2: 98.3                RADIOLOGY & ADDITIONAL TESTS:    Consultant(s) Notes Reviewed:  [x ] YES  [ ] NO    MEDICATIONS  (STANDING):  ALBUTerol    90 MICROgram(s) HFA Inhaler 8 Puff(s) Inhalation every 6 hours  cefepime   IVPB 1000 milliGRAM(s) IV Intermittent every 12 hours  chlorhexidine 0.12% Liquid 15 milliLiter(s) Oral Mucosa two times a day  chlorhexidine 4% Liquid 1 Application(s) Topical <User Schedule>  clonazePAM  Tablet 0.5 milliGRAM(s) Oral every 12 hours  dexMEDEtomidine Infusion 0.05 MICROgram(s)/kG/Hr (0.63 mL/Hr) IV Continuous <Continuous>  dextrose 5%. 1000 milliLiter(s) (50 mL/Hr) IV Continuous <Continuous>  dextrose 5%. 1000 milliLiter(s) (100 mL/Hr) IV Continuous <Continuous>  dextrose 50% Injectable 25 Gram(s) IV Push once  dextrose 50% Injectable 12.5 Gram(s) IV Push once  dextrose 50% Injectable 25 Gram(s) IV Push once  enoxaparin Injectable 40 milliGRAM(s) SubCutaneous every 24 hours  glucagon  Injectable 1 milliGRAM(s) IntraMuscular once  insulin glargine Injectable (LANTUS) 8 Unit(s) SubCutaneous at bedtime  insulin lispro (ADMELOG) corrective regimen sliding scale   SubCutaneous every 6 hours  ipratropium 17 MICROgram(s) HFA Inhaler 8 Puff(s) Inhalation every 6 hours  midodrine 5 milliGRAM(s) Oral every 8 hours  norepinephrine Infusion 0.03 MICROgram(s)/kG/Min (4.69 mL/Hr) IV Continuous <Continuous>  pantoprazole   Suspension 40 milliGRAM(s) Oral before breakfast  polyethylene glycol 3350 17 Gram(s) Oral daily  propofol Infusion 5.004 MICROgram(s)/kG/Min (1.21 mL/Hr) IV Continuous <Continuous>  QUEtiapine 75 milliGRAM(s) Oral every 12 hours  senna 2 Tablet(s) Oral at bedtime    MEDICATIONS  (PRN):  acetaminophen     Tablet .. 650 milliGRAM(s) Oral every 6 hours PRN Temp greater or equal to 38C (100.4F), Mild Pain (1 - 3)  dextrose Oral Gel 15 Gram(s) Oral once PRN Blood Glucose LESS THAN 70 milliGRAM(s)/deciliter  midazolam Injectable 2 milliGRAM(s) IV Push every 2 hours PRN severe agitation, inadequate sedation with propofol, precedex, fentanyl      PHYSICAL EXAM:  GENERAL:   HEAD:  Atraumatic, Normocephalic  EYES: Unable to assess  NECK: Supple, No JVD, Normal thyroid, no enlarged nodes  NERVOUS SYSTEM:  Not awake  CHEST/LUNG: Some wheezing  HEART: S1S2 normal, no S3, Regular rate and rhythm; No murmurs  ABDOMEN: Soft, Nontender, Nondistended; Bowel sounds present  EXTREMITIES:  2+ Peripheral Pulses,   LYMPH: No lymphadenopathy noted  SKIN: No rashes or lesions    A/P:  89 y/o female with PMHx of COPD on home O2, pHTN, GERD, Lucio's esophagus, COVID vaccinated presented to the ED for acute onset of sob.    # Acute on chronic hypercapnic respiratory failure s/p intubation  # COPD exacerbation  # HO COPD, O2 dependent  # R secondary spontaneous pneumothorax s/p pigtail  - presented to ED with SOB, found to be hypercapnic to 115, intubated  - post intubation CXR showed R PTX s/p R pigtail catheter, dislodged, replaced 6/15  - CTSX following  - cont solumedrol @ 60q12  - Nebs q4 and PRN  - vent changes per pulm: none today  - sedation with propofol - attempt SAT/SBT on propofol  - increase seroquel to 50mg BID, monitor qtc. Qtc 406 (22)  - on levo @ 0.03, titrate as tolerated  - Procal 0.52->0.13, D-dimer 579  - BCX x2 NGTD, UCX neg, RVP neg, urine strep/legionella neg  - leukocytosis, afebrile  - s/p azithromycin x5d, cont cefepime 1g q12h x7d (finish )  - daily ABG, CXR, SAT/SBT    # H/o HTN  # H/o HFmrEF (45%)  # H/o mild pHTN  - BP: 90/42 (75/36 - 186/72)  - on amlodipine 5mg qd at home -- held for now    # GERD  # Lucio's Esophagus  - PPI    # Platelets downtrending, resolved  - has received LMWH  - HIT ab negative    # Elevated D-dimer  - d-dimer 579  - LE duplex negative    Guzman   [facility] T[facility]     PT/REHAB: n/a  ACTIVITY: Activity - Bedrest  DVT PPX: enoxaparin Injectable  GI PPX: pantoprazole  DIET: Diet, NPO with Tube Feed  CODE: DNR  Disposition: MICU  Pending: SAT/SBT        Care Discussed with Consultants/Other Providers [ x] YES  [ ] NO

## 2022-06-21 NOTE — PROGRESS NOTE ADULT - SUBJECTIVE AND OBJECTIVE BOX
Patient is a 88y old  Female who presents with a chief complaint of respiratory distress (2022 03:16)        Over Night Events:  Remains critically ill on MV.  Sedated.  On Levophed.          ROS:     All ROS are negative except HPI         PHYSICAL EXAM    ICU Vital Signs Last 24 Hrs  T(C): 37.4 (2022 08:00), Max: 38 (2022 15:15)  T(F): 99.3 (2022 08:00), Max: 100.4 (2022 15:15)  HR: 116 (2022 09:45) (90 - 132)  BP: 113/64 (2022 09:30) (85/58 - 177/76)  BP(mean): 91 (2022 09:30) (49 - 107)  ABP: --  ABP(mean): --  RR: 0 (2022 06:45) (0 - 30)  SpO2: 100% (2022 09:45) (83% - 100%)      CONSTITUTIONAL:  Ill appearing in NAD    ENT:   Airway patent,   Mouth with normal mucosa.   + ET     EYES:   Pupils equal,   Round and reactive to light.    CARDIAC:   Normal rate,   Regular rhythm.      RESPIRATORY:   No wheezing  Bilateral BS  Normal chest expansion  Not tachypneic,  No use of accessory muscles    GASTROINTESTINAL:  Abdomen soft,   Non-tender,   No guarding,   + BS    MUSCULOSKELETAL:   Range of motion is not limited,  No clubbing, cyanosis    NEUROLOGICAL:   Sedated  Agitated off sedation   No motor  deficits.    SKIN:   Skin normal color for race,   No evidence of rash.    PSYCHIATRIC:   No apparent risk to self or others.        22 @ 07:01  -  22 @ 07:00  --------------------------------------------------------  IN:    Enteral Tube Flush: 30 mL    FentaNYL: 12 mL    IV PiggyBack: 50 mL    Norepinephrine: 108.7 mL    Propofol: 103.9 mL    Vital HN: 720 mL  Total IN: 1024.6 mL    OUT:    Chest Tube (mL): 25 mL    Chest Tube (mL): 60 mL    Dexmedetomidine: 0 mL    Indwelling Catheter - Urethral (mL): 365 mL    Voided (mL): 50 mL  Total OUT: 500 mL    Total NET: 524.6 mL      22 @ 07:01  -  22 @ 10:02  --------------------------------------------------------  IN:    Norepinephrine: 4.6 mL    Propofol: 12 mL    Vital HN: 90 mL  Total IN: 106.6 mL    OUT:    Voided (mL): 0 mL  Total OUT: 0 mL    Total NET: 106.6 mL          LABS:                            11.9   41.02 )-----------( 346      ( 2022 04:40 )             35.4                                                   140  |  101  |  58<H>  ----------------------------<  143<H>  4.6   |  36<H>  |  0.7    Ca    8.8      2022 04:40  Mg     2.2         TPro  4.5<L>  /  Alb  2.6<L>  /  TBili  0.3  /  DBili  x   /  AST  13  /  ALT  50<H>  /  AlkPhos  51                                               Urinalysis Basic - ( 2022 09:40 )    Color: Yellow / Appearance: Slightly Turbid / S.035 / pH: x  Gluc: x / Ketone: Trace  / Bili: Negative / Urobili: <2 mg/dL   Blood: x / Protein: 100 mg/dL / Nitrite: Negative   Leuk Esterase: Moderate / RBC: 9 /HPF / WBC 10 /HPF   Sq Epi: x / Non Sq Epi: 5 /HPF / Bacteria: Few                                                  LIVER FUNCTIONS - ( 2022 04:40 )  Alb: 2.6 g/dL / Pro: 4.5 g/dL / ALK PHOS: 51 U/L / ALT: 50 U/L / AST: 13 U/L / GGT: x                                                  Culture - Sputum (collected 2022 20:27)  Source: Trach Asp Tracheal Aspirate  Gram Stain (2022 06:35):    Moderate polymorphonuclear leukocytes per low power field    No Squamous epithelial cells per low power field    Few Gram positive cocci in pairs seen per oil power field                                                   Mode: AC/ CMV (Assist Control/ Continuous Mandatory Ventilation)  RR (machine): 14  TV (machine): 300  FiO2: 30  PEEP: 7  ITime: 1  MAP: 12  PIP: 28                                      ABG - ( 2022 03:47 )  pH, Arterial: 7.47  pH, Blood: x     /  pCO2: 52    /  pO2: 83    / HCO3: 38    / Base Excess: 12.1  /  SaO2: 98.3                MEDICATIONS  (STANDING):  ALBUTerol    90 MICROgram(s) HFA Inhaler 8 Puff(s) Inhalation every 6 hours  cefepime   IVPB 1000 milliGRAM(s) IV Intermittent every 12 hours  chlorhexidine 0.12% Liquid 15 milliLiter(s) Oral Mucosa two times a day  chlorhexidine 4% Liquid 1 Application(s) Topical <User Schedule>  clonazePAM  Tablet 1 milliGRAM(s) Oral two times a day  dexMEDEtomidine Infusion 0.05 MICROgram(s)/kG/Hr (0.63 mL/Hr) IV Continuous <Continuous>  dextrose 5%. 1000 milliLiter(s) (50 mL/Hr) IV Continuous <Continuous>  dextrose 5%. 1000 milliLiter(s) (100 mL/Hr) IV Continuous <Continuous>  dextrose 50% Injectable 25 Gram(s) IV Push once  dextrose 50% Injectable 12.5 Gram(s) IV Push once  dextrose 50% Injectable 25 Gram(s) IV Push once  enoxaparin Injectable 40 milliGRAM(s) SubCutaneous every 24 hours  escitalopram 10 milliGRAM(s) Oral daily  glucagon  Injectable 1 milliGRAM(s) IntraMuscular once  insulin glargine Injectable (LANTUS) 8 Unit(s) SubCutaneous at bedtime  insulin lispro (ADMELOG) corrective regimen sliding scale   SubCutaneous every 6 hours  ipratropium 17 MICROgram(s) HFA Inhaler 8 Puff(s) Inhalation every 6 hours  methylPREDNISolone sodium succinate Injectable 40 milliGRAM(s) IV Push daily  norepinephrine Infusion 0.05 MICROgram(s)/kG/Min (4.69 mL/Hr) IV Continuous <Continuous>  pantoprazole   Suspension 40 milliGRAM(s) Oral before breakfast  polyethylene glycol 3350 17 Gram(s) Oral daily  propofol Infusion 5.004 MICROgram(s)/kG/Min (1.21 mL/Hr) IV Continuous <Continuous>  QUEtiapine 50 milliGRAM(s) Oral two times a day  senna 2 Tablet(s) Oral at bedtime    MEDICATIONS  (PRN):  acetaminophen     Tablet .. 650 milliGRAM(s) Oral every 6 hours PRN Temp greater or equal to 38C (100.4F), Mild Pain (1 - 3)  dextrose Oral Gel 15 Gram(s) Oral once PRN Blood Glucose LESS THAN 70 milliGRAM(s)/deciliter  midazolam Injectable 2 milliGRAM(s) IV Push every 2 hours PRN severe agitation, inadequate sedation with propofol, precedex, fentanyl      New X-rays reviewed:                                                                                  ECHO

## 2022-06-21 NOTE — PROGRESS NOTE ADULT - SUBJECTIVE AND OBJECTIVE BOX
GENERAL SURGERY PROGRESS NOTE    Patient: KIMBERLI LEIGH , 88y (34)Female   MRN: [id number]  Location: [facility]  A  Visit: 06-10-22 Inpatient  Date: 22 @ 03:17      Procedure/Dx/Injuries: subcutenous emphysema, right pneumothorax, s/p pigtail placement     Events of past 24 hours:    PAST MEDICAL & SURGICAL HISTORY:  COPD (chronic obstructive pulmonary disease)      GERD (gastroesophageal reflux disease)      Glaucoma      Anxiety      Pancreatitis      History of tonsillectomy      H/O colonoscopy  5 years ago          Vitals:   T(F): 98.7 (22 @ 16:00), Max: 100.9 (22 @ 08:00)  HR: 112 (22 @ 03:00)  BP: 99/52 (22 @ 02:45)  RR: 28 (22 @ 03:00)  SpO2: 100% (22 @ 03:00)  Mode: AC/ CMV (Assist Control/ Continuous Mandatory Ventilation), RR (machine): 14, TV (machine): 300, FiO2: 30, PEEP: 7, ITime: 1, MAP: 11, PIP: 22    Diet, NPO with Tube Feed:   Tube Feeding Modality: Orogastric  Vital High Protein  Total Volume for 24 Hours (mL): 720  Continuous  Until Goal Tube Feed Rate (mL per Hour): 30  Tube Feed Duration (in Hours): 24  Tube Feed Start Time: 18:30      Fluids:     I & O's:    22 @ 07:01  -  22 @ 07:00  --------------------------------------------------------  IN:    FentaNYL: 152.3 mL    IV PiggyBack: 100 mL    Norepinephrine: 104.1 mL    Propofol: 109.2 mL    Vital HN: 690 mL  Total IN: 1155.6 mL    OUT:    Chest Tube (mL): 0 mL    Chest Tube (mL): 0 mL    Dexmedetomidine: 0 mL    Indwelling Catheter - Urethral (mL): 795 mL  Total OUT: 795 mL    Total NET: 360.6 mL        Bowel Movement: : [] YES [] NO  Flatus: : [] YES [] NO    PHYSICAL EXAM:  General: NAD, intubated   Cardiac: RRR S1, S2,   Respiratory: CTAB, 2 chest tubes to suction on the right, -airleak. + subcutaneous emphysema   Abdomen: Soft, non-distended,    MEDICATIONS  (STANDING):  ALBUTerol    90 MICROgram(s) HFA Inhaler 8 Puff(s) Inhalation every 6 hours  cefepime   IVPB 1000 milliGRAM(s) IV Intermittent every 12 hours  chlorhexidine 0.12% Liquid 15 milliLiter(s) Oral Mucosa two times a day  chlorhexidine 4% Liquid 1 Application(s) Topical <User Schedule>  clonazePAM  Tablet 1 milliGRAM(s) Oral two times a day  dexMEDEtomidine Infusion 0.05 MICROgram(s)/kG/Hr (0.63 mL/Hr) IV Continuous <Continuous>  dextrose 5%. 1000 milliLiter(s) (100 mL/Hr) IV Continuous <Continuous>  dextrose 5%. 1000 milliLiter(s) (50 mL/Hr) IV Continuous <Continuous>  dextrose 50% Injectable 25 Gram(s) IV Push once  dextrose 50% Injectable 12.5 Gram(s) IV Push once  dextrose 50% Injectable 25 Gram(s) IV Push once  enoxaparin Injectable 40 milliGRAM(s) SubCutaneous every 24 hours  escitalopram 10 milliGRAM(s) Oral daily  glucagon  Injectable 1 milliGRAM(s) IntraMuscular once  insulin glargine Injectable (LANTUS) 8 Unit(s) SubCutaneous at bedtime  insulin lispro (ADMELOG) corrective regimen sliding scale   SubCutaneous every 6 hours  ipratropium 17 MICROgram(s) HFA Inhaler 8 Puff(s) Inhalation every 6 hours  methylPREDNISolone sodium succinate Injectable 40 milliGRAM(s) IV Push daily  morphine  - Injectable 2 milliGRAM(s) IV Push once  norepinephrine Infusion 0.05 MICROgram(s)/kG/Min (4.69 mL/Hr) IV Continuous <Continuous>  pantoprazole   Suspension 40 milliGRAM(s) Oral before breakfast  polyethylene glycol 3350 17 Gram(s) Oral daily  propofol Infusion 5.004 MICROgram(s)/kG/Min (1.21 mL/Hr) IV Continuous <Continuous>  QUEtiapine 50 milliGRAM(s) Oral two times a day  senna 2 Tablet(s) Oral at bedtime    MEDICATIONS  (PRN):  acetaminophen     Tablet .. 650 milliGRAM(s) Oral every 6 hours PRN Temp greater or equal to 38C (100.4F), Mild Pain (1 - 3)  dextrose Oral Gel 15 Gram(s) Oral once PRN Blood Glucose LESS THAN 70 milliGRAM(s)/deciliter  midazolam Injectable 2 milliGRAM(s) IV Push every 2 hours PRN severe agitation, inadequate sedation with propofol, precedex, fentanyl      DVT PROPHYLAXIS: enoxaparin Injectable 40 milliGRAM(s) SubCutaneous every 24 hours    GI PROPHYLAXIS: pantoprazole   Suspension 40 milliGRAM(s) Oral before breakfast    ANTICOAGULATION:   ANTIBIOTICS:  cefepime   IVPB 1000 milliGRAM(s)            LAB/STUDIES:  Labs:  CAPILLARY BLOOD GLUCOSE      POCT Blood Glucose.: 231 mg/dL (2022 22:56)  POCT Blood Glucose.: 249 mg/dL (2022 17:56)  POCT Blood Glucose.: 245 mg/dL (2022 12:13)  POCT Blood Glucose.: 109 mg/dL (2022 05:09)                          11.6   30.20 )-----------( 314      ( 2022 20:38 )             34.7       Auto Neutrophil %: 94.0 % (22 @ 20:38)  Auto Immature Granulocyte %: 1.4 % (22 @ 20:38)  Auto Neutrophil %: 93.3 % (22 @ 05:20)  Auto Immature Granulocyte %: 1.5 % (22 @ 05:20)        137  |  98  |  53<H>  ----------------------------<  228<H>  4.6   |  36<H>  |  0.7      Calcium, Total Serum: 8.5 mg/dL (22 @ 05:20)      LFTs:             4.8  | 0.3  | 20       ------------------[46      ( 2022 05:20 )  2.7  | x    | 78          Lipase:x      Amylase:x         Blood Gas Arterial, Lactate: 1.30 mmol/L (22 @ 16:08)  Blood Gas Arterial, Lactate: 1.50 mmol/L (22 @ 02:49)  Blood Gas Arterial, Lactate: 1.50 mmol/L (22 @ 03:23)  Blood Gas Arterial, Lactate: 1.10 mmol/L (22 @ 04:07)    ABG - ( 2022 16:08 )  pH: 7.48  /  pCO2: 52    /  pO2: 85    / HCO3: 39    / Base Excess: 13.0  /  SaO2: 97.8            ABG - ( 2022 02:49 )  pH: 7.49  /  pCO2: 48    /  pO2: 105   / HCO3: 37    / Base Excess: 11.5  /  SaO2: 99.2            ABG - ( 2022 03:23 )  pH: 7.46  /  pCO2: 52    /  pO2: 102   / HCO3: 37    / Base Excess: 11.2  /  SaO2: 98.6              Coags:            Urinalysis Basic - ( 2022 09:40 )    Color: Yellow / Appearance: Slightly Turbid / S.035 / pH: x  Gluc: x / Ketone: Trace  / Bili: Negative / Urobili: <2 mg/dL   Blood: x / Protein: 100 mg/dL / Nitrite: Negative   Leuk Esterase: Moderate / RBC: 9 /HPF / WBC 10 /HPF   Sq Epi: x / Non Sq Epi: 5 /HPF / Bacteria: Few      IMAGING:      ACCESS/ DEVICES:  [ ] Peripheral IV  [ ] Central Venous Line	[ ] R	[ ] L	[ ] IJ	[ ] Fem	[ ] SC	Placed:   [ ] Arterial Line		[ ] R	[ ] L	[ ] Fem	[ ] Rad	[ ] Ax	Placed:   [ ] PICC:					[ ] Mediport  [ ] Urinary Catheter,  Date Placed:   [ ] Chest tube: [ ] Right, [ ] Left  [ ] CRISTELA/Migel Drains

## 2022-06-21 NOTE — PROGRESS NOTE ADULT - ASSESSMENT
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      HEENT: Oral care    PULMONARY:  HOB @ 45 degrees.  Vent changes: None.  Chest tube to water seal.  repeat CXR in 4 hours.  Daily CXR.  Nebs Q6 and PRN. SDC solumedrol.  Short Prednisone taper.      CARDIOVASCULAR:  Avoid overload.  Wean Levophed     GI: GI prophylaxis.  OG Feeding.  Bowel regimen    RENAL:  Follow up lytes.  Correct as needed.  KEON Lujan.    INFECTIOUS DISEASE:  Finish ABX course.      HEMATOLOGICAL:  DVT prophylaxis.     ENDOCRINE:  Follow up FS.  Insulin protocol if needed.  TG level noted    MUSCULOSKELETAL:  Bed rest     MICU monitoring     Trach VS Liberation.      R IJ 6/16

## 2022-06-21 NOTE — PROGRESS NOTE ADULT - ASSESSMENT
ASSESSMENT:  88y F admitted with respiratory failure now with subcutaneous emphysema, right pneumothorax, s/p pigtail placement and chest tube placement     PLAN:  Chest tubes to suction  Daily AM cxr  follow up Chest tube output  monitor for airleak  monitor subcutaneous emphysema   monitor O2 sat  pain control  SCDs  DVT/GI prophylaxis    spectra [id number]

## 2022-06-22 NOTE — PROGRESS NOTE ADULT - ASSESSMENT
IMP:  - acute hypercapnic resp failure  - h/o Lucio's esophagus  - hyperglycemia, likely DM  - hyperkalemia    - f/u phos level    - patient off of propofol, suggest Peptamen AF at 35 ml/h --> 64 gm of whey protein, 1008 total kcal and 36 total mEq K/d with lower carb content

## 2022-06-22 NOTE — CONSULT NOTE ADULT - SUBJECTIVE AND OBJECTIVE BOX
HPI:  87 y/o female with PMHx of COPD not on home O2, pHTN, GERD, Lucio's esophagus, COVID vaccinated presented to the ED for acute onset of sob. As per nurse aide, pt was asymptomatic sitting on the couch, when she became acutely sob. pt was brought to ED and was tachypneic and answering questions with one word answers, and struggling to breath.    In the ED, pt was placed on bipap immediately and symptoms improved and pt looks more comfortable. ABG initially shows pCO2 115 and was placed on bipap and soon after repeat pco2 was 100. per family and patient, patient is full code and decision was made to intubate the patient. Labs are overall unremarkable except wbc 19K. She received duoneb, Mg and solumedrol 125 mg x1 in ED.     ICU Vital Signs Last 24 Hrs  T(C): 36.1 (2022 22:40), Max: 36.1 (2022 22:40)  T(F): 97 (2022 22:40), Max: 97 (2022 22:40)  HR: 100 (10 Crow 2022 01:10) (100 - 132)  BP: 151/84 (10 Crow 2022 01:10) (120/50 - 202/94)  BP(mean): --  ABP: --  ABP(mean): --  RR: 22 (10 Crow 2022 00:30) (22 - 31)  SpO2: 100% (10 Crow 2022 01:10) (95% - 100%)   (10 Crow 2022 01:25)    PERTINENT PM/SXH:   COPD (chronic obstructive pulmonary disease)    GERD (gastroesophageal reflux disease)    Glaucoma    Anxiety    Pancreatitis      History of tonsillectomy    H/O colonoscopy      FAMILY HISTORY:  No pertinent family history in first degree relatives      ITEMS NOT CHECKED ARE NOT PRESENT    SOCIAL HISTORY:   Significant other/partner[ x]  Children[ ]  Zoroastrianism/Spirituality:  Substance hx:  [ ]   Tobacco hx:  [ ]   Alcohol hx: [ ]   Living Situation: [ ]Home  [ ]Long term care  [ ]Rehab [ ]Other  Home Services: [ ] HHA [ ] Visting RN [ ] Hospice  Occupation:  Home Opioid hx:  [ ] Y [ ] N [ ] I-Stop Reference No:    ADVANCE DIRECTIVES:    DNR  MOLST  [ ]  Living Will  [ ]   DECISION MAKER(s):  [ ] Health Care Proxy(s)  [x ] Surrogate(s)  [ ] Guardian           Name(s): Phone Number(s): Jose M gant     BASELINE (I)ADL(s) (prior to admission):  [place]: [ ]Total  [ ] Moderate [ ]Dependent  Palliative Performance Status Version 2:         %    http://[email or web address]/files/news/palliative_performance_scale_[email or web address].pdf    Allergies    No Known Allergies    Intolerances    MEDICATIONS  (STANDING):  ALBUTerol    90 MICROgram(s) HFA Inhaler 8 Puff(s) Inhalation every 6 hours  caspofungin IVPB      caspofungin IVPB 70 milliGRAM(s) IV Intermittent once  cefepime   IVPB 1000 milliGRAM(s) IV Intermittent every 12 hours  chlorhexidine 0.12% Liquid 15 milliLiter(s) Oral Mucosa two times a day  chlorhexidine 4% Liquid 1 Application(s) Topical <User Schedule>  clonazePAM  Tablet 0.5 milliGRAM(s) Oral every 12 hours  dexMEDEtomidine Infusion 0.05 MICROgram(s)/kG/Hr (0.63 mL/Hr) IV Continuous <Continuous>  dextrose 5%. 1000 milliLiter(s) (100 mL/Hr) IV Continuous <Continuous>  dextrose 5%. 1000 milliLiter(s) (50 mL/Hr) IV Continuous <Continuous>  dextrose 50% Injectable 25 Gram(s) IV Push once  dextrose 50% Injectable 12.5 Gram(s) IV Push once  dextrose 50% Injectable 25 Gram(s) IV Push once  enoxaparin Injectable 40 milliGRAM(s) SubCutaneous every 24 hours  glucagon  Injectable 1 milliGRAM(s) IntraMuscular once  insulin glargine Injectable (LANTUS) 8 Unit(s) SubCutaneous at bedtime  insulin lispro (ADMELOG) corrective regimen sliding scale   SubCutaneous every 6 hours  ipratropium 17 MICROgram(s) HFA Inhaler 8 Puff(s) Inhalation every 6 hours  midazolam Infusion 0.02 mG/kG/Hr (0.81 mL/Hr) IV Continuous <Continuous>  midodrine 5 milliGRAM(s) Oral every 8 hours  norepinephrine Infusion 0.05 MICROgram(s)/kG/Min (4.69 mL/Hr) IV Continuous <Continuous>  pantoprazole   Suspension 40 milliGRAM(s) Oral before breakfast  polyethylene glycol 3350 17 Gram(s) Oral daily  predniSONE   Tablet 20 milliGRAM(s) Oral daily  propofol Infusion 5.004 MICROgram(s)/kG/Min (1.21 mL/Hr) IV Continuous <Continuous>  QUEtiapine 75 milliGRAM(s) Oral every 12 hours  senna 2 Tablet(s) Oral at bedtime    MEDICATIONS  (PRN):  acetaminophen     Tablet .. 650 milliGRAM(s) Oral every 6 hours PRN Temp greater or equal to 38C (100.4F), Mild Pain (1 - 3)  dextrose Oral Gel 15 Gram(s) Oral once PRN Blood Glucose LESS THAN 70 milliGRAM(s)/deciliter    PRESENT SYMPTOMS: [x ]Unable to obtain due to poor mentation   Source if other than patient:  [ ]Family   [ ]Team     Pain: [ ]yes [ ]no  QOL impact -   Location -                    Aggravating factors -  Quality -  Radiation -  Timing-  Severity (0-10 scale):  Minimal acceptable level (0-10 scale):     CPOT:    https://www.[facility].org/getattachment/[email or web address]/Critical-Care-Pain-Observation-Tool-(CPOT)      PAIN AD Score:     http://[email or web address].[email or web address]/painad.pdf (press ctrl +  left click to view)    Dyspnea:                           [ ]Mild [ ]Moderate [ ]Severe  Anxiety:                             [ ]Mild [ ]Moderate [ ]Severe  Fatigue:                             [ ]Mild [ ]Moderate [ ]Severe  Nausea:                             [ ]Mild [ ]Moderate [ ]Severe  Loss of appetite:              [ ]Mild [ ]Moderate [ ]Severe  Constipation:                    [ ]Mild [ ]Moderate [ ]Severe    Other Symptoms:  [ ]All other review of systems negative     Palliative Performance Status Version 2:         %    http://npcrc.org/files/news/palliative_performance_scale_ppsv2.pdf  PHYSICAL EXAM:  Vital Signs Last 24 Hrs  T(C): 37.1 (2022 04:00), Max: 37.4 (2022 12:00)  T(F): 98.7 (2022 04:00), Max: 99.4 (2022 12:00)  HR: 88 (2022 07:40) (88 - 126)  BP: 138/71 (2022 07:00) (69/50 - 140/59)  BP(mean): 94 (2022 07:00) (55 - 98)  RR: 22 (2022 07:00) (0 - 29)  SpO2: 99% (2022 07:40) (94% - 100%) I&O's Summary    2022 07:01  -  2022 07:00  --------------------------------------------------------  IN: 1209.5 mL / OUT: 930 mL / NET: 279.5 mL      GENERAL:  [ ]Alert  [ ]Oriented x   [ ]Lethargic  [ ]Cachexia  [x ]Unarousable  [ ]Verbal  [ ]Non-Verbal  Behavioral:  unable to assess   [ ] Anxiety  [ ] Delirium [ ] Agitation [ ] Other  HEENT:  [ ]Normal   [ ]Dry mouth   [x ]ET Tube/Trach  [ ]Oral lesions  PULMONARY:   [ ]Clear [ ]Tachypnea  [ ]Audible excessive secretions   [ ]Rhonchi        [ ]Right [ ]Left [ ]Bilateral  [ ]Crackles        [ ]Right [ ]Left [ ]Bilateral  [ ]Wheezing     [ ]Right [ ]Left [ ]Bilateral  [ x]Diminished breath sounds [ ]right [ ]left [ ]bilateral  CARDIOVASCULAR:    [ ]Regular [ ]Irregular [x ]Tachy  [ ]Lucas [ ]Murmur [ ]Other  GASTROINTESTINAL:  [ ]Soft  [ ]Distended   [ ]+BS  [ ]Non tender [ ]Tender  [ ]PEG [ x]OGT/ NGT  Last BM:   GENITOURINARY:  [ ]Normal [ ] Incontinent   [ ]Oliguria/Anuria   [x]Guzman  MUSCULOSKELETAL:   [ ]Normal   [x ]Weakness  [ ]Bed/Wheelchair bound [ ]Edema  NEUROLOGIC:   [ ]No focal deficits  [ ]Cognitive impairment  [ ]Dysphagia [ ]Dysarthria [ ]Paresis [ ]Other   SKIN:   [x ]Normal    [ ]Rash  [ ]Pressure ulcer(s)       Present on admission [ ]y [ ]n    CRITICAL CARE:  [x ] Shock Present  [ ]Septic [ ]Cardiogenic [ ]Neurologic [ ]Hypovolemic  [ ]  Vasopressors [ ]  Inotropes   [x ]Respiratory failure present [x ]Mechanical ventilation [ ]Non-invasive ventilatory support [ ]High flow  [ ]Acute  [ ]Chronic [ ]Hypoxic  [ ]Hypercarbic [ ]Other  [ ]Other organ failure     LABS:                        11.7   48.54 )-----------( 382      ( 2022 05:10 )             34.8       140  |  99  |  64<HH>  ----------------------------<  196<H>  4.8   |  34<H>  |  0.7    Ca    9.0      2022 05:10  Mg     2.2         TPro  4.8<L>  /  Alb  2.4<L>  /  TBili  0.3  /  DBili  x   /  AST  13  /  ALT  34  /  AlkPhos  61        Urinalysis Basic - ( 2022 09:40 )    Color: Yellow / Appearance: Slightly Turbid / S.035 / pH: x  Gluc: x / Ketone: Trace  / Bili: Negative / Urobili: <2 mg/dL   Blood: x / Protein: 100 mg/dL / Nitrite: Negative   Leuk Esterase: Moderate / RBC: 9 /HPF / WBC 10 /HPF   Sq Epi: x / Non Sq Epi: 5 /HPF / Bacteria: Few      RADIOLOGY & ADDITIONAL STUDIES:    PROTEIN CALORIE MALNUTRITION PRESENT: [ ]mild [ ]moderate [ ]severe [ ]underweight [ ]morbid obesity  https://www.andeal.org/vault/2440/web/files/ONC/Table_Clinical%20Characteristics%20to%20Document%20Malnutrition-White%20JV%20et%20al%2020.pdf    Height (cm): 147.3 (06-10-22 @ 22:45), 147.3 (21 @ 20:05)  Weight (kg): 40.3 (06-10-22 @ 22:45)  BMI (kg/m2): 18.6 (06-10-22 @ 22:45)    [ ]PPSV2 < or = to 30% [ ]significant weight loss  [ ]poor nutritional intake  [ ]anasarca      [ ]Artificial Nutrition      REFERRALS:   [ ]Chaplaincy  [ ]Hospice  [ ]Child Life  x ]Social Work  [ ]Case management [ ]Holistic Therapy     Goals of Care Document:        HPI:  89 y/o female with PMHx of COPD not on home O2, pHTN, GERD, Lucio's esophagus, COVID vaccinated presented to the ED for acute onset of sob. As per nurse aide, pt was asymptomatic sitting on the couch, when she became acutely sob. pt was brought to ED and was tachypneic and answering questions with one word answers, and struggling to breath.    In the ED, pt was placed on bipap immediately and symptoms improved and pt looks more comfortable. ABG initially shows pCO2 115 and was placed on bipap and soon after repeat pco2 was 100. per family and patient, patient is full code and decision was made to intubate the patient. Labs are overall unremarkable except wbc 19K. She received duoneb, Mg and solumedrol 125 mg x1 in ED.     ICU Vital Signs Last 24 Hrs  T(C): 36.1 (2022 22:40), Max: 36.1 (2022 22:40)  T(F): 97 (2022 22:40), Max: 97 (2022 22:40)  HR: 100 (10 Crow 2022 01:10) (100 - 132)  BP: 151/84 (10 Crow 2022 01:10) (120/50 - 202/94)  BP(mean): --  ABP: --  ABP(mean): --  RR: 22 (10 Crow 2022 00:30) (22 - 31)  SpO2: 100% (10 Crow 2022 01:10) (95% - 100%)   (10 Crow 2022 01:25)    PERTINENT PM/SXH:   COPD (chronic obstructive pulmonary disease)    GERD (gastroesophageal reflux disease)    Glaucoma    Anxiety    Pancreatitis      History of tonsillectomy    H/O colonoscopy      FAMILY HISTORY:  No pertinent family history in first degree relatives      ITEMS NOT CHECKED ARE NOT PRESENT    SOCIAL HISTORY:   Significant other/partner[ x]  Children[ ]  Adventist/Spirituality:  Substance hx:  [ ]   Tobacco hx:  [ ]   Alcohol hx: [ ]   Living Situation: [ ]Home  [ ]Long term care  [ ]Rehab [ ]Other  Home Services: [ ] HHA [ ] Visting RN [ ] Hospice  Occupation:  Home Opioid hx:  [ ] Y [ ] N [ ] I-Stop Reference No:    ADVANCE DIRECTIVES:    DNR  MOLST  [ ]  Living Will  [ ]   DECISION MAKER(s):  [ ] Health Care Proxy(s)  [x ] Surrogate(s)  [ ] Guardian           Name(s): Phone Number(s): Jose M gant     BASELINE (I)ADL(s) (prior to admission):  [place]: [ ]Total  [ ] Moderate [ ]Dependent  Palliative Performance Status Version 2:         %    http://[email or web address]/files/news/palliative_performance_scale_[email or web address].pdf    Allergies    No Known Allergies    Intolerances    MEDICATIONS  (STANDING):  ALBUTerol    90 MICROgram(s) HFA Inhaler 8 Puff(s) Inhalation every 6 hours  caspofungin IVPB      caspofungin IVPB 70 milliGRAM(s) IV Intermittent once  cefepime   IVPB 1000 milliGRAM(s) IV Intermittent every 12 hours  chlorhexidine 0.12% Liquid 15 milliLiter(s) Oral Mucosa two times a day  chlorhexidine 4% Liquid 1 Application(s) Topical <User Schedule>  clonazePAM  Tablet 0.5 milliGRAM(s) Oral every 12 hours  dexMEDEtomidine Infusion 0.05 MICROgram(s)/kG/Hr (0.63 mL/Hr) IV Continuous <Continuous>  dextrose 5%. 1000 milliLiter(s) (100 mL/Hr) IV Continuous <Continuous>  dextrose 5%. 1000 milliLiter(s) (50 mL/Hr) IV Continuous <Continuous>  dextrose 50% Injectable 25 Gram(s) IV Push once  dextrose 50% Injectable 12.5 Gram(s) IV Push once  dextrose 50% Injectable 25 Gram(s) IV Push once  enoxaparin Injectable 40 milliGRAM(s) SubCutaneous every 24 hours  glucagon  Injectable 1 milliGRAM(s) IntraMuscular once  insulin glargine Injectable (LANTUS) 8 Unit(s) SubCutaneous at bedtime  insulin lispro (ADMELOG) corrective regimen sliding scale   SubCutaneous every 6 hours  ipratropium 17 MICROgram(s) HFA Inhaler 8 Puff(s) Inhalation every 6 hours  midazolam Infusion 0.02 mG/kG/Hr (0.81 mL/Hr) IV Continuous <Continuous>  midodrine 5 milliGRAM(s) Oral every 8 hours  norepinephrine Infusion 0.05 MICROgram(s)/kG/Min (4.69 mL/Hr) IV Continuous <Continuous>  pantoprazole   Suspension 40 milliGRAM(s) Oral before breakfast  polyethylene glycol 3350 17 Gram(s) Oral daily  predniSONE   Tablet 20 milliGRAM(s) Oral daily  propofol Infusion 5.004 MICROgram(s)/kG/Min (1.21 mL/Hr) IV Continuous <Continuous>  QUEtiapine 75 milliGRAM(s) Oral every 12 hours  senna 2 Tablet(s) Oral at bedtime    MEDICATIONS  (PRN):  acetaminophen     Tablet .. 650 milliGRAM(s) Oral every 6 hours PRN Temp greater or equal to 38C (100.4F), Mild Pain (1 - 3)  dextrose Oral Gel 15 Gram(s) Oral once PRN Blood Glucose LESS THAN 70 milliGRAM(s)/deciliter    PRESENT SYMPTOMS: [x ]Unable to obtain due to poor mentation   Source if other than patient:  [ ]Family   [ ]Team     Pain: [ ]yes [ ]no  QOL impact -   Location -                    Aggravating factors -  Quality -  Radiation -  Timing-  Severity (0-10 scale):  Minimal acceptable level (0-10 scale):     CPOT:    https://www.[facility].org/getattachment/[email or web address]/Critical-Care-Pain-Observation-Tool-(CPOT)      PAIN AD Score:     http://geriatrictoolkit.[email or web address]/cog/painad.pdf (press ctrl +  left click to view)    Dyspnea:                           [ ]Mild [ ]Moderate [ ]Severe  Anxiety:                             [ ]Mild [ ]Moderate [ ]Severe  Fatigue:                             [ ]Mild [ ]Moderate [ ]Severe  Nausea:                             [ ]Mild [ ]Moderate [ ]Severe  Loss of appetite:              [ ]Mild [ ]Moderate [ ]Severe  Constipation:                    [ ]Mild [ ]Moderate [ ]Severe    Other Symptoms:  [ ]All other review of systems negative     Palliative Performance Status Version 2:         %    http://npcrc.org/files/news/palliative_performance_scale_ppsv2.pdf  PHYSICAL EXAM:  Vital Signs Last 24 Hrs  T(C): 37.1 (2022 04:00), Max: 37.4 (2022 12:00)  T(F): 98.7 (2022 04:00), Max: 99.4 (2022 12:00)  HR: 88 (2022 07:40) (88 - 126)  BP: 138/71 (2022 07:00) (69/50 - 140/59)  BP(mean): 94 (2022 07:00) (55 - 98)  RR: 22 (2022 07:00) (0 - 29)  SpO2: 99% (2022 07:40) (94% - 100%) I&O's Summary    2022 07:01  -  2022 07:00  --------------------------------------------------------  IN: 1209.5 mL / OUT: 930 mL / NET: 279.5 mL      GENERAL:  [ ]Alert  [ ]Oriented x   [ ]Lethargic  [ ]Cachexia  [x ]Unarousable  [ ]Verbal  [ ]Non-Verbal  Behavioral:  unable to assess   [ ] Anxiety  [ ] Delirium [ ] Agitation [ ] Other  HEENT:  [ ]Normal   [ ]Dry mouth   [x ]ET Tube/Trach  [ ]Oral lesions  PULMONARY:   [ ]Clear [x ]Tachypnea  [ ]Audible excessive secretions   [ ]Rhonchi        [ ]Right [ ]Left [ ]Bilateral  [ ]Crackles        [ ]Right [ ]Left [ ]Bilateral  [ ]Wheezing     [ ]Right [ ]Left [ ]Bilateral  [ x]Diminished breath sounds [ ]right [ ]left [ ]bilateral  CARDIOVASCULAR:    [ ]Regular [ ]Irregular [x ]Tachy  [ ]Lucas [ ]Murmur [ ]Other  GASTROINTESTINAL:  [ ]Soft  [ ]Distended   [ ]+BS  [ ]Non tender [ ]Tender  [ ]PEG [ x]OGT/ NGT  Last BM:    GENITOURINARY:  [ ]Normal [ ] Incontinent   [ ]Oliguria/Anuria   [x]Guzman  MUSCULOSKELETAL:   [ ]Normal   [x ]Weakness  [ ]Bed/Wheelchair bound [ ]Edema  NEUROLOGIC:   [ ]No focal deficits  [ ]Cognitive impairment  [ ]Dysphagia [ ]Dysarthria [ ]Paresis [ ]Other   SKIN:   [x ]Normal    [ ]Rash  [ ]Pressure ulcer(s)       Present on admission [ ]y [ ]n    CRITICAL CARE:  [x ] Shock Present  [ ]Septic [ ]Cardiogenic [ ]Neurologic [ ]Hypovolemic  [ ]  Vasopressors [ ]  Inotropes   [x ]Respiratory failure present [x ]Mechanical ventilation [ ]Non-invasive ventilatory support [ ]High flow  [ ]Acute  [ ]Chronic [ ]Hypoxic  [ ]Hypercarbic [ ]Other  [ ]Other organ failure     LABS:                        11.7   48.54 )-----------( 382      ( 2022 05:10 )             34.8       140  |  99  |  64<HH>  ----------------------------<  196<H>  4.8   |  34<H>  |  0.7    Ca    9.0      2022 05:10  Mg     2.2         TPro  4.8<L>  /  Alb  2.4<L>  /  TBili  0.3  /  DBili  x   /  AST  13  /  ALT  34  /  AlkPhos  61        Urinalysis Basic - ( 2022 09:40 )    Color: Yellow / Appearance: Slightly Turbid / S.035 / pH: x  Gluc: x / Ketone: Trace  / Bili: Negative / Urobili: <2 mg/dL   Blood: x / Protein: 100 mg/dL / Nitrite: Negative   Leuk Esterase: Moderate / RBC: 9 /HPF / WBC 10 /HPF   Sq Epi: x / Non Sq Epi: 5 /HPF / Bacteria: Few      RADIOLOGY & ADDITIONAL STUDIES:    PROTEIN CALORIE MALNUTRITION PRESENT: [ ]mild [ ]moderate [ ]severe [ ]underweight [ ]morbid obesity  https://www.andeal.org/vault/2440/web/files/ONC/Table_Clinical%20Characteristics%20to%20Document%20Malnutrition-White%20JV%20et%20al%2020.pdf    Height (cm): 147.3 (06-10-22 @ 22:45), 147.3 (21 @ 20:05)  Weight (kg): 40.3 (06-10-22 @ 22:45)  BMI (kg/m2): 18.6 (06-10-22 @ 22:45)    [ ]PPSV2 < or = to 30% [ ]significant weight loss  [ ]poor nutritional intake  [ ]anasarca      [ ]Artificial Nutrition      REFERRALS:   [ ]Chaplaincy  [ ]Hospice  [ ]Child Life  x ]Social Work  [ ]Case management [ ]Holistic Therapy     Goals of Care Document:        HPI:  89 y/o female with PMHx of COPD not on home O2, pHTN, GERD, Lucio's esophagus, COVID vaccinated presented to the ED for acute onset of sob. As per nurse aide, pt was asymptomatic sitting on the couch, when she became acutely sob. pt was brought to ED and was tachypneic and answering questions with one word answers, and struggling to breath.    In the ED, pt was placed on bipap immediately and symptoms improved and pt looks more comfortable. ABG initially shows pCO2 115 and was placed on bipap and soon after repeat pco2 was 100. per family and patient, patient is full code and decision was made to intubate the patient. Labs are overall unremarkable except wbc 19K. She received duoneb, Mg and solumedrol 125 mg x1 in ED.     ICU Vital Signs Last 24 Hrs  T(C): 36.1 (2022 22:40), Max: 36.1 (2022 22:40)  T(F): 97 (2022 22:40), Max: 97 (2022 22:40)  HR: 100 (10 Crow 2022 01:10) (100 - 132)  BP: 151/84 (10 Crow 2022 01:10) (120/50 - 202/94)  BP(mean): --  ABP: --  ABP(mean): --  RR: 22 (10 Corw 2022 00:30) (22 - 31)  SpO2: 100% (10 Crow 2022 01:10) (95% - 100%)   (10 Crow 2022 01:25)    PERTINENT PM/SXH:   COPD (chronic obstructive pulmonary disease)    GERD (gastroesophageal reflux disease)    Glaucoma    Anxiety    Pancreatitis      History of tonsillectomy    H/O colonoscopy      FAMILY HISTORY:  Cannot obtain from patient    ITEMS NOT CHECKED ARE NOT PRESENT    SOCIAL HISTORY:   Significant other/partner[ x]  Children[ ]  Congregational/Spirituality:  Substance hx:  [ ]   Tobacco hx:  [ ]   Alcohol hx: [ ]   Living Situation: [ ]Home  [ ]Long term care  [ ]Rehab [ ]Other  Home Services: [ ] HHA [ ] Ru RN [ ] Hospice  Occupation:  Home Opioid hx:  [ ] Y [ ] N [ ] I-Stop Reference No:    ADVANCE DIRECTIVES:    DNR  MOLST  [ ]  Living Will  [ ]   DECISION MAKER(s):  [ ] Health Care Proxy(s)  [x ] Surrogate(s)  [ ] Guardian           Name(s): Phone Number(s): Jose M Vaughan daughter     BASELINE (I)ADL(s) (prior to admission):  [place]: [ ]Total  [ ] Moderate [ ]Dependent  Palliative Performance Status Version 2:         %    http://[email or web address].org/files/news/palliative_performance_scale_[email or web address].pdf    Allergies    No Known Allergies    Intolerances    MEDICATIONS  (STANDING):  ALBUTerol    90 MICROgram(s) HFA Inhaler 8 Puff(s) Inhalation every 6 hours  caspofungin IVPB      caspofungin IVPB 70 milliGRAM(s) IV Intermittent once  cefepime   IVPB 1000 milliGRAM(s) IV Intermittent every 12 hours  chlorhexidine 0.12% Liquid 15 milliLiter(s) Oral Mucosa two times a day  chlorhexidine 4% Liquid 1 Application(s) Topical <User Schedule>  clonazePAM  Tablet 0.5 milliGRAM(s) Oral every 12 hours  dexMEDEtomidine Infusion 0.05 MICROgram(s)/kG/Hr (0.63 mL/Hr) IV Continuous <Continuous>  dextrose 5%. 1000 milliLiter(s) (100 mL/Hr) IV Continuous <Continuous>  dextrose 5%. 1000 milliLiter(s) (50 mL/Hr) IV Continuous <Continuous>  dextrose 50% Injectable 25 Gram(s) IV Push once  dextrose 50% Injectable 12.5 Gram(s) IV Push once  dextrose 50% Injectable 25 Gram(s) IV Push once  enoxaparin Injectable 40 milliGRAM(s) SubCutaneous every 24 hours  glucagon  Injectable 1 milliGRAM(s) IntraMuscular once  insulin glargine Injectable (LANTUS) 8 Unit(s) SubCutaneous at bedtime  insulin lispro (ADMELOG) corrective regimen sliding scale   SubCutaneous every 6 hours  ipratropium 17 MICROgram(s) HFA Inhaler 8 Puff(s) Inhalation every 6 hours  midazolam Infusion 0.02 mG/kG/Hr (0.81 mL/Hr) IV Continuous <Continuous>  midodrine 5 milliGRAM(s) Oral every 8 hours  norepinephrine Infusion 0.05 MICROgram(s)/kG/Min (4.69 mL/Hr) IV Continuous <Continuous>  pantoprazole   Suspension 40 milliGRAM(s) Oral before breakfast  polyethylene glycol 3350 17 Gram(s) Oral daily  predniSONE   Tablet 20 milliGRAM(s) Oral daily  propofol Infusion 5.004 MICROgram(s)/kG/Min (1.21 mL/Hr) IV Continuous <Continuous>  QUEtiapine 75 milliGRAM(s) Oral every 12 hours  senna 2 Tablet(s) Oral at bedtime    MEDICATIONS  (PRN):  acetaminophen     Tablet .. 650 milliGRAM(s) Oral every 6 hours PRN Temp greater or equal to 38C (100.4F), Mild Pain (1 - 3)  dextrose Oral Gel 15 Gram(s) Oral once PRN Blood Glucose LESS THAN 70 milliGRAM(s)/deciliter    PRESENT SYMPTOMS: [x ]Unable to obtain due to poor mentation   Source if other than patient:  [ ]Family   [ ]Team     Pain: [ ]yes [ ]no  QOL impact -   Location -                    Aggravating factors -  Quality -  Radiation -  Timing-  Severity (0-10 scale):  Minimal acceptable level (0-10 scale):     CPOT:    https://www.[facility].org/getattachment/[email or web address]/Critical-Care-Pain-Observation-Tool-(CPOT)      PAIN AD Score:     http://geriatrictoolkit.[facility]/cog/painad.pdf (press ctrl +  left click to view)    Dyspnea:                           [ ]Mild [ ]Moderate [ ]Severe  Anxiety:                             [ ]Mild [ ]Moderate [ ]Severe  Fatigue:                             [ ]Mild [ ]Moderate [ ]Severe  Nausea:                             [ ]Mild [ ]Moderate [ ]Severe  Loss of appetite:              [ ]Mild [ ]Moderate [ ]Severe  Constipation:                    [ ]Mild [ ]Moderate [ ]Severe    Other Symptoms:  [ ]All other review of systems negative     Palliative Performance Status Version 2:         %    http://npcrc.org/files/news/palliative_performance_scale_ppsv2.pdf  PHYSICAL EXAM:  Vital Signs Last 24 Hrs  T(C): 37.1 (2022 04:00), Max: 37.4 (2022 12:00)  T(F): 98.7 (2022 04:00), Max: 99.4 (2022 12:00)  HR: 88 (2022 07:40) (88 - 126)  BP: 138/71 (2022 07:00) (69/50 - 140/59)  BP(mean): 94 (2022 07:00) (55 - 98)  RR: 22 (2022 07:00) (0 - 29)  SpO2: 99% (2022 07:40) (94% - 100%) I&O's Summary    2022 07:01  -  2022 07:00  --------------------------------------------------------  IN: 1209.5 mL / OUT: 930 mL / NET: 279.5 mL      GENERAL:  [ ]Alert  [ ]Oriented x   [ ]Lethargic  [ ]Cachexia  [x ]Unarousable  [ ]Verbal  [ ]Non-Verbal  Behavioral:  unable to assess   [ ] Anxiety  [ ] Delirium [ ] Agitation [ ] Other  HEENT:  [ ]Normal   [ ]Dry mouth   [x ]ET Tube/Trach  [ ]Oral lesions  PULMONARY:   [ ]Clear [x ]Tachypnea  [ ]Audible excessive secretions   [ ]Rhonchi        [ ]Right [ ]Left [ ]Bilateral  [ ]Crackles        [ ]Right [ ]Left [ ]Bilateral  [ ]Wheezing     [ ]Right [ ]Left [ ]Bilateral  [ x]Diminished breath sounds [ ]right [ ]left [ ]bilateral  CARDIOVASCULAR:    [ ]Regular [ ]Irregular [x ]Tachy  [ ]Lucas [ ]Murmur [ ]Other  GASTROINTESTINAL:  [ ]Soft  [ ]Distended   [ ]+BS  [ ]Non tender [ ]Tender  [ ]PEG [ x]OGT/ NGT  Last BM:    GENITOURINARY:  [ ]Normal [ ] Incontinent   [ ]Oliguria/Anuria   [x]Gumzan  MUSCULOSKELETAL:   [ ]Normal   [x ]Weakness  [ ]Bed/Wheelchair bound [ ]Edema  NEUROLOGIC:   [ ]No focal deficits  [ ]Cognitive impairment  [ ]Dysphagia [ ]Dysarthria [ ]Paresis [ ]Other   SKIN:   [x ]Normal    [ ]Rash  [ ]Pressure ulcer(s)       Present on admission [ ]y [ ]n    CRITICAL CARE:  [x ] Shock Present  [ ]Septic [ ]Cardiogenic [ ]Neurologic [ ]Hypovolemic  [ ]  Vasopressors [ ]  Inotropes   [x ]Respiratory failure present [x ]Mechanical ventilation [ ]Non-invasive ventilatory support [ ]High flow  [ ]Acute  [ ]Chronic [ ]Hypoxic  [ ]Hypercarbic [ ]Other  [ ]Other organ failure     LABS:                        11.7   48.54 )-----------( 382      ( 2022 05:10 )             34.8       140  |  99  |  64<HH>  ----------------------------<  196<H>  4.8   |  34<H>  |  0.7    Ca    9.0      2022 05:10  Mg     2.2         TPro  4.8<L>  /  Alb  2.4<L>  /  TBili  0.3  /  DBili  x   /  AST  13  /  ALT  34  /  AlkPhos  61        Urinalysis Basic - ( 2022 09:40 )    Color: Yellow / Appearance: Slightly Turbid / S.035 / pH: x  Gluc: x / Ketone: Trace  / Bili: Negative / Urobili: <2 mg/dL   Blood: x / Protein: 100 mg/dL / Nitrite: Negative   Leuk Esterase: Moderate / RBC: 9 /HPF / WBC 10 /HPF   Sq Epi: x / Non Sq Epi: 5 /HPF / Bacteria: Few      RADIOLOGY & ADDITIONAL STUDIES:    PROTEIN CALORIE MALNUTRITION PRESENT: [ ]mild [ ]moderate [ ]severe [ ]underweight [ ]morbid obesity  https://www.andeal.org/vault/2440/web/files/ONC/Table_Clinical%20Characteristics%20to%20Document%20Malnutrition-White%20JV%20et%20al%2020.pdf    Height (cm): 147.3 (06-10-22 @ 22:45), 147.3 (21 @ 20:05)  Weight (kg): 40.3 (06-10-22 @ 22:45)  BMI (kg/m2): 18.6 (06-10-22 @ 22:45)    [ ]PPSV2 < or = to 30% [ ]significant weight loss  [ ]poor nutritional intake  [ ]anasarca      [ ]Artificial Nutrition      REFERRALS:   [ ]Chaplaincy  [ ]Hospice  [ ]Child Life  x ]Social Work  [ ]Case management [ ]Holistic Therapy     Goals of Care Document:

## 2022-06-22 NOTE — PROGRESS NOTE ADULT - ASSESSMENT
ASSESSMENT:  88y F admitted with respiratory failure now with subcutaneous emphysema, right pneumothorax, s/p pigtail placement and chest tube placement     PLAN:  Chest tube and pigtail to suction  Daily AM cxr  follow up Chest tubes output  monitor for airleak  monitor subcutaneous emphysema   monitor O2 sat  pain control  SCDs  DVT/GI prophylaxis    spectra [id number]   ASSESSMENT:  88y F admitted with respiratory failure now with subcutaneous emphysema, right pneumothorax, s/p pigtail placement and chest tube placement     PLAN:  Chest tube and pigtail to WS as per ICU  Daily AM cxr  follow up Chest tubes output  monitor for airleak  monitor subcutaneous emphysema   monitor O2 sat  pain control  SCDs  DVT/GI prophylaxis  follow up palliative discussion for possible trach and peg    spectra [phone]

## 2022-06-22 NOTE — PROGRESS NOTE ADULT - ASSESSMENT
7 y/o female with PMHx of COPD on home O2, pHTN, GERD, Lucio's esophagus, COVID vaccinated presented to the ED for acute onset of sob.    -Acute on chronic hypercapnic respiratory failure s/p intubation  Likely COPD exacerbation  HO COPD, O2 dependent  R secondary spontaneous pneumothorax s/p pigtail  - presented to ED with SOB, found to be hypercapnic to 115, intubated  - post intubation CXR showed R PTX s/p R pigtail catheter, dislodged, replaced 6/15  - CTSX following  - sedation with propofol  - on levo 8 mg IV  - Procal 0.52->0.13->0.23, D-dimer 579  - BCX x2 NGTD, UCX neg, RVP neg, urine strep/legionella neg  - leukocytosis WBC 48.54, afebrile   - cont cefepime 1g q12h x7d (finish 6/19)  - Caspofungin 50 mg IV every 12 hours  - daily ABG, CXR, SAT/SBT    # H/o HTN  # 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    -Acute on chronic hypercapnic respiratory failure s/p intubation  Likely COPD exacerbation  HO COPD, O2 dependent  R secondary spontaneous pneumothorax s/p pigtail  - presented to ED with SOB, found to be hypercapnic to 115, intubated  - post intubation CXR showed R PTX s/p R pigtail catheter, dislodged, replaced 6/15  - CTSX following  - Switched sedation from propofol to Versed 100 mg  - on levo 8 mg IV - try to wean after switching to Versed.  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

## 2022-06-22 NOTE — CONSULT NOTE ADULT - CONVERSATION DETAILS
Introduced palliative care and reviewed patients current condition. Pt's daughter aware of her poor prognosis and was in discussion w ICU team about tracheostomy and PEG placement. She said she knows her mother never wanted to live on machines or in a nursing home. She is aware her condition would not improve on the ventilator.     Discussed palliative vent withdrawal. Daughter said she is only concerned with mom feeling short of breath, explained that when we remove life support we give symptom management. She said she was glad to know this option and wants to have further conversation with palliative care. She has a call out to Dr Snyder/Dr Bernard Mcdaniel and she will discuss this with them as well. Introduced palliative care and reviewed patients current condition. Pt's daughter aware of her poor prognosis and was in discussion w ICU team about tracheostomy and PEG placement. She said she knows her mother never wanted to live on machines or in a nursing home. She is aware her condition would not improve on the ventilator.     Discussed palliative vent withdrawal. Daughter said she is only concerned with mom feeling short of breath, explained that when we remove life support we give symptom management. She said she was glad to know this option and wants to have further conversation with palliative care. She has a call out to Dr Snyder/Dr Bernard Park and she will discuss this with them as well.

## 2022-06-22 NOTE — PROGRESS NOTE ADULT - SUBJECTIVE AND OBJECTIVE BOX
GENERAL SURGERY PROGRESS NOTE    Patient: KIMBERLI LEIGH , 88y (34)Female   MRN: [id number]  Location: [facility]  A  Visit: 06-10-22 Inpatient  Date: 22 @ 00:50    Procedure/Dx/Injuries: subcutaneous emphysema, right pneumothorax, s/p pigtail and chest tube placement    PAST MEDICAL & SURGICAL HISTORY:  COPD (chronic obstructive pulmonary disease)      GERD (gastroesophageal reflux disease)      Glaucoma      Anxiety      Pancreatitis      History of tonsillectomy      H/O colonoscopy  5 years ago          Vitals:   T(F): 99.4 (22 @ 12:00), Max: 99.4 (22 @ 12:00)  HR: 93 (22 @ 00:42)  BP: 103/52 (22 @ 19:00)  RR: 22 (22 @ 19:00)  SpO2: 100% (22 @ 00:42)  Mode: AC/ CMV (Assist Control/ Continuous Mandatory Ventilation), RR (machine): 14, TV (machine): 300, FiO2: 30, PEEP: 7, ITime: 1, MAP: 13, PIP: 34    Diet, NPO with Tube Feed:   Tube Feeding Modality: Orogastric  Vital High Protein  Total Volume for 24 Hours (mL): 720  Continuous  Until Goal Tube Feed Rate (mL per Hour): 30  Tube Feed Duration (in Hours): 24  Tube Feed Start Time: 18:30      Fluids:     I & O's:    22 @ 07:01  -  22 @ 07:00  --------------------------------------------------------  IN:    Enteral Tube Flush: 30 mL    FentaNYL: 12 mL    IV PiggyBack: 50 mL    Norepinephrine: 108.7 mL    Propofol: 103.9 mL    Vital HN: 720 mL  Total IN: 1024.6 mL    OUT:    Chest Tube (mL): 60 mL    Chest Tube (mL): 25 mL    Dexmedetomidine: 0 mL    Indwelling Catheter - Urethral (mL): 365 mL    Voided (mL): 50 mL  Total OUT: 500 mL    Total NET: 524.6 mL    Bowel Movement: : [] YES [] NO  Flatus: : [] YES [] NO    PHYSICAL EXAM:  General: NAD, intubated   Cardiac: RRR S1, S2,   Respiratory: CTAB, pigtail and chest tubes to suction on the right, -airleak. + subcutaneous emphysema   Abdomen: Soft, non-distended,    MEDICATIONS  (STANDING):  ALBUTerol    90 MICROgram(s) HFA Inhaler 8 Puff(s) Inhalation every 6 hours  cefepime   IVPB 1000 milliGRAM(s) IV Intermittent every 12 hours  chlorhexidine 0.12% Liquid 15 milliLiter(s) Oral Mucosa two times a day  chlorhexidine 4% Liquid 1 Application(s) Topical <User Schedule>  clonazePAM  Tablet 0.5 milliGRAM(s) Oral every 12 hours  dexMEDEtomidine Infusion 0.05 MICROgram(s)/kG/Hr (0.63 mL/Hr) IV Continuous <Continuous>  dextrose 5%. 1000 milliLiter(s) (100 mL/Hr) IV Continuous <Continuous>  dextrose 5%. 1000 milliLiter(s) (50 mL/Hr) IV Continuous <Continuous>  dextrose 50% Injectable 25 Gram(s) IV Push once  dextrose 50% Injectable 12.5 Gram(s) IV Push once  dextrose 50% Injectable 25 Gram(s) IV Push once  enoxaparin Injectable 40 milliGRAM(s) SubCutaneous every 24 hours  glucagon  Injectable 1 milliGRAM(s) IntraMuscular once  insulin glargine Injectable (LANTUS) 8 Unit(s) SubCutaneous at bedtime  insulin lispro (ADMELOG) corrective regimen sliding scale   SubCutaneous every 6 hours  ipratropium 17 MICROgram(s) HFA Inhaler 8 Puff(s) Inhalation every 6 hours  midodrine 5 milliGRAM(s) Oral every 8 hours  norepinephrine Infusion 0.05 MICROgram(s)/kG/Min (4.69 mL/Hr) IV Continuous <Continuous>  pantoprazole   Suspension 40 milliGRAM(s) Oral before breakfast  polyethylene glycol 3350 17 Gram(s) Oral daily  predniSONE   Tablet 20 milliGRAM(s) Oral daily  propofol Infusion 5.004 MICROgram(s)/kG/Min (1.21 mL/Hr) IV Continuous <Continuous>  QUEtiapine 75 milliGRAM(s) Oral every 12 hours  senna 2 Tablet(s) Oral at bedtime    MEDICATIONS  (PRN):  acetaminophen     Tablet .. 650 milliGRAM(s) Oral every 6 hours PRN Temp greater or equal to 38C (100.4F), Mild Pain (1 - 3)  dextrose Oral Gel 15 Gram(s) Oral once PRN Blood Glucose LESS THAN 70 milliGRAM(s)/deciliter  midazolam Injectable 2 milliGRAM(s) IV Push every 2 hours PRN severe agitation, inadequate sedation with propofol, precedex, fentanyl      DVT PROPHYLAXIS: enoxaparin Injectable 40 milliGRAM(s) SubCutaneous every 24 hours    GI PROPHYLAXIS: pantoprazole   Suspension 40 milliGRAM(s) Oral before breakfast    ANTICOAGULATION:   ANTIBIOTICS:  cefepime   IVPB 1000 milliGRAM(s)            LAB/STUDIES:  Labs:  CAPILLARY BLOOD GLUCOSE      POCT Blood Glucose.: 191 mg/dL (2022 00:40)  POCT Blood Glucose.: 231 mg/dL (2022 17:42)  POCT Blood Glucose.: 230 mg/dL (2022 11:51)  POCT Blood Glucose.: 142 mg/dL (2022 04:41)                          11.9   41.02 )-----------( 346      ( 2022 04:40 )             35.4       Auto Neutrophil %: 91.7 % (22 @ 04:40)  Auto Immature Granulocyte %: 1.8 % (22 @ 04:40)        140  |  101  |  58<H>  ----------------------------<  143<H>  4.6   |  36<H>  |  0.7      Calcium, Total Serum: 8.8 mg/dL (22 @ 04:40)      LFTs:             4.5  | 0.3  | 13       ------------------[51      ( 2022 04:40 )  2.6  | x    | 50          Lipase:x      Amylase:x         Blood Gas Arterial, Lactate: 1.00 mmol/L (22 @ 03:47)  Blood Gas Arterial, Lactate: 1.30 mmol/L (22 @ 16:08)  Blood Gas Arterial, Lactate: 1.50 mmol/L (22 @ 02:49)  Blood Gas Arterial, Lactate: 1.50 mmol/L (22 @ 03:23)    ABG - ( 2022 03:47 )  pH: 7.47  /  pCO2: 52    /  pO2: 83    / HCO3: 38    / Base Excess: 12.1  /  SaO2: 98.3            ABG - ( 2022 16:08 )  pH: 7.48  /  pCO2: 52    /  pO2: 85    / HCO3: 39    / Base Excess: 13.0  /  SaO2: 97.8            ABG - ( 2022 02:49 )  pH: 7.49  /  pCO2: 48    /  pO2: 105   / HCO3: 37    / Base Excess: 11.5  /  SaO2: 99.2              Coags:            Urinalysis Basic - ( 2022 09:40 )    Color: Yellow / Appearance: Slightly Turbid / S.035 / pH: x  Gluc: x / Ketone: Trace  / Bili: Negative / Urobili: <2 mg/dL   Blood: x / Protein: 100 mg/dL / Nitrite: Negative   Leuk Esterase: Moderate / RBC: 9 /HPF / WBC 10 /HPF   Sq Epi: x / Non Sq Epi: 5 /HPF / Bacteria: Few        Culture - Sputum (collected 2022 20:27)  Source: Trach Asp Tracheal Aspirate  Gram Stain (2022 06:35):    Moderate polymorphonuclear leukocytes per low power field    No Squamous epithelial cells per low power field    Few Gram positive cocci in pairs seen per oil power field  Preliminary Report (2022 18:56):    Normal Respiratory Desire present    Culture - Blood (collected 2022 11:26)  Source: .Blood None  Preliminary Report (2022 23:01):    No growth to date.      IMAGING:  < from: Xray Chest 1 View- PORTABLE-Routine (Xray Chest 1 View- PORTABLE-Routine in AM.) (22 @ 05:21) >    IMPRESSION:    No significant change since one day earlier    --- End of Report ---    < end of copied text >      ACCESS/ DEVICES:  [x ] Peripheral IV  [ ] Central Venous Line	[ ] R	[ ] L	[ ] IJ	[ ] Fem	[ ] SC	Placed:   [ ] Arterial Line		[ ] R	[ ] L	[ ] Fem	[ ] Rad	[ ] Ax	Placed:   [ ] PICC:					[ ] Mediport  [ ] Urinary Catheter,  Date Placed:   [ x] Chest tube: [ x] Right, [ ] Left  [ ] CRISTELA/Migel Drains     GENERAL SURGERY PROGRESS NOTE    Patient: KIMBERLI LEIGH , 88y (34)Female   MRN: [id number]  Location: [facility]  A  Visit: 06-10-22 Inpatient  Date: 22 @ 00:50    Procedure/Dx/Injuries: subcutaneous emphysema, right pneumothorax, s/p pigtail and chest tube placement    24hrs events: CT and PT to WS, DNR, palliative consult    PAST MEDICAL & SURGICAL HISTORY:  COPD (chronic obstructive pulmonary disease)      GERD (gastroesophageal reflux disease)      Glaucoma      Anxiety      Pancreatitis      History of tonsillectomy      H/O colonoscopy  5 years ago          Vitals:   T(F): 99.4 (22 @ 12:00), Max: 99.4 (22 @ 12:00)  HR: 93 (22 @ 00:42)  BP: 103/52 (22 @ 19:00)  RR: 22 (22 @ 19:00)  SpO2: 100% (22 @ 00:42)  Mode: AC/ CMV (Assist Control/ Continuous Mandatory Ventilation), RR (machine): 14, TV (machine): 300, FiO2: 30, PEEP: 7, ITime: 1, MAP: 13, PIP: 34    Diet, NPO with Tube Feed:   Tube Feeding Modality: Orogastric  Vital High Protein  Total Volume for 24 Hours (mL): 720  Continuous  Until Goal Tube Feed Rate (mL per Hour): 30  Tube Feed Duration (in Hours): 24  Tube Feed Start Time: 18:30      Fluids:     I & O's:    22 @ 07:01  -  22 @ 07:00  --------------------------------------------------------  IN:    Enteral Tube Flush: 30 mL    FentaNYL: 12 mL    IV PiggyBack: 50 mL    Norepinephrine: 108.7 mL    Propofol: 103.9 mL    Vital HN: 720 mL  Total IN: 1024.6 mL    OUT:    Chest Tube (mL): 60 mL    Chest Tube (mL): 25 mL    Dexmedetomidine: 0 mL    Indwelling Catheter - Urethral (mL): 365 mL    Voided (mL): 50 mL  Total OUT: 500 mL    Total NET: 524.6 mL    Bowel Movement: : [] YES [] NO  Flatus: : [] YES [] NO    PHYSICAL EXAM:  General: NAD, intubated   Cardiac: RRR S1, S2,   Respiratory: CTAB, pigtail and chest tubes to suction on the right, -airleak. + subcutaneous emphysema   Abdomen: Soft, non-distended,    MEDICATIONS  (STANDING):  ALBUTerol    90 MICROgram(s) HFA Inhaler 8 Puff(s) Inhalation every 6 hours  cefepime   IVPB 1000 milliGRAM(s) IV Intermittent every 12 hours  chlorhexidine 0.12% Liquid 15 milliLiter(s) Oral Mucosa two times a day  chlorhexidine 4% Liquid 1 Application(s) Topical <User Schedule>  clonazePAM  Tablet 0.5 milliGRAM(s) Oral every 12 hours  dexMEDEtomidine Infusion 0.05 MICROgram(s)/kG/Hr (0.63 mL/Hr) IV Continuous <Continuous>  dextrose 5%. 1000 milliLiter(s) (100 mL/Hr) IV Continuous <Continuous>  dextrose 5%. 1000 milliLiter(s) (50 mL/Hr) IV Continuous <Continuous>  dextrose 50% Injectable 25 Gram(s) IV Push once  dextrose 50% Injectable 12.5 Gram(s) IV Push once  dextrose 50% Injectable 25 Gram(s) IV Push once  enoxaparin Injectable 40 milliGRAM(s) SubCutaneous every 24 hours  glucagon  Injectable 1 milliGRAM(s) IntraMuscular once  insulin glargine Injectable (LANTUS) 8 Unit(s) SubCutaneous at bedtime  insulin lispro (ADMELOG) corrective regimen sliding scale   SubCutaneous every 6 hours  ipratropium 17 MICROgram(s) HFA Inhaler 8 Puff(s) Inhalation every 6 hours  midodrine 5 milliGRAM(s) Oral every 8 hours  norepinephrine Infusion 0.05 MICROgram(s)/kG/Min (4.69 mL/Hr) IV Continuous <Continuous>  pantoprazole   Suspension 40 milliGRAM(s) Oral before breakfast  polyethylene glycol 3350 17 Gram(s) Oral daily  predniSONE   Tablet 20 milliGRAM(s) Oral daily  propofol Infusion 5.004 MICROgram(s)/kG/Min (1.21 mL/Hr) IV Continuous <Continuous>  QUEtiapine 75 milliGRAM(s) Oral every 12 hours  senna 2 Tablet(s) Oral at bedtime    MEDICATIONS  (PRN):  acetaminophen     Tablet .. 650 milliGRAM(s) Oral every 6 hours PRN Temp greater or equal to 38C (100.4F), Mild Pain (1 - 3)  dextrose Oral Gel 15 Gram(s) Oral once PRN Blood Glucose LESS THAN 70 milliGRAM(s)/deciliter  midazolam Injectable 2 milliGRAM(s) IV Push every 2 hours PRN severe agitation, inadequate sedation with propofol, precedex, fentanyl      DVT PROPHYLAXIS: enoxaparin Injectable 40 milliGRAM(s) SubCutaneous every 24 hours    GI PROPHYLAXIS: pantoprazole   Suspension 40 milliGRAM(s) Oral before breakfast    ANTICOAGULATION:   ANTIBIOTICS:  cefepime   IVPB 1000 milliGRAM(s)            LAB/STUDIES:  Labs:  CAPILLARY BLOOD GLUCOSE      POCT Blood Glucose.: 191 mg/dL (2022 00:40)  POCT Blood Glucose.: 231 mg/dL (2022 17:42)  POCT Blood Glucose.: 230 mg/dL (2022 11:51)  POCT Blood Glucose.: 142 mg/dL (2022 04:41)                          11.9   41.02 )-----------( 346      ( 2022 04:40 )             35.4       Auto Neutrophil %: 91.7 % (22 @ 04:40)  Auto Immature Granulocyte %: 1.8 % (22 @ 04:40)        140  |  101  |  58<H>  ----------------------------<  143<H>  4.6   |  36<H>  |  0.7      Calcium, Total Serum: 8.8 mg/dL (22 @ 04:40)      LFTs:             4.5  | 0.3  | 13       ------------------[51      ( 2022 04:40 )  2.6  | x    | 50          Lipase:x      Amylase:x         Blood Gas Arterial, Lactate: 1.00 mmol/L (22 @ 03:47)  Blood Gas Arterial, Lactate: 1.30 mmol/L (22 @ 16:08)  Blood Gas Arterial, Lactate: 1.50 mmol/L (22 @ 02:49)  Blood Gas Arterial, Lactate: 1.50 mmol/L (22 @ 03:23)    ABG - ( 2022 03:47 )  pH: 7.47  /  pCO2: 52    /  pO2: 83    / HCO3: 38    / Base Excess: 12.1  /  SaO2: 98.3            ABG - ( 2022 16:08 )  pH: 7.48  /  pCO2: 52    /  pO2: 85    / HCO3: 39    / Base Excess: 13.0  /  SaO2: 97.8            ABG - ( 2022 02:49 )  pH: 7.49  /  pCO2: 48    /  pO2: 105   / HCO3: 37    / Base Excess: 11.5  /  SaO2: 99.2              Coags:            Urinalysis Basic - ( 2022 09:40 )    Color: Yellow / Appearance: Slightly Turbid / S.035 / pH: x  Gluc: x / Ketone: Trace  / Bili: Negative / Urobili: <2 mg/dL   Blood: x / Protein: 100 mg/dL / Nitrite: Negative   Leuk Esterase: Moderate / RBC: 9 /HPF / WBC 10 /HPF   Sq Epi: x / Non Sq Epi: 5 /HPF / Bacteria: Few        Culture - Sputum (collected 2022 20:27)  Source: Trach Asp Tracheal Aspirate  Gram Stain (2022 06:35):    Moderate polymorphonuclear leukocytes per low power field    No Squamous epithelial cells per low power field    Few Gram positive cocci in pairs seen per oil power field  Preliminary Report (2022 18:56):    Normal Respiratory Desire present    Culture - Blood (collected 2022 11:26)  Source: .Blood None  Preliminary Report (2022 23:01):    No growth to date.      IMAGING:  < from: Xray Chest 1 View- PORTABLE-Routine (Xray Chest 1 View- PORTABLE-Routine in AM.) (22 @ 05:21) >    IMPRESSION:    No significant change since one day earlier    --- End of Report ---    < end of copied text >      ACCESS/ DEVICES:  [x ] Peripheral IV  [ ] Central Venous Line	[ ] R	[ ] L	[ ] IJ	[ ] Fem	[ ] SC	Placed:   [ ] Arterial Line		[ ] R	[ ] L	[ ] Fem	[ ] Rad	[ ] Ax	Placed:   [ ] PICC:					[ ] Mediport  [ ] Urinary Catheter,  Date Placed:   [ x] Chest tube: [ x] Right, [ ] Left  [ ] CRISTELA/Migel Drains

## 2022-06-22 NOTE — PROGRESS NOTE ADULT - SUBJECTIVE AND OBJECTIVE BOX
Patient is a 88y old  Female who presents with a chief complaint of respiratory distress (22 Jun 2022 09:10)      INTERVAL HPI/OVERNIGHT EVENTS:   Daughter made her DNR. States that she doesn't want a tracheostomy for her mother if she will not be viable.   Afebrile, hemodynamically stable     ICU Vital Signs Last 24 Hrs  T(C): 37.1 (22 Jun 2022 04:00), Max: 37.2 (22 Jun 2022 00:00)  T(F): 98.7 (22 Jun 2022 04:00), Max: 99 (22 Jun 2022 00:00)  HR: 108 (22 Jun 2022 11:15) (68 - 116)  BP: 117/65 (22 Jun 2022 11:15) (69/50 - 140/59)  BP(mean): 90 (22 Jun 2022 11:15) (55 - 98)  ABP: --  ABP(mean): --  RR: 32 (22 Jun 2022 11:15) (0 - 32)  SpO2: 99% (22 Jun 2022 11:15) (92% - 100%)    I&O's Summary    21 Jun 2022 07:01  -  22 Jun 2022 07:00  --------------------------------------------------------  IN: 1209.5 mL / OUT: 930 mL / NET: 279.5 mL    22 Jun 2022 07:01  -  22 Jun 2022 12:55  --------------------------------------------------------  IN: 268.9 mL / OUT: 125 mL / NET: 143.9 mL      Mode: AC/ CMV (Assist Control/ Continuous Mandatory Ventilation)  RR (machine): 14  TV (machine): 300  FiO2: 30  PEEP: 7  ITime: 1  MAP: 12  PIP: 30      LABS:                        11.7   48.54 )-----------( 382      ( 22 Jun 2022 05:10 )             34.8     06-22    140  |  99  |  64<HH>  ----------------------------<  196<H>  4.8   |  34<H>  |  0.7    Ca    9.0      22 Jun 2022 05:10  Mg     2.2     06-22    TPro  4.8<L>  /  Alb  2.4<L>  /  TBili  0.3  /  DBili  x   /  AST  13  /  ALT  34  /  AlkPhos  61  06-22        CAPILLARY BLOOD GLUCOSE      POCT Blood Glucose.: 124 mg/dL (22 Jun 2022 11:57)  POCT Blood Glucose.: 171 mg/dL (22 Jun 2022 07:58)  POCT Blood Glucose.: 191 mg/dL (22 Jun 2022 00:40)  POCT Blood Glucose.: 231 mg/dL (21 Jun 2022 17:42)    ABG - ( 22 Jun 2022 03:31 )  pH, Arterial: 7.47  pH, Blood: x     /  pCO2: 48    /  pO2: 77    / HCO3: 35    / Base Excess: 9.7   /  SaO2: 97.8                RADIOLOGY & ADDITIONAL TESTS:    Consultant(s) Notes Reviewed:  [x ] YES  [ ] NO    MEDICATIONS  (STANDING):  ALBUTerol    90 MICROgram(s) HFA Inhaler 8 Puff(s) Inhalation every 6 hours  caspofungin IVPB      cefepime   IVPB 1000 milliGRAM(s) IV Intermittent every 12 hours  chlorhexidine 0.12% Liquid 15 milliLiter(s) Oral Mucosa two times a day  chlorhexidine 4% Liquid 1 Application(s) Topical <User Schedule>  clonazePAM  Tablet 0.5 milliGRAM(s) Oral every 12 hours  dexMEDEtomidine Infusion 0.05 MICROgram(s)/kG/Hr (0.63 mL/Hr) IV Continuous <Continuous>  dextrose 5%. 1000 milliLiter(s) (100 mL/Hr) IV Continuous <Continuous>  dextrose 5%. 1000 milliLiter(s) (50 mL/Hr) IV Continuous <Continuous>  dextrose 50% Injectable 25 Gram(s) IV Push once  dextrose 50% Injectable 12.5 Gram(s) IV Push once  dextrose 50% Injectable 25 Gram(s) IV Push once  enoxaparin Injectable 40 milliGRAM(s) SubCutaneous every 24 hours  glucagon  Injectable 1 milliGRAM(s) IntraMuscular once  insulin glargine Injectable (LANTUS) 8 Unit(s) SubCutaneous at bedtime  insulin lispro (ADMELOG) corrective regimen sliding scale   SubCutaneous every 6 hours  ipratropium 17 MICROgram(s) HFA Inhaler 8 Puff(s) Inhalation every 6 hours  midazolam Infusion 0.02 mG/kG/Hr (0.81 mL/Hr) IV Continuous <Continuous>  midodrine 5 milliGRAM(s) Oral every 8 hours  norepinephrine Infusion 0.05 MICROgram(s)/kG/Min (4.69 mL/Hr) IV Continuous <Continuous>  pantoprazole   Suspension 40 milliGRAM(s) Oral before breakfast  polyethylene glycol 3350 17 Gram(s) Oral daily  predniSONE   Tablet 20 milliGRAM(s) Oral daily  QUEtiapine 75 milliGRAM(s) Oral every 12 hours  senna 2 Tablet(s) Oral at bedtime    MEDICATIONS  (PRN):  acetaminophen     Tablet .. 650 milliGRAM(s) Oral every 6 hours PRN Temp greater or equal to 38C (100.4F), Mild Pain (1 - 3)  dextrose Oral Gel 15 Gram(s) Oral once PRN Blood Glucose LESS THAN 70 milliGRAM(s)/deciliter  morphine  - Injectable 2 milliGRAM(s) IV Push every 6 hours PRN Severe Pain (7 - 10)      PHYSICAL EXAM:  GENERAL: Not Conscious  HEAD:  Atraumatic, Normocephalic  EYES: unable to assess  NECK: Supple, No JVD, Normal thyroid, no enlarged nodes  NERVOUS SYSTEM:  unconscious  CHEST/LUNG: no wheezing  HEART: S1S2 normal, no S3, Regular rate and rhythm; No murmurs  ABDOMEN: Soft, Nontender, Nondistended; Bowel sounds present  EXTREMITIES:  2+ Peripheral Pulses, No clubbing, cyanosis, or edema  LYMPH: No lymphadenopathy noted  SKIN: No rashes or lesions    Care Discussed with Consultants/Other Providers [ x] YES  [ ] NO

## 2022-06-22 NOTE — PROGRESS NOTE ADULT - ASSESSMENT
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  Switch Propofol to Versed.      HEENT: Oral care    PULMONARY:  HOB @ 45 degrees.  Vent changes: None.  ABG PRN.  Chest tube to water seal.   Daily CXR.  Nebs Q6 and PRN. Prednisone taper.      CARDIOVASCULAR:  Avoid overload.  Wean Levophed.      GI: GI prophylaxis.  OG Feeding.  Bowel regimen.  LFT.  Lipase.      RENAL:  Follow up lytes.  Correct as needed.     INFECTIOUS DISEASE:  Finish ABX course.  FU fungitell.  Start Caspofungin.      HEMATOLOGICAL:  DVT prophylaxis.     ENDOCRINE:  Follow up FS.  Insulin protocol if needed.  TG level noted    MUSCULOSKELETAL:  Bed rest     MICU monitoring     Trach VS Liberation.      R IJ 6/16

## 2022-06-22 NOTE — PROGRESS NOTE ADULT - SUBJECTIVE AND OBJECTIVE BOX
Patient is a 88y old  Female who presents with a chief complaint of respiratory distress (2022 00:49)        Over Night Events:  Remains critically ill on MV.  Sedated.  Remains on pressors.          ROS:     All ROS are negative except HPI         PHYSICAL EXAM    ICU Vital Signs Last 24 Hrs  T(C): 37.1 (2022 04:00), Max: 37.4 (2022 12:00)  T(F): 98.7 (2022 04:00), Max: 99.4 (2022 12:00)  HR: 88 (2022 07:40) (88 - 126)  BP: 138/71 (2022 07:00) (69/50 - 140/59)  BP(mean): 94 (2022 07:00) (55 - 98)  ABP: --  ABP(mean): --  RR: 22 (2022 07:00) (0 - 29)  SpO2: 99% (2022 07:40) (94% - 100%)      CONSTITUTIONAL:  Ill appearing in  NAD    ENT:   Airway patent,   Mouth with normal mucosa.   + ET     EYES:   Pupils equal,   Round and reactive to light.    CARDIAC:   Tachycardic   Regular rhythm.      RESPIRATORY:   No wheezing  Bilateral BS  Normal chest expansion  Not tachypneic,  No use of accessory muscles    GASTROINTESTINAL:  Abdomen soft,   Non-tender,   No guarding,   + BS    MUSCULOSKELETAL:   Range of motion is not limited,  No clubbing, cyanosis    NEUROLOGICAL:   Sedated   No motor  deficits.    SKIN:   Skin normal color for race,   No evidence of rash.    PSYCHIATRIC:   Sedated   No apparent risk to self or others.        22 @ 07:01  -  22 @ 07:00  --------------------------------------------------------  IN:    Enteral Tube Flush: 110 mL    IV PiggyBack: 50 mL    Norepinephrine: 85 mL    Propofol: 244.5 mL    Vital HN: 720 mL  Total IN: 1209.5 mL    OUT:    Chest Tube (mL): 10 mL    Chest Tube (mL): 15 mL    Indwelling Catheter - Urethral (mL): 905 mL    Voided (mL): 0 mL  Total OUT: 930 mL    Total NET: 279.5 mL          LABS:                            11.7   48.54 )-----------( 382      ( 2022 05:10 )             34.8                                                   140  |  99  |  64<HH>  ----------------------------<  196<H>  4.8   |  34<H>  |  0.7    Ca    9.0      2022 05:10  Mg     2.2         TPro  4.8<L>  /  Alb  2.4<L>  /  TBili  0.3  /  DBili  x   /  AST  13  /  ALT  34  /  AlkPhos  61                                               Urinalysis Basic - ( 2022 09:40 )    Color: Yellow / Appearance: Slightly Turbid / S.035 / pH: x  Gluc: x / Ketone: Trace  / Bili: Negative / Urobili: <2 mg/dL   Blood: x / Protein: 100 mg/dL / Nitrite: Negative   Leuk Esterase: Moderate / RBC: 9 /HPF / WBC 10 /HPF   Sq Epi: x / Non Sq Epi: 5 /HPF / Bacteria: Few                                                  LIVER FUNCTIONS - ( 2022 05:10 )  Alb: 2.4 g/dL / Pro: 4.8 g/dL / ALK PHOS: 61 U/L / ALT: 34 U/L / AST: 13 U/L / GGT: x                                                  Culture - Sputum (collected 2022 20:27)  Source: Trach Asp Tracheal Aspirate  Gram Stain (2022 06:35):    Moderate polymorphonuclear leukocytes per low power field    No Squamous epithelial cells per low power field    Few Gram positive cocci in pairs seen per oil power field  Preliminary Report (2022 18:56):    Normal Respiratory Desire present    Culture - Blood (collected 2022 11:26)  Source: .Blood None  Preliminary Report (2022 23:01):    No growth to date.                                                   Mode: AC/ CMV (Assist Control/ Continuous Mandatory Ventilation)  RR (machine): 14  TV (machine): 300  FiO2: 30  PEEP: 7  ITime: 1  MAP: 12  PIP: 30                                      ABG - ( 2022 03:31 )  pH, Arterial: 7.47  pH, Blood: x     /  pCO2: 48    /  pO2: 77    / HCO3: 35    / Base Excess: 9.7   /  SaO2: 97.8                MEDICATIONS  (STANDING):  ALBUTerol    90 MICROgram(s) HFA Inhaler 8 Puff(s) Inhalation every 6 hours  cefepime   IVPB 1000 milliGRAM(s) IV Intermittent every 12 hours  chlorhexidine 0.12% Liquid 15 milliLiter(s) Oral Mucosa two times a day  chlorhexidine 4% Liquid 1 Application(s) Topical <User Schedule>  clonazePAM  Tablet 0.5 milliGRAM(s) Oral every 12 hours  dexMEDEtomidine Infusion 0.05 MICROgram(s)/kG/Hr (0.63 mL/Hr) IV Continuous <Continuous>  dextrose 5%. 1000 milliLiter(s) (100 mL/Hr) IV Continuous <Continuous>  dextrose 5%. 1000 milliLiter(s) (50 mL/Hr) IV Continuous <Continuous>  dextrose 50% Injectable 25 Gram(s) IV Push once  dextrose 50% Injectable 12.5 Gram(s) IV Push once  dextrose 50% Injectable 25 Gram(s) IV Push once  enoxaparin Injectable 40 milliGRAM(s) SubCutaneous every 24 hours  glucagon  Injectable 1 milliGRAM(s) IntraMuscular once  insulin glargine Injectable (LANTUS) 8 Unit(s) SubCutaneous at bedtime  insulin lispro (ADMELOG) corrective regimen sliding scale   SubCutaneous every 6 hours  ipratropium 17 MICROgram(s) HFA Inhaler 8 Puff(s) Inhalation every 6 hours  midodrine 5 milliGRAM(s) Oral every 8 hours  norepinephrine Infusion 0.05 MICROgram(s)/kG/Min (4.69 mL/Hr) IV Continuous <Continuous>  pantoprazole   Suspension 40 milliGRAM(s) Oral before breakfast  polyethylene glycol 3350 17 Gram(s) Oral daily  predniSONE   Tablet 20 milliGRAM(s) Oral daily  propofol Infusion 5.004 MICROgram(s)/kG/Min (1.21 mL/Hr) IV Continuous <Continuous>  QUEtiapine 75 milliGRAM(s) Oral every 12 hours  senna 2 Tablet(s) Oral at bedtime    MEDICATIONS  (PRN):  acetaminophen     Tablet .. 650 milliGRAM(s) Oral every 6 hours PRN Temp greater or equal to 38C (100.4F), Mild Pain (1 - 3)  dextrose Oral Gel 15 Gram(s) Oral once PRN Blood Glucose LESS THAN 70 milliGRAM(s)/deciliter  midazolam Injectable 2 milliGRAM(s) IV Push every 2 hours PRN severe agitation, inadequate sedation with propofol, precedex, fentanyl      New X-rays reviewed:                                                                                  ECHO

## 2022-06-22 NOTE — PROGRESS NOTE ADULT - SUBJECTIVE AND OBJECTIVE BOX
pt remains intubated, sedated on versed, off propofol > 24h, min responsive  + norepi drip  abd soft, ND  2 chest tubes  + right IJ TLC  Vital Signs Last 24 Hrs  T(C): 37.1 (22 Jun 2022 16:00), Max: 37.2 (22 Jun 2022 00:00)  T(F): 98.8 (22 Jun 2022 16:00), Max: 99 (22 Jun 2022 00:00)  HR: 118 (22 Jun 2022 19:00) (68 - 124)  BP: 101/45 (22 Jun 2022 19:00) (64/40 - 138/71)  BP(mean): 59 (22 Jun 2022 19:00) (46 - 94)  RR: 25 (22 Jun 2022 19:00) (0 - 32)  SpO2: 100% (22 Jun 2022 19:00) (92% - 100%)    MEDICATIONS  (STANDING):  ALBUTerol    90 MICROgram(s) HFA Inhaler 8 Puff(s) Inhalation every 6 hours  caspofungin IVPB      cefepime   IVPB 1000 milliGRAM(s) IV Intermittent every 12 hours  chlorhexidine 0.12% Liquid 15 milliLiter(s) Oral Mucosa two times a day  chlorhexidine 4% Liquid 1 Application(s) Topical <User Schedule>  clonazePAM  Tablet 0.5 milliGRAM(s) Oral every 12 hours  dexMEDEtomidine Infusion 0.05 MICROgram(s)/kG/Hr (0.63 mL/Hr) IV Continuous <Continuous>  enoxaparin Injectable 40 milliGRAM(s) SubCutaneous every 24 hours  insulin glargine Injectable (LANTUS) 8 Unit(s) SubCutaneous at bedtime  insulin lispro (ADMELOG) corrective regimen sliding scale   SubCutaneous every 6 hours  ipratropium 17 MICROgram(s) HFA Inhaler 8 Puff(s) Inhalation every 6 hours  midazolam Infusion 0.02 mG/kG/Hr (0.81 mL/Hr) IV Continuous <Continuous>  midodrine 5 milliGRAM(s) Oral every 8 hours  norepinephrine Infusion 0.05 MICROgram(s)/kG/Min (4.69 mL/Hr) IV Continuous <Continuous>  pantoprazole   Suspension 40 milliGRAM(s) Oral before breakfast  polyethylene glycol 3350 17 Gram(s) Oral daily  predniSONE   Tablet 20 milliGRAM(s) Oral daily  QUEtiapine 75 milliGRAM(s) Oral every 12 hours  senna 2 Tablet(s) Oral at bedtime    Mode: AC/ CMV (Assist Control/ Continuous Mandatory Ventilation)  RR (machine): 14  TV (machine): 300  FiO2: 30  PEEP: 7  ITime: 1  MAP: 15  PIP: 28  ABG - ( 22 Jun 2022 03:31 )  pH, Arterial: 7.47  pH, Blood: x     /  pCO2: 48    /  pO2: 77    / HCO3: 35    / Base Excess: 9.7   /  SaO2: 97.8                          11.7   48.54 )-----------( 382      ( 22 Jun 2022 05:10 )             34.8   06-22    140  |  99  |  64<HH>  ----------------------------<  196<H>  4.8   |  34<H>  |  0.7    Ca    9.0      22 Jun 2022 05:10  Mg     2.2     06-22    TPro  4.8<L>  /  Alb  2.4<L>  /  TBili  0.3  /  DBili  x   /  AST  13  /  ALT  34  /  AlkPhos  61  06-22    Phosphorus Level, Serum: 3.6 mg/dL (06.18.22 @ 04:57)   < from: Xray Chest 1 View- PORTABLE-Routine (Xray Chest 1 View- PORTABLE-Routine in AM.) (06.22.22 @ 06:47) >  Support devices: ET tube, enteric tube, right IJ central venous catheter,   right chest tube and right pleural catheter are stable.    Cardiac/mediastinum/hilum: Stable.    Lung parenchyma/Pleura: No focal consolidation.There is no the   visualized pneumothorax.    < end of copied text >  CAPILLARY BLOOD GLUCOSE      POCT Blood Glucose.: 150 mg/dL (22 Jun 2022 17:07)  POCT Blood Glucose.: 124 mg/dL (22 Jun 2022 11:57)  POCT Blood Glucose.: 171 mg/dL (22 Jun 2022 07:58)  POCT Blood Glucose.: 191 mg/dL (22 Jun 2022 00:40)

## 2022-06-22 NOTE — CONSULT NOTE ADULT - CONSULT REASON
Right sided PTX
Goals of care and Symptom Management
ARF
Goals of care and Symptom Management
enteral feeding

## 2022-06-22 NOTE — CONSULT NOTE ADULT - NS ATTEND AMEND GEN_ALL_CORE FT
Agree with above, patient with agitation requiring IV sedation, on vent; family not open to palliative involvement at this time, but please reconsult PRN if they are amenable    Talat Senior MD  Palliative Care  [phone]
Agree with above, c/w IV morphine PRN, patient overall appears to have poor prognosis, ongoing GOC with daughter after she d/w pulmonary  ______________  Talat Senior MD  Palliative Medicine  [facility]   of Geriatric and Palliative Medicine  [phone]

## 2022-06-22 NOTE — CONSULT NOTE ADULT - ASSESSMENT
88yFemale being evaluated for goals of care and symptom management.       MEDD (morphine equivalent daily dose):      See Recs below.    Please call [phone] with questions or concerns 24/7.   We will continue to follow.    88yFemale being evaluated for goals of care and symptom management. Palliative care consulted for GOC. See GOC note.       MEDD (morphine equivalent daily dose): 6mg/24hrs       See Recs below.    Please call [phone] with questions or concerns 24/7.   We will continue to follow.    88yFemale being evaluated for goals of care and symptom management. Palliative care consulted for GOC. See GOC note.       MEDD (morphine equivalent daily dose): 6mg/24hrs       See Recs below.    Please call [phone] with questions or concerns 24/7.   We will continue to follow.

## 2022-06-23 NOTE — PROGRESS NOTE ADULT - CRITICAL CARE SERVICES PROVIDED
Patient is critically ill, requiring critical care services.

## 2022-06-23 NOTE — PROGRESS NOTE ADULT - ASSESSMENT
88yFemale being evaluated for goals of care and symptom management. Palliative care consulted for GOC.    MEDD (morphine equivalent daily dose): 6mg/24hrs       See Recs below.    Please call [phone] with questions or concerns 24/7.   We will continue to follow.

## 2022-06-23 NOTE — PROGRESS NOTE ADULT - SUBJECTIVE AND OBJECTIVE BOX
ICU Transfer Note    Transfer from:     Transfer to: (  ) Medicine    (  ) Telemetry    (  ) RCU                               (  ) Palliative    (  ) Stroke Unit    (  ) MICU    (  ) CCU (X) CEU    Signout given to:     ----------------------------------------------------------------------------------------------------------  HPI / ICU COURSE:   89 y/o female     PMHx of COPD not on home O2, pHTN, GERD, Lucio's esophagus, COVID vaccinated     presented to the ED for acute onset of sob.     As per nurse aide, pt was asymptomatic sitting on the couch, when she became acutely sob. pt was brought to ED and was tachypneic and answering questions with one word answers, and struggling to breath.    In the ED, pt was placed on bipap immediately and symptoms improved and pt looks more comfortable. ABG initially shows pCO2 115 and was placed on bipap and soon after repeat pco2 was 100. per family and patient, patient is full code and decision was made to intubate the patient. Labs are overall unremarkable except wbc 19K. She received duoneb, Mg and solumedrol 125 mg x1 in ED.     6/22/22 - Patient switched from propofol to Versed 100 mg.        Pressors during ICU course: Levophed8 mg  Antibiotics: Azithromycin 500 mg Q24, Cefepime 1000mg Q12, Caspofungin 50 mg Q24    Guzman: Urinary  --------------------------------------------------------------------------------------------------------  PMH/PSH:  PAST MEDICAL & SURGICAL HISTORY:  COPD (chronic obstructive pulmonary disease)    GERD (gastroesophageal reflux disease)    Glaucoma    Anxiety    Pancreatitis    History of tonsillectomy    H/O colonoscopy  5 years ago        -------------------------------------------------------------------------------------------------------  PHYSICAL EXAM:  General: NAD  HEENT: Atraumatic  Respiratory: CTAB  Cardiac: RRR  Abdomen: soft, non-tender, non-distended  Extremities: warm and well-perfused  Neuro: A+Ox4. No FND    Vital Signs Last 24 Hrs  T(C): 36.2 (23 Jun 2022 08:00), Max: 37.8 (22 Jun 2022 20:00)  T(F): 97.1 (23 Jun 2022 08:00), Max: 100 (22 Jun 2022 20:00)  HR: 90 (23 Jun 2022 11:30) (88 - 140)  BP: 96/42 (23 Jun 2022 11:30) (64/40 - 150/59)  BP(mean): 54 (23 Jun 2022 11:30) (46 - 92)  RR: 15 (23 Jun 2022 11:30) (0 - 36)  SpO2: 98% (23 Jun 2022 11:30) (97% - 100%)    I&O's Summary    22 Jun 2022 07:01  -  23 Jun 2022 07:00  --------------------------------------------------------  IN: 1262.6 mL / OUT: 1050 mL / NET: 212.6 mL    23 Jun 2022 07:01  -  23 Jun 2022 11:56  --------------------------------------------------------  IN: 181 mL / OUT: 190 mL / NET: -9 mL        --------------------------------------------------------------------------------------------------------  LABS:                               10.2   35.81 )-----------( 340      ( 23 Jun 2022 05:40 )             30.2       06-23    143  |  105  |  66<HH>  ----------------------------<  220<H>  4.8   |  35<H>  |  0.7    Ca    8.7      23 Jun 2022 05:40  Mg     2.2     06-23    TPro  4.1<L>  /  Alb  2.2<L>  /  TBili  0.4  /  DBili  x   /  AST  11  /  ALT  22  /  AlkPhos  57  06-23          ABG - ( 22 Jun 2022 03:31 )  pH, Arterial: 7.47  pH, Blood: x     /  pCO2: 48    /  pO2: 77    / HCO3: 35    / Base Excess: 9.7   /  SaO2: 97.8              ---------------------------------------------------------------------------------------------------  ASSESSMENT & PLAN:   89 y/o female with PMHx of COPD on home O2, pHTN, GERD, Lucio's esophagus, COVID vaccinated presented to the ED for acute onset of sob.    # Acute on chronic hypercapnic respiratory failure s/p intubation  # COPD exacerbation  # HO COPD, O2 dependent  # R secondary spontaneous pneumothorax s/p pigtail  - presented to ED with SOB, found to be hypercapnic to 115, intubated  - post intubation CXR showed R PTX s/p R pigtail catheter, dislodged, replaced 6/15  - CTSX following  - cont solumedrol @ 60q12  - Nebs q4 and PRN  - vent changes per pulm: none today  - sedation with propofol - attempt SAT/SBT on propofol  - increase seroquel to 50mg BID, monitor qtc  - BCX x2 NGTD, UCX neg, RVP neg, urine strep/legionella neg  - daily ABG, CXR, SAT/SBT    # H/o HTN  # H/o HFmrEF (45%)  # H/o mild pHTN  - on amlodipine 5mg qd at home -- held for now    # GERD  # Lucio's Esophagus  - PPI    # Platelets downtrending, resolved  - has received LMWH  - HIT ab negative    # Elevated D-dimer  - d-dimer 579  - LE duplex negative    Guzman 6/14  RIJ TLC 6/16    PT/REHAB: n/a  ACTIVITY: Activity - Bedrest  DVT PPX: enoxaparin Injectable  GI PPX: pantoprazole  DIET: Diet, NPO with Tube Feed  CODE: Full  Disposition: MICU  Pending: SAT/SBT

## 2022-06-23 NOTE — PROGRESS NOTE ADULT - SUBJECTIVE AND OBJECTIVE BOX
HPI:  87 y/o female with PMHx of COPD not on home O2, pHTN, GERD, Lucio's esophagus, COVID vaccinated presented to the ED for acute onset of sob. As per nurse aide, pt was asymptomatic sitting on the couch, when she became acutely sob. pt was brought to ED and was tachypneic and answering questions with one word answers, and struggling to breath. (10 Crow 2022 01:25)    INTERVAL EVENTS:  6/23: patient remains intubated    ADVANCE DIRECTIVES:    DNR  MOLST  [ ]  Living Will  [ ]   DECISION MAKER(s):  [ ] Health Care Proxy(s)  [x ] Surrogate(s)  [ ] Guardian           Name(s): Phone Number(s): Jose M Vaughan daughter     BASELINE (I)ADL(s) (prior to admission):  [place]: [ ]Total  [ ] Moderate [ ]Dependent  Palliative Performance Status Version 2:         %    [email or web address]/[email or web address]    Allergies    No Known Allergies    Intolerances    MEDICATIONS  (STANDING):  ALBUTerol    90 MICROgram(s) HFA Inhaler 8 Puff(s) Inhalation every 6 hours  caspofungin IVPB      caspofungin IVPB 50 milliGRAM(s) IV Intermittent every 24 hours  cefepime   IVPB 1000 milliGRAM(s) IV Intermittent every 12 hours  chlorhexidine 0.12% Liquid 15 milliLiter(s) Oral Mucosa two times a day  chlorhexidine 4% Liquid 1 Application(s) Topical <User Schedule>  clonazePAM  Tablet 0.5 milliGRAM(s) Oral every 12 hours  dexMEDEtomidine Infusion 0.05 MICROgram(s)/kG/Hr (0.63 mL/Hr) IV Continuous <Continuous>  dextrose 5%. 1000 milliLiter(s) (50 mL/Hr) IV Continuous <Continuous>  dextrose 5%. 1000 milliLiter(s) (100 mL/Hr) IV Continuous <Continuous>  dextrose 50% Injectable 25 Gram(s) IV Push once  dextrose 50% Injectable 12.5 Gram(s) IV Push once  dextrose 50% Injectable 25 Gram(s) IV Push once  enoxaparin Injectable 40 milliGRAM(s) SubCutaneous every 24 hours  fentaNYL   Infusion. 0.5 MICROgram(s)/kG/Hr (2.02 mL/Hr) IV Continuous <Continuous>  glucagon  Injectable 1 milliGRAM(s) IntraMuscular once  insulin glargine Injectable (LANTUS) 8 Unit(s) SubCutaneous at bedtime  insulin lispro (ADMELOG) corrective regimen sliding scale   SubCutaneous every 6 hours  ipratropium 17 MICROgram(s) HFA Inhaler 8 Puff(s) Inhalation every 6 hours  midazolam Infusion 0.02 mG/kG/Hr (0.81 mL/Hr) IV Continuous <Continuous>  midodrine 10 milliGRAM(s) Oral every 8 hours  norepinephrine Infusion 0.05 MICROgram(s)/kG/Min (4.69 mL/Hr) IV Continuous <Continuous>  pantoprazole   Suspension 40 milliGRAM(s) Oral before breakfast  polyethylene glycol 3350 17 Gram(s) Oral daily  predniSONE   Tablet 20 milliGRAM(s) Oral daily  QUEtiapine 75 milliGRAM(s) Oral every 12 hours  senna 2 Tablet(s) Oral at bedtime    MEDICATIONS  (PRN):  acetaminophen     Tablet .. 650 milliGRAM(s) Oral every 6 hours PRN Temp greater or equal to 38C (100.4F), Mild Pain (1 - 3)  dextrose Oral Gel 15 Gram(s) Oral once PRN Blood Glucose LESS THAN 70 milliGRAM(s)/deciliter  morphine  - Injectable 2 milliGRAM(s) IV Push every 6 hours PRN Severe Pain (7 - 10)      PRESENT SYMPTOMS: [x ]Unable to obtain due to poor mentation   Source if other than patient:  [ ]Family   [ ]Team     Pain: [ ]yes [ ]no  QOL impact -   Location -                    Aggravating factors -  Quality -  Radiation -  Timing-  Severity (0-10 scale):  Minimal acceptable level (0-10 scale):     CPOT:    https://www.sccm.org/getattachment/sxy43l51-4n4r-0r8s-8e5q-3613r9196l5l/Critical-Care-Pain-Observation-Tool-(CPOT)      PAIN AD Score:     [email or web address]/[email or web address] (press ctrl +  left click to view)    Dyspnea:                           [ ]Mild [ ]Moderate [ ]Severe  Anxiety:                             [ ]Mild [ ]Moderate [ ]Severe  Fatigue:                             [ ]Mild [ ]Moderate [ ]Severe  Nausea:                             [ ]Mild [ ]Moderate [ ]Severe  Loss of appetite:              [ ]Mild [ ]Moderate [ ]Severe  Constipation:                    [ ]Mild [ ]Moderate [ ]Severe    Other Symptoms:  [ ]All other review of systems negative     Palliative Performance Status Version 2:         %    http://npcrc.org/files/news/palliative_performance_scale_ppsv2.pdf    PHYSICAL EXAM:  Vital Signs Last 24 Hrs  T(C): 36.4 (23 Jun 2022 16:00), Max: 37.8 (22 Jun 2022 20:00)  T(F): 97.6 (23 Jun 2022 16:00), Max: 100 (22 Jun 2022 20:00)  HR: 86 (23 Jun 2022 16:00) (86 - 140)  BP: 118/44 (23 Jun 2022 16:00) (64/40 - 150/59)  BP(mean): 67 (23 Jun 2022 16:00) (46 - 92)  RR: 24 (23 Jun 2022 16:00) (0 - 36)  SpO2: 100% (23 Jun 2022 16:00) (97% - 100%)    GENERAL:  [ ]Alert  [ ]Oriented x   [ ]Lethargic  [ ]Cachexia  [x ]Unarousable  [ ]Verbal  [ ]Non-Verbal  Behavioral:  unable to assess   [ ] Anxiety  [ ] Delirium [ ] Agitation [ ] Other  HEENT:  [ ]Normal   [ ]Dry mouth   [x ]ET Tube/Trach  [ ]Oral lesions  PULMONARY:   [ ]Clear [x ]Tachypnea  [ ]Audible excessive secretions   [ ]Rhonchi        [ ]Right [ ]Left [ ]Bilateral  [ ]Crackles        [ ]Right [ ]Left [ ]Bilateral  [ ]Wheezing     [ ]Right [ ]Left [ ]Bilateral  [ x]Diminished breath sounds [ ]right [ ]left [ ]bilateral  CARDIOVASCULAR:    [ ]Regular [ ]Irregular [x ]Tachy  [ ]Lucas [ ]Murmur [ ]Other  GASTROINTESTINAL:  [ ]Soft  [ ]Distended   [ ]+BS  [ ]Non tender [ ]Tender  [ ]PEG [ x]OGT/ NGT  Last BM: 6/22   GENITOURINARY:  [ ]Normal [ ] Incontinent   [ ]Oliguria/Anuria   [x]Guzman  MUSCULOSKELETAL:   [ ]Normal   [x ]Weakness  [ ]Bed/Wheelchair bound [ ]Edema  NEUROLOGIC:   [ ]No focal deficits  [ ]Cognitive impairment  [ ]Dysphagia [ ]Dysarthria [ ]Paresis [ ]Other   SKIN:   [x ]Normal    [ ]Rash  [ ]Pressure ulcer(s)       Present on admission [ ]y [ ]n    CRITICAL CARE:  [x ] Shock Present  [ ]Septic [ ]Cardiogenic [ ]Neurologic [ ]Hypovolemic  [ ]  Vasopressors [ ]  Inotropes   [x ]Respiratory failure present [x ]Mechanical ventilation [ ]Non-invasive ventilatory support [ ]High flow  [ ]Acute  [ ]Chronic [ ]Hypoxic  [ ]Hypercarbic [ ]Other  [ ]Other organ failure     LABS:                                   10.2   35.81 )-----------( 340      ( 23 Jun 2022 05:40 )             30.2     06-23    143  |  105  |  66<HH>  ----------------------------<  220<H>  4.8   |  35<H>  |  0.7    Ca    8.7      23 Jun 2022 05:40  Mg     2.2     06-23        RADIOLOGY & ADDITIONAL STUDIES:    PROTEIN CALORIE MALNUTRITION PRESENT: [ ]mild [ ]moderate [ ]severe [ ]underweight [ ]morbid obesity  https://www.andeal.org/vault/2440/web/files/ONC/Table_Clinical%20Characteristics%20to%20Document%20Malnutrition-White%20JV%20et%20al%202012.pdf    Height (cm): 147.3 (06-10-22 @ 22:45), 147.3 (12-18-21 @ 20:05)  Weight (kg): 40.3 (06-10-22 @ 22:45)  BMI (kg/m2): 18.6 (06-10-22 @ 22:45)    [ ]PPSV2 < or = to 30% [ ]significant weight loss  [ ]poor nutritional intake  [ ]anasarca      [ ]Artificial Nutrition      REFERRALS:   [ ]Chaplaincy  [ ]Hospice  [ ]Child Life  x ]Social Work  [ ]Case management [ ]Holistic Therapy     Goals of Care Document:

## 2022-06-23 NOTE — PROGRESS NOTE ADULT - ASSESSMENT
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  Add fentanyl     HEENT: Oral care    PULMONARY:  HOB @ 45 degrees.  Vent changes: None.  ABG PRN.  Chest tube to water seal.   Daily CXR.  Nebs Q6 and PRN. Prednisone taper.      CARDIOVASCULAR:  Avoid overload.        GI: GI prophylaxis.  OG Feeding.  Bowel regimen.  LFT and Lipase neg.      RENAL:  Follow up lytes.  Correct as needed.     INFECTIOUS DISEASE:  Finish ABX course.  Fungitell neg.  Continue  Caspofungin.      HEMATOLOGICAL:  DVT prophylaxis.     ENDOCRINE:  Follow up FS.  Insulin protocol if needed.     MUSCULOSKELETAL:  Bed rest     Downgrade to CEU    DC IJ

## 2022-06-23 NOTE — PROGRESS NOTE ADULT - ATTENDING COMMENTS
Ms. Peralta is an 88-year-old female admitted with diagnosis of COPD exacerbation, required intubation and ventilatory support, developed large right pneumothorax treated with percutaneous pleural drain with adequate lung re-expansion. Thoracic surgery consulted for assistance in care. Currently in low ventilatory support. CXR: drain in good position, no residual pneumothorax.    06/14/12: Pleural drain working well, no air leak, CXR: no pneumothorax. Failed SBT yesterday, SBT today for possible extubation, will keep pleural drain until extubated   06/15/22: Pleural drain dislodged into chest wall, CXR recurrent pneumothorax   06/16/22: New pleural drain in good position, resolved pneumothorax, no air leak. Unable to wean off ventilator   06/17/22: Drain I good position, no air leak. CXR no pneumothorax.  06/21/22: Additional chest tube placed over the weekend for massive subcutaneous emphysema which is now improving, small intermittent air leak  06/22/22: Stable, improving subcutaneous emphysema, pleural drains in place.  06/23/22: Stable, CXR no pneumothorax  Plan:    Pleural drains to water seal, remove pig tail catheter, keep chest tube

## 2022-06-23 NOTE — PROGRESS NOTE ADULT - ASSESSMENT
88y F admitted with respiratory failure now with subcutaneous emphysema, right pneumothorax, s/p pigtail placement and chest tube placement     PLAN:  Chest tube and pigtail to WS as per ICU  Daily AM cxr  follow up Chest tubes output  monitor for airleak  monitor subcutaneous emphysema   monitor O2 sat  pain control  SCDs  DVT/GI prophylaxis  follow up palliative discussion for possible trach and peg  wean off pressors as tolerated  consider ID consult for leukocytosis     spectra [id number]

## 2022-06-23 NOTE — PROGRESS NOTE ADULT - SUBJECTIVE AND OBJECTIVE BOX
Patient is a 88y old  Female who presents with a chief complaint of respiratory distress (23 Jun 2022 01:27)        Over Night Events:  Remains critically ill on MV.  Sedated.  Off pressors.          ROS:     All ROS are negative except HPI         PHYSICAL EXAM    ICU Vital Signs Last 24 Hrs  T(C): 37.2 (23 Jun 2022 04:00), Max: 37.8 (22 Jun 2022 20:00)  T(F): 98.9 (23 Jun 2022 04:00), Max: 100 (22 Jun 2022 20:00)  HR: 115 (23 Jun 2022 07:35) (91 - 140)  BP: 150/59 (23 Jun 2022 07:00) (64/40 - 150/59)  BP(mean): 80 (23 Jun 2022 07:00) (46 - 92)  ABP: --  ABP(mean): --  RR: 31 (23 Jun 2022 07:00) (8 - 36)  SpO2: 100% (23 Jun 2022 07:35) (92% - 100%)      CONSTITUTIONAL:  Ill appearing in  NAD    ENT:   Airway patent,   Mouth with normal mucosa.       EYES:   Pupils equal,   Round and reactive to light.    CARDIAC:   Normal rate,   Regular rhythm.      RESPIRATORY:   No wheezing  Bilateral BS  Normal chest expansion  Not tachypneic,  No use of accessory muscles    GASTROINTESTINAL:  Abdomen soft,   Non-tender,   No guarding,   + BS    MUSCULOSKELETAL:   Range of motion is not limited,  No clubbing, cyanosis    NEUROLOGICAL:   Sedated   No motor  deficits.    SKIN:   Skin normal color for race,   No evidence of rash.    PSYCHIATRIC:   Sedated   No apparent risk to self or others.        06-22-22 @ 07:01  -  06-23-22 @ 07:00  --------------------------------------------------------  IN:    Enteral Tube Flush: 90 mL    IV PiggyBack: 150 mL    Midazolam: 80 mL    Norepinephrine: 155.4 mL    Peptamen A.F.: 385 mL    Propofol: 12.2 mL    Vital HN: 390 mL  Total IN: 1262.6 mL    OUT:    Chest Tube (mL): 5 mL    Chest Tube (mL): 5 mL    Indwelling Catheter - Urethral (mL): 1040 mL  Total OUT: 1050 mL    Total NET: 212.6 mL          LABS:                            10.2   35.81 )-----------( 340      ( 23 Jun 2022 05:40 )             30.2                                               06-23    143  |  105  |  66<HH>  ----------------------------<  220<H>  4.8   |  35<H>  |  0.7    Ca    8.7      23 Jun 2022 05:40  Mg     2.2     06-23    TPro  4.1<L>  /  Alb  2.2<L>  /  TBili  0.4  /  DBili  x   /  AST  11  /  ALT  22  /  AlkPhos  57  06-23                                                                                           LIVER FUNCTIONS - ( 23 Jun 2022 05:40 )  Alb: 2.2 g/dL / Pro: 4.1 g/dL / ALK PHOS: 57 U/L / ALT: 22 U/L / AST: 11 U/L / GGT: x                                                  Culture - Sputum (collected 20 Jun 2022 20:27)  Source: Trach Asp Tracheal Aspirate  Gram Stain (21 Jun 2022 06:35):    Moderate polymorphonuclear leukocytes per low power field    No Squamous epithelial cells per low power field    Few Gram positive cocci in pairs seen per oil power field  Final Report (22 Jun 2022 16:49):    Normal Respiratory Desire present    Culture - Blood (collected 20 Jun 2022 11:26)  Source: .Blood None  Preliminary Report (21 Jun 2022 23:01):    No growth to date.                                                   Mode: AC/ CMV (Assist Control/ Continuous Mandatory Ventilation)  RR (machine): 14  TV (machine): 300  FiO2: 30  PEEP: 7  ITime: 1  MAP: 14  PIP: 31              SAt 99    MEDICATIONS  (STANDING):  ALBUTerol    90 MICROgram(s) HFA Inhaler 8 Puff(s) Inhalation every 6 hours  caspofungin IVPB      caspofungin IVPB 50 milliGRAM(s) IV Intermittent every 24 hours  cefepime   IVPB 1000 milliGRAM(s) IV Intermittent every 12 hours  chlorhexidine 0.12% Liquid 15 milliLiter(s) Oral Mucosa two times a day  chlorhexidine 4% Liquid 1 Application(s) Topical <User Schedule>  clonazePAM  Tablet 0.5 milliGRAM(s) Oral every 12 hours  dexMEDEtomidine Infusion 0.05 MICROgram(s)/kG/Hr (0.63 mL/Hr) IV Continuous <Continuous>  dextrose 5%. 1000 milliLiter(s) (50 mL/Hr) IV Continuous <Continuous>  dextrose 5%. 1000 milliLiter(s) (100 mL/Hr) IV Continuous <Continuous>  dextrose 50% Injectable 25 Gram(s) IV Push once  dextrose 50% Injectable 12.5 Gram(s) IV Push once  dextrose 50% Injectable 25 Gram(s) IV Push once  enoxaparin Injectable 40 milliGRAM(s) SubCutaneous every 24 hours  glucagon  Injectable 1 milliGRAM(s) IntraMuscular once  insulin glargine Injectable (LANTUS) 8 Unit(s) SubCutaneous at bedtime  insulin lispro (ADMELOG) corrective regimen sliding scale   SubCutaneous every 6 hours  ipratropium 17 MICROgram(s) HFA Inhaler 8 Puff(s) Inhalation every 6 hours  midazolam Infusion 0.02 mG/kG/Hr (0.81 mL/Hr) IV Continuous <Continuous>  midodrine 5 milliGRAM(s) Oral every 8 hours  norepinephrine Infusion 0.05 MICROgram(s)/kG/Min (4.69 mL/Hr) IV Continuous <Continuous>  pantoprazole   Suspension 40 milliGRAM(s) Oral before breakfast  polyethylene glycol 3350 17 Gram(s) Oral daily  predniSONE   Tablet 20 milliGRAM(s) Oral daily  QUEtiapine 75 milliGRAM(s) Oral every 12 hours  senna 2 Tablet(s) Oral at bedtime    MEDICATIONS  (PRN):  acetaminophen     Tablet .. 650 milliGRAM(s) Oral every 6 hours PRN Temp greater or equal to 38C (100.4F), Mild Pain (1 - 3)  dextrose Oral Gel 15 Gram(s) Oral once PRN Blood Glucose LESS THAN 70 milliGRAM(s)/deciliter  morphine  - Injectable 2 milliGRAM(s) IV Push every 6 hours PRN Severe Pain (7 - 10)      New X-rays reviewed:                                                                                  ECHO

## 2022-06-23 NOTE — PROGRESS NOTE ADULT - SUBJECTIVE AND OBJECTIVE BOX
GENERAL SURGERY PROGRESS NOTE    Patient: KIMBERLI LEIGH , 88y ([birth year])Female   MRN: [id number]  Location: [facility]  A  Visit: 06-10-22 Inpatient  Date: 06-23-22 @ 01:28      Procedure/Dx/Injuries: R Pneumothorax, SQ emphysema    Events of past 24 hours: no acute events, levo restarted    PAST MEDICAL & SURGICAL HISTORY:  COPD (chronic obstructive pulmonary disease)      GERD (gastroesophageal reflux disease)      Glaucoma      Anxiety      Pancreatitis      History of tonsillectomy      H/O colonoscopy  5 years ago          Vitals:   T(F): 100 (06-22-22 @ 20:00), Max: 100 (06-22-22 @ 20:00)  HR: 124 (06-22-22 @ 22:00)  BP: 127/62 (06-22-22 @ 22:00)  RR: 29 (06-22-22 @ 22:00)  SpO2: 100% (06-22-22 @ 22:00)  Mode: AC/ CMV (Assist Control/ Continuous Mandatory Ventilation), RR (machine): 14, TV (machine): 300, FiO2: 30, PEEP: 7, ITime: 1, MAP: 14, PIP: 31    Diet, NPO with Tube Feed:   Tube Feeding Modality: Orogastric  Peptamen A.F. Formula  Total Volume for 24 Hours (mL): 840  Continuous  Until Goal Tube Feed Rate (mL per Hour): 35  Tube Feed Duration (in Hours): 24  Tube Feed Start Time: 21:00      Fluids:     I & O's:    06-21-22 @ 07:01  -  06-22-22 @ 07:00  --------------------------------------------------------  IN:    Enteral Tube Flush: 110 mL    IV PiggyBack: 50 mL    Norepinephrine: 85 mL    Propofol: 244.5 mL    Vital HN: 720 mL  Total IN: 1209.5 mL    OUT:    Chest Tube (mL): 10 mL    Chest Tube (mL): 15 mL    Indwelling Catheter - Urethral (mL): 905 mL    Voided (mL): 0 mL  Total OUT: 930 mL    Total NET: 279.5 mL          PHYSICAL EXAM:  General Appearance: NAD, intubated, SQ emphysema improving  Heart: RRR  Lungs: no increased work of breathing, R CT and pigtail x WS  Abdomen:  soft, non tender  MSK/Extremities:  no cyanosis      MEDICATIONS  (STANDING):  ALBUTerol    90 MICROgram(s) HFA Inhaler 8 Puff(s) Inhalation every 6 hours  caspofungin IVPB      caspofungin IVPB 50 milliGRAM(s) IV Intermittent every 24 hours  cefepime   IVPB 1000 milliGRAM(s) IV Intermittent every 12 hours  chlorhexidine 0.12% Liquid 15 milliLiter(s) Oral Mucosa two times a day  chlorhexidine 4% Liquid 1 Application(s) Topical <User Schedule>  clonazePAM  Tablet 0.5 milliGRAM(s) Oral every 12 hours  dexMEDEtomidine Infusion 0.05 MICROgram(s)/kG/Hr (0.63 mL/Hr) IV Continuous <Continuous>  dextrose 5%. 1000 milliLiter(s) (50 mL/Hr) IV Continuous <Continuous>  dextrose 5%. 1000 milliLiter(s) (100 mL/Hr) IV Continuous <Continuous>  dextrose 50% Injectable 12.5 Gram(s) IV Push once  dextrose 50% Injectable 25 Gram(s) IV Push once  dextrose 50% Injectable 25 Gram(s) IV Push once  enoxaparin Injectable 40 milliGRAM(s) SubCutaneous every 24 hours  glucagon  Injectable 1 milliGRAM(s) IntraMuscular once  insulin glargine Injectable (LANTUS) 8 Unit(s) SubCutaneous at bedtime  insulin lispro (ADMELOG) corrective regimen sliding scale   SubCutaneous every 6 hours  ipratropium 17 MICROgram(s) HFA Inhaler 8 Puff(s) Inhalation every 6 hours  midazolam Infusion 0.02 mG/kG/Hr (0.81 mL/Hr) IV Continuous <Continuous>  midodrine 5 milliGRAM(s) Oral every 8 hours  norepinephrine Infusion 0.05 MICROgram(s)/kG/Min (4.69 mL/Hr) IV Continuous <Continuous>  pantoprazole   Suspension 40 milliGRAM(s) Oral before breakfast  polyethylene glycol 3350 17 Gram(s) Oral daily  predniSONE   Tablet 20 milliGRAM(s) Oral daily  QUEtiapine 75 milliGRAM(s) Oral every 12 hours  senna 2 Tablet(s) Oral at bedtime    MEDICATIONS  (PRN):  acetaminophen     Tablet .. 650 milliGRAM(s) Oral every 6 hours PRN Temp greater or equal to 38C (100.4F), Mild Pain (1 - 3)  dextrose Oral Gel 15 Gram(s) Oral once PRN Blood Glucose LESS THAN 70 milliGRAM(s)/deciliter  morphine  - Injectable 2 milliGRAM(s) IV Push every 6 hours PRN Severe Pain (7 - 10)      DVT PROPHYLAXIS: enoxaparin Injectable 40 milliGRAM(s) SubCutaneous every 24 hours    GI PROPHYLAXIS: pantoprazole   Suspension 40 milliGRAM(s) Oral before breakfast    ANTICOAGULATION:   ANTIBIOTICS:  caspofungin IVPB    caspofungin IVPB 50 milliGRAM(s)  cefepime   IVPB 1000 milliGRAM(s)            LAB/STUDIES:  Labs:  CAPILLARY BLOOD GLUCOSE      POCT Blood Glucose.: 212 mg/dL (22 Jun 2022 21:49)  POCT Blood Glucose.: 514 mg/dL (22 Jun 2022 21:46)  POCT Blood Glucose.: 234 mg/dL (22 Jun 2022 21:43)  POCT Blood Glucose.: 150 mg/dL (22 Jun 2022 17:07)  POCT Blood Glucose.: 124 mg/dL (22 Jun 2022 11:57)  POCT Blood Glucose.: 171 mg/dL (22 Jun 2022 07:58)                          11.7   48.54 )-----------( 382      ( 22 Jun 2022 05:10 )             34.8       Auto Neutrophil %: 86.7 % (06-22-22 @ 05:10)  Band Neutrophils %: 4.4 % (06-22-22 @ 05:10)    06-22    140  |  99  |  64<HH>  ----------------------------<  196<H>  4.8   |  34<H>  |  0.7      Calcium, Total Serum: 9.0 mg/dL (06-22-22 @ 05:10)      LFTs:             4.8  | 0.3  | 13       ------------------[61      ( 22 Jun 2022 05:10 )  2.4  | x    | 34          Lipase:x      Amylase:x         Blood Gas Arterial, Lactate: 0.90 mmol/L (06-22-22 @ 03:31)  Blood Gas Arterial, Lactate: 1.00 mmol/L (06-21-22 @ 03:47)  Blood Gas Arterial, Lactate: 1.30 mmol/L (06-20-22 @ 16:08)  Blood Gas Arterial, Lactate: 1.50 mmol/L (06-20-22 @ 02:49)    ABG - ( 22 Jun 2022 03:31 )  pH: 7.47  /  pCO2: 48    /  pO2: 77    / HCO3: 35    / Base Excess: 9.7   /  SaO2: 97.8            ABG - ( 21 Jun 2022 03:47 )  pH: 7.47  /  pCO2: 52    /  pO2: 83    / HCO3: 38    / Base Excess: 12.1  /  SaO2: 98.3            ABG - ( 20 Jun 2022 16:08 )  pH: 7.48  /  pCO2: 52    /  pO2: 85    / HCO3: 39    / Base Excess: 13.0  /  SaO2: 97.8              Coags:                Culture - Sputum (collected 20 Jun 2022 20:27)  Source: Trach Asp Tracheal Aspirate  Gram Stain (21 Jun 2022 06:35):    Moderate polymorphonuclear leukocytes per low power field    No Squamous epithelial cells per low power field    Few Gram positive cocci in pairs seen per oil power field  Final Report (22 Jun 2022 16:49):    Normal Respiratory Desire present    Culture - Blood (collected 20 Jun 2022 11:26)  Source: .Blood None  Preliminary Report (21 Jun 2022 23:01):    No growth to date.              IMAGING:      ACCESS/ DEVICES:  [ ] Peripheral IV  [ ] Central Venous Line	[ ] R	[ ] L	[ ] IJ	[ ] Fem	[ ] SC	Placed:   [ ] Arterial Line		[ ] R	[ ] L	[ ] Fem	[ ] Rad	[ ] Ax	Placed:   [ ] PICC:					[ ] Mediport  [ ] Urinary Catheter,  Date Placed:   [ ] Chest tube: [ ] Right, [ ] Left  [ ] CRISTELA/Migel Drains

## 2022-06-24 NOTE — PROGRESS NOTE ADULT - ASSESSMENT
ASSESSMENT & PLAN:   87 y/o female with PMHx of COPD on home O2, pHTN, GERD, Lucio's esophagus, COVID vaccinated presented to the ED for acute onset of sob.    # Acute on chronic hypercapnic respiratory failure s/p intubation  # COPD exacerbation  # HO COPD, O2 dependent  # R secondary spontaneous pneumothorax s/p pigtail  - presented to ED with SOB, found to be hypercapnic to 115, intubated  - post intubation CXR showed R PTX s/p R pigtail catheter, dislodged, replaced 6/15  - CTSX following  - cont solumedrol @ 60q12  - Nebs q4 and PRN  - increase seroquel to 50mg BID, monitor qtc  - BCX x2 NGTD, UCX neg, RVP neg, urine strep/legionella neg  - daily ABG, CXR, SAT/SBT    # H/o HTN  # H/o HFmrEF (45%)  # H/o mild pHTN  - on amlodipine 5mg qd at home -- held for now    # GERD  # Lucio's Esophagus  - PPI    # Platelets downtrending, resolved  - has received LMWH  - HIT ab negative    # Elevated D-dimer  - d-dimer 579  - LE duplex negative    Guzman 6/14  RIJ TLC 6/16    PT/REHAB: n/a  ACTIVITY: Activity - Bedrest  DVT PPX: enoxaparin Injectable  GI PPX: pantoprazole  DIET: Diet, NPO with Tube Feed  CODE: DNR  Disposition: MICU

## 2022-06-24 NOTE — PROGRESS NOTE ADULT - SUBJECTIVE AND OBJECTIVE BOX
GENERAL SURGERY PROGRESS NOTE    Patient: KIMBERLI LEIGH , 88y ([birth year])Female   MRN: [id number]  Location: [facility]  A  Visit: 06-10-22 Inpatient  Date: 06-24-22 @ 01:39    Procedure/Dx/Injuries: Right PTX, SQ emphysema    Events of past 24 hours: pigtail removed, right CT to WS, no airleak    PAST MEDICAL & SURGICAL HISTORY:  COPD (chronic obstructive pulmonary disease)      GERD (gastroesophageal reflux disease)      Glaucoma      Anxiety      Pancreatitis      History of tonsillectomy      H/O colonoscopy  5 years ago          Vitals:   T(F): 98.4 (06-24-22 @ 00:00), Max: 98.9 (06-23-22 @ 04:00)  HR: 90 (06-24-22 @ 01:30)  BP: 103/47 (06-24-22 @ 01:30)  RR: 23 (06-24-22 @ 01:30)  SpO2: 99% (06-24-22 @ 01:30)  Mode: AC/ CMV (Assist Control/ Continuous Mandatory Ventilation), RR (machine): 14, TV (machine): 300, FiO2: 30, PEEP: 7, ITime: 1, MAP: 11, PIP: 21    Diet, NPO with Tube Feed:   Tube Feeding Modality: Orogastric  Peptamen A.F. Formula  Total Volume for 24 Hours (mL): 840  Continuous  Until Goal Tube Feed Rate (mL per Hour): 35  Tube Feed Duration (in Hours): 24  Tube Feed Start Time: 21:00      Fluids:     I & O's:    06-22-22 @ 07:01  -  06-23-22 @ 07:00  --------------------------------------------------------  IN:    Enteral Tube Flush: 90 mL    IV PiggyBack: 150 mL    Midazolam: 80 mL    Norepinephrine: 155.4 mL    Peptamen A.F.: 385 mL    Propofol: 12.2 mL    Vital HN: 390 mL  Total IN: 1262.6 mL    OUT:    Chest Tube (mL): 5 mL    Chest Tube (mL): 5 mL    Indwelling Catheter - Urethral (mL): 1040 mL  Total OUT: 1050 mL    Total NET: 212.6 mL      PHYSICAL EXAM:  General: intubated, sedated, SQ emphysema improving  Cardiac: RRR   Respiratory: CTAB, normal respiratory effort, right CT to WS  Abdomen: Soft, non-distended  Skin: Warm/dry, normal color, no jaundice      MEDICATIONS  (STANDING):  ALBUTerol    90 MICROgram(s) HFA Inhaler 8 Puff(s) Inhalation every 6 hours  caspofungin IVPB 50 milliGRAM(s) IV Intermittent every 24 hours  caspofungin IVPB      cefepime   IVPB 1000 milliGRAM(s) IV Intermittent every 12 hours  chlorhexidine 0.12% Liquid 15 milliLiter(s) Oral Mucosa two times a day  chlorhexidine 4% Liquid 1 Application(s) Topical <User Schedule>  clonazePAM  Tablet 0.5 milliGRAM(s) Oral every 12 hours  dexMEDEtomidine Infusion 0.05 MICROgram(s)/kG/Hr (0.63 mL/Hr) IV Continuous <Continuous>  dextrose 5%. 1000 milliLiter(s) (100 mL/Hr) IV Continuous <Continuous>  dextrose 5%. 1000 milliLiter(s) (50 mL/Hr) IV Continuous <Continuous>  dextrose 50% Injectable 25 Gram(s) IV Push once  dextrose 50% Injectable 12.5 Gram(s) IV Push once  dextrose 50% Injectable 25 Gram(s) IV Push once  enoxaparin Injectable 40 milliGRAM(s) SubCutaneous every 24 hours  fentaNYL   Infusion. 0.5 MICROgram(s)/kG/Hr (2.02 mL/Hr) IV Continuous <Continuous>  glucagon  Injectable 1 milliGRAM(s) IntraMuscular once  insulin glargine Injectable (LANTUS) 8 Unit(s) SubCutaneous at bedtime  insulin lispro (ADMELOG) corrective regimen sliding scale   SubCutaneous every 6 hours  ipratropium 17 MICROgram(s) HFA Inhaler 8 Puff(s) Inhalation every 6 hours  midazolam Infusion 0.02 mG/kG/Hr (0.81 mL/Hr) IV Continuous <Continuous>  midodrine 10 milliGRAM(s) Oral every 8 hours  norepinephrine Infusion 0.05 MICROgram(s)/kG/Min (4.69 mL/Hr) IV Continuous <Continuous>  pantoprazole   Suspension 40 milliGRAM(s) Oral before breakfast  polyethylene glycol 3350 17 Gram(s) Oral daily  predniSONE   Tablet 20 milliGRAM(s) Oral daily  QUEtiapine 75 milliGRAM(s) Oral every 12 hours  senna 2 Tablet(s) Oral at bedtime    MEDICATIONS  (PRN):  acetaminophen     Tablet .. 650 milliGRAM(s) Oral every 6 hours PRN Temp greater or equal to 38C (100.4F), Mild Pain (1 - 3)  dextrose Oral Gel 15 Gram(s) Oral once PRN Blood Glucose LESS THAN 70 milliGRAM(s)/deciliter  morphine  - Injectable 2 milliGRAM(s) IV Push every 6 hours PRN Severe Pain (7 - 10)      DVT PROPHYLAXIS: enoxaparin Injectable 40 milliGRAM(s) SubCutaneous every 24 hours    GI PROPHYLAXIS: pantoprazole   Suspension 40 milliGRAM(s) Oral before breakfast    ANTICOAGULATION:   ANTIBIOTICS:  caspofungin IVPB    caspofungin IVPB 50 milliGRAM(s)  cefepime   IVPB 1000 milliGRAM(s)            LAB/STUDIES:  Labs:  CAPILLARY BLOOD GLUCOSE      POCT Blood Glucose.: 171 mg/dL (23 Jun 2022 23:43)  POCT Blood Glucose.: 166 mg/dL (23 Jun 2022 21:29)  POCT Blood Glucose.: 193 mg/dL (23 Jun 2022 17:26)  POCT Blood Glucose.: 208 mg/dL (23 Jun 2022 05:38)                          10.2   35.81 )-----------( 340      ( 23 Jun 2022 05:40 )             30.2       Auto Neutrophil %: 90.9 % (06-23-22 @ 05:40)  Auto Immature Granulocyte %: 1.7 % (06-23-22 @ 05:40)    06-23    143  |  105  |  66<HH>  ----------------------------<  220<H>  4.8   |  35<H>  |  0.7      Calcium, Total Serum: 8.7 mg/dL (06-23-22 @ 05:40)      LFTs:             x    | x    | x        ------------------[x       ( 23 Jun 2022 08:55 )  x    | x    | x           Lipase:26     Amylase:x         Blood Gas Arterial, Lactate: 0.90 mmol/L (06-22-22 @ 03:31)  Blood Gas Arterial, Lactate: 1.00 mmol/L (06-21-22 @ 03:47)    ABG - ( 22 Jun 2022 03:31 )  pH: 7.47  /  pCO2: 48    /  pO2: 77    / HCO3: 35    / Base Excess: 9.7   /  SaO2: 97.8            ABG - ( 21 Jun 2022 03:47 )  pH: 7.47  /  pCO2: 52    /  pO2: 83    / HCO3: 38    / Base Excess: 12.1  /  SaO2: 98.3            ABG - ( 20 Jun 2022 16:08 )  pH: 7.48  /  pCO2: 52    /  pO2: 85    / HCO3: 39    / Base Excess: 13.0  /  SaO2: 97.8            IMAGING:    < from: Xray Chest 1 View-PORTABLE IMMEDIATE (Xray Chest 1 View-PORTABLE IMMEDIATE .) (06.23.22 @ 16:22) >    Unchanged bilateral opacities.  Previously described trace right apical pneumothorax not welldelineated.  Stable support devices.               GENERAL SURGERY PROGRESS NOTE    Patient: KIMBERLI LEIGH , 88y ([birth year])Female   MRN: [id number]  Location: [facility]  A  Visit: 06-10-22 Inpatient  Date: 06-24-22 @ 01:39    Procedure/Dx/Injuries: Right PTX, SQ emphysema    Events of past 24 hours: pigtail removed, right CT to WS, no airleak    Vitals:   T(F): 98.4 (06-24-22 @ 00:00), Max: 98.9 (06-23-22 @ 04:00)  HR: 90 (06-24-22 @ 01:30)  BP: 103/47 (06-24-22 @ 01:30)  RR: 23 (06-24-22 @ 01:30)  SpO2: 99% (06-24-22 @ 01:30)  Mode: AC/ CMV (Assist Control/ Continuous Mandatory Ventilation), RR (machine): 14, TV (machine): 300, FiO2: 30, PEEP: 7, ITime: 1, MAP: 11, PIP: 21    Diet, NPO with Tube Feed:   Tube Feeding Modality: Orogastric  Peptamen A.F. Formula  Total Volume for 24 Hours (mL): 840  Continuous  Until Goal Tube Feed Rate (mL per Hour): 35  Tube Feed Duration (in Hours): 24  Tube Feed Start Time: 21:00      Fluids:     I & O's:    06-22-22 @ 07:01  -  06-23-22 @ 07:00  --------------------------------------------------------  IN:    Enteral Tube Flush: 90 mL    IV PiggyBack: 150 mL    Midazolam: 80 mL    Norepinephrine: 155.4 mL    Peptamen A.F.: 385 mL    Propofol: 12.2 mL    Vital HN: 390 mL  Total IN: 1262.6 mL    OUT:    Chest Tube (mL): 5 mL    Chest Tube (mL): 5 mL    Indwelling Catheter - Urethral (mL): 1040 mL  Total OUT: 1050 mL    Total NET: 212.6 mL      PHYSICAL EXAM:  General: intubated, sedated, SQ emphysema improving  Cardiac: RRR   Respiratory: intubated and ventilated, vent 30/7, right CT to WS  Abdomen: Soft, non-distended  Skin: Warm/dry, no jaundice      MEDICATIONS  (STANDING):  ALBUTerol    90 MICROgram(s) HFA Inhaler 8 Puff(s) Inhalation every 6 hours  caspofungin IVPB 50 milliGRAM(s) IV Intermittent every 24 hours  caspofungin IVPB      cefepime   IVPB 1000 milliGRAM(s) IV Intermittent every 12 hours  chlorhexidine 0.12% Liquid 15 milliLiter(s) Oral Mucosa two times a day  chlorhexidine 4% Liquid 1 Application(s) Topical <User Schedule>  clonazePAM  Tablet 0.5 milliGRAM(s) Oral every 12 hours  dexMEDEtomidine Infusion 0.05 MICROgram(s)/kG/Hr (0.63 mL/Hr) IV Continuous <Continuous>  dextrose 5%. 1000 milliLiter(s) (100 mL/Hr) IV Continuous <Continuous>  dextrose 5%. 1000 milliLiter(s) (50 mL/Hr) IV Continuous <Continuous>  dextrose 50% Injectable 25 Gram(s) IV Push once  dextrose 50% Injectable 12.5 Gram(s) IV Push once  dextrose 50% Injectable 25 Gram(s) IV Push once  enoxaparin Injectable 40 milliGRAM(s) SubCutaneous every 24 hours  fentaNYL   Infusion. 0.5 MICROgram(s)/kG/Hr (2.02 mL/Hr) IV Continuous <Continuous>  glucagon  Injectable 1 milliGRAM(s) IntraMuscular once  insulin glargine Injectable (LANTUS) 8 Unit(s) SubCutaneous at bedtime  insulin lispro (ADMELOG) corrective regimen sliding scale   SubCutaneous every 6 hours  ipratropium 17 MICROgram(s) HFA Inhaler 8 Puff(s) Inhalation every 6 hours  midazolam Infusion 0.02 mG/kG/Hr (0.81 mL/Hr) IV Continuous <Continuous>  midodrine 10 milliGRAM(s) Oral every 8 hours  norepinephrine Infusion 0.05 MICROgram(s)/kG/Min (4.69 mL/Hr) IV Continuous <Continuous>  pantoprazole   Suspension 40 milliGRAM(s) Oral before breakfast  polyethylene glycol 3350 17 Gram(s) Oral daily  predniSONE   Tablet 20 milliGRAM(s) Oral daily  QUEtiapine 75 milliGRAM(s) Oral every 12 hours  senna 2 Tablet(s) Oral at bedtime    MEDICATIONS  (PRN):  acetaminophen     Tablet .. 650 milliGRAM(s) Oral every 6 hours PRN Temp greater or equal to 38C (100.4F), Mild Pain (1 - 3)  dextrose Oral Gel 15 Gram(s) Oral once PRN Blood Glucose LESS THAN 70 milliGRAM(s)/deciliter  morphine  - Injectable 2 milliGRAM(s) IV Push every 6 hours PRN Severe Pain (7 - 10)      DVT PROPHYLAXIS: enoxaparin Injectable 40 milliGRAM(s) SubCutaneous every 24 hours    GI PROPHYLAXIS: pantoprazole   Suspension 40 milliGRAM(s) Oral before breakfast    ANTICOAGULATION:   ANTIBIOTICS:  caspofungin IVPB    caspofungin IVPB 50 milliGRAM(s)  cefepime   IVPB 1000 milliGRAM(s)            LAB/STUDIES:  Labs:  CAPILLARY BLOOD GLUCOSE      POCT Blood Glucose.: 171 mg/dL (23 Jun 2022 23:43)  POCT Blood Glucose.: 166 mg/dL (23 Jun 2022 21:29)  POCT Blood Glucose.: 193 mg/dL (23 Jun 2022 17:26)  POCT Blood Glucose.: 208 mg/dL (23 Jun 2022 05:38)                          10.2   35.81 )-----------( 340      ( 23 Jun 2022 05:40 )             30.2       Auto Neutrophil %: 90.9 % (06-23-22 @ 05:40)  Auto Immature Granulocyte %: 1.7 % (06-23-22 @ 05:40)    06-23    143  |  105  |  66<HH>  ----------------------------<  220<H>  4.8   |  35<H>  |  0.7      Calcium, Total Serum: 8.7 mg/dL (06-23-22 @ 05:40)      LFTs:             x    | x    | x        ------------------[x       ( 23 Jun 2022 08:55 )  x    | x    | x           Lipase:26     Amylase:x         Blood Gas Arterial, Lactate: 0.90 mmol/L (06-22-22 @ 03:31)  Blood Gas Arterial, Lactate: 1.00 mmol/L (06-21-22 @ 03:47)    ABG - ( 22 Jun 2022 03:31 )  pH: 7.47  /  pCO2: 48    /  pO2: 77    / HCO3: 35    / Base Excess: 9.7   /  SaO2: 97.8            ABG - ( 21 Jun 2022 03:47 )  pH: 7.47  /  pCO2: 52    /  pO2: 83    / HCO3: 38    / Base Excess: 12.1  /  SaO2: 98.3            ABG - ( 20 Jun 2022 16:08 )  pH: 7.48  /  pCO2: 52    /  pO2: 85    / HCO3: 39    / Base Excess: 13.0  /  SaO2: 97.8            IMAGING:    < from: Xray Chest 1 View-PORTABLE IMMEDIATE (Xray Chest 1 View-PORTABLE IMMEDIATE .) (06.23.22 @ 16:22) >    Unchanged bilateral opacities.  Previously described trace right apical pneumothorax not welldelineated.  Stable support devices.

## 2022-06-24 NOTE — PROGRESS NOTE ADULT - ASSESSMENT
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  Adequate sedation.    HEENT: Oral care    PULMONARY:  HOB @ 45 degrees.  Vent changes: None.  ABG PRN.  Chest tube to water seal.  CTS FU.  Daily CXR.  Nebs Q6 and PRN.  Prednisone taper for 2 more days.    CARDIOVASCULAR:  Wean Norepinephrine.  Target MAP > 55.  Avoid overload.        GI: GI prophylaxis.  OG Feeding.  Bowel regimen.    RENAL:  Follow up lytes.  Correct as needed.     INFECTIOUS DISEASE:  Finish ABX course.  FU cultures.    HEMATOLOGICAL:  DVT prophylaxis.     ENDOCRINE:  Follow up FS.  Insulin protocol if needed.     MUSCULOSKELETAL:  Bed rest    Downgrade to CEU

## 2022-06-24 NOTE — PROGRESS NOTE ADULT - TREATMENT GUIDELINE COMMENT
DNR  planning to remove life support, will let us know when, due to  home/and her coming to see patient

## 2022-06-24 NOTE — PROGRESS NOTE ADULT - ASSESSMENT
88yFemale being evaluated for goals of care and symptom management. Palliative care consulted for GOC.  Spoke to primary team/ and bedside nurse that daughter does not want trach/PEG wants pall vent withdrawal.      MEDD (morphine equivalent daily dose): Fentanyl  drip       See Recs below.    Please call [phone] with questions or concerns 24/7.   We will continue to follow.

## 2022-06-24 NOTE — PROGRESS NOTE ADULT - PROBLEM SELECTOR PLAN 1
DNR   Plan for removal from life support. Daughter Lashay will let team know when.   Palliative care for recs for symptom management.

## 2022-06-24 NOTE — PROGRESS NOTE ADULT - SUBJECTIVE AND OBJECTIVE BOX
pt remains intubated, min sedated, unresponsive  abd soft, ND  tolerating feedings Peptamen AF at 35 ml/h  right IJ TLC c/d/i, + pressor  d/w RN at bedside, d/e residents  [facility] noted  Vital Signs Last 24 Hrs  T(C): 36.8 (24 Jun 2022 04:00), Max: 36.9 (24 Jun 2022 00:00)  T(F): 98.3 (24 Jun 2022 04:00), Max: 98.4 (24 Jun 2022 00:00)  HR: 84 (24 Jun 2022 15:37) (78 - 110)  BP: 101/47 (24 Jun 2022 12:31) (24/39 - 142/58)  BP(mean): 66 (24 Jun 2022 12:31) (44 - 107)  RR: 18 (24 Jun 2022 12:31) (18 - 47)  SpO2: 100% (24 Jun 2022 15:37) (98% - 100%)    MEDICATIONS  (STANDING):  ALBUTerol    90 MICROgram(s) HFA Inhaler 8 Puff(s) Inhalation every 6 hours  caspofungin IVPB      caspofungin IVPB 50 milliGRAM(s) IV Intermittent every 24 hours  cefepime   IVPB 1000 milliGRAM(s) IV Intermittent every 12 hours  chlorhexidine 0.12% Liquid 15 milliLiter(s) Oral Mucosa two times a day  chlorhexidine 4% Liquid 1 Application(s) Topical <User Schedule>  clonazePAM  Tablet 0.5 milliGRAM(s) Oral every 12 hours  dexMEDEtomidine Infusion 0.05 MICROgram(s)/kG/Hr (0.63 mL/Hr) IV Continuous <Continuous>  enoxaparin Injectable 40 milliGRAM(s) SubCutaneous every 24 hours  fentaNYL   Infusion. 0.5 MICROgram(s)/kG/Hr (2.02 mL/Hr) IV Continuous <Continuous>  insulin glargine Injectable (LANTUS) 8 Unit(s) SubCutaneous at bedtime  insulin lispro (ADMELOG) corrective regimen sliding scale   SubCutaneous every 6 hours  ipratropium 17 MICROgram(s) HFA Inhaler 8 Puff(s) Inhalation every 6 hours  midazolam Infusion 0.02 mG/kG/Hr (0.81 mL/Hr) IV Continuous <Continuous>  midodrine 10 milliGRAM(s) Oral every 8 hours  norepinephrine Infusion 0.05 MICROgram(s)/kG/Min (3.78 mL/Hr) IV Continuous <Continuous>  pantoprazole   Suspension 40 milliGRAM(s) Oral before breakfast  polyethylene glycol 3350 17 Gram(s) Oral daily  predniSONE   Tablet 20 milliGRAM(s) Oral daily  QUEtiapine 75 milliGRAM(s) Oral every 12 hours  senna 2 Tablet(s) Oral at bedtime                        9.6    34.06 )-----------( 383      ( 24 Jun 2022 05:00 )             28.7   06-24    144  |  104  |  67<HH>  ----------------------------<  157<H>  4.9   |  33<H>  |  0.6<L>    Ca    8.7      24 Jun 2022 05:00  Mg     2.2     06-24    TPro  4.2<L>  /  Alb  2.2<L>  /  TBili  0.3  /  DBili  x   /  AST  17  /  ALT  25  /  AlkPhos  60  06-24  Mode: AC/ CMV (Assist Control/ Continuous Mandatory Ventilation)  RR (machine): 14  TV (machine): 300  FiO2: 30  PEEP: 7  ITime: 1  MAP: 10  PIP: 18

## 2022-06-24 NOTE — PROGRESS NOTE ADULT - ATTENDING COMMENTS
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

## 2022-06-24 NOTE — PROGRESS NOTE ADULT - ATTENDING COMMENTS
Ms. Peralta is an 88-year-old female admitted with diagnosis of COPD exacerbation, required intubation and ventilatory support, developed large right pneumothorax treated with percutaneous pleural drain with adequate lung re-expansion. Thoracic surgery consulted for assistance in care. Currently in low ventilatory support. CXR: drain in good position, no residual pneumothorax.    06/14/12: Pleural drain working well, no air leak, CXR: no pneumothorax. Failed SBT yesterday, SBT today for possible extubation, will keep pleural drain until extubated   06/15/22: Pleural drain dislodged into chest wall, CXR recurrent pneumothorax   06/16/22: New pleural drain in good position, resolved pneumothorax, no air leak. Unable to wean off ventilator   06/17/22: Drain I good position, no air leak. CXR no pneumothorax.  06/21/22: Additional chest tube placed over the weekend for massive subcutaneous emphysema which is now improving, small intermittent air leak  06/22/22: Stable, improving subcutaneous emphysema, pleural drains in place.  06/23/22: Stable, CXR no pneumothorax  06/24/22: Added on for trach and PEG, chest tube in place, no air leak.  Plan:    Pleural drain to water seal

## 2022-06-24 NOTE — PROGRESS NOTE ADULT - ASSESSMENT
ASSESSMENT: 88y F admitted with respiratory failure now with subcutaneous emphysema, right pneumothorax, s/p pigtail placement and chest tube placement     PLAN:  - Care as per ICU  - Chest tube to WS   - Daily AM CXR  - Follow up Chest tube output  - Monitor for airleak  - Monitor subcutaneous emphysema   - Monitor O2 sat  - Pain control  - DVT/GI prophylaxis    spectra [phone]   ASSESSMENT: 88y F admitted with respiratory failure now with subcutaneous emphysema, right pneumothorax, s/p pigtail placement and chest tube placement     PLAN:  - primary care as per ICU  - Chest tube to WS   - Daily AM CXR  - Follow up Chest tube output  - Monitor for airleak  - Monitor subcutaneous emphysema   - Monitor O2 sat  - Pain control  - patient is added on for today for bronch, trach, & peg, however family is still undecided, patient is on Levo @ 0.1 and was just made NPO this morning. will f/u   do not hesitate to call with any updates      spectra [phone]

## 2022-06-24 NOTE — PROGRESS NOTE ADULT - SUBJECTIVE AND OBJECTIVE BOX
HPI:  89 y/o female with PMHx of COPD not on home O2, pHTN, GERD, Lucio's esophagus, COVID vaccinated presented to the ED for acute onset of sob. As per nurse aide, pt was asymptomatic sitting on the couch, when she became acutely sob. pt was brought to ED and was tachypneic and answering questions with one word answers, and struggling to breath. (10 Crow 2022 01:25)    INTERVAL EVENTS:  6/23: patient remains intubated  6/24: patient remains intubated for downgrade, poor prognosis overall   ADVANCE DIRECTIVES:    DNR  MOLST  [ ]  Living Will  [ ]   DECISION MAKER(s):  [ ] Health Care Proxy(s)  [x ] Surrogate(s)  [ ] Guardian           Name(s): Phone Number(s): Jose M Vaughan daughter     BASELINE (I)ADL(s) (prior to admission):  [place]: [ ]Total  [ ] Moderate [ ]Dependent  Palliative Performance Status Version 2:         %    http://npcrc.org/files/news/palliative_performance_scale_ppsv2.pdf    Allergies    No Known Allergies    Intolerances    MEDICATIONS  (STANDING):  ALBUTerol    90 MICROgram(s) HFA Inhaler 8 Puff(s) Inhalation every 6 hours  caspofungin IVPB      caspofungin IVPB 50 milliGRAM(s) IV Intermittent every 24 hours  cefepime   IVPB 1000 milliGRAM(s) IV Intermittent every 12 hours  chlorhexidine 0.12% Liquid 15 milliLiter(s) Oral Mucosa two times a day  chlorhexidine 4% Liquid 1 Application(s) Topical <User Schedule>  clonazePAM  Tablet 0.5 milliGRAM(s) Oral every 12 hours  dexMEDEtomidine Infusion 0.05 MICROgram(s)/kG/Hr (0.63 mL/Hr) IV Continuous <Continuous>  dextrose 5%. 1000 milliLiter(s) (50 mL/Hr) IV Continuous <Continuous>  dextrose 5%. 1000 milliLiter(s) (100 mL/Hr) IV Continuous <Continuous>  dextrose 50% Injectable 25 Gram(s) IV Push once  dextrose 50% Injectable 12.5 Gram(s) IV Push once  dextrose 50% Injectable 25 Gram(s) IV Push once  enoxaparin Injectable 40 milliGRAM(s) SubCutaneous every 24 hours  fentaNYL   Infusion. 0.5 MICROgram(s)/kG/Hr (2.02 mL/Hr) IV Continuous <Continuous>  glucagon  Injectable 1 milliGRAM(s) IntraMuscular once  insulin glargine Injectable (LANTUS) 8 Unit(s) SubCutaneous at bedtime  insulin lispro (ADMELOG) corrective regimen sliding scale   SubCutaneous every 6 hours  ipratropium 17 MICROgram(s) HFA Inhaler 8 Puff(s) Inhalation every 6 hours  midazolam Infusion 0.02 mG/kG/Hr (0.81 mL/Hr) IV Continuous <Continuous>  midodrine 10 milliGRAM(s) Oral every 8 hours  norepinephrine Infusion 0.05 MICROgram(s)/kG/Min (3.78 mL/Hr) IV Continuous <Continuous>  pantoprazole   Suspension 40 milliGRAM(s) Oral before breakfast  polyethylene glycol 3350 17 Gram(s) Oral daily  predniSONE   Tablet 20 milliGRAM(s) Oral daily  QUEtiapine 75 milliGRAM(s) Oral every 12 hours  senna 2 Tablet(s) Oral at bedtime    MEDICATIONS  (PRN):  acetaminophen     Tablet .. 650 milliGRAM(s) Oral every 6 hours PRN Temp greater or equal to 38C (100.4F), Mild Pain (1 - 3)  dextrose Oral Gel 15 Gram(s) Oral once PRN Blood Glucose LESS THAN 70 milliGRAM(s)/deciliter  morphine  - Injectable 2 milliGRAM(s) IV Push every 6 hours PRN Severe Pain (7 - 10)      PRESENT SYMPTOMS: [x ]Unable to obtain due to poor mentation   Source if other than patient:  [ ]Family   [ ]Team     Pain: [ ]yes [ ]no  QOL impact -   Location -                    Aggravating factors -  Quality -  Radiation -  Timing-  Severity (0-10 scale):  Minimal acceptable level (0-10 scale):     CPOT:    https://www.sccm.org/getattachment/[email or web address]/Critical-Care-Pain-Observation-Tool-(CPOT)      PAIN AD Score:     [email or web address] (press ctrl +  left click to view)    Dyspnea:                           [ ]Mild [ ]Moderate [ ]Severe  Anxiety:                             [ ]Mild [ ]Moderate [ ]Severe  Fatigue:                             [ ]Mild [ ]Moderate [ ]Severe  Nausea:                             [ ]Mild [ ]Moderate [ ]Severe  Loss of appetite:              [ ]Mild [ ]Moderate [ ]Severe  Constipation:                    [ ]Mild [ ]Moderate [ ]Severe    Other Symptoms:  [ ]All other review of systems negative     Palliative Performance Status Version 2:         %    http://npcrc.org/files/news/palliative_performance_scale_ppsv2.pdf    PHYSICAL EXAM:  Vital Signs Last 24 Hrs  T(C): 36.8 (24 Jun 2022 04:00), Max: 36.9 (24 Jun 2022 00:00)  T(F): 98.3 (24 Jun 2022 04:00), Max: 98.4 (24 Jun 2022 00:00)  HR: 88 (24 Jun 2022 10:00) (78 - 110)  BP: 99/45 (24 Jun 2022 10:00) (24/39 - 142/58)  BP(mean): 66 (24 Jun 2022 10:00) (44 - 107)  RR: 20 (24 Jun 2022 10:00) (4 - 47)  SpO2: 100% (24 Jun 2022 10:00) (98% - 100%)          GENERAL:  [ ]Alert  [ ]Oriented x   [ ]Lethargic  [ ]Cachexia  [x ]Unarousable  [ ]Verbal  [ ]Non-Verbal  Behavioral:  unable to assess   [ ] Anxiety  [ ] Delirium [ ] Agitation [ ] Other  HEENT:  [ ]Normal   [ ]Dry mouth   [x ]ET Tube/Trach  [ ]Oral lesions  PULMONARY:   [ ]Clear [x ]Tachypnea  [ ]Audible excessive secretions   [ ]Rhonchi        [ ]Right [ ]Left [ ]Bilateral  [ ]Crackles        [ ]Right [ ]Left [ ]Bilateral  [ ]Wheezing     [ ]Right [ ]Left [ ]Bilateral  [ x]Diminished breath sounds [ ]right [ ]left [ ]bilateral  CARDIOVASCULAR:    [ ]Regular [ ]Irregular [x ]Tachy  [ ]Lucas [ ]Murmur [ ]Other  GASTROINTESTINAL:  [ ]Soft  [ ]Distended   [ ]+BS  [ ]Non tender [ ]Tender  [ ]PEG [ x]OGT/ NGT  Last BM: 6/22   GENITOURINARY:  [ ]Normal [ ] Incontinent   [ ]Oliguria/Anuria   [x]Guzman  MUSCULOSKELETAL:   [ ]Normal   [x ]Weakness  [ ]Bed/Wheelchair bound [ ]Edema  NEUROLOGIC:   [ ]No focal deficits  [ ]Cognitive impairment  [ ]Dysphagia [ ]Dysarthria [ ]Paresis [ ]Other   SKIN:   [x ]Normal    [ ]Rash  [ ]Pressure ulcer(s)       Present on admission [ ]y [ ]n    CRITICAL CARE:  [x ] Shock Present  [ ]Septic [ ]Cardiogenic [ ]Neurologic [ ]Hypovolemic  [ ]  Vasopressors [ ]  Inotropes   [x ]Respiratory failure present [x ]Mechanical ventilation [ ]Non-invasive ventilatory support [ ]High flow  [ ]Acute  [ ]Chronic [ ]Hypoxic  [ ]Hypercarbic [ ]Other  [ ]Other organ failure     Labs    06-24    144  |  104  |  67<HH>  ----------------------------<  157<H>  4.9   |  33<H>  |  0.6<L>    Ca    8.7      24 Jun 2022 05:00  Mg     2.2     06-24    TPro  4.2<L>  /  Alb  2.2<L>  /  TBili  0.3  /  DBili  x   /  AST  17  /  ALT  25  /  AlkPhos  60  06-24                            9.6    34.06 )-----------( 383      ( 24 Jun 2022 05:00 )             28.7         RADIOLOGY & ADDITIONAL STUDIES:    PROTEIN CALORIE MALNUTRITION PRESENT: [ ]mild [ ]moderate [ ]severe [ ]underweight [ ]morbid obesity  https://www.andeal.org/vault/2440/web/files/ONC/Table_Clinical%20Characteristics%20to%20Document%20Malnutrition-White%20JV%20et%20al%202012.pdf    Height (cm): 147.3 (06-10-22 @ 22:45), 147.3 (12-18-21 @ 20:05)  Weight (kg): 40.3 (06-10-22 @ 22:45)  BMI (kg/m2): 18.6 (06-10-22 @ 22:45)    [ ]PPSV2 < or = to 30% [ ]significant weight loss  [ ]poor nutritional intake  [ ]anasarca      [ ]Artificial Nutrition      REFERRALS:   [ ]Chaplaincy  [ ]Hospice  [ ]Child Life  x ]Social Work  [ ]Case management [ ]Holistic Therapy     Goals of Care Document:        HPI:  89 y/o female with PMHx of COPD not on home O2, pHTN, GERD, Lucio's esophagus, COVID vaccinated presented to the ED for acute onset of sob. As per nurse aide, pt was asymptomatic sitting on the couch, when she became acutely sob. pt was brought to ED and was tachypneic and answering questions with one word answers, and struggling to breath. (10 Crow 2022 01:25)    INTERVAL EVENTS:  6/23: patient remains intubated  6/24: patient remains intubated for downgrade, poor prognosis overall     ADVANCE DIRECTIVES:    DNR  MOLST  [ ]  Living Will  [ ]   DECISION MAKER(s):  [ ] Health Care Proxy(s)  [x ] Surrogate(s)  [ ] Guardian           Name(s): Phone Number(s): Jose M Vaughan daughter     BASELINE (I)ADL(s) (prior to admission):  [place]: [ ]Total  [ ] Moderate [ ]Dependent  Palliative Performance Status Version 2:         %    http://npcrc.org/files/news/palliative_performance_scale_ppsv2.pdf    Allergies    No Known Allergies    Intolerances    MEDICATIONS  (STANDING):  ALBUTerol    90 MICROgram(s) HFA Inhaler 8 Puff(s) Inhalation every 6 hours  caspofungin IVPB      caspofungin IVPB 50 milliGRAM(s) IV Intermittent every 24 hours  cefepime   IVPB 1000 milliGRAM(s) IV Intermittent every 12 hours  chlorhexidine 0.12% Liquid 15 milliLiter(s) Oral Mucosa two times a day  chlorhexidine 4% Liquid 1 Application(s) Topical <User Schedule>  clonazePAM  Tablet 0.5 milliGRAM(s) Oral every 12 hours  dexMEDEtomidine Infusion 0.05 MICROgram(s)/kG/Hr (0.63 mL/Hr) IV Continuous <Continuous>  dextrose 5%. 1000 milliLiter(s) (50 mL/Hr) IV Continuous <Continuous>  dextrose 5%. 1000 milliLiter(s) (100 mL/Hr) IV Continuous <Continuous>  dextrose 50% Injectable 25 Gram(s) IV Push once  dextrose 50% Injectable 12.5 Gram(s) IV Push once  dextrose 50% Injectable 25 Gram(s) IV Push once  enoxaparin Injectable 40 milliGRAM(s) SubCutaneous every 24 hours  fentaNYL   Infusion. 0.5 MICROgram(s)/kG/Hr (2.02 mL/Hr) IV Continuous <Continuous>  glucagon  Injectable 1 milliGRAM(s) IntraMuscular once  insulin glargine Injectable (LANTUS) 8 Unit(s) SubCutaneous at bedtime  insulin lispro (ADMELOG) corrective regimen sliding scale   SubCutaneous every 6 hours  ipratropium 17 MICROgram(s) HFA Inhaler 8 Puff(s) Inhalation every 6 hours  midazolam Infusion 0.02 mG/kG/Hr (0.81 mL/Hr) IV Continuous <Continuous>  midodrine 10 milliGRAM(s) Oral every 8 hours  norepinephrine Infusion 0.05 MICROgram(s)/kG/Min (3.78 mL/Hr) IV Continuous <Continuous>  pantoprazole   Suspension 40 milliGRAM(s) Oral before breakfast  polyethylene glycol 3350 17 Gram(s) Oral daily  predniSONE   Tablet 20 milliGRAM(s) Oral daily  QUEtiapine 75 milliGRAM(s) Oral every 12 hours  senna 2 Tablet(s) Oral at bedtime    MEDICATIONS  (PRN):  acetaminophen     Tablet .. 650 milliGRAM(s) Oral every 6 hours PRN Temp greater or equal to 38C (100.4F), Mild Pain (1 - 3)  dextrose Oral Gel 15 Gram(s) Oral once PRN Blood Glucose LESS THAN 70 milliGRAM(s)/deciliter  morphine  - Injectable 2 milliGRAM(s) IV Push every 6 hours PRN Severe Pain (7 - 10)      PRESENT SYMPTOMS: [x ]Unable to obtain due to poor mentation   Source if other than patient:  [ ]Family   [ ]Team     Pain: [ ]yes [ ]no  QOL impact -   Location -                    Aggravating factors -  Quality -  Radiation -  Timing-  Severity (0-10 scale):  Minimal acceptable level (0-10 scale):     CPOT:    https://www.sccm.org/getattachment/[email or web address]-[email or web address]-8j4o-3245q6756l7o/Critical-Care-Pain-Observation-Tool-(CPOT)      PAIN AD Score:     [email or web address] (press ctrl +  left click to view)    Dyspnea:                           [ ]Mild [ ]Moderate [ ]Severe  Anxiety:                             [ ]Mild [ ]Moderate [ ]Severe  Fatigue:                             [ ]Mild [ ]Moderate [ ]Severe  Nausea:                             [ ]Mild [ ]Moderate [ ]Severe  Loss of appetite:              [ ]Mild [ ]Moderate [ ]Severe  Constipation:                    [ ]Mild [ ]Moderate [ ]Severe    Other Symptoms:  [ ]All other review of systems negative     Palliative Performance Status Version 2:         %    http://npcrc.org/files/news/palliative_performance_scale_ppsv2.pdf    PHYSICAL EXAM:  Vital Signs Last 24 Hrs  T(C): 36.8 (24 Jun 2022 04:00), Max: 36.9 (24 Jun 2022 00:00)  T(F): 98.3 (24 Jun 2022 04:00), Max: 98.4 (24 Jun 2022 00:00)  HR: 88 (24 Jun 2022 10:00) (78 - 110)  BP: 99/45 (24 Jun 2022 10:00) (24/39 - 142/58)  BP(mean): 66 (24 Jun 2022 10:00) (44 - 107)  RR: 20 (24 Jun 2022 10:00) (4 - 47)  SpO2: 100% (24 Jun 2022 10:00) (98% - 100%)    GENERAL:  [ ]Alert  [ ]Oriented x   [ ]Lethargic  [ ]Cachexia  [x ]Unarousable  [ ]Verbal  [ ]Non-Verbal  Behavioral:  unable to assess   [ ] Anxiety  [ ] Delirium [ ] Agitation [ ] Other  HEENT:  [ ]Normal   [ ]Dry mouth   [x ]ET Tube/Trach  [ ]Oral lesions  PULMONARY:   [ ]Clear [x ]Tachypnea  [ ]Audible excessive secretions   [ ]Rhonchi        [ ]Right [ ]Left [ ]Bilateral  [ ]Crackles        [ ]Right [ ]Left [ ]Bilateral  [ ]Wheezing     [ ]Right [ ]Left [ ]Bilateral  [ x]Diminished breath sounds [ ]right [ ]left [ ]bilateral  CARDIOVASCULAR:    [ ]Regular [ ]Irregular [x ]Tachy  [ ]Lucas [ ]Murmur [ ]Other  GASTROINTESTINAL:  [ ]Soft  [ ]Distended   [ ]+BS  [ ]Non tender [ ]Tender  [ ]PEG [ x]OGT/ NGT  Last BM: 6/22   GENITOURINARY:  [ ]Normal [ ] Incontinent   [ ]Oliguria/Anuria   [x]Guzman  MUSCULOSKELETAL:   [ ]Normal   [x ]Weakness  [ ]Bed/Wheelchair bound [ ]Edema  NEUROLOGIC:   [ ]No focal deficits  [ ]Cognitive impairment  [ ]Dysphagia [ ]Dysarthria [ ]Paresis [ ]Other   SKIN:   [x ]Normal    [ ]Rash  [ ]Pressure ulcer(s)       Present on admission [ ]y [ ]n    CRITICAL CARE:  [x ] Shock Present  [ ]Septic [ ]Cardiogenic [ ]Neurologic [ ]Hypovolemic  [ ]  Vasopressors [ ]  Inotropes   [x ]Respiratory failure present [x ]Mechanical ventilation [ ]Non-invasive ventilatory support [ ]High flow  [ ]Acute  [ ]Chronic [ ]Hypoxic  [ ]Hypercarbic [ ]Other  [ ]Other organ failure     Labs    06-24    144  |  104  |  67<HH>  ----------------------------<  157<H>  4.9   |  33<H>  |  0.6<L>    Ca    8.7      24 Jun 2022 05:00  Mg     2.2     06-24    TPro  4.2<L>  /  Alb  2.2<L>  /  TBili  0.3  /  DBili  x   /  AST  17  /  ALT  25  /  AlkPhos  60  06-24                            9.6    34.06 )-----------( 383      ( 24 Jun 2022 05:00 )             28.7         RADIOLOGY & ADDITIONAL STUDIES:    PROTEIN CALORIE MALNUTRITION PRESENT: [ ]mild [ ]moderate [ ]severe [ ]underweight [ ]morbid obesity  https://www.andeal.org/vault/2440/web/files/ONC/Table_Clinical%20Characteristics%20to%20Document%20Malnutrition-White%20JV%20et%20al%202012.pdf    Height (cm): 147.3 (06-10-22 @ 22:45), 147.3 (12-18-21 @ 20:05)  Weight (kg): 40.3 (06-10-22 @ 22:45)  BMI (kg/m2): 18.6 (06-10-22 @ 22:45)    [ ]PPSV2 < or = to 30% [ ]significant weight loss  [ ]poor nutritional intake  [ ]anasarca      [ ]Artificial Nutrition      REFERRALS:   [ ]Chaplaincy  [ ]Hospice  [ ]Child Life  x ]Social Work  [ ]Case management [ ]Holistic Therapy     Goals of Care Document:

## 2022-06-24 NOTE — PROGRESS NOTE ADULT - ASSESSMENT
IMP:  - acute hypercapnic resp failure  - h/o Lucio's esophagus  - hyperglycemia, likely DM  - hyperkalemia    - f/u phos level  - family declined trach, considering palliative extubation early next week  - cont current feeds

## 2022-06-24 NOTE — PROGRESS NOTE ADULT - SUBJECTIVE AND OBJECTIVE BOX
Patient is a 88y old  Female who presents with a chief complaint of respiratory distress (24 Jun 2022 01:39)    Over Night Events:  Remains critically ill on MV.  Sedated.  On pressors.  SP right pigtail removal by CTS    ROS:   All ROS are negative except HPI     PHYSICAL EXAM  ICU Vital Signs Last 24 Hrs  T(C): 36.8 (24 Jun 2022 04:00), Max: 36.9 (24 Jun 2022 00:00)  T(F): 98.3 (24 Jun 2022 04:00), Max: 98.4 (24 Jun 2022 00:00)  HR: 90 (24 Jun 2022 07:41) (78 - 110)  BP: 104/45 (24 Jun 2022 07:00) (24/39 - 142/58)  BP(mean): 70 (24 Jun 2022 07:00) (44 - 107)  RR: 20 (24 Jun 2022 07:00) (4 - 47)  SpO2: 100% (24 Jun 2022 07:41) (98% - 100%)    CONSTITUTIONAL:  Ill appearing in  NAD    ENT:   Airway patent,   Mouth with normal mucosa.       EYES:   Pupils equal,   Round and reactive to light.    CARDIAC:   Normal rate,   Regular rhythm.      RESPIRATORY:   No wheezing  Bilateral BS  Normal chest expansion  Not tachypneic,  No use of accessory muscles    GASTROINTESTINAL:  Abdomen soft,   Non-tender,   No guarding,   + BS    MUSCULOSKELETAL:   Range of motion is not limited,  No clubbing, cyanosis    NEUROLOGICAL:   Sedated   No motor  deficits.    SKIN:   Skin normal color for race,   No evidence of rash.    PSYCHIATRIC:   Sedated   No apparent risk to self or others.      06-23-22 @ 07:01  -  06-24-22 @ 07:00  --------------------------------------------------------  IN:    Enteral Tube Flush: 100 mL    FentaNYL: 12 mL    FentaNYL: 34 mL    IV PiggyBack: 100 mL    Midazolam: 32 mL    Midazolam: 6 mL    Norepinephrine: 91.8 mL    Peptamen A.F.: 805 mL  Total IN: 1180.8 mL    OUT:    Chest Tube (mL): 0 mL    Chest Tube (mL): 15 mL    Dexmedetomidine: 0 mL    Indwelling Catheter - Urethral (mL): 1155 mL  Total OUT: 1170 mL    Total NET: 10.8 mL      LABS:                        9.6    34.06 )-----------( 383      ( 24 Jun 2022 05:00 )             28.7     144  |  104  |  67<HH>  ----------------------------<  157<H>  4.9   |  33<H>  |  0.6<L>    Ca    8.7      24 Jun 2022 05:00  Mg     2.2     06-24    TPro  4.2<L>  /  Alb  2.2<L>  /  TBili  0.3  /  DBili  x   /  AST  17  /  ALT  25  /  AlkPhos  60  06-24    LIVER FUNCTIONS - ( 24 Jun 2022 05:00 )  Alb: 2.2 g/dL / Pro: 4.2 g/dL / ALK PHOS: 60 U/L / ALT: 25 U/L / AST: 17 U/L / GGT: x                                              Mode: AC/ CMV (Assist Control/ Continuous Mandatory Ventilation)  RR (machine): 14  TV (machine): 300  FiO2: 30  PEEP: 7  ITime: 1  MAP: 9  PIP: 20                                          MEDICATIONS  (STANDING):  ALBUTerol    90 MICROgram(s) HFA Inhaler 8 Puff(s) Inhalation every 6 hours  caspofungin IVPB      caspofungin IVPB 50 milliGRAM(s) IV Intermittent every 24 hours  cefepime   IVPB 1000 milliGRAM(s) IV Intermittent every 12 hours  chlorhexidine 0.12% Liquid 15 milliLiter(s) Oral Mucosa two times a day  chlorhexidine 4% Liquid 1 Application(s) Topical <User Schedule>  clonazePAM  Tablet 0.5 milliGRAM(s) Oral every 12 hours  dexMEDEtomidine Infusion 0.05 MICROgram(s)/kG/Hr (0.63 mL/Hr) IV Continuous <Continuous>  dextrose 5%. 1000 milliLiter(s) (50 mL/Hr) IV Continuous <Continuous>  dextrose 5%. 1000 milliLiter(s) (100 mL/Hr) IV Continuous <Continuous>  dextrose 50% Injectable 25 Gram(s) IV Push once  dextrose 50% Injectable 12.5 Gram(s) IV Push once  dextrose 50% Injectable 25 Gram(s) IV Push once  enoxaparin Injectable 40 milliGRAM(s) SubCutaneous every 24 hours  fentaNYL   Infusion. 0.5 MICROgram(s)/kG/Hr (2.02 mL/Hr) IV Continuous <Continuous>  glucagon  Injectable 1 milliGRAM(s) IntraMuscular once  insulin glargine Injectable (LANTUS) 8 Unit(s) SubCutaneous at bedtime  insulin lispro (ADMELOG) corrective regimen sliding scale   SubCutaneous every 6 hours  ipratropium 17 MICROgram(s) HFA Inhaler 8 Puff(s) Inhalation every 6 hours  midazolam Infusion 0.02 mG/kG/Hr (0.81 mL/Hr) IV Continuous <Continuous>  midodrine 10 milliGRAM(s) Oral every 8 hours  norepinephrine Infusion 0.05 MICROgram(s)/kG/Min (4.69 mL/Hr) IV Continuous <Continuous>  pantoprazole   Suspension 40 milliGRAM(s) Oral before breakfast  polyethylene glycol 3350 17 Gram(s) Oral daily  predniSONE   Tablet 20 milliGRAM(s) Oral daily  QUEtiapine 75 milliGRAM(s) Oral every 12 hours  senna 2 Tablet(s) Oral at bedtime    MEDICATIONS  (PRN):  acetaminophen     Tablet .. 650 milliGRAM(s) Oral every 6 hours PRN Temp greater or equal to 38C (100.4F), Mild Pain (1 - 3)  dextrose Oral Gel 15 Gram(s) Oral once PRN Blood Glucose LESS THAN 70 milliGRAM(s)/deciliter  morphine  - Injectable 2 milliGRAM(s) IV Push every 6 hours PRN Severe Pain (7 - 10)      New X-rays reviewed:                                                                                  ECHO

## 2022-06-24 NOTE — PROGRESS NOTE ADULT - CONVERSATION DETAILS
Palliative care team spoke to Lashay    A follow up to our previous conversation re: option of removal of vent and life support/CMO. She verbalized that she spoke to Dr Snyder the pulmonary MD and knows that she will never be able to live without ventilator support which would mean a tracheostomy and a nursing home. She promised no nursing home to her mother and therefore wants to palliatively extubate and do comfort care    She does not want a trach and PEG, and will plan for vent withdrawal after she speaks to  home. She is currently at work and does want to be present for the withdrawal.    She asked for a Rabbi's blessing.

## 2022-06-24 NOTE — PRE-ANESTHESIA EVALUATION ADULT - NSANTHPMHFT_GEN_ALL_CORE
88F with hx COPD on home O2, pHTN, HTN, HFrEF, GERD, Lucio's esophagus admitted for resp failure s/p intubation. Course c/b right PTX s/p pigtail. Scheduled for trach and PEG.

## 2022-06-25 NOTE — PROGRESS NOTE ADULT - ASSESSMENT
87 y/o female with PMHx of COPD on home O2, pHTN, GERD, Lucio's esophagus, COVID vaccinated presented to the ED for acute onset of SOB.    # Acute on chronic hypercapnic respiratory failure s/p intubation  # COPD exacerbation  # HO COPD, O2 dependent  # R secondary spontaneous pneumothorax s/p pigtail  - presented to ED with SOB, found to be hypercapnic to 115, intubated  - post intubation CXR showed R PTX s/p R pigtail catheter, dislodged, replaced 6/15  - CTSX following  - cont solumedrol @ 60q12  - Nebs q4 and PRN  - vent changes per pulm: none today  - sedation with propofol - attempt SAT/SBT on propofol  - increase seroquel to 50mg BID, monitor qtc  - BCX x2 NGTD, UCX neg, RVP neg, urine strep/legionella neg  - daily ABG, CXR, SAT/SBT    # H/o HTN  # 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 olivarez.   Disposition: CEU.

## 2022-06-25 NOTE — PROGRESS NOTE ADULT - ASSESSMENT
IMPRESSION:    Acute on Chronic Hypercapnic Respiratory Failure requiring MV  COPD exacerbation  Secondary Spontaneous Pneumothorax SP pig tail  HFmrEF (EF 45%), Mod TR mild PHTN    PLAN:    CNS:  Adequate sedation.    HEENT: Oral care    PULMONARY:  HOB @ 45 degrees.  Vent changes: None.  ABG PRN.  Chest tube to water seal.  CTS FU.  Nebs Q6 and PRN.  Prednisone taper for 1 more day.    CARDIOVASCULAR:   Target MAP > 55.  Avoid overload.        GI: GI prophylaxis.  OG Feeding.  Bowel regimen.    RENAL:  Follow up lytes.  Correct as needed.     INFECTIOUS DISEASE:  Finish Cefepime & Caspofungin course total 7 days.  FU cultures.    HEMATOLOGICAL:  DVT prophylaxis.     ENDOCRINE:  Follow up FS.  Insulin protocol if needed.     MUSCULOSKELETAL:  Bed rest    CODE STATUS: DNR + CMO.  Palliative following, terminal weaning on Monday.    Monitor in SDU IMPRESSION:    Acute on Chronic Hypercapnic Respiratory Failure requiring MV  COPD exacerbation  Secondary Spontaneous Pneumothorax SP pig tail  HFmrEF (EF 45%), Mod TR mild PHTN    PLAN:    CNS:  Adequate sedation.    HEENT: Oral care    PULMONARY:  HOB @ 45 degrees.  Vent changes: None.  ABG PRN.  Chest tube to water seal.  Nebs Q6 and PRN.  Prednisone taper for 1 more day.    CARDIOVASCULAR:   Target MAP > 60.  Avoid overload.        GI: GI prophylaxis.  OG Feeding.  Bowel regimen.    RENAL:  Follow up lytes.  Correct as needed.     INFECTIOUS DISEASE:  Continue ABX     HEMATOLOGICAL:  DVT prophylaxis.     ENDOCRINE:  Follow up FS.  Insulin protocol if needed.     MUSCULOSKELETAL:  Bed rest    CODE STATUS: DNR + CMO.  Palliative following, terminal weaning on Monday.    Monitor in SDU

## 2022-06-25 NOTE — PROGRESS NOTE ADULT - SUBJECTIVE AND OBJECTIVE BOX
Patient is a 88y old  Female who presents with a chief complaint of respiratory distress (25 Jun 2022 00:51)    Over Night Events: Remains critically ill on MV.  Sedated.  Off pressors.    ROS:   All ROS are negative except HPI     PHYSICAL EXAM  ICU Vital Signs Last 24 Hrs  T(C): 37 (25 Jun 2022 04:28), Max: 37 (25 Jun 2022 04:28)  T(F): 98.6 (25 Jun 2022 04:28), Max: 98.6 (25 Jun 2022 04:28)  HR: 99 (25 Jun 2022 04:28) (79 - 99)  BP: 134/60 (25 Jun 2022 04:28) (97/46 - 170/72)  BP(mean): 86 (25 Jun 2022 04:28) (61 - 99)  RR: 99 (25 Jun 2022 04:28) (18 - 99)  SpO2: 98% (25 Jun 2022 04:28) (98% - 100%)    CONSTITUTIONAL:  Ill appearing in  NAD    ENT:   Airway patent,   Mouth with normal mucosa.       EYES:   Pupils equal,   Round and reactive to light.    CARDIAC:   Normal rate,   Regular rhythm.      RESPIRATORY:   No wheezing  Bilateral BS  Normal chest expansion  Not tachypneic,  No use of accessory muscles    GASTROINTESTINAL:  Abdomen soft,   Non-tender,   No guarding,   + BS    MUSCULOSKELETAL:   Range of motion is not limited,  No clubbing, cyanosis    NEUROLOGICAL:   Sedated   No motor  deficits.    SKIN:   Skin normal color for race,   No evidence of rash.    PSYCHIATRIC:   Sedated   No apparent risk to self or others.      06-24-22 @ 07:01  -  06-25-22 @ 07:00  --------------------------------------------------------  IN:    Enteral Tube Flush: 50 mL    FentaNYL: 26 mL    IV PiggyBack: 50 mL    Midazolam: 26 mL    Norepinephrine: 8.5 mL    Norepinephrine: 5.4 mL    Peptamen A.F.: 385 mL  Total IN: 550.9 mL    OUT:    Chest Tube (mL): 0 mL    Indwelling Catheter - Urethral (mL): 495 mL    Voided (mL): 200 mL  Total OUT: 695 mL    Total NET: -144.1 mL    LABS:                        9.6    34.06 )-----------( 383      ( 24 Jun 2022 05:00 )             28.7     144  |  104  |  67<HH>  ----------------------------<  157<H>  4.9   |  33<H>  |  0.6<L>    Ca    8.7      24 Jun 2022 05:00  Mg     2.2     06-24    TPro  4.2<L>  /  Alb  2.2<L>  /  TBili  0.3  /  DBili  x   /  AST  17  /  ALT  25  /  AlkPhos  60  06-24    LIVER FUNCTIONS - ( 24 Jun 2022 05:00 )  Alb: 2.2 g/dL / Pro: 4.2 g/dL / ALK PHOS: 60 U/L / ALT: 25 U/L / AST: 17 U/L / GGT: x                                              Mode: AC/ CMV (Assist Control/ Continuous Mandatory Ventilation)  RR (machine): 14  TV (machine): 300  FiO2: 30  PEEP: 7  ITime: 1  MAP: 11  PIP: 29                                          MEDICATIONS  (STANDING):  ALBUTerol    90 MICROgram(s) HFA Inhaler 8 Puff(s) Inhalation every 6 hours  caspofungin IVPB      caspofungin IVPB 50 milliGRAM(s) IV Intermittent every 24 hours  cefepime   IVPB 1000 milliGRAM(s) IV Intermittent every 12 hours  chlorhexidine 0.12% Liquid 15 milliLiter(s) Oral Mucosa two times a day  chlorhexidine 4% Liquid 1 Application(s) Topical <User Schedule>  clonazePAM  Tablet 0.5 milliGRAM(s) Oral every 12 hours  dexMEDEtomidine Infusion 0.05 MICROgram(s)/kG/Hr (0.63 mL/Hr) IV Continuous <Continuous>  dextrose 5%. 1000 milliLiter(s) (50 mL/Hr) IV Continuous <Continuous>  dextrose 5%. 1000 milliLiter(s) (100 mL/Hr) IV Continuous <Continuous>  dextrose 50% Injectable 25 Gram(s) IV Push once  dextrose 50% Injectable 12.5 Gram(s) IV Push once  dextrose 50% Injectable 25 Gram(s) IV Push once  enoxaparin Injectable 40 milliGRAM(s) SubCutaneous every 24 hours  fentaNYL   Infusion. 0.5 MICROgram(s)/kG/Hr (2.02 mL/Hr) IV Continuous <Continuous>  glucagon  Injectable 1 milliGRAM(s) IntraMuscular once  insulin glargine Injectable (LANTUS) 8 Unit(s) SubCutaneous at bedtime  insulin lispro (ADMELOG) corrective regimen sliding scale   SubCutaneous every 6 hours  ipratropium 17 MICROgram(s) HFA Inhaler 8 Puff(s) Inhalation every 6 hours  midazolam Infusion 0.02 mG/kG/Hr (0.81 mL/Hr) IV Continuous <Continuous>  midodrine 10 milliGRAM(s) Oral every 8 hours  norepinephrine Infusion 0.05 MICROgram(s)/kG/Min (3.78 mL/Hr) IV Continuous <Continuous>  pantoprazole   Suspension 40 milliGRAM(s) Oral before breakfast  polyethylene glycol 3350 17 Gram(s) Oral daily  predniSONE   Tablet 20 milliGRAM(s) Oral daily  QUEtiapine 75 milliGRAM(s) Oral every 12 hours  senna 2 Tablet(s) Oral at bedtime    MEDICATIONS  (PRN):  acetaminophen     Tablet .. 650 milliGRAM(s) Oral every 6 hours PRN Temp greater or equal to 38C (100.4F), Mild Pain (1 - 3)  dextrose Oral Gel 15 Gram(s) Oral once PRN Blood Glucose LESS THAN 70 milliGRAM(s)/deciliter  morphine  - Injectable 2 milliGRAM(s) IV Push every 6 hours PRN Severe Pain (7 - 10)      New X-rays reviewed:                                                                                  ECHO

## 2022-06-25 NOTE — PROGRESS NOTE ADULT - ASSESSMENT
87 y/o female with PMHx of COPD on home O2, pHTN, GERD, Lucio's esophagus, COVID vaccinated presented to the ED for acute onset of sob.    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 In agreement with plan of care.  Terminal wean next week?  Disposition: Home_____/SNF______/Other_____/Unknown at this time__x___  Spent over 35 min reviewing chart and on coordinating patient care during interdisciplinary rounds     Please call me with any questions at extension [phone]

## 2022-06-25 NOTE — PROGRESS NOTE ADULT - ASSESSMENT
ASSESSMENT: 88y F admitted with respiratory failure now with subcutaneous emphysema, right pneumothorax, s/p pigtail placement and chest tube placement     PLAN:  - Care as per CEU  - CT to WS  - Daily CXR  - Monitor CT output  - Monitor vitals  - Palliative: plan for vent withdrawal     [id number] ASSESSMENT: 88y F admitted with respiratory failure now with subcutaneous emphysema, right pneumothorax, s/p pigtail placement and chest tube placement. Since family decided for palliative extubation there is no further need for surgery to follow, no intervention on this admission.     PLAN:  - Care as per CEU  - CT to WS  - Daily CXR  - Monitor CT output  - Monitor vitals  - Palliative: plan for vent withdrawal   - Surgery will sign off, recall if further evaluation needed    [phone]

## 2022-06-25 NOTE — PROGRESS NOTE ADULT - NS ATTEND OPT1A GEN_ALL_CORE
Medical decision making

## 2022-06-25 NOTE — PROGRESS NOTE ADULT - SUBJECTIVE AND OBJECTIVE BOX
SUBJECTIVE:    PMHx of COPD not on home O2, pHTN, GERD, Lucio's esophagus, COVID vaccinated     presented to the ED for acute onset of sob.    As per nurse aide, pt was asymptomatic sitting on the couch, when she became acutely sob. pt was brought to ED and was tachypneic and answering questions with one word answers, and struggling to breath.    In the ED, pt was placed on bipap immediately and symptoms improved and pt looks more comfortable. ABG initially shows pCO2 115 and was placed on bipap and soon after repeat pco2 was 100. per family and patient, patient was full code and decision was made to intubate the patient. Labs are overall unremarkable except wbc 19K. She received duoneb, Mg and solumedrol 125 mg x1 in ED.     6/22/22 - Patient switched from propofol to Versed 100 mg.    6/23/22: Daughter called and requested tracheostomy. BP dropped to 83/39 but then yane again after starting levo.     6/24/22 - Daughter changed her mind after speaking to attending regarding tracheostomy. She does not want tracheostomy. [facility] wants to do palliative extubation but not sure when yet.     Pressors during ICU course: Levophed8 mg  Antibiotics: Azithromycin 500 mg Q24, Cefepime 1000mg Q12, Caspofungin 50 mg Q24    Guzman: Urinary    Today is hospital day 15d.     Overnight Events:     No significant overnight events    PAST MEDICAL & SURGICAL HISTORY  COPD (chronic obstructive pulmonary disease)    GERD (gastroesophageal reflux disease)    Glaucoma    Anxiety    Pancreatitis    History of tonsillectomy    H/O colonoscopy  5 years ago      ALLERGIES:  No Known Allergies    MEDICATIONS:  STANDING MEDICATIONS  ALBUTerol    90 MICROgram(s) HFA Inhaler 8 Puff(s) Inhalation every 6 hours  caspofungin IVPB      caspofungin IVPB 50 milliGRAM(s) IV Intermittent every 24 hours  cefepime   IVPB 1000 milliGRAM(s) IV Intermittent every 12 hours  chlorhexidine 0.12% Liquid 15 milliLiter(s) Oral Mucosa two times a day  chlorhexidine 4% Liquid 1 Application(s) Topical <User Schedule>  clonazePAM  Tablet 0.5 milliGRAM(s) Oral every 12 hours  dexMEDEtomidine Infusion 0.05 MICROgram(s)/kG/Hr IV Continuous <Continuous>  dextrose 5%. 1000 milliLiter(s) IV Continuous <Continuous>  dextrose 5%. 1000 milliLiter(s) IV Continuous <Continuous>  dextrose 50% Injectable 25 Gram(s) IV Push once  dextrose 50% Injectable 12.5 Gram(s) IV Push once  dextrose 50% Injectable 25 Gram(s) IV Push once  enoxaparin Injectable 40 milliGRAM(s) SubCutaneous every 24 hours  fentaNYL   Infusion. 0.5 MICROgram(s)/kG/Hr IV Continuous <Continuous>  glucagon  Injectable 1 milliGRAM(s) IntraMuscular once  insulin glargine Injectable (LANTUS) 8 Unit(s) SubCutaneous at bedtime  insulin lispro (ADMELOG) corrective regimen sliding scale   SubCutaneous every 6 hours  ipratropium 17 MICROgram(s) HFA Inhaler 8 Puff(s) Inhalation every 6 hours  midazolam Infusion 0.02 mG/kG/Hr IV Continuous <Continuous>  midodrine 10 milliGRAM(s) Oral every 8 hours  norepinephrine Infusion 0.05 MICROgram(s)/kG/Min IV Continuous <Continuous>  pantoprazole   Suspension 40 milliGRAM(s) Oral before breakfast  polyethylene glycol 3350 17 Gram(s) Oral daily  predniSONE   Tablet 20 milliGRAM(s) Oral daily  QUEtiapine 75 milliGRAM(s) Oral every 12 hours  senna 2 Tablet(s) Oral at bedtime  vitamin A &amp; D Ointment 1 Application(s) Topical daily    PRN MEDICATIONS  acetaminophen     Tablet .. 650 milliGRAM(s) Oral every 6 hours PRN  dextrose Oral Gel 15 Gram(s) Oral once PRN  morphine  - Injectable 2 milliGRAM(s) IV Push every 6 hours PRN    VITALS:   ICU Vital Signs Last 24 Hrs  T(C): 36.6 (25 Jun 2022 11:54), Max: 37 (25 Jun 2022 04:28)  T(F): 97.8 (25 Jun 2022 11:54), Max: 98.6 (25 Jun 2022 04:28)  HR: 122 (25 Jun 2022 13:00) (79 - 122)  BP: 135/88 (25 Jun 2022 13:00) (114/53 - 170/72)  BP(mean): 81 (25 Jun 2022 12:36) (76 - 99)  RR: 18 (25 Jun 2022 13:00) (14 - 99)  SpO2: 97% (25 Jun 2022 13:00) (97% - 100%)      LABS:                        9.6    34.06 )-----------( 383      ( 24 Jun 2022 05:00 )             28.7     06-24    144  |  104  |  67<HH>  ----------------------------<  157<H>  4.9   |  33<H>  |  0.6<L>    Ca    8.7      24 Jun 2022 05:00  Mg     2.2     06-24    TPro  4.2<L>  /  Alb  2.2<L>  /  TBili  0.3  /  DBili  x   /  AST  17  /  ALT  25  /  AlkPhos  60  06-24        RADIOLOGY:    < from: Xray Chest 1 View- PORTABLE-Routine (Xray Chest 1 View- PORTABLE-Routine in AM.) (06.25.22 @ 06:03) >    ACC: [id number] EXAM:  XR CHEST PORTABLE ROUTINE 1V                          PROCEDURE DATE:  06/25/2022      INTERPRETATION:  Clinical History / Reason for exam: Hypoxia    Impression:    Bilateral opacities and bilateral subcutaneous emphysema, unchanged.    --- End of Report ---        PHYSICAL EXAM:  GEN: ill appearing  LUNGS: intubated on vent, chest tub on right (CT to WS)  HEART: normal rate and rhythm  ABD: soft, nondistended, peg tube  EXT: edema appreciated in UE b/l, +1 edema appreciated in LE b/l  NEURO: obtunded

## 2022-06-25 NOTE — PROGRESS NOTE ADULT - SUBJECTIVE AND OBJECTIVE BOX
KIMBERLI LEIGH  88y  Female      Patient is a 88y old  Female who presents with respiratory distress (25 Jun 2022 14:09)      INTERVAL HPI/OVERNIGHT EVENTS:  Patient seen and examined earlier this morning  lying comfortably in bed  not following commands  on vent   chest tube in place       REVIEW OF SYSTEMS:  unable to assess due to mental status    T(C): 36.6 (06-25-22 @ 16:06), Max: 37 (06-25-22 @ 04:28)  HR: 99 (06-25-22 @ 16:06) (79 - 122)  BP: 141/64 (06-25-22 @ 16:06) (114/53 - 170/72)  RR: 18 (06-25-22 @ 16:06) (14 - 99)  SpO2: 100% (06-25-22 @ 16:06) (97% - 100%) on vent     PHYSICAL EXAM:  GENERAL: elderly, thin, frail female   HEAD:  Atraumatic, Normocephalic  EYES: EOMI, PERRLA, conjunctiva and sclera clear  ENMT: on vent  NECK: on vent   NERVOUS SYSTEM: obtunded   CHEST/LUNG: chest tube +, good air entry anteriorly   HEART: Regular rate and rhythm; No murmurs, rubs, or gallops  ABDOMEN: Soft, Nontender, Nondistended; Bowel sounds present  EXTREMITIES:  2+ Peripheral Pulses, No clubbing, cyanosis, or edema  LYMPH: No lymphadenopathy noted  SKIN: multiple ecchymotic areas    Consultant(s) Notes Reviewed:  [x ] YES  [ ] NO  Care Discussed with Consultants/Other Providers [ x] YES  [ ] NO    LAB:                        9.6    34.06 )-----------( 383      ( 24 Jun 2022 05:00 )             28.7     06-24    144  |  104  |  67<HH>  ----------------------------<  157<H>  4.9   |  33<H>  |  0.6<L>    Ca    8.7      24 Jun 2022 05:00  Mg     2.2     06-24    TPro  4.2<L>  /  Alb  2.2<L>  /  TBili  0.3  /  DBili  x   /  AST  17  /  ALT  25  /  AlkPhos  60  06-24    LIVER FUNCTIONS - ( 24 Jun 2022 05:00 )  Alb: 2.2 g/dL / Pro: 4.2 g/dL / ALK PHOS: 60 U/L / ALT: 25 U/L / AST: 17 U/L / GGT: x                 Drug Dosing Weight  Height (cm): 147.3 (24 Jun 2022 12:31)  Weight (kg): 40.3 (24 Jun 2022 12:31)  BMI (kg/m2): 18.6 (24 Jun 2022 12:31)  BSA (m2): 1.29 (24 Jun 2022 12:31)    CAPILLARY BLOOD GLUCOSE  POCT Blood Glucose.: 195 mg/dL (25 Jun 2022 16:24)  POCT Blood Glucose.: 224 mg/dL (25 Jun 2022 12:01)  POCT Blood Glucose.: 108 mg/dL (25 Jun 2022 07:35)  POCT Blood Glucose.: 133 mg/dL (25 Jun 2022 05:41)  POCT Blood Glucose.: 113 mg/dL (24 Jun 2022 22:37)    I&O's Summary    24 Jun 2022 07:01  -  25 Jun 2022 07:00  --------------------------------------------------------  IN: 550.9 mL / OUT: 695 mL / NET: -144.1 mL    25 Jun 2022 07:01  -  25 Jun 2022 16:37  --------------------------------------------------------  IN: 357 mL / OUT: 950 mL / NET: -593 mL          RADIOLOGY & ADDITIONAL TESTS:  Imaging Personally Reviewed:  [x] YES  [ ] NO  < from: Xray Chest 1 View- PORTABLE-Routine (Xray Chest 1 View- PORTABLE-Routine in AM.) (06.25.22 @ 06:03) >  Impression:    Bilateral opacities and bilateral subcutaneous emphysema, unchanged.    < end of copied text >      HEALTH ISSUES - PROBLEM Dx:  Palliative care by specialist    Acute respiratory failure with hypoxia    Agitation    Sepsis    Advanced care planning/counseling discussion            MEDS:  acetaminophen     Tablet .. 650 milliGRAM(s) Oral every 6 hours PRN  ALBUTerol    90 MICROgram(s) HFA Inhaler 8 Puff(s) Inhalation every 6 hours  caspofungin IVPB      caspofungin IVPB 50 milliGRAM(s) IV Intermittent every 24 hours  cefepime   IVPB 1000 milliGRAM(s) IV Intermittent every 12 hours  chlorhexidine 0.12% Liquid 15 milliLiter(s) Oral Mucosa two times a day  chlorhexidine 4% Liquid 1 Application(s) Topical <User Schedule>  clonazePAM  Tablet 0.5 milliGRAM(s) Oral every 12 hours  dexMEDEtomidine Infusion 0.05 MICROgram(s)/kG/Hr IV Continuous <Continuous>  dextrose 5%. 1000 milliLiter(s) IV Continuous <Continuous>  dextrose 5%. 1000 milliLiter(s) IV Continuous <Continuous>  dextrose 50% Injectable 25 Gram(s) IV Push once  dextrose 50% Injectable 12.5 Gram(s) IV Push once  dextrose 50% Injectable 25 Gram(s) IV Push once  dextrose Oral Gel 15 Gram(s) Oral once PRN  enoxaparin Injectable 40 milliGRAM(s) SubCutaneous every 24 hours  fentaNYL   Infusion. 0.5 MICROgram(s)/kG/Hr IV Continuous <Continuous>  glucagon  Injectable 1 milliGRAM(s) IntraMuscular once  insulin glargine Injectable (LANTUS) 8 Unit(s) SubCutaneous at bedtime  insulin lispro (ADMELOG) corrective regimen sliding scale   SubCutaneous every 6 hours  ipratropium 17 MICROgram(s) HFA Inhaler 8 Puff(s) Inhalation every 6 hours  midazolam Infusion 0.02 mG/kG/Hr IV Continuous <Continuous>  midodrine 10 milliGRAM(s) Oral every 8 hours  morphine  - Injectable 2 milliGRAM(s) IV Push every 6 hours PRN  norepinephrine Infusion 0.05 MICROgram(s)/kG/Min IV Continuous <Continuous>  pantoprazole   Suspension 40 milliGRAM(s) Oral before breakfast  polyethylene glycol 3350 17 Gram(s) Oral daily  predniSONE   Tablet 20 milliGRAM(s) Oral daily  QUEtiapine 75 milliGRAM(s) Oral every 12 hours  senna 2 Tablet(s) Oral at bedtime  vitamin A &amp; D Ointment 1 Application(s) Topical daily

## 2022-06-25 NOTE — PROGRESS NOTE ADULT - SUBJECTIVE AND OBJECTIVE BOX
GENERAL SURGERY PROGRESS NOTE    Patient: KIMBERLI LEIGH , 88y ([birth year])Female   MRN: [id number]  Location: [address]  Visit: 06-10-22 Inpatient  Date: 06-25-22 @ 00:51    Procedure/Dx/Injuries: Right PTX, SQ emphysema    Events of past 24 hours: Family declined trach and PEG, seen by palliative planning for extubation early next week. Off levo overnight    PAST MEDICAL & SURGICAL HISTORY:  COPD (chronic obstructive pulmonary disease)      GERD (gastroesophageal reflux disease)      Glaucoma      Anxiety      Pancreatitis      History of tonsillectomy      H/O colonoscopy  5 years ago          Vitals:   T(F): 98.2 (06-25-22 @ 00:13), Max: 98.5 (06-24-22 @ 21:35)  HR: 79 (06-25-22 @ 00:13)  BP: 168/69 (06-25-22 @ 00:13)  RR: 20 (06-25-22 @ 00:13)  SpO2: 98% (06-25-22 @ 00:13)  Mode: AC/ CMV (Assist Control/ Continuous Mandatory Ventilation), RR (machine): 14, TV (machine): 300, FiO2: 30, PEEP: 7, ITime: 1, MAP: 10, PIP: 19    Diet, NPO with Tube Feed:   Tube Feeding Modality: Orogastric  Peptamen A.F. Formula  Total Volume for 24 Hours (mL): 840  Continuous  Until Goal Tube Feed Rate (mL per Hour): 35  Tube Feed Duration (in Hours): 24  Tube Feed Start Time: 21:00      Fluids:     I & O's:    06-23-22 @ 07:01  -  06-24-22 @ 07:00  --------------------------------------------------------  IN:    Enteral Tube Flush: 100 mL    FentaNYL: 12 mL    FentaNYL: 34 mL    IV PiggyBack: 100 mL    Midazolam: 32 mL    Midazolam: 6 mL    Norepinephrine: 91.8 mL    Peptamen A.F.: 805 mL  Total IN: 1180.8 mL    OUT:    Chest Tube (mL): 0 mL    Chest Tube (mL): 15 mL    Dexmedetomidine: 0 mL    Indwelling Catheter - Urethral (mL): 1155 mL  Total OUT: 1170 mL    Total NET: 10.8 mL      PHYSICAL EXAM:  General: intubated, sedated  Cardiac: RRR   Respiratory: intubated and ventilated, vent 30/7, right CT to WS  Abdomen: Soft, non-distended    MEDICATIONS  (STANDING):  ALBUTerol    90 MICROgram(s) HFA Inhaler 8 Puff(s) Inhalation every 6 hours  caspofungin IVPB      caspofungin IVPB 50 milliGRAM(s) IV Intermittent every 24 hours  cefepime   IVPB 1000 milliGRAM(s) IV Intermittent every 12 hours  chlorhexidine 0.12% Liquid 15 milliLiter(s) Oral Mucosa two times a day  chlorhexidine 4% Liquid 1 Application(s) Topical <User Schedule>  clonazePAM  Tablet 0.5 milliGRAM(s) Oral every 12 hours  dexMEDEtomidine Infusion 0.05 MICROgram(s)/kG/Hr (0.63 mL/Hr) IV Continuous <Continuous>  dextrose 5%. 1000 milliLiter(s) (50 mL/Hr) IV Continuous <Continuous>  dextrose 5%. 1000 milliLiter(s) (100 mL/Hr) IV Continuous <Continuous>  dextrose 50% Injectable 25 Gram(s) IV Push once  dextrose 50% Injectable 12.5 Gram(s) IV Push once  dextrose 50% Injectable 25 Gram(s) IV Push once  enoxaparin Injectable 40 milliGRAM(s) SubCutaneous every 24 hours  fentaNYL   Infusion. 0.5 MICROgram(s)/kG/Hr (2.02 mL/Hr) IV Continuous <Continuous>  glucagon  Injectable 1 milliGRAM(s) IntraMuscular once  insulin glargine Injectable (LANTUS) 8 Unit(s) SubCutaneous at bedtime  insulin lispro (ADMELOG) corrective regimen sliding scale   SubCutaneous every 6 hours  ipratropium 17 MICROgram(s) HFA Inhaler 8 Puff(s) Inhalation every 6 hours  midazolam Infusion 0.02 mG/kG/Hr (0.81 mL/Hr) IV Continuous <Continuous>  midodrine 10 milliGRAM(s) Oral every 8 hours  norepinephrine Infusion 0.05 MICROgram(s)/kG/Min (3.78 mL/Hr) IV Continuous <Continuous>  pantoprazole   Suspension 40 milliGRAM(s) Oral before breakfast  polyethylene glycol 3350 17 Gram(s) Oral daily  predniSONE   Tablet 20 milliGRAM(s) Oral daily  QUEtiapine 75 milliGRAM(s) Oral every 12 hours  senna 2 Tablet(s) Oral at bedtime    MEDICATIONS  (PRN):  acetaminophen     Tablet .. 650 milliGRAM(s) Oral every 6 hours PRN Temp greater or equal to 38C (100.4F), Mild Pain (1 - 3)  dextrose Oral Gel 15 Gram(s) Oral once PRN Blood Glucose LESS THAN 70 milliGRAM(s)/deciliter  morphine  - Injectable 2 milliGRAM(s) IV Push every 6 hours PRN Severe Pain (7 - 10)      DVT PROPHYLAXIS: enoxaparin Injectable 40 milliGRAM(s) SubCutaneous every 24 hours    GI PROPHYLAXIS: pantoprazole   Suspension 40 milliGRAM(s) Oral before breakfast    ANTICOAGULATION:   ANTIBIOTICS:  caspofungin IVPB    caspofungin IVPB 50 milliGRAM(s)  cefepime   IVPB 1000 milliGRAM(s)            LAB/STUDIES:  Labs:  CAPILLARY BLOOD GLUCOSE      POCT Blood Glucose.: 113 mg/dL (24 Jun 2022 22:37)  POCT Blood Glucose.: 242 mg/dL (24 Jun 2022 11:52)  POCT Blood Glucose.: 160 mg/dL (24 Jun 2022 04:12)                          9.6    34.06 )-----------( 383      ( 24 Jun 2022 05:00 )             28.7       Auto Neutrophil %: 89.6 % (06-24-22 @ 05:00)  Auto Immature Granulocyte %: 1.7 % (06-24-22 @ 05:00)    06-24    144  |  104  |  67<HH>  ----------------------------<  157<H>  4.9   |  33<H>  |  0.6<L>      Calcium, Total Serum: 8.7 mg/dL (06-24-22 @ 05:00)      LFTs:             4.2  | 0.3  | 17       ------------------[60      ( 24 Jun 2022 05:00 )  2.2  | x    | 25          Lipase:x      Amylase:x         Blood Gas Arterial, Lactate: 0.90 mmol/L (06-22-22 @ 03:31)    ABG - ( 22 Jun 2022 03:31 )  pH: 7.47  /  pCO2: 48    /  pO2: 77    / HCO3: 35    / Base Excess: 9.7   /  SaO2: 97.8            ABG - ( 21 Jun 2022 03:47 )  pH: 7.47  /  pCO2: 52    /  pO2: 83    / HCO3: 38    / Base Excess: 12.1  /  SaO2: 98.3            ABG - ( 20 Jun 2022 16:08 )  pH: 7.48  /  pCO2: 52    /  pO2: 85    / HCO3: 39    / Base Excess: 13.0  /  SaO2: 97.8              Coags:                        IMAGING:    < from: Xray Chest 1 View- PORTABLE-Routine (Xray Chest 1 View- PORTABLE-Routine in AM.) (06.24.22 @ 06:12) >  Support devices: Endotracheal tube in satisfactory position. Feeding tube   coursing into the abdomen. Stable right-sided central venous catheter.   Right-sided chest tube.    Cardiac/mediastinum/hilum: Unchanged    Lung parenchyma/Pleura: Bilateral opacities redemonstrated. Limited   evaluation for pneumothorax due to chest wall emphysema. No large   pneumothorax seen.    Skeleton/soft tissues: Chest wall emphysema redemonstrated-otherwise   unchanged    < end of copied text >

## 2022-06-26 NOTE — PROGRESS NOTE ADULT - ASSESSMENT
89 y/o female with PMHx of COPD on home O2, pHTN, GERD, Lucio's esophagus, COVID vaccinated presented to the ED for acute onset of sob.    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 In agreement with plan of care.  Terminal wean tomorrow   Disposition: Home_____/SNF______/Other_____/Unknown at this time__x___  Spent over 35 min reviewing chart and on coordinating patient care during interdisciplinary rounds     Please call me with any questions at extension [phone]

## 2022-06-26 NOTE — PROGRESS NOTE ADULT - ASSESSMENT
IMPRESSION:    Acute on Chronic Hypercapnic Respiratory Failure requiring MV  COPD exacerbation  Secondary Spontaneous Pneumothorax SP pig tail  HFmrEF (EF 45%), Mod TR mild PHTN    PLAN:    CNS:  Adequate sedation.    HEENT: Oral care    PULMONARY:  HOB @ 45 degrees.  Vent changes: None.  ABG PRN.  Chest tube to water seal.  Nebs Q6 and PRN.     CARDIOVASCULAR:   Target MAP > 60.  Avoid overload.        GI: GI prophylaxis.  OG Feeding.  Bowel regimen.    RENAL:  Follow up lytes.  Correct as needed.     INFECTIOUS DISEASE:  Continue ABX     HEMATOLOGICAL:  DVT prophylaxis.     ENDOCRINE:  Follow up FS.  Insulin protocol if needed.     MUSCULOSKELETAL:  Bed rest    CODE STATUS: DNR + CMO.  Palliative following, terminal weaning on Monday.    Monitor in SDU

## 2022-06-26 NOTE — PROGRESS NOTE ADULT - SUBJECTIVE AND OBJECTIVE BOX
Patient is a 88y old  Female who presents with a chief complaint of respiratory distress (25 Jun 2022 16:36)        Over Night Events:    No events overnight. Fentanyl 0.5 and versed 0.05. off pressors. Possible terminal wean tomorrow.    ROS:  See HPI    PHYSICAL EXAM    ICU Vital Signs Last 24 Hrs  T(C): 36.9 (26 Jun 2022 08:00), Max: 37.2 (25 Jun 2022 20:00)  T(F): 98.4 (26 Jun 2022 08:00), Max: 98.9 (25 Jun 2022 20:00)  HR: 105 (26 Jun 2022 08:00) (84 - 122)  BP: 110/55 (26 Jun 2022 08:00) (106/53 - 142/67)  BP(mean): 77 (26 Jun 2022 08:00) (74 - 97)  ABP: --  ABP(mean): --  RR: 18 (26 Jun 2022 08:00) (17 - 18)  SpO2: 100% (26 Jun 2022 08:00) (97% - 100%)      06-25-22 @ 07:01  -  06-26-22 @ 07:00  --------------------------------------------------------  IN:    Enteral Tube Flush: 50 mL    FentaNYL: 24 mL    IV PiggyBack: 50 mL    Midazolam: 19 mL    Peptamen A.F.: 420 mL  Total IN: 563 mL    OUT:    Chest Tube (mL): 0 mL    Indwelling Catheter - Urethral (mL): 1775 mL  Total OUT: 1775 mL    Total NET: -1212 mL      06-26-22 @ 07:01  -  06-26-22 @ 09:16  --------------------------------------------------------  IN:    Enteral Tube Flush: 50 mL    FentaNYL: 4 mL    IV PiggyBack: 250 mL    Midazolam: 4 mL    Peptamen A.F.: 70 mL  Total IN: 378 mL    OUT:    Chest Tube (mL): 0 mL    Indwelling Catheter - Urethral (mL): 120 mL  Total OUT: 120 mL    Total NET: 258 mL            CONSTITUTIONAL:  Ill appearing.     ENT:   ETT in place   No thrush    EYES:   Clear bilaterally,   pupils equal,   round and reactive to light.    CARDIAC:   tachycardic  regular rhythm.    no edema      CAROTID:   normal systolic impulse  no bruits    RESPIRATORY:   No wheezing  Normal chest expansion  Not tachypneic,  No use of accessory muscles    GASTROINTESTINAL:  Abdomen soft,   non-tender,   no guarding,   + BS    MUSCULOSKELETAL:   range of motion is not limited,  no clubbing, cyanosis    NEUROLOGICAL:   Sedateed. Not following commands    SKIN:   Skin normal color for race,   No evidence of rash.    PSYCHIATRIC:   normal mood and affect.   no apparent risk to self or others.        LABS:                            9.6    26.47 )-----------( 346      ( 26 Jun 2022 07:47 )             29.5                                               06-26    141  |  101  |  65<HH>  ----------------------------<  184<H>  5.0   |  33<H>  |  0.6<L>    Ca    8.6      26 Jun 2022 07:47  Mg     2.2     06-26    TPro  4.6<L>  /  Alb  2.1<L>  /  TBili  0.3  /  DBili  x   /  AST  12  /  ALT  22  /  AlkPhos  59  06-26                                                                                           LIVER FUNCTIONS - ( 26 Jun 2022 07:47 )  Alb: 2.1 g/dL / Pro: 4.6 g/dL / ALK PHOS: 59 U/L / ALT: 22 U/L / AST: 12 U/L / GGT: x                    Procalcitonin, Serum: 0.23 ng/mL (06-20-22 @ 11:26)  Procalcitonin, Serum: 0.13 ng/mL (06-15-22 @ 10:43)  Procalcitonin, Serum: 0.52 ng/mL (06-10-22 @ 08:13)  D-Dimer Assay, Quantitative: 579 ng/mL DDU (06-10-22 @ 08:13)                    POCT Blood Glucose.: 171 mg/dL (06-26-22 @ 05:56)  POCT Blood Glucose.: 133 mg/dL (06-25-22 @ 22:42)  POCT Blood Glucose.: 123 mg/dL (06-25-22 @ 21:53)  POCT Blood Glucose.: 195 mg/dL (06-25-22 @ 16:24)  POCT Blood Glucose.: 224 mg/dL (06-25-22 @ 12:01)                                                                   Mode: AC/ CMV (Assist Control/ Continuous Mandatory Ventilation)  RR (machine): 14  TV (machine): 300  FiO2: 30  PEEP: 7  ITime: 1  MAP: 10  PIP: 22                                          MEDICATIONS  (STANDING):  ALBUTerol    90 MICROgram(s) HFA Inhaler 8 Puff(s) Inhalation every 6 hours  caspofungin IVPB      caspofungin IVPB 50 milliGRAM(s) IV Intermittent every 24 hours  cefepime   IVPB 1000 milliGRAM(s) IV Intermittent every 12 hours  chlorhexidine 0.12% Liquid 15 milliLiter(s) Oral Mucosa two times a day  chlorhexidine 4% Liquid 1 Application(s) Topical <User Schedule>  clonazePAM  Tablet 0.5 milliGRAM(s) Oral every 12 hours  dexMEDEtomidine Infusion 0.05 MICROgram(s)/kG/Hr (0.63 mL/Hr) IV Continuous <Continuous>  dextrose 5%. 1000 milliLiter(s) (100 mL/Hr) IV Continuous <Continuous>  dextrose 5%. 1000 milliLiter(s) (50 mL/Hr) IV Continuous <Continuous>  dextrose 50% Injectable 25 Gram(s) IV Push once  dextrose 50% Injectable 12.5 Gram(s) IV Push once  dextrose 50% Injectable 25 Gram(s) IV Push once  enoxaparin Injectable 40 milliGRAM(s) SubCutaneous every 24 hours  fentaNYL   Infusion. 0.5 MICROgram(s)/kG/Hr (2.02 mL/Hr) IV Continuous <Continuous>  glucagon  Injectable 1 milliGRAM(s) IntraMuscular once  insulin glargine Injectable (LANTUS) 8 Unit(s) SubCutaneous at bedtime  insulin lispro (ADMELOG) corrective regimen sliding scale   SubCutaneous every 6 hours  ipratropium 17 MICROgram(s) HFA Inhaler 8 Puff(s) Inhalation every 6 hours  midazolam Infusion 0.02 mG/kG/Hr (0.81 mL/Hr) IV Continuous <Continuous>  midodrine 10 milliGRAM(s) Oral every 8 hours  norepinephrine Infusion 0.05 MICROgram(s)/kG/Min (3.78 mL/Hr) IV Continuous <Continuous>  pantoprazole   Suspension 40 milliGRAM(s) Oral before breakfast  polyethylene glycol 3350 17 Gram(s) Oral daily  QUEtiapine 75 milliGRAM(s) Oral every 12 hours  senna 2 Tablet(s) Oral at bedtime  vitamin A &amp; D Ointment 1 Application(s) Topical daily    MEDICATIONS  (PRN):  acetaminophen     Tablet .. 650 milliGRAM(s) Oral every 6 hours PRN Temp greater or equal to 38C (100.4F), Mild Pain (1 - 3)  dextrose Oral Gel 15 Gram(s) Oral once PRN Blood Glucose LESS THAN 70 milliGRAM(s)/deciliter  morphine  - Injectable 2 milliGRAM(s) IV Push every 6 hours PRN Severe Pain (7 - 10)      Xrays:                                                                                     ECHO

## 2022-06-26 NOTE — PROGRESS NOTE ADULT - SUBJECTIVE AND OBJECTIVE BOX
KIMBERLI LEIGH  88y  Female      Patient is a 88y old  Female who presents with respiratory distress (25 Jun 2022 14:09)      INTERVAL HPI/OVERNIGHT EVENTS:  Patient seen and examined earlier this morning  lying comfortably in bed  not following commands  on vent   chest tube in place       REVIEW OF SYSTEMS:  unable to assess due to mental status    Vital Signs Last 24 Hrs  T(C): 36.9 (26 Jun 2022 08:00), Max: 37.2 (25 Jun 2022 20:00)  T(F): 98.4 (26 Jun 2022 08:00), Max: 98.9 (25 Jun 2022 20:00)  HR: 105 (26 Jun 2022 08:00) (84 - 122)  BP: 110/55 (26 Jun 2022 08:00) (106/53 - 142/67)  BP(mean): 77 (26 Jun 2022 08:00) (74 - 97)  RR: 18 (26 Jun 2022 08:00) (17 - 18)  SpO2: 100% (26 Jun 2022 08:00) (97% - 100%) on vent     PHYSICAL EXAM:  GENERAL: elderly, thin, frail female   HEAD:  Atraumatic, Normocephalic  EYES: EOMI, PERRLA, conjunctiva and sclera clear  ENMT: on vent  NECK: on vent   NERVOUS SYSTEM: obtunded   CHEST/LUNG: chest tube +, good air entry anteriorly   HEART: Regular rate and rhythm; No murmurs, rubs, or gallops  ABDOMEN: Soft, Nontender, Nondistended; Bowel sounds present  EXTREMITIES:  2+ Peripheral Pulses, No clubbing, cyanosis, or edema  LYMPH: No lymphadenopathy noted  SKIN: multiple ecchymotic areas    Consultant(s) Notes Reviewed:  [x ] YES  [ ] NO  Care Discussed with Consultants/Other Providers [ x] YES  [ ] NO    LAB:                                 9.6    26.47 )-----------( 346      ( 26 Jun 2022 07:47 )             29.5     06-26    141  |  101  |  65<HH>  ----------------------------<  184<H>  5.0   |  33<H>  |  0.6<L>    Ca    8.6      26 Jun 2022 07:47  Mg     2.2     06-26    TPro  4.6<L>  /  Alb  2.1<L>  /  TBili  0.3  /  DBili  x   /  AST  12  /  ALT  22  /  AlkPhos  59  06-26      Drug Dosing Weight  Height (cm): 147.3 (24 Jun 2022 12:31)  Weight (kg): 40.3 (24 Jun 2022 12:31)  BMI (kg/m2): 18.6 (24 Jun 2022 12:31)  BSA (m2): 1.29 (24 Jun 2022 12:31)    CAPILLARY BLOOD GLUCOSE  POCT Blood Glucose.: 265 mg/dL (26 Jun 2022 11:10)  POCT Blood Glucose.: 171 mg/dL (26 Jun 2022 05:56)  POCT Blood Glucose.: 133 mg/dL (25 Jun 2022 22:42)  POCT Blood Glucose.: 123 mg/dL (25 Jun 2022 21:53)  POCT Blood Glucose.: 195 mg/dL (25 Jun 2022 16:24)            RADIOLOGY & ADDITIONAL TESTS:  Imaging Personally Reviewed:  [x] YES  [ ] NO  < from: Xray Chest 1 View- PORTABLE-Routine (Xray Chest 1 View- PORTABLE-Routine in AM.) (06.25.22 @ 06:03) >  Impression:    Bilateral opacities and bilateral subcutaneous emphysema, unchanged.    < end of copied text >      HEALTH ISSUES - PROBLEM Dx:  Palliative care by specialist    Acute respiratory failure with hypoxia    Agitation    Sepsis    Advanced care planning/counseling discussion            MEDICATIONS  (STANDING):  ALBUTerol    90 MICROgram(s) HFA Inhaler 8 Puff(s) Inhalation every 6 hours  caspofungin IVPB      caspofungin IVPB 50 milliGRAM(s) IV Intermittent every 24 hours  cefepime   IVPB 1000 milliGRAM(s) IV Intermittent every 12 hours  chlorhexidine 0.12% Liquid 15 milliLiter(s) Oral Mucosa two times a day  chlorhexidine 4% Liquid 1 Application(s) Topical <User Schedule>  clonazePAM  Tablet 0.5 milliGRAM(s) Oral every 12 hours  dexMEDEtomidine Infusion 0.05 MICROgram(s)/kG/Hr (0.63 mL/Hr) IV Continuous <Continuous>  dextrose 5%. 1000 milliLiter(s) (50 mL/Hr) IV Continuous <Continuous>  dextrose 5%. 1000 milliLiter(s) (100 mL/Hr) IV Continuous <Continuous>  dextrose 50% Injectable 25 Gram(s) IV Push once  dextrose 50% Injectable 12.5 Gram(s) IV Push once  dextrose 50% Injectable 25 Gram(s) IV Push once  enoxaparin Injectable 40 milliGRAM(s) SubCutaneous every 24 hours  fentaNYL   Infusion. 0.5 MICROgram(s)/kG/Hr (2.02 mL/Hr) IV Continuous <Continuous>  glucagon  Injectable 1 milliGRAM(s) IntraMuscular once  insulin glargine Injectable (LANTUS) 8 Unit(s) SubCutaneous at bedtime  insulin lispro (ADMELOG) corrective regimen sliding scale   SubCutaneous every 6 hours  ipratropium 17 MICROgram(s) HFA Inhaler 8 Puff(s) Inhalation every 6 hours  midazolam Infusion 0.02 mG/kG/Hr (0.81 mL/Hr) IV Continuous <Continuous>  midodrine 10 milliGRAM(s) Oral every 8 hours  pantoprazole   Suspension 40 milliGRAM(s) Oral before breakfast  polyethylene glycol 3350 17 Gram(s) Oral daily  QUEtiapine 75 milliGRAM(s) Oral every 12 hours  senna 2 Tablet(s) Oral at bedtime  vitamin A &amp; D Ointment 1 Application(s) Topical daily    MEDICATIONS  (PRN):  acetaminophen     Tablet .. 650 milliGRAM(s) Oral every 6 hours PRN Temp greater or equal to 38C (100.4F), Mild Pain (1 - 3)  dextrose Oral Gel 15 Gram(s) Oral once PRN Blood Glucose LESS THAN 70 milliGRAM(s)/deciliter  morphine  - Injectable 2 milliGRAM(s) IV Push every 6 hours PRN Severe Pain (7 - 10)

## 2022-06-26 NOTE — PROGRESS NOTE ADULT - ATTENDING SUPERVISION STATEMENT
Fellow
Fellow
Resident
Resident
Fellow
Resident
Fellow
Fellow

## 2022-06-27 NOTE — DISCHARGE NOTE FOR THE EXPIRED PATIENT - HOSPITAL COURSE
87 y/o female with PMHx of COPD on home O2, pHTN, GERD, Lucio's esophagus, COVID vaccinated presented to the ED for acute onset of sob.Patient admitted for  Acute on chronic hypercapnic respiratory failure due to  COPD exacerbation and Right sided secondary spontaneous pneumothorax s/p pigtail. Patient was not able to tolerate coming off of intubation and was evaluated for trach and peg. However patient wishes were to not continue with long term ventilation. palliative following -  terminal weaned today. Patient passed today at 12:05.

## 2022-06-27 NOTE — PROGRESS NOTE ADULT - PROVIDER SPECIALTY LIST ADULT
Critical Care
Hospitalist
Nutrition Support
Nutrition Support
CT Surgery
Critical Care
Critical Care
Hospitalist
Thoracic Surgery
Thoracic Surgery
Critical Care
Hospitalist
Nutrition Support
Pulmonology
Thoracic Surgery
CT Surgery
Critical Care
Internal Medicine
Pulmonology
Surgery
Thoracic Surgery
Critical Care
Critical Care
Palliative Care

## 2022-06-27 NOTE — PROGRESS NOTE ADULT - SUBJECTIVE AND OBJECTIVE BOX
KIMBERLI LEIGH  88y  Female      Patient is a 88y old  Female who presents with respiratory distress (25 Jun 2022 14:09)      INTERVAL HPI/OVERNIGHT EVENTS:  Patient seen and examined earlier this morning with her family bedside   lying comfortably in bed and not following commands  remains on vent   chest tube in place   for liberation today       REVIEW OF SYSTEMS:  unable to assess due to mental status    Vital Signs Last 24 Hrs  T(C): 36.1 (27 Jun 2022 08:23), Max: 38 (26 Jun 2022 21:00)  T(F): 97 (27 Jun 2022 08:23), Max: 100.4 (26 Jun 2022 21:00)  HR: 115 (27 Jun 2022 08:23) (91 - 121)  BP: 122/59 (27 Jun 2022 08:23) (96/54 - 139/63)  BP(mean): 75 (26 Jun 2022 18:00) (70 - 90)  RR: 20 (27 Jun 2022 08:23) (18 - 20)  SpO2: 100% (27 Jun 2022 08:23) (97% - 100%)  on vent     PHYSICAL EXAM:  GENERAL: elderly, thin, frail female   HEAD:  Atraumatic, Normocephalic  EYES: EOMI, PERRLA, conjunctiva and sclera clear  ENMT: on vent  NECK: on vent   NERVOUS SYSTEM: obtunded   CHEST/LUNG: chest tube +, good air entry anteriorly   HEART: Regular rate and rhythm; No murmurs, rubs, or gallops  ABDOMEN: Soft, Nontender, Nondistended; Bowel sounds present  EXTREMITIES:  2+ Peripheral Pulses, No clubbing, cyanosis, or edema  LYMPH: No lymphadenopathy noted  SKIN: multiple ecchymotic areas    Consultant(s) Notes Reviewed:  [x ] YES  [ ] NO  Care Discussed with Consultants/Other Providers [ x] YES  [ ] NO    LAB:                           9.2    29.64 )-----------( 358      ( 27 Jun 2022 04:23 )             28.5     06-27    139  |  100  |  66<HH>  ----------------------------<  157<H>  5.0   |  30  |  0.6<L>    Ca    8.2<L>      27 Jun 2022 04:23  Mg     2.3     06-27    TPro  4.6<L>  /  Alb  2.0<L>  /  TBili  0.3  /  DBili  x   /  AST  16  /  ALT  21  /  AlkPhos  59  06-27          Drug Dosing Weight  Height (cm): 147.3 (24 Jun 2022 12:31)  Weight (kg): 40.3 (24 Jun 2022 12:31)  BMI (kg/m2): 18.6 (24 Jun 2022 12:31)  BSA (m2): 1.29 (24 Jun 2022 12:31)      CAPILLARY BLOOD GLUCOSE  POCT Blood Glucose.: 180 mg/dL (27 Jun 2022 05:58)  POCT Blood Glucose.: 143 mg/dL (26 Jun 2022 21:19)  POCT Blood Glucose.: 180 mg/dL (26 Jun 2022 17:09)  POCT Blood Glucose.: 265 mg/dL (26 Jun 2022 11:10)            RADIOLOGY & ADDITIONAL TESTS:  Imaging Personally Reviewed:  [x] YES  [ ] NO  < from: Xray Chest 1 View- PORTABLE-Routine (Xray Chest 1 View- PORTABLE-Routine in AM.) (06.25.22 @ 06:03) >  Impression:    Bilateral opacities and bilateral subcutaneous emphysema, unchanged.    < end of copied text >      HEALTH ISSUES - PROBLEM Dx:  Palliative care by specialist    MEDICATIONS  (STANDING):  fentaNYL   Infusion. 0.5 MICROgram(s)/kG/Hr (2.02 mL/Hr) IV Continuous <Continuous>  midazolam Infusion 0.02 mG/kG/Hr (0.81 mL/Hr) IV Continuous <Continuous>  midodrine 10 milliGRAM(s) Oral every 8 hours    MEDICATIONS  (PRN):  morphine  - Injectable 2 milliGRAM(s) IV Push every 6 hours PRN Severe Pain (7 - 10)  Acute respiratory failure with hypoxia    Agitation    Sepsis    Advanced care planning/counseling discussion

## 2022-06-27 NOTE — PROGRESS NOTE ADULT - NS ATTEND AMEND GEN_ALL_CORE FT
Ms. Peralta is an 88-year-old female admitted with diagnosis of COPD exacerbation, required intubation and ventilatory support, developed large right pneumothorax treated with percutaneous pleural drain with adequate lung re-expansion. Thoracic surgery consulted for assistance in care. Currently in low ventilatory support. CXR: drain in good position, no residual pneumothorax.    06/14/12: Pleural drain working well, no air leak, CXR: no pneumothorax. Failed SBT yesterday, SBT today for possible extubation, will keep pleural drain until extubated   06/15/22: Pleural drain dislodged into chest wall, CXR recurrent pneumothorax   06/16/22: New pleural drain in good position, resolved pneumothorax, no air leak. Unable to wean off ventilator   06/17/22: Drain I good position, no air leak. CXR no pneumothorax.  Plan:    Pleural drain to suction
Ms. Peralta is an 88-year-old female admitted with diagnosis of COPD exacerbation, required intubation and ventilatory support, developed large right pneumothorax treated with percutaneous pleural drain with adequate lung re-expansion. Thoracic surgery consulted for assistance in care. Currently in low ventilatory support. CXR: drain in good position, no residual pneumothorax.    06/14/12: Pleural drain working well, no air leak, CXR: no pneumothorax. Failed SBT yesterday, SBT today for possible extubation, will keep pleural drain until extubated   06/15/22: Pleural drain dislodged into chest wall, CXR recurrent pneumothorax   06/16/22: New pleural drain in good position, resolved pneumothorax, no air leak. Unable to wean off ventilator   06/17/22: Drain I good position, no air leak. CXR no pneumothorax.  06/21/22: Additional chest tube placed over the weekend for massive subcutaneous emphysema which is now improving, small intermittent air leak  Plan:    Pleural drains to suction
Ms. Peralta is an 88-year-old female admitted with diagnosis of COPD exacerbation, required intubation and ventilatory support, developed large right pneumothorax treated with percutaneous pleural drain with adequate lung re-expansion. Thoracic surgery consulted for assistance in care. Currently in low ventilatory support. CXR: drain in good position, no residual pneumothorax.   06/14/12: Pleural drain working well, no air leak, CXR: no pneumothorax. Failed SBT yesterday, SBT today for possible extubation, will keep pleural drain until extubated  06/15/22: Pleural drain dislodged into chest wall, CXR recurrent pneumothorax  06/16/22: New pleural drain in good position, resolved pneumothorax, no air leak. Unable to wean off ventilator  Plan:   Pleural drain to suction
Ms. Peralta is an 88-year-old female admitted with diagnosis of COPD exacerbation, required intubation and ventilatory support, developed large right pneumothorax treated with percutaneous pleural drain with adequate lung re-expansion. Thoracic surgery consulted for assistance in care. Currently in low ventilatory support. CXR: drain in good position, no residual pneumothorax.   06/11/12: Pleural drain working well, no air leak, CXR: no pneumothorax  Plan:   Continue drain to suction.
Ms. Peralta is an 88-year-old female admitted with diagnosis of COPD exacerbation, required intubation and ventilatory support, developed large right pneumothorax treated with percutaneous pleural drain with adequate lung re-expansion. Thoracic surgery consulted for assistance in care. Currently in low ventilatory support. CXR: drain in good position, no residual pneumothorax.   06/14/12: Pleural drain working well, no air leak, CXR: no pneumothorax. Failed SBT yesterday, SBT today for possible extubation, will keep pleural drain until extubated  06/15/22: Pleural drain dislodged into chest wall, CXR recurrent pneumothorax  Plan:   Place new pleural drain
Ms. Peralta is an 88-year-old female admitted with diagnosis of COPD exacerbation, required intubation and ventilatory support, developed large right pneumothorax treated with percutaneous pleural drain with adequate lung re-expansion. Thoracic surgery consulted for assistance in care. Currently in low ventilatory support. CXR: drain in good position, no residual pneumothorax.    06/14/12: Pleural drain working well, no air leak, CXR: no pneumothorax. Failed SBT yesterday, SBT today for possible extubation, will keep pleural drain until extubated   06/15/22: Pleural drain dislodged into chest wall, CXR recurrent pneumothorax   06/16/22: New pleural drain in good position, resolved pneumothorax, no air leak. Unable to wean off ventilator   06/17/22: Drain I good position, no air leak. CXR no pneumothorax.  06/21/22: Additional chest tube placed over the weekend for massive subcutaneous emphysema which is now improving, small intermittent air leak  06/22/22: Stable, improving subcutaneous emphysema, pleural drains in place.  06/23/22: Stable, CXR no pneumothorax  06/24/22: Added on for trach and PEG, chest tube in place, no air leak.  06/25/22: family decided not to proceed with trach/PEG, possible CMO  Plan:    Pleural drain to water seal  Will sign off
Ms. Peralta is an 88-year-old female admitted with diagnosis of COPD exacerbation, required intubation and ventilatory support, developed large right pneumothorax treated with percutaneous pleural drain with adequate lung re-expansion. Thoracic surgery consulted for assistance in care. Currently in low ventilatory support. CXR: drain in good position, no residual pneumothorax.    06/14/12: Pleural drain working well, no air leak, CXR: no pneumothorax. Failed SBT yesterday, SBT today for possible extubation, will keep pleural drain until extubated   06/15/22: Pleural drain dislodged into chest wall, CXR recurrent pneumothorax   06/16/22: New pleural drain in good position, resolved pneumothorax, no air leak. Unable to wean off ventilator   06/17/22: Drain I good position, no air leak. CXR no pneumothorax.  06/21/22: Additional chest tube placed over the weekend for massive subcutaneous emphysema which is now improving, small intermittent air leak  06/22/22: Stable, improving subcutaneous emphysema, pleural drains in place.  Plan:    Pleural drains to water seal
Agree with above, patient made CMO today, palliatively extubated with symptom management as above; subsequently passed away  ______________  Talat Senior MD  Palliative Medicine  [facility]   of Geriatric and Palliative Medicine  [phone]
Agree with above, patient comfortable, on vent, IV abx; eventual palliative withdrawal of care  ______________  Talat Senior MD  Palliative Medicine  [facility]   of Geriatric and Palliative Medicine  [phone]

## 2022-06-27 NOTE — PROGRESS NOTE ADULT - ASSESSMENT
89 y/o female with PMHx of COPD on home O2, pHTN, GERD, Lucio's esophagus, COVID vaccinated presented to the ED for acute onset of sob.    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 In agreement with plan of care.  Liberation today   Disposition: Home_____/SNF______/Other_____/Unknown at this time__x___  Spent over 35 min reviewing chart and on coordinating patient care during interdisciplinary rounds     Please call me with any questions at extension [phone]

## 2022-06-27 NOTE — PROVIDER CONTACT NOTE (OTHER) - ACTION/TREATMENT ORDERED:
STAT labs to be ordered. Will continue to monitor STAT labs ordered and sent. Will continue to monitor

## 2022-06-27 NOTE — PROGRESS NOTE ADULT - ASSESSMENT
IMPRESSION:    Acute on Chronic Hypercapnic Respiratory Failure requiring MV  COPD exacerbation  Secondary Spontaneous Pneumothorax SP pig tail  HFmrEF (EF 45%), Mod TR mild PHTN    PLAN:    CNS:  Adequate sedation.    HEENT: Oral care    PULMONARY:  HOB @ 45 degrees.  Vent changes: None.  ABG PRN.  Chest tube to water seal.  Nebs Q6 and PRN.     CARDIOVASCULAR:   Target MAP > 60.  Avoid overload.        GI: GI prophylaxis.  OG Feeding.  Bowel regimen.    RENAL:  Follow up lytes.  Correct as needed.     INFECTIOUS DISEASE:  Continue ABX     HEMATOLOGICAL:  DVT prophylaxis.     ENDOCRINE:  Follow up FS.  Insulin protocol if needed.     MUSCULOSKELETAL:  Bed rest    CODE STATUS: DNR + CMO.  Palliative following, terminal weaning on Monday.    Monitor in SDU IMPRESSION:    Acute on Chronic Hypercapnic Respiratory Failure requiring MV  COPD exacerbation  Secondary Spontaneous Pneumothorax SP pig tail  HFmrEF (EF 45%), Mod TR mild PHTN    PLAN:    CNS:  Adequate sedation.     HEENT: Oral care    PULMONARY:  HOB @ 45 degrees.  Vent changes: None.  FOr liberation today     CARDIOVASCULAR:   Target MAP > 60.  Avoid overload.        GI: GI prophylaxis.  NPO     RENAL: NO BW .     INFECTIOUS DISEASE:  DC ABX     HEMATOLOGICAL:  DVT prophylaxis.     ENDOCRINE:  NO FS.,  No Insulin     MUSCULOSKELETAL:  Bed rest    CODE STATUS: DNR + CMO.  Liberation today     DW Family at the bed side

## 2022-06-27 NOTE — PROGRESS NOTE ADULT - PROBLEM SELECTOR PLAN 3
- called daughter, she is visiting today and would like to speak with ICU team   - after discussion, will d/w her re: trach
- new MOLST completed: DNR/DNI/CMO
- see above, daughter unable to come in, GOC conversation held via phone

## 2022-06-27 NOTE — PROGRESS NOTE ADULT - ASSESSMENT
88yFemale being evaluated for goals of care and symptom management. Palliative care consulted for GOC.  Spoke to primary team/ and bedside nurse that daughter does not want trach/PEG wants pall vent withdrawal.      MEDD (morphine equivalent daily dose): Fentanyl  drip       See Recs below.    Please call [phone] with questions or concerns 24/7.   We will continue to follow.

## 2022-06-27 NOTE — PROGRESS NOTE ADULT - SUBJECTIVE AND OBJECTIVE BOX
Patient is a 88y old  Female who presents with a chief complaint of respiratory distress (26 Jun 2022 12:13)        Over Night Events:  On MV.  For liberation         ROS:     All ROS are negative except HPI         PHYSICAL EXAM    ICU Vital Signs Last 24 Hrs  T(C): 35.9 (27 Jun 2022 03:57), Max: 38 (26 Jun 2022 21:00)  T(F): 96.7 (27 Jun 2022 03:57), Max: 100.4 (26 Jun 2022 21:00)  HR: 98 (27 Jun 2022 03:57) (91 - 121)  BP: 103/54 (27 Jun 2022 03:57) (96/54 - 139/63)  BP(mean): 75 (26 Jun 2022 18:00) (70 - 90)  ABP: --  ABP(mean): --  RR: 20 (27 Jun 2022 03:57) (18 - 20)  SpO2: 100% (27 Jun 2022 03:57) (97% - 100%)      CONSTITUTIONAL:  Ill appearing in  NAD    ENT:   Airway patent,   Mouth with normal mucosa.     EYES:   Pupils equal,   Round and reactive to light.    CARDIAC:   Normal rate,   Regular rhythm.      RESPIRATORY:   No wheezing  Bilateral BS  Normal chest expansion  Not tachypneic,  No use of accessory muscles    GASTROINTESTINAL:  Abdomen soft,   Non-tender,   No guarding,   + BS    MUSCULOSKELETAL:   Range of motion is not limited,  No clubbing, cyanosis    NEUROLOGICAL:   Sedated   Does not follow commands     SKIN:   Skin normal color for race,   No evidence of rash.    PSYCHIATRIC:   Sedated    No apparent risk to self or others.        06-26-22 @ 07:01  -  06-27-22 @ 07:00  --------------------------------------------------------  IN:    Enteral Tube Flush: 210 mL    FentaNYL: 48 mL    IV PiggyBack: 350 mL    Midazolam: 44 mL    Peptamen A.F.: 840 mL  Total IN: 1492 mL    OUT:    Chest Tube (mL): 0 mL    Dexmedetomidine: 0 mL    Indwelling Catheter - Urethral (mL): 1230 mL  Total OUT: 1230 mL    Total NET: 262 mL          LABS:                            9.2    29.64 )-----------( 358      ( 27 Jun 2022 04:23 )             28.5                                               06-27    139  |  100  |  66<HH>  ----------------------------<  157<H>  5.0   |  30  |  0.6<L>    Ca    8.2<L>      27 Jun 2022 04:23  Mg     2.3     06-27    TPro  4.6<L>  /  Alb  2.0<L>  /  TBili  0.3  /  DBili  x   /  AST  16  /  ALT  21  /  AlkPhos  59  06-27                                                                                           LIVER FUNCTIONS - ( 27 Jun 2022 04:23 )  Alb: 2.0 g/dL / Pro: 4.6 g/dL / ALK PHOS: 59 U/L / ALT: 21 U/L / AST: 16 U/L / GGT: x                                                                                               Mode: AC/ CMV (Assist Control/ Continuous Mandatory Ventilation)  RR (machine): 14  TV (machine): 300  FiO2: 30  PEEP: 7  ITime: 1  MAP: 12  PIP: 28                                          MEDICATIONS  (STANDING):  ALBUTerol    90 MICROgram(s) HFA Inhaler 8 Puff(s) Inhalation every 6 hours  caspofungin IVPB      caspofungin IVPB 50 milliGRAM(s) IV Intermittent every 24 hours  cefepime   IVPB 1000 milliGRAM(s) IV Intermittent every 12 hours  chlorhexidine 0.12% Liquid 15 milliLiter(s) Oral Mucosa two times a day  chlorhexidine 4% Liquid 1 Application(s) Topical <User Schedule>  clonazePAM  Tablet 0.5 milliGRAM(s) Oral every 12 hours  dexMEDEtomidine Infusion 0.05 MICROgram(s)/kG/Hr (0.63 mL/Hr) IV Continuous <Continuous>  dextrose 5%. 1000 milliLiter(s) (100 mL/Hr) IV Continuous <Continuous>  dextrose 5%. 1000 milliLiter(s) (50 mL/Hr) IV Continuous <Continuous>  dextrose 50% Injectable 25 Gram(s) IV Push once  dextrose 50% Injectable 12.5 Gram(s) IV Push once  dextrose 50% Injectable 25 Gram(s) IV Push once  enoxaparin Injectable 40 milliGRAM(s) SubCutaneous every 24 hours  fentaNYL   Infusion. 0.5 MICROgram(s)/kG/Hr (2.02 mL/Hr) IV Continuous <Continuous>  glucagon  Injectable 1 milliGRAM(s) IntraMuscular once  insulin glargine Injectable (LANTUS) 8 Unit(s) SubCutaneous at bedtime  insulin lispro (ADMELOG) corrective regimen sliding scale   SubCutaneous every 6 hours  ipratropium 17 MICROgram(s) HFA Inhaler 8 Puff(s) Inhalation every 6 hours  midazolam Infusion 0.02 mG/kG/Hr (0.81 mL/Hr) IV Continuous <Continuous>  midodrine 10 milliGRAM(s) Oral every 8 hours  pantoprazole   Suspension 40 milliGRAM(s) Oral before breakfast  pantoprazole  Injectable 40 milliGRAM(s) IV Push two times a day  polyethylene glycol 3350 17 Gram(s) Oral daily  QUEtiapine 75 milliGRAM(s) Oral every 12 hours  senna 2 Tablet(s) Oral at bedtime  vitamin A &amp; D Ointment 1 Application(s) Topical daily    MEDICATIONS  (PRN):  acetaminophen     Tablet .. 650 milliGRAM(s) Oral every 6 hours PRN Temp greater or equal to 38C (100.4F), Mild Pain (1 - 3)  dextrose Oral Gel 15 Gram(s) Oral once PRN Blood Glucose LESS THAN 70 milliGRAM(s)/deciliter  morphine  - Injectable 2 milliGRAM(s) IV Push every 6 hours PRN Severe Pain (7 - 10)      New X-rays reviewed:                                                                                  ECHO

## 2022-06-27 NOTE — PROGRESS NOTE ADULT - REASON FOR ADMISSION
respiratory distress

## 2022-06-27 NOTE — PROGRESS NOTE ADULT - PROBLEM SELECTOR PLAN 4
- will follow  - d/w team  ______________  Talat Senior MD  Palliative Medicine  [facility]  [phone]
DNR/DNI, CMO and vent withdrawal today
End stage illness, on vent. Plan for  palliative vent withdrawal  morphine IV PRN pain

## 2022-06-27 NOTE — PROGRESS NOTE ADULT - NS ATTEND OPT1 GEN_ALL_CORE
I independently performed the documented:
I attest my time as attending is greater than 50% of the total combined time spent on qualifying patient care activities by the PA/NP and attending.
I attest my time as attending is greater than 50% of the total combined time spent on qualifying patient care activities by the PA/NP and attending.

## 2022-06-27 NOTE — PROGRESS NOTE ADULT - SUBJECTIVE AND OBJECTIVE BOX
HPI:  87 y/o female with PMHx of COPD not on home O2, pHTN, GERD, Lucio's esophagus, COVID vaccinated presented to the ED for acute onset of sob. As per nurse aide, pt was asymptomatic sitting on the couch, when she became acutely sob. pt was brought to ED and was tachypneic and answering questions with one word answers, and struggling to breath. (10 Crow 2022 01:25)    INTERVAL EVENTS:  6/23: patient remains intubated  6/24: patient remains intubated for downgrade, poor prognosis overall     ADVANCE DIRECTIVES:    DNR  MOLST  [ ]  Living Will  [ ]   DECISION MAKER(s):  [ ] Health Care Proxy(s)  [x ] Surrogate(s)  [ ] Guardian           Name(s): Phone Number(s): Jose M Vaughan daughter     BASELINE (I)ADL(s) (prior to admission):  [place]: [ ]Total  [ ] Moderate [ ]Dependent  Palliative Performance Status Version 2:         %    http://npcrc.org/files/news/palliative_performance_scale_ppsv2.pdf    Allergies    No Known Allergies    Intolerances    MEDICATIONS  (STANDING):  fentaNYL   Infusion. 0.5 MICROgram(s)/kG/Hr (2.02 mL/Hr) IV Continuous <Continuous>  midazolam Infusion 0.02 mG/kG/Hr (0.81 mL/Hr) IV Continuous <Continuous>    MEDICATIONS  (PRN):  morphine  - Injectable 2 milliGRAM(s) IV Push every 6 hours PRN Severe Pain (7 - 10)    PRESENT SYMPTOMS: [x ]Unable to obtain due to poor mentation   Source if other than patient:  [ ]Family   [ ]Team     Pain: [ ]yes [ ]no  QOL impact -   Location -                    Aggravating factors -  Quality -  Radiation -  Timing-  Severity (0-10 scale):  Minimal acceptable level (0-10 scale):     CPOT:    https://www.sccm.org/getattachment/fzv40h89-2d6w-4s2c-4f7n-8068p9408a5a/Critical-Care-Pain-Observation-Tool-(CPOT)      PAIN AD Score:     http://geriatrictoolkit.[facility]/cog/painad.pdf (press ctrl +  left click to view)    Dyspnea:                           [ ]Mild [ ]Moderate [ ]Severe  Anxiety:                             [ ]Mild [ ]Moderate [ ]Severe  Fatigue:                             [ ]Mild [ ]Moderate [ ]Severe  Nausea:                             [ ]Mild [ ]Moderate [ ]Severe  Loss of appetite:              [ ]Mild [ ]Moderate [ ]Severe  Constipation:                    [ ]Mild [ ]Moderate [ ]Severe    Other Symptoms:  [ ]All other review of systems negative     Palliative Performance Status Version 2:         %    http://[facility].org/files/news/palliative_performance_scale_ppsv2.pdf    PHYSICAL EXAM:  Vital Signs Last 24 Hrs  T(C): 36.1 (27 Jun 2022 08:23), Max: 38 (26 Jun 2022 21:00)  T(F): 97 (27 Jun 2022 08:23), Max: 100.4 (26 Jun 2022 21:00)  HR: 115 (27 Jun 2022 08:23) (91 - 121)  BP: 122/59 (27 Jun 2022 08:23) (96/54 - 139/63)  BP(mean): 75 (26 Jun 2022 18:00) (70 - 90)  RR: 20 (27 Jun 2022 08:23) (18 - 20)  SpO2: 100% (27 Jun 2022 08:23) (97% - 100%)    GENERAL:  [ ]Alert  [ ]Oriented x   [ ]Lethargic  [ ]Cachexia  [x ]Unarousable  [ ]Verbal  [ ]Non-Verbal  Behavioral:  unable to assess   [ ] Anxiety  [ ] Delirium [ ] Agitation [ ] Other  HEENT:  [ ]Normal   [ ]Dry mouth   [x ]ET Tube/Trach  [ ]Oral lesions  PULMONARY:   [ ]Clear [x ]Tachypnea  [ ]Audible excessive secretions   [ ]Rhonchi        [ ]Right [ ]Left [ ]Bilateral  [ ]Crackles        [ ]Right [ ]Left [ ]Bilateral  [ ]Wheezing     [ ]Right [ ]Left [ ]Bilateral  [ x]Diminished breath sounds [ ]right [ ]left [ ]bilateral  CARDIOVASCULAR:    [ ]Regular [ ]Irregular [x ]Tachy  [ ]Lucas [ ]Murmur [ ]Other  GASTROINTESTINAL:  [ ]Soft  [ ]Distended   [ ]+BS  [ ]Non tender [ ]Tender  [ ]PEG [ x]OGT/ NGT  Last BM: 6/22   GENITOURINARY:  [ ]Normal [ ] Incontinent   [ ]Oliguria/Anuria   [x]Guzman  MUSCULOSKELETAL:   [ ]Normal   [x ]Weakness  [ ]Bed/Wheelchair bound [ ]Edema  NEUROLOGIC:   [ ]No focal deficits  [ ]Cognitive impairment  [ ]Dysphagia [ ]Dysarthria [ ]Paresis [ ]Other   SKIN:   [x ]Normal    [ ]Rash  [ ]Pressure ulcer(s)       Present on admission [ ]y [ ]n    CRITICAL CARE:  [x ] Shock Present  [ ]Septic [ ]Cardiogenic [ ]Neurologic [ ]Hypovolemic  [ ]  Vasopressors [ ]  Inotropes   [x ]Respiratory failure present [x ]Mechanical ventilation [ ]Non-invasive ventilatory support [ ]High flow  [ ]Acute  [ ]Chronic [ ]Hypoxic  [ ]Hypercarbic [ ]Other  [ ]Other organ failure     Labs: reviewed     06-27    139  |  100  |  66<HH>  ----------------------------<  157<H>  5.0   |  30  |  0.6<L>    Ca    8.2<L>      27 Jun 2022 04:23  Mg     2.3     06-27    TPro  4.6<L>  /  Alb  2.0<L>  /  TBili  0.3  /  DBili  x   /  AST  16  /  ALT  21  /  AlkPhos  59  06-27                            9.2    29.64 )-----------( 358      ( 27 Jun 2022 04:23 )             28.5               RADIOLOGY & ADDITIONAL STUDIES:    PROTEIN CALORIE MALNUTRITION PRESENT: [ ]mild [ ]moderate [ ]severe [ ]underweight [ ]morbid obesity  https://www.andeal.org/vault/2440/web/files/ONC/Table_Clinical%20Characteristics%20to%20Document%20Malnutrition-White%20JV%20et%20al%202012.pdf    Height (cm): 147.3 (06-10-22 @ 22:45), 147.3 (12-18-21 @ 20:05)  Weight (kg): 40.3 (06-10-22 @ 22:45)  BMI (kg/m2): 18.6 (06-10-22 @ 22:45)    [ ]PPSV2 < or = to 30% [ ]significant weight loss  [ ]poor nutritional intake  [ ]anasarca      [ ]Artificial Nutrition      REFERRALS:   [ ]Chaplaincy  [ ]Hospice  [ ]Child Life  x ]Social Work  [ ]Case management [ ]Holistic Therapy     Goals of Care Document:    HPI:  89 y/o female with PMHx of COPD not on home O2, pHTN, GERD, Lucio's esophagus, COVID vaccinated presented to the ED for acute onset of sob. As per nurse aide, pt was asymptomatic sitting on the couch, when she became acutely sob. pt was brought to ED and was tachypneic and answering questions with one word answers, and struggling to breath. (10 Crow 2022 01:25)    INTERVAL EVENTS:  6/23: patient remains intubated  6/24: patient remains intubated for downgrade, poor prognosis overall   6/27: patient intubated, goals for extubation today    ADVANCE DIRECTIVES:    DNR  MOLST  [ ]  Living Will  [ ]   DECISION MAKER(s):  [ ] Health Care Proxy(s)  [x ] Surrogate(s)  [ ] Guardian           Name(s): Phone Number(s): Jose M Vaughan daughter     BASELINE (I)ADL(s) (prior to admission):  [place]: [ ]Total  [ ] Moderate [ ]Dependent  Palliative Performance Status Version 2:         %    http://npcrc.org/files/news/palliative_performance_scale_ppsv2.pdf    Allergies    No Known Allergies    Intolerances    MEDICATIONS  (STANDING):  fentaNYL   Infusion. 0.5 MICROgram(s)/kG/Hr (2.02 mL/Hr) IV Continuous <Continuous>  midazolam Infusion 0.02 mG/kG/Hr (0.81 mL/Hr) IV Continuous <Continuous>    MEDICATIONS  (PRN):  morphine  - Injectable 2 milliGRAM(s) IV Push every 6 hours PRN Severe Pain (7 - 10)    PRESENT SYMPTOMS: [x ]Unable to obtain due to poor mentation   Source if other than patient:  [ ]Family   [ ]Team     Pain: [ ]yes [ ]no  QOL impact -   Location -                    Aggravating factors -  Quality -  Radiation -  Timing-  Severity (0-10 scale):  Minimal acceptable level (0-10 scale):     CPOT:    https://www.sccm.org/getattachment/gxo71h10-3q8z-1s4t-9d4n-1037f1043x2e/Critical-Care-Pain-Observation-Tool-(CPOT)      PAIN AD Score: 0    http://geriatrictoolkit.[place]/cog/painad.pdf (press ctrl +  left click to view)    Dyspnea:                           [ ]Mild [ ]Moderate [ ]Severe  Anxiety:                             [ ]Mild [ ]Moderate [ ]Severe  Fatigue:                             [ ]Mild [ ]Moderate [ ]Severe  Nausea:                             [ ]Mild [ ]Moderate [ ]Severe  Loss of appetite:              [ ]Mild [ ]Moderate [ ]Severe  Constipation:                    [ ]Mild [ ]Moderate [ ]Severe    Other Symptoms:  [ ]All other review of systems negative     Palliative Performance Status Version 2:         %    http://[facility].org/files/news/palliative_performance_scale_ppsv2.pdf    PHYSICAL EXAM:  Vital Signs Last 24 Hrs  T(C): 36.1 (27 Jun 2022 08:23), Max: 38 (26 Jun 2022 21:00)  T(F): 97 (27 Jun 2022 08:23), Max: 100.4 (26 Jun 2022 21:00)  HR: 115 (27 Jun 2022 08:23) (91 - 121)  BP: 122/59 (27 Jun 2022 08:23) (96/54 - 139/63)  BP(mean): 75 (26 Jun 2022 18:00) (70 - 90)  RR: 20 (27 Jun 2022 08:23) (18 - 20)  SpO2: 100% (27 Jun 2022 08:23) (97% - 100%)    GENERAL:  [ ]Alert  [ ]Oriented x   [ ]Lethargic  [ ]Cachexia  [x ]Unarousable  [ ]Verbal  [ ]Non-Verbal  Behavioral:  unable to assess   [ ] Anxiety  [ ] Delirium [ ] Agitation [x ] Other calm  HEENT:  [ ]Normal   [ ]Dry mouth   [x ]ET Tube/Trach  [ ]Oral lesions  PULMONARY:   [ ]Clear [x ]Tachypnea  [ ]Audible excessive secretions   [ ]Rhonchi        [ ]Right [ ]Left [ ]Bilateral  [ ]Crackles        [ ]Right [ ]Left [ ]Bilateral  [ ]Wheezing     [ ]Right [ ]Left [ ]Bilateral  [ x]Diminished breath sounds [ ]right [ ]left [ ]bilateral  CARDIOVASCULAR:    [ ]Regular [ ]Irregular [x ]Tachy  [ ]Lucas [ ]Murmur [ ]Other  GASTROINTESTINAL:  [ ]Soft  [ ]Distended   [ ]+BS  [ ]Non tender [ ]Tender  [ ]PEG [ x]OGT/ NGT  Last BM: 6/22   GENITOURINARY:  [ ]Normal [ ] Incontinent   [ ]Oliguria/Anuria   [x]Guzman  MUSCULOSKELETAL:   [ ]Normal   [x ]Weakness  [ ]Bed/Wheelchair bound [ ]Edema  NEUROLOGIC:   [ ]No focal deficits  [ ]Cognitive impairment  [ ]Dysphagia [ ]Dysarthria [ ]Paresis [ ]Other   SKIN:   [x ]Normal    [ ]Rash  [ ]Pressure ulcer(s)       Present on admission [ ]y [ ]n    CRITICAL CARE:  [x ] Shock Present  [ ]Septic [ ]Cardiogenic [ ]Neurologic [ ]Hypovolemic  [ ]  Vasopressors [ ]  Inotropes   [x ]Respiratory failure present [x ]Mechanical ventilation [ ]Non-invasive ventilatory support [ ]High flow  [ ]Acute  [ ]Chronic [ ]Hypoxic  [ ]Hypercarbic [ ]Other  [ ]Other organ failure     Labs: reviewed     06-27    139  |  100  |  66<HH>  ----------------------------<  157<H>  5.0   |  30  |  0.6<L>    Ca    8.2<L>      27 Jun 2022 04:23  Mg     2.3     06-27    TPro  4.6<L>  /  Alb  2.0<L>  /  TBili  0.3  /  DBili  x   /  AST  16  /  ALT  21  /  AlkPhos  59  06-27                            9.2    29.64 )-----------( 358      ( 27 Jun 2022 04:23 )             28.5               RADIOLOGY & ADDITIONAL STUDIES:    PROTEIN CALORIE MALNUTRITION PRESENT: [ ]mild [ ]moderate [ ]severe [ ]underweight [ ]morbid obesity  https://www.andeal.org/vault/2440/web/files/ONC/Table_Clinical%20Characteristics%20to%20Document%20Malnutrition-White%20JV%20et%20al%202012.pdf    Height (cm): 147.3 (06-10-22 @ 22:45), 147.3 (12-18-21 @ 20:05)  Weight (kg): 40.3 (06-10-22 @ 22:45)  BMI (kg/m2): 18.6 (06-10-22 @ 22:45)    [ ]PPSV2 < or = to 30% [ ]significant weight loss  [ ]poor nutritional intake  [ ]anasarca      [ ]Artificial Nutrition      REFERRALS:   [ ]Chaplaincy  [ ]Hospice  [ ]Child Life  x ]Social Work  [ ]Case management [ ]Holistic Therapy     Goals of Care Document:

## 2022-06-27 NOTE — PROVIDER CONTACT NOTE (OTHER) - ASSESSMENT
Patient assessed, bleeding per rectum- dark red blood.
VSS
MD made aware the precedex is already maxed and pt did not respond to increasinf fent to max.

## 2022-06-27 NOTE — PROGRESS NOTE ADULT - PROBLEM SELECTOR PLAN 1
DNR/DNI, CMO and vent withdrawal today - morphine 4mg IV and ativan 0.5mg IV prior to extubation and PRN thereafter (Q15 mins, Q1 hour respsectively)  - robinul PRN

## 2022-07-12 NOTE — CDI QUERY NOTE - NSCDIOTHERTXTBX_GEN_ALL_CORE_HH
CLINICAL INDICATORS  ,  Progress Note Adult-Critical Care Resident: Skin: sacral DTI. skin tears to b/l UE and LE     Progress Note Adult-Critical Care Attending: SKIN: Stage III sacral ulcer     Discharge Note for the  Patient: Patient admitted for Acute on chronic hypercapnic respiratory failure due to  COPD exacerbation and Right sided secondary spontaneous pneumothorax s/p pigtail. Patient was not able to tolerate coming off of intubation and was evaluated for trach and peg. However patient wishes were to not continue with long term ventilation. palliative following -  terminal weaned today.    6/10 Nursing pressure injury assessment: Was Pressure Injury Present on Admission to hospital? Yes. Location-sacrum. Stage-suspected deep tissue     Advanced Practice Nurse Consult-Wound Ostomy Care: Type of wound:  Stage 3 pressure injury; evolved from Deep tissue pressure injury/DTPI; POA. Location: coccyx- b/l buttock     Nursing pressure injury assessment: Was Pressure Injury Present on Admission to hospital? Yes. Location-sacrum. Stage-III     Nursing pressure injury assessment: Was Pressure Injury Present on Admission to hospital? No. Location-sacrum. Stage-III    Based on your professional judgement and the clinical indicators, please clarify if the present on admission status of documented pressure injury can be further specified as:  • Stage 3 sacral ulcer was treated, evaluated, and present on admit  • Stage 3 sacral ulcer was treated, evaluated, and not present on admit  • Other (please specify):  • Clinically unable to determine    Thank you,  Irma Doran RN [facility]  [phone]

## 2022-07-26 NOTE — CHART NOTE - NSCHARTNOTEFT_GEN_A_CORE
As per nursing documentation, patient was found to have a Stage III sacral ulcer - unclear if present on admission
CXR 4hour post WS w/o PTX leave pigtail to WS will reassess in the AM
ICU Transfer Note    Transfer from:     Transfer to: (  ) Medicine    (  ) Telemetry    (  ) RCU                               (  ) Palliative    (  ) Stroke Unit    (  ) MICU    (  ) CCU (X) CEU    Signout given to:     ----------------------------------------------------------------------------------------------------------    HPI / ICU COURSE:     PMHx of COPD not on home O2, pHTN, GERD, Lucio's esophagus, COVID vaccinated     presented to the ED for acute onset of sob.    As per nurse aide, pt was asymptomatic sitting on the couch, when she became acutely sob. pt was brought to ED and was tachypneic and answering questions with one word answers, and struggling to breath.    In the ED, pt was placed on bipap immediately and symptoms improved and pt looks more comfortable. ABG initially shows pCO2 115 and was placed on bipap and soon after repeat pco2 was 100. per family and patient, patient was full code and decision was made to intubate the patient. Labs are overall unremarkable except wbc 19K. She received duoneb, Mg and solumedrol 125 mg x1 in ED.     6/22/22 - Patient switched from propofol to Versed 100 mg.    6/23/22: Daughter called and requested tracheostomy. BP dropped to 83/39 but then yane again after starting levo.     6/24/22 - Daughter changed her mind after speaking to attending regarding tracheostomy. She does not want tracheostomy. After speaking to [facility], wants to do palliative extubation but not sure when yet.     Pressors during ICU course: Levophed8 mg  Antibiotics: Azithromycin 500 mg Q24, Cefepime 1000mg Q12, Caspofungin 50 mg Q24    Guzman: Urinary    --------------------------------------------------------------------------------------------------------  PMH/PSH:  PAST MEDICAL & SURGICAL HISTORY:  COPD (chronic obstructive pulmonary disease)    GERD (gastroesophageal reflux disease)    Glaucoma    Anxiety    Pancreatitis    History of tonsillectomy    H/O colonoscopy  5 years ago        -------------------------------------------------------------------------------------------------------  PHYSICAL EXAM:  General: Not awake  HEENT: Atraumatic  Respiratory: Expiratory wheezing  Cardiac: RRR  Abdomen: soft, non-tender, non-distended  Extremities: warm and well-perfused  Neuro: A+Ox4. No FND    Vital Signs Last 24 Hrs  T(C): 36.8 (24 Jun 2022 04:00), Max: 36.9 (24 Jun 2022 00:00)  T(F): 98.3 (24 Jun 2022 04:00), Max: 98.4 (24 Jun 2022 00:00)  HR: 80 (24 Jun 2022 12:31) (78 - 110)  BP: 101/47 (24 Jun 2022 12:31) (24/39 - 142/58)  BP(mean): 66 (24 Jun 2022 12:31) (44 - 107)  RR: 18 (24 Jun 2022 12:31) (18 - 47)  SpO2: 100% (24 Jun 2022 12:31) (98% - 100%)    I&O's Summary    23 Jun 2022 07:01  -  24 Jun 2022 07:00  --------------------------------------------------------  IN: 1180.8 mL / OUT: 1170 mL / NET: 10.8 mL    24 Jun 2022 07:01  -  24 Jun 2022 14:42  --------------------------------------------------------  IN: 239.9 mL / OUT: 170 mL / NET: 69.9 mL        --------------------------------------------------------------------------------------------------------  LABS:                               9.6    34.06 )-----------( 383      ( 24 Jun 2022 05:00 )             28.7       06-24    144  |  104  |  67<HH>  ----------------------------<  157<H>  4.9   |  33<H>  |  0.6<L>    Ca    8.7      24 Jun 2022 05:00  Mg     2.2     06-24    TPro  4.2<L>  /  Alb  2.2<L>  /  TBili  0.3  /  DBili  x   /  AST  17  /  ALT  25  /  AlkPhos  60  06-24          ---------------------------------------------------------------------------------------------------  ASSESSMENT & PLAN:   ASSESSMENT & PLAN:   89 y/o female with PMHx of COPD on home O2, pHTN, GERD, Lucio's esophagus, COVID vaccinated presented to the ED for acute onset of sob.    # Acute on chronic hypercapnic respiratory failure s/p intubation  # COPD exacerbation  # HO COPD, O2 dependent  # R secondary spontaneous pneumothorax s/p pigtail  - presented to ED with SOB, found to be hypercapnic to 115, intubated  - post intubation CXR showed R PTX s/p R pigtail catheter, dislodged, replaced 6/15  - CTSX following  - cont solumedrol @ 60q12  - Nebs q4 and PRN  - vent changes per pulm: none today  - sedation with propofol - attempt SAT/SBT on propofol  - increase seroquel to 50mg BID, monitor qtc  - BCX x2 NGTD, UCX neg, RVP neg, urine strep/legionella neg  - daily ABG, CXR, SAT/SBT    # H/o HTN  # 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
PALLIATIVE MEDICINE INTERDISCIPLINARY TEAM NOTE    Provider:                                         [  X ] Initial visit        Met with: [  X ] Patient  [   ] Family  [   ] Other:    Primary Language: [ X  ] English [   ] Other*:                      *Interpretation provided by:    SUPPORT DIAGNOSES            (Check all that apply)    [   ] EOL issues  [ X  ] Advanced Illness  [   ] Cultural / spiritual concerns  [ X  ] Pain / suffering  [   ] Dementia / AMS  [   ] Other:  [   ] AD issues  [   ] Grief / loss / sadness  [   ] Discharge issues  [ X  ] Distress / coping    PSYCHOSOCIAL ASSESSMENT OF PATIENT         (Check all that apply)    [ X  ] Initial Assessment            [   ] Reassessment          [   ] Not Applicable this visit    Pain/suffering acuity:  [ X  ] None to mild (0-3)           [   ] Moderate (4-6)        [   ] High (7-10)    Mental Status:  [   ] Alert/oriented (x3)          [   ] Confused/Altered(x2/x1)         [ X  ] Non-resp: intubated    Functional status:  [   ] Independent w ADLs      [   ] Needs Assistance             [  X ] Bedbound/Full Care: currently     Coping:  [   ] Coping well                     [   ] Coping w/difficulty            [   ] Poor coping  [ X ] unable to assess    Support system:  [   ] Strong                              [   ] Adequate                        [   ] Inadequate    [ X ] Unable to assess      Past history and medications for:     [ ] Anxiety       [ ] Depression    [ ] Sleep disorders   [ X ] unable to assess       SERVICE PROVIDED  [   ]Discharge support / facilitation  [   ]AD / goals of care counseling                                  [   ]EOL / death / bereavement counseling  [ X  ]Counseling / support                                                [   ] Family meeting  [   ]Prayer / sacrament / ritual                                      [   ] Referral   [   ]Other                                                                       NOTE and Plan of Care (PoC):    patient is an 89 y/o F with noted pmhx, presenting with c/o acute onset of SOB. visited patient earlier today. patient is currently intubated. no family at bedside. will f/u with daughter. [phone]
Spoke to daughter at bedside. Discussed that patient will likely need trach given her dependency on mechanical ventilation at this time. Daughter is interested in the possibility of Trach/PEG if it means that she would be able to take patient home. She does not want her mother to be placed in a nursing home. She is also open to discuss other options with palliative care as her goal is to make her mother as comfortable as possible  At this time she would like the patient to be DNR, understanding that if her heart were to stop, no compressions or medications will be given to restart the heart.    Palliative Care consult placed
Spoke w daughter Jose M for medical update.  Discussed pt's respiratory failure requiring intubation and sedation. Discussed pt's difficulty with sedation and SAT. Discussed Dr. Singh's recommendation for speaking with palliative care. Jose M is not agreeable to speaking with palliative care at this time. Jose M endorses pt has severe anxiety and believes we just need to titrate sedation gradually. Her goal for her mother is for her to return home with full time aid.
right pigtail removed. right chest tube in place on water seal. CXR this evening and in AM.
As per discussion with Thoracic surgery attending, will clamp pigtail. No alterations to chest tube. will get CXR in 4 hours. If CXR is stable then will remove pigtail later today
Palliative team visited patient. patient's daughter Jose M at bedside. We discussed with Jose M palliative extubation/CMO at length. Jose M expressed her mother would never would have wanted to live this way. She wished to move forward with palliative extubation/CMO. She had notified her rabbi in community and made arrangement for appropriate next steps. All questions answered. support rendered. ISMAEL completed and placed in chart. [phone]

## 2022-07-26 NOTE — CHART NOTE - NSCHARTNOTESELECT_GEN_ALL_CORE
Palliative Care - Social Work/Event Note
clamp trial/Event Note
CT surgery/Event Note
Event Note
GOC - Goals of Care/Event Note
Palliative Care Social Work - Initial Assessment/Event Note
Transfer Note

## 2022-09-12 NOTE — ED PROVIDER NOTE - RATE
Nutrition Goals    Look into cookbooks   Aim to follow plate method at lunch/dinner  Continue being primary    Calcium needs: 1200 mg/day from all sources     Resources:     Starchy vegetables:   Corn  Potatoes   Green peas  Beans/lentils     Protein ideas:  Shrimp  Turkey  Chicken   Fish   Eggs  Cottage cheese (if able to tolerate)   Beans/lentils     Types of fats  https://www.[facility].[place].[facility]/nutritionsource/what-should-you-eat/fats-and-cholesterol/types-of-fat/    Tips for lowering sodium content of meals  http://www.[employer])/918083.pdf      Mediterranean Diet Resources    https://www.health.[place].edu/staying-healthy/foods-that-fight-inflammation     https://www.heart.org/en/healthy-living/healthy-eating/eat-smart/nutrition-basics/mediterranean-diet     https://[employer].org/traditional-diets/mediterranean-diet     https://my.[facility].org/health/articles/16037-mediterranean-diet       Calcium sources:     Seeds (poppy, sesame, celery, sabrina)  Cheese (recommend low-fat or skim)  Yogurt  Cottage cheese  Beans/lentils   Almonds  Milk  Whey protein   Leafy greens   Edamame and tofu  Figs    The Plate Method:  https://www.cdc.gov/diabetes/images/managing/Diabetes-Manage-Eat-Well-Plate-Graphic_600px.jpg     Summary of Volumetrics Eating Plan  http://fvfiles.com/842952.pdf     Follow-Up:  Monday, October 17th at 1:00 pm    GAMAL Fischer, RD, LD  Clinic #: [phone]     96

## 2022-10-10 NOTE — CARDIOLOGY SUMMARY
[de-identified] : 6-4-2021 NSR RSR' in V1 and V2 .  [de-identified] : 4- Normal LV systolic function mild MR Mod TR RVSP was 52 mmhg  [___] : [unfilled] No

## 2022-10-12 ENCOUNTER — APPOINTMENT (OUTPATIENT)
Age: 87
End: 2022-10-12

## 2022-10-12 ENCOUNTER — APPOINTMENT (OUTPATIENT)
Dept: CARDIOLOGY | Facility: CLINIC | Age: 87
End: 2022-10-12

## 2022-10-13 NOTE — ASSESSMENT
[FreeTextEntry1] : The patient has had her baseline KONG . She has not been sick during the pandemic . She does have some degree of pulmonary HTN . This may be from her lung issues . She does have moderate to severe TR . No clinical signs however of right sided HF . . The patient has had previous pancreatitis and had increased Lipase and Amylase . The pancreas and gallbladder were normal on recent CT scan of the abdomen .  Until seen in clinic

## 2022-10-26 NOTE — ED PROVIDER NOTE - WR INTERPRETED BY 2
Juan Carlos Link Dapsone Pregnancy And Lactation Text: This medication is Pregnancy Category C and is not considered safe during pregnancy or breast feeding.

## 2023-01-11 NOTE — ASU PREOP CHECKLIST - TEMPERATURE IN FAHRENHEIT (DEGREES F)
97.6 SSKI Counseling:  I discussed with the patient the risks of SSKI including but not limited to thyroid abnormalities, metallic taste, GI upset, fever, headache, acne, arthralgias, paraesthesias, lymphadenopathy, easy bleeding, arrhythmias, and allergic reaction.
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
## 2023-05-03 NOTE — DISCUSSION/SUMMARY
[FreeTextEntry1] : COPD EXACERBATION/ ON OXYGEN BETTER\par SOB/ COUGH Complex Repair And O-T Advancement Flap Text: The defect edges were debeveled with a #15 scalpel blade.  The primary defect was closed partially with a complex linear closure.  Given the location of the remaining defect, shape of the defect and the proximity to free margins an O-T advancement flap was deemed most appropriate for complete closure of the defect.  Using a sterile surgical marker, an appropriate advancement flap was drawn incorporating the defect and placing the expected incisions within the relaxed skin tension lines where possible.    The area thus outlined was incised deep to adipose tissue with a #15 scalpel blade.  The skin margins were undermined to an appropriate distance in all directions utilizing iris scissors.

## 2023-08-19 NOTE — HISTORY OF PRESENT ILLNESS
[FreeTextEntry1] : The patient has COPD since 2000. She had stopped smoking more than 30 years ago. Overall she has no chest pain . She does have SOB with the slightest exertion. 
Refer to the Assessment tab to view/cancel completed assessment.

## 2023-09-24 NOTE — DIETITIAN INITIAL EVALUATION ADULT - SIGNS/SYMPTOMS
current EN regimen at goal provides 118% of estimated kcal needs There are no Wet Read(s) to document.
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
## 2025-02-26 NOTE — PATIENT PROFILE ADULT - MONEY FOR FOOD
Interventional Radiology -- Procedure Note    Patient Name:  Yvonne Miller   Surgical Date:  2/26/2025  MRN:  [id number]     Pre-op diagnosis:  Left thyroid nodule  Post-op diagnosis:  same    Surgeon:  Maria Teresa Troy PA-C   Assistant:  None    Anesthesia:  Local  Anesthesiologist:  N/A    Procedure:  US Guided Left 1.8cm thyroid nodule biopsy    EBL:  Minimal    Specimen: Sent to the lab    Implants:  * No implants in log *    Complications:  None      Findings:  Successful FNA x 5 of a LEFT 1.8cm thyroid nodule- please note in the impression on her US Thyroid 2/3/25 the lesion is identified as a right thyroid nodule. This is incorrect and Diagnostic Radiology has been notified.     1 of the passes was sent for AFRIMA.    Full report to follow.      Maria Teresa Troy PA-C   2/26/2025     
no